# Patient Record
Sex: MALE | Race: WHITE | NOT HISPANIC OR LATINO | Employment: OTHER | URBAN - METROPOLITAN AREA
[De-identification: names, ages, dates, MRNs, and addresses within clinical notes are randomized per-mention and may not be internally consistent; named-entity substitution may affect disease eponyms.]

---

## 2017-09-09 ENCOUNTER — HOSPITAL ENCOUNTER (EMERGENCY)
Facility: HOSPITAL | Age: 57
Discharge: HOME | End: 2017-09-09
Attending: INTERNAL MEDICINE
Payer: COMMERCIAL

## 2017-09-09 VITALS
BODY MASS INDEX: 29.35 KG/M2 | DIASTOLIC BLOOD PRESSURE: 105 MMHG | RESPIRATION RATE: 16 BRPM | SYSTOLIC BLOOD PRESSURE: 164 MMHG | HEART RATE: 64 BPM | OXYGEN SATURATION: 98 % | TEMPERATURE: 98 F | HEIGHT: 70 IN | WEIGHT: 205 LBS

## 2017-09-09 DIAGNOSIS — S99.921A INJURY OF HEEL, RIGHT, INITIAL ENCOUNTER: ICD-10-CM

## 2017-09-09 DIAGNOSIS — T14.8XXA SKIN ABRASION: Primary | ICD-10-CM
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
## 2017-09-09 PROCEDURE — 99284 EMERGENCY DEPT VISIT MOD MDM: CPT
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
## 2018-05-08 ENCOUNTER — OFFICE VISIT (OUTPATIENT)
Dept: OPHTHALMOLOGY | Facility: CLINIC | Age: 58
End: 2018-05-08
Payer: COMMERCIAL

## 2018-05-08 DIAGNOSIS — H52.03 HYPEROPIA WITH PRESBYOPIA OF BOTH EYES: ICD-10-CM

## 2018-05-08 DIAGNOSIS — Z13.5 GLAUCOMA SCREENING: ICD-10-CM

## 2018-05-08 DIAGNOSIS — H02.401 PTOSIS OF EYELID, RIGHT: ICD-10-CM

## 2018-05-08 DIAGNOSIS — H52.4 HYPEROPIA WITH PRESBYOPIA OF BOTH EYES: ICD-10-CM

## 2018-05-08 DIAGNOSIS — H50.05 ESOTROPIA, ALTERNATING: Primary | ICD-10-CM

## 2018-05-08 PROCEDURE — 99999 PR PBB SHADOW E&M-EST. PATIENT-LVL I: CPT | Mod: PBBFAC,,, | Performed by: OPTOMETRIST

## 2018-05-08 PROCEDURE — 92015 DETERMINE REFRACTIVE STATE: CPT | Mod: S$GLB,,, | Performed by: OPTOMETRIST

## 2018-05-08 PROCEDURE — 92004 COMPRE OPH EXAM NEW PT 1/>: CPT | Mod: S$GLB,,, | Performed by: OPTOMETRIST

## 2018-05-08 NOTE — PROGRESS NOTES
HPI     Blurred Vision    Additional comments: At near and distance           Comments   NP to [facility].  Last eye exam was about 18 years ago  Pt c/o blurred vision at near and distance  Wears +2.00 OTC readers  No other complaints  No Drops  1. Eye Muscle SX at age 21.  Used corrective glasses as a child for   esotropia.  2.  Ptosis right eye has been getting progressively worse over the years.    Varies according to fatigue.         Last edited by Ashley Camarillo, OD on 5/8/2018 11:33 AM. (History)            Assessment /Plan     For exam results, see Encounter Report.    Esotropia, alternating    Ptosis of eyelid, right    Glaucoma screening    Hyperopia with presbyopia of both eyes      SP strabismus surgery (cosmetic, age 21) with stable alternating esotropia.  Briefly discussed ptosis surgery.   Patient deferred surgery consult.  Glaucoma screening and fundus exam normal.  Latent hyperopia becoming manifest.  Multifocal prescription.  Return to clinic 2 yrs.

## 2020-02-03 DIAGNOSIS — Z00.00 ROUTINE GENERAL MEDICAL EXAMINATION AT A HEALTH CARE FACILITY: Primary | ICD-10-CM

## 2020-02-05 ENCOUNTER — CLINICAL SUPPORT (OUTPATIENT)
Dept: INTERNAL MEDICINE | Facility: CLINIC | Age: 60
End: 2020-02-05
Payer: COMMERCIAL

## 2020-02-05 ENCOUNTER — CLINICAL SUPPORT (OUTPATIENT)
Dept: CARDIOLOGY | Facility: CLINIC | Age: 60
End: 2020-02-05
Payer: COMMERCIAL

## 2020-02-05 ENCOUNTER — HOSPITAL ENCOUNTER (OUTPATIENT)
Dept: RADIOLOGY | Facility: HOSPITAL | Age: 60
Discharge: HOME | End: 2020-02-05
Attending: FAMILY MEDICINE
Payer: COMMERCIAL

## 2020-02-05 ENCOUNTER — TELEPHONE (OUTPATIENT)
Dept: CARDIOLOGY | Facility: CLINIC | Age: 60
End: 2020-02-05

## 2020-02-05 ENCOUNTER — CLINICAL SUPPORT (OUTPATIENT)
Dept: REHABILITATION | Facility: HOSPITAL | Age: 60
End: 2020-02-05
Attending: FAMILY MEDICINE
Payer: COMMERCIAL

## 2020-02-05 ENCOUNTER — OFFICE VISIT (OUTPATIENT)
Dept: INTERNAL MEDICINE | Facility: CLINIC | Age: 60
End: 2020-02-05
Payer: COMMERCIAL

## 2020-02-05 VITALS
HEART RATE: 63 BPM | OXYGEN SATURATION: 98 % | HEIGHT: 70 IN | DIASTOLIC BLOOD PRESSURE: 74 MMHG | RESPIRATION RATE: 16 BRPM | SYSTOLIC BLOOD PRESSURE: 136 MMHG | WEIGHT: 194 LBS | BODY MASS INDEX: 27.77 KG/M2 | TEMPERATURE: 98 F

## 2020-02-05 VITALS
WEIGHT: 194 LBS | HEIGHT: 70 IN | BODY MASS INDEX: 27.77 KG/M2 | DIASTOLIC BLOOD PRESSURE: 90 MMHG | SYSTOLIC BLOOD PRESSURE: 164 MMHG | RESPIRATION RATE: 16 BRPM

## 2020-02-05 DIAGNOSIS — Z00.00 ROUTINE GENERAL MEDICAL EXAMINATION AT A HEALTH CARE FACILITY: Primary | ICD-10-CM

## 2020-02-05 DIAGNOSIS — Z00.00 PHYSICAL EXAM, ANNUAL: Primary | ICD-10-CM

## 2020-02-05 DIAGNOSIS — Z00.00 ROUTINE GENERAL MEDICAL EXAMINATION AT A HEALTH CARE FACILITY: ICD-10-CM

## 2020-02-05 DIAGNOSIS — R74.8 ELEVATED LIVER ENZYMES: ICD-10-CM

## 2020-02-05 DIAGNOSIS — Z12.11 ENCOUNTER FOR SCREENING COLONOSCOPY: ICD-10-CM

## 2020-02-05 LAB
25(OH)D3+25(OH)D2 SERPL-MCNC: 44 NG/ML (ref 30–96)
ALBUMIN SERPL BCP-MCNC: 4.1 G/DL (ref 3.5–5.2)
ALP SERPL-CCNC: 64 U/L (ref 55–135)
ALT SERPL W/O P-5'-P-CCNC: 41 U/L (ref 10–44)
ANION GAP SERPL CALC-SCNC: 8 MMOL/L (ref 8–16)
AST SERPL-CCNC: 72 U/L (ref 10–40)
BILIRUB SERPL-MCNC: 1 MG/DL (ref 0.1–1)
BUN SERPL-MCNC: 16 MG/DL (ref 6–20)
CALCIUM SERPL-MCNC: 9.5 MG/DL (ref 8.7–10.5)
CHLORIDE SERPL-SCNC: 104 MMOL/L (ref 95–110)
CHOLEST SERPL-MCNC: 193 MG/DL (ref 120–199)
CHOLEST/HDLC SERPL: 3.2 {RATIO} (ref 2–5)
CO2 SERPL-SCNC: 30 MMOL/L (ref 23–29)
COMPLEXED PSA SERPL-MCNC: 2.4 NG/ML (ref 0–4)
CREAT SERPL-MCNC: 1.1 MG/DL (ref 0.5–1.4)
DIASTOLIC DYSFUNCTION: NO
ERYTHROCYTE [DISTWIDTH] IN BLOOD BY AUTOMATED COUNT: 12.3 % (ref 11.5–14.5)
EST. GFR  (AFRICAN AMERICAN): >60 ML/MIN/1.73 M^2
EST. GFR  (NON AFRICAN AMERICAN): >60 ML/MIN/1.73 M^2
ESTIMATED AVG GLUCOSE: 111 MG/DL (ref 68–131)
GLUCOSE SERPL-MCNC: 104 MG/DL (ref 70–110)
HBA1C MFR BLD HPLC: 5.5 % (ref 4–5.6)
HCT VFR BLD AUTO: 45.5 % (ref 40–54)
HDLC SERPL-MCNC: 60 MG/DL (ref 40–75)
HDLC SERPL: 31.1 % (ref 20–50)
HGB BLD-MCNC: 15.5 G/DL (ref 14–18)
LDLC SERPL CALC-MCNC: 120.2 MG/DL (ref 63–159)
MCH RBC QN AUTO: 31.8 PG (ref 27–31)
MCHC RBC AUTO-ENTMCNC: 34.1 G/DL (ref 32–36)
MCV RBC AUTO: 93 FL (ref 82–98)
NONHDLC SERPL-MCNC: 133 MG/DL
PLATELET # BLD AUTO: 171 K/UL (ref 150–350)
PMV BLD AUTO: 11.3 FL (ref 9.2–12.9)
POTASSIUM SERPL-SCNC: 4 MMOL/L (ref 3.5–5.1)
PROT SERPL-MCNC: 6.9 G/DL (ref 6–8.4)
RBC # BLD AUTO: 4.88 M/UL (ref 4.6–6.2)
SODIUM SERPL-SCNC: 142 MMOL/L (ref 136–145)
TRIGL SERPL-MCNC: 64 MG/DL (ref 30–150)
TSH SERPL DL<=0.005 MIU/L-ACNC: 2.58 UIU/ML (ref 0.4–4)
WBC # BLD AUTO: 4.81 K/UL (ref 3.9–12.7)

## 2020-02-05 PROCEDURE — 97802 PR MED NUTR THER, 1ST, INDIV, EA 15 MIN: ICD-10-PCS | Mod: S$GLB,,, | Performed by: INTERNAL MEDICINE

## 2020-02-05 PROCEDURE — 93015 CV STRESS TEST SUPVJ I&R: CPT | Mod: S$GLB,,, | Performed by: INTERNAL MEDICINE

## 2020-02-05 PROCEDURE — 99999 PR PBB SHADOW E&M-EST. PATIENT-LVL III: CPT | Mod: PBBFAC,,, | Performed by: FAMILY MEDICINE

## 2020-02-05 PROCEDURE — 86703 HIV-1/HIV-2 1 RESULT ANTBDY: CPT

## 2020-02-05 PROCEDURE — 80053 COMPREHEN METABOLIC PANEL: CPT

## 2020-02-05 PROCEDURE — 99999 PR PBB SHADOW E&M-EST. PATIENT-LVL III: ICD-10-PCS | Mod: PBBFAC,,, | Performed by: FAMILY MEDICINE

## 2020-02-05 PROCEDURE — 84443 ASSAY THYROID STIM HORMONE: CPT

## 2020-02-05 PROCEDURE — 97802 MEDICAL NUTRITION INDIV IN: CPT | Mod: S$GLB,,, | Performed by: INTERNAL MEDICINE

## 2020-02-05 PROCEDURE — 71046 XR CHEST PA AND LATERAL: ICD-10-PCS | Mod: 26,,, | Performed by: RADIOLOGY

## 2020-02-05 PROCEDURE — 83036 HEMOGLOBIN GLYCOSYLATED A1C: CPT

## 2020-02-05 PROCEDURE — 80061 LIPID PANEL: CPT

## 2020-02-05 PROCEDURE — 86803 HEPATITIS C AB TEST: CPT

## 2020-02-05 PROCEDURE — 71046 X-RAY EXAM CHEST 2 VIEWS: CPT | Mod: 26,,, | Performed by: RADIOLOGY

## 2020-02-05 PROCEDURE — 93010 EKG 12-LEAD: ICD-10-PCS | Mod: S$GLB,,, | Performed by: INTERNAL MEDICINE

## 2020-02-05 PROCEDURE — 84153 ASSAY OF PSA TOTAL: CPT

## 2020-02-05 PROCEDURE — 82306 VITAMIN D 25 HYDROXY: CPT

## 2020-02-05 PROCEDURE — 99386 PR PREVENTIVE VISIT,NEW,40-64: ICD-10-PCS | Mod: S$GLB,,, | Performed by: FAMILY MEDICINE

## 2020-02-05 PROCEDURE — 71046 X-RAY EXAM CHEST 2 VIEWS: CPT | Mod: TC

## 2020-02-05 PROCEDURE — 99386 PREV VISIT NEW AGE 40-64: CPT | Mod: S$GLB,,, | Performed by: FAMILY MEDICINE

## 2020-02-05 PROCEDURE — 97750 PHYSICAL PERFORMANCE TEST: CPT | Performed by: PHYSICAL THERAPIST

## 2020-02-05 PROCEDURE — 93010 ELECTROCARDIOGRAM REPORT: CPT | Mod: S$GLB,,, | Performed by: INTERNAL MEDICINE

## 2020-02-05 PROCEDURE — 93005 ELECTROCARDIOGRAM TRACING: CPT | Mod: S$GLB,,, | Performed by: FAMILY MEDICINE

## 2020-02-05 PROCEDURE — 85027 COMPLETE CBC AUTOMATED: CPT

## 2020-02-05 PROCEDURE — 93005 EKG 12-LEAD: ICD-10-PCS | Mod: S$GLB,,, | Performed by: FAMILY MEDICINE

## 2020-02-05 PROCEDURE — 93015 CARDIAC TREADMILL STRESS TEST: ICD-10-PCS | Mod: S$GLB,,, | Performed by: INTERNAL MEDICINE

## 2020-02-05 NOTE — PROGRESS NOTES
:  60    DX: Z00.0  Orders:  Fitness Evaluation    An [employer] Fitness Component evaluation was completed.  Results are as follows:    Height (in):    70                            Weight (lbs):    194                                    BMI:   27.9    Resting Energy Expenditure:         2100       kcal/24 hrs.              Estimated:    1702     REE measured post treadmill:     Yes    ~ 1.25 hours   REE measured fasting:       Yes         Body Composition:          23.64  % body fat             Rating: Very Good    Waist to Hip Ratio:     0.94                    Risk:  Low   Hip taken over clothing:      Yes          Muscular Strength and Endurance Assessment:               Strength (lbs):     Right: 116             Rating:  Above average      Left:  99           Rating: above average   Push-ups:   10                 Rating:  good   Curl-ups :   30                Rating:  Above average    Reported discomfort B shoulders with push-ups.  Has been working out and actually better.  States would not have been able to do push-ups a year ago.     Flexibility Testing:   Sit and Reach (cm):    31.5                       Rating:  Excellent    H/O lumbar DDD      The patient completed the testing procedures without complications.

## 2020-02-05 NOTE — TELEPHONE ENCOUNTER
Pt presented today for a ETT. HTN noted. Several PVCs, PACs, Atrial runs noted during exercise. Pt asymptomatic. No chest pain. Pt states he have seen Cardio in the past for arrhythmias and ABN EKG. No treatment. Suggests he see Cardiology soon.

## 2020-02-05 NOTE — PROGRESS NOTES
"Nutrition Assessment  Client name:  Cedric Santoro  :  1960  Age:  60 y.o.  Gender:  male    Client states:  Very pleasant gentleman, retired from [employer], who is here for his annual physical with [employer]. Reports being  and 1 one child.  Food and exercise history provided below. Has presented with some high blood pressure readings lately so patient is monitoring at home to present physician with trends. Patient is hoping to improve his blood pressure through diet and exercise and to not have to start taking medication. Wife keeps bowls of chocolate candies and fruits on kitchen counter, which patient finds himself constantly grazing on throughout the day. Currently consume fast food approximately 3 times per week. Has made small changes to nutrition recently: previously dined out more than current amount, switched to lower salt/no salt seasoning options.     Anthropometrics  Height:  5' 10"     Weight:  194 lbs  BMI:  27.9  % Body Fat:  23.64%    Clinical Signs/Symptoms  N/V/D:  none  Appetite (Good, Fair, or Poor):  good      No past medical history on file.    Past Surgical History:   Procedure Laterality Date    EYE SURGERY      GALLBLADDER SURGERY      HERNIA REPAIR      THROAT SURGERY         Medications    has a current medication list which includes the following prescription(s): multivitamin.    Vitamins, Minerals, and/or Supplements:  multivitamin     Food/Medication Interactions:  Reviewed     Food Allergies or Intolerances:  NKFA     Social History    Marital status:    Employment:  Retired from [employer]    Social History     Tobacco Use    Smoking status: Never Smoker    Smokeless tobacco: Never Used   Substance Use Topics    Alcohol use: No        Lab Reports   Total Cholesterol:  193    Triglycerides:  64  HDL:  60  LDL:  120.2   Glucose:  104  HbA1c:  pending  BP:  136/74     Food History  Breakfast:  Toast with peanut butter or butter + coffee  Mid-morning Snack:  " "Throughout the day grazes on chocolate candies or fruit  Lunch:  [place] + chips + 2 fruits  Mid-afternoon Snack:  Same as snack above  Dinner:  [place] + gravy + vegetable + meat  H.S. Snack:  Same as snack above  *Fluid intake:  Water, unsweetened tea    Exercise History:  Walk 4-5 days weekly for 2.5 miles each time (previously walked 3.5 miles daily but had to decrease due to back/hip pain, which is now improving)    Cultural/Spiritual/Personal Preferences:  None noted    Support System:  spouse    State of Change:  contemplation    Barriers to Change:  none    Diagnosis    Excessive energy and carbohydrate intake related to food- and nutrition-related knowledge deficit as evidenced by BMI 27, patient-reported diet history, glucose 104.    Intervention    RMR (Method:  Body Samson):  1702 kcal  Activity Factor:  1.3  ARIELA:  2216 calories    Goals:  1.  Follow My Plate Method for meals.  2.  Have set snack times, using examples from Healthy Snack List.  3.  Choose meals listed in Fast Food Guide 2 out of 3 weekly fast food meals.    Nutrition Education  Labs were available at time of nutrition consultation and were already reviewed with physician. Complimented patient on his current walking routine as he is meeting recommendations for heart health. Reviewed My Plate Method and Fast Food Guide. Made patient aware of how his grazing on candies and fruits throughout the day can add unnecessary calories and sugar intake to his diet. Encouraged patient to not leave food out on counters, so that it is "out of sight, out of mind." Encouraged set snack times. Provided Healthy Snack Lists for options that are satisfying. In regards to patient wanting to decrease sodium intake, made patient aware of sodium sources and alternative. Discussed sodium content typically found in fast food meals. Discussed some of the healthier options available, as far as calories and fat content is concerned.     Patient verbalized understanding of " nutrition education and recommendations received.    Handouts Provided  Meal Planning Guide  Restaurant Guide  Eat Fit Shopping List  Eat Fit Fabiola  Fast Food Guide  Healthy Snack List  My Plate Method  Heart-Healthy Nutrition Therapy    Monitoring/Evaluation    Monitor the following:  Weight  BMI  % Body Fat  Caloric intake  Labs:  CMP, Lipid Panel, HgbA1c    Follow Up Plan:  Communication with referring healthcare provider is unnecessary at this time as patient presented as part of annual wellness exam.  However, will follow up with patient in 1-2 years.

## 2020-02-05 NOTE — PROGRESS NOTES
Subjective:       Patient ID: Cedric Santoro is a 60 y.o. male.    Chief Complaint: [employer]    Annual exam:     Pt is a 60 year who is in generally good health. Elevated liver enzyme was shown on this CMP. Not on any medications    Review of Systems   Constitutional: Negative.    Eyes: Negative.    Respiratory: Negative.    Cardiovascular: Negative.        Objective:      Physical Exam   Constitutional: He appears well-developed and well-nourished.   Cardiovascular: Regular rhythm. Exam reveals no friction rub.   No murmur heard.  Pulmonary/Chest: Effort normal and breath sounds normal. No stridor. He has no wheezes.   Psychiatric: He has a normal mood and affect. His behavior is normal.       Assessment:       1. Physical exam, annual    2. Elevated liver enzymes    3. Encounter for screening colonoscopy        Plan:       Physical exam, annual  Comments:  Pt is over healthy.     Elevated liver enzymes  -     ALT (SGPT); Future; Expected date: 08/05/2020  -     AST (SGOT); Future; Expected date: 08/05/2020    Encounter for screening colonoscopy  -     Case request GI: COLONOSCOPY

## 2020-02-06 ENCOUNTER — TELEPHONE (OUTPATIENT)
Dept: ENDOSCOPY | Facility: HOSPITAL | Age: 60
End: 2020-02-06

## 2020-02-06 LAB
HCV AB SERPL QL IA: NEGATIVE
HIV 1+2 AB+HIV1 P24 AG SERPL QL IA: NEGATIVE

## 2020-02-13 DIAGNOSIS — R94.31 PROLONGED Q-T INTERVAL ON ECG: Primary | ICD-10-CM

## 2020-02-19 ENCOUNTER — PATIENT OUTREACH (OUTPATIENT)
Dept: ADMINISTRATIVE | Facility: HOSPITAL | Age: 60
End: 2020-02-19

## 2020-02-20 ENCOUNTER — DOCUMENTATION ONLY (OUTPATIENT)
Dept: INTERNAL MEDICINE | Facility: CLINIC | Age: 60
End: 2020-02-20

## 2020-02-20 ENCOUNTER — PATIENT MESSAGE (OUTPATIENT)
Dept: ADMINISTRATIVE | Facility: OTHER | Age: 60
End: 2020-02-20

## 2020-02-20 ENCOUNTER — OFFICE VISIT (OUTPATIENT)
Dept: INTERNAL MEDICINE | Facility: CLINIC | Age: 60
End: 2020-02-20
Payer: COMMERCIAL

## 2020-02-20 VITALS
BODY MASS INDEX: 28.69 KG/M2 | HEART RATE: 60 BPM | OXYGEN SATURATION: 100 % | WEIGHT: 200.38 LBS | RESPIRATION RATE: 18 BRPM | SYSTOLIC BLOOD PRESSURE: 160 MMHG | TEMPERATURE: 96 F | HEIGHT: 70 IN | DIASTOLIC BLOOD PRESSURE: 92 MMHG

## 2020-02-20 DIAGNOSIS — R00.1 BRADYCARDIA: ICD-10-CM

## 2020-02-20 DIAGNOSIS — R25.2 MUSCLE CRAMPING: ICD-10-CM

## 2020-02-20 DIAGNOSIS — I10 ESSENTIAL HYPERTENSION: ICD-10-CM

## 2020-02-20 DIAGNOSIS — Z76.89 ESTABLISHING CARE WITH NEW DOCTOR, ENCOUNTER FOR: Primary | ICD-10-CM

## 2020-02-20 DIAGNOSIS — Z12.11 COLON CANCER SCREENING: ICD-10-CM

## 2020-02-20 PROCEDURE — 3008F BODY MASS INDEX DOCD: CPT | Mod: CPTII,S$GLB,, | Performed by: FAMILY MEDICINE

## 2020-02-20 PROCEDURE — 3077F SYST BP >= 140 MM HG: CPT | Mod: CPTII,S$GLB,, | Performed by: FAMILY MEDICINE

## 2020-02-20 PROCEDURE — 99999 PR PBB SHADOW E&M-EST. PATIENT-LVL III: ICD-10-PCS | Mod: PBBFAC,,, | Performed by: FAMILY MEDICINE

## 2020-02-20 PROCEDURE — 3080F DIAST BP >= 90 MM HG: CPT | Mod: CPTII,S$GLB,, | Performed by: FAMILY MEDICINE

## 2020-02-20 PROCEDURE — 3077F PR MOST RECENT SYSTOLIC BLOOD PRESSURE >= 140 MM HG: ICD-10-PCS | Mod: CPTII,S$GLB,, | Performed by: FAMILY MEDICINE

## 2020-02-20 PROCEDURE — 3008F PR BODY MASS INDEX (BMI) DOCUMENTED: ICD-10-PCS | Mod: CPTII,S$GLB,, | Performed by: FAMILY MEDICINE

## 2020-02-20 PROCEDURE — 99214 OFFICE O/P EST MOD 30 MIN: CPT | Mod: 25,S$GLB,, | Performed by: FAMILY MEDICINE

## 2020-02-20 PROCEDURE — 3080F PR MOST RECENT DIASTOLIC BLOOD PRESSURE >= 90 MM HG: ICD-10-PCS | Mod: CPTII,S$GLB,, | Performed by: FAMILY MEDICINE

## 2020-02-20 PROCEDURE — 99214 PR OFFICE/OUTPT VISIT, EST, LEVL IV, 30-39 MIN: ICD-10-PCS | Mod: 25,S$GLB,, | Performed by: FAMILY MEDICINE

## 2020-02-20 PROCEDURE — 99999 PR PBB SHADOW E&M-EST. PATIENT-LVL III: CPT | Mod: PBBFAC,,, | Performed by: FAMILY MEDICINE

## 2020-02-20 RX ORDER — AMLODIPINE BESYLATE 2.5 MG/1
2.5 TABLET ORAL DAILY
Qty: 90 TABLET | Refills: 1 | Status: SHIPPED | OUTPATIENT
Start: 2020-02-20 | End: 2020-03-05 | Stop reason: ALTCHOICE

## 2020-02-20 RX ORDER — TIZANIDINE 2 MG/1
4 TABLET ORAL EVERY 6 HOURS PRN
Qty: 40 TABLET | Refills: 1 | Status: SHIPPED | OUTPATIENT
Start: 2020-02-20 | End: 2020-03-01

## 2020-02-20 NOTE — PROGRESS NOTES
Subjective:       Patient ID: Cedric Santoro is a 60 y.o. male.    Chief Complaint: Establish Care    HPI Mr. Santoro presents today to establish care.  He has a medical history as listed below.     Tetanus done in 2017 at his job     Elevated blood pressure in the past. No diagnosis of HTN    Muscle cramps   Legs and fingers   Looked at magnesium, potassium  When they start they are frequent.     Review of Systems   Constitutional: Negative for activity change, appetite change, fatigue and fever.   HENT: Negative for congestion, ear pain, facial swelling, hearing loss, sore throat and tinnitus.    Eyes: Negative for redness and visual disturbance.   Respiratory: Negative for cough, chest tightness and wheezing.    Gastrointestinal: Negative for abdominal distention, abdominal pain, constipation, diarrhea, nausea and vomiting.   Endocrine: Negative for polydipsia and polyuria.   Genitourinary: Negative for discharge, flank pain and frequency.   Musculoskeletal: Negative for back pain, gait problem and joint swelling.   Skin: Negative for rash.   Neurological: Negative for dizziness, tremors, seizures, weakness and headaches.   Psychiatric/Behavioral: Negative for agitation and confusion.         Past Medical History:   Diagnosis Date    Raynaud's syndrome     Vasovagal syncopes      Past Surgical History:   Procedure Laterality Date    ankle/foot surgery      EYE SURGERY      GALLBLADDER SURGERY      HERNIA REPAIR      THROAT SURGERY       No family history on file.  Social History     Socioeconomic History    Marital status:      Spouse name: Not on file    Number of children: Not on file    Years of education: Not on file    Highest education level: Not on file   Occupational History    Not on file   Social Needs    Financial resource strain: Not on file    Food insecurity:     Worry: Not on file     Inability: Not on file    Transportation needs:     Medical: Not on file     Non-medical: Not on  file   Tobacco Use    Smoking status: Never Smoker    Smokeless tobacco: Never Used   Substance and Sexual Activity    Alcohol use: No    Drug use: No    Sexual activity: Not on file   Lifestyle    Physical activity:     Days per week: Not on file     Minutes per session: Not on file    Stress: Not on file   Relationships    Social connections:     Talks on phone: Not on file     Gets together: Not on file     Attends Jain service: Not on file     Active member of club or organization: Not on file     Attends meetings of clubs or organizations: Not on file     Relationship status: Not on file   Other Topics Concern    Not on file   Social History Narrative    Not on file       Objective:        Physical Exam   Constitutional: He is oriented to person, place, and time. He appears well-nourished. He does not appear ill.   HENT:   Head: Normocephalic and atraumatic.   Right Ear: External ear normal.   Left Ear: External ear normal.   Cerumen impaction in right ear canal.   Irrigated and TM appears normal   Eyes: Pupils are equal, round, and reactive to light. EOM are normal. Right eye exhibits no discharge. Left eye exhibits no discharge. No scleral icterus.   Neck: Normal range of motion. Neck supple. No thyromegaly present.   Cardiovascular: Normal rate, regular rhythm and normal heart sounds.   No murmur heard.  Pulmonary/Chest: Effort normal and breath sounds normal. No respiratory distress. He has no wheezes.   Abdominal: Soft. Bowel sounds are normal. He exhibits no distension. There is no tenderness.   Musculoskeletal: Normal range of motion. He exhibits no edema.   Neurological: He is alert and oriented to person, place, and time. He has normal reflexes. Coordination normal.   Skin: Skin is warm. No rash noted. No erythema.   Psychiatric: He has a normal mood and affect. His behavior is normal.   Nursing note and vitals reviewed.        Results for orders placed or performed in visit on 02/05/20    Cardiac treadmill stress test   Result Value Ref Range    Diastolic Dysfunction No        Assessment/Plan:     Establishing care with new doctor, encounter for  Reviewed medical, social, surgical and family history. Reviewed health maintenance  Reviewed recent physical with job and results from lab, EKG and stress test.   He has an appt with cardiology for possible holter monitor .  Discussed his Bradycardia -he is asymptomatic    Colon cancer screening  -     Fecal Immunochemical Test (iFOBT); Future; Expected date: 02/20/2020    Muscle cramping  -     Magnesium; Future; Expected date: 02/20/2020  -     tiZANidine (ZANAFLEX) 2 MG tablet; Take 2 tablets (4 mg total) by mouth every 6 (six) hours as needed.  Dispense: 40 tablet; Refill: 1    Essential hypertension  -     amLODIPine (NORVASC) 2.5 MG tablet; Take 1 tablet (2.5 mg total) by mouth once daily.  Dispense: 90 tablet; Refill: 1      Follow up as needed     Monserrat Asif MD  [facility]   Family Medicine

## 2020-02-24 PROBLEM — I10 ESSENTIAL HYPERTENSION: Status: ACTIVE | Noted: 2020-02-24

## 2020-02-26 ENCOUNTER — PATIENT OUTREACH (OUTPATIENT)
Dept: OTHER | Facility: OTHER | Age: 60
End: 2020-02-26

## 2020-03-02 ENCOUNTER — TELEPHONE (OUTPATIENT)
Dept: CARDIOLOGY | Facility: CLINIC | Age: 60
End: 2020-03-02

## 2020-03-02 NOTE — TELEPHONE ENCOUNTER
Returned patient's call.  Asked patient if he has seen a Cardiologist since 2015. Pt denies seeing any other Cardiologist since then.    ----- Message from Laz Farrar sent at 3/2/2020  4:39 PM CST -----  Contact: Pt   Type:  Patient Returning Call    Who Called:Cedricedson Santoro  Who Left Message for Patient:  Does the patient know what this is regarding?:Records  Would the patient rather a call back or a response via [facility]? Call Back  Best Call Back Number:[phone] (home)   Additional Information:

## 2020-03-03 ENCOUNTER — PATIENT MESSAGE (OUTPATIENT)
Dept: ADMINISTRATIVE | Facility: OTHER | Age: 60
End: 2020-03-03

## 2020-03-05 ENCOUNTER — INITIAL CONSULT (OUTPATIENT)
Dept: CARDIOLOGY | Facility: CLINIC | Age: 60
End: 2020-03-05
Payer: COMMERCIAL

## 2020-03-05 ENCOUNTER — APPOINTMENT (OUTPATIENT)
Dept: LAB | Facility: HOSPITAL | Age: 60
End: 2020-03-05
Attending: FAMILY MEDICINE
Payer: COMMERCIAL

## 2020-03-05 VITALS
HEIGHT: 70 IN | SYSTOLIC BLOOD PRESSURE: 140 MMHG | WEIGHT: 199.5 LBS | BODY MASS INDEX: 28.56 KG/M2 | HEART RATE: 54 BPM | DIASTOLIC BLOOD PRESSURE: 88 MMHG | OXYGEN SATURATION: 97 %

## 2020-03-05 DIAGNOSIS — I10 ESSENTIAL HYPERTENSION: ICD-10-CM

## 2020-03-05 DIAGNOSIS — R00.2 PALPITATION: ICD-10-CM

## 2020-03-05 DIAGNOSIS — I48.0 PAROXYSMAL ATRIAL FIBRILLATION: Primary | ICD-10-CM

## 2020-03-05 DIAGNOSIS — R94.31 PROLONGED Q-T INTERVAL ON ECG: ICD-10-CM

## 2020-03-05 DIAGNOSIS — R74.8 ELEVATED LIVER ENZYMES: ICD-10-CM

## 2020-03-05 DIAGNOSIS — R94.39 ABNORMAL STRESS TEST: ICD-10-CM

## 2020-03-05 PROCEDURE — 3008F BODY MASS INDEX DOCD: CPT | Mod: CPTII,S$GLB,, | Performed by: INTERNAL MEDICINE

## 2020-03-05 PROCEDURE — 3008F PR BODY MASS INDEX (BMI) DOCUMENTED: ICD-10-PCS | Mod: CPTII,S$GLB,, | Performed by: INTERNAL MEDICINE

## 2020-03-05 PROCEDURE — 99204 OFFICE O/P NEW MOD 45 MIN: CPT | Mod: S$GLB,,, | Performed by: INTERNAL MEDICINE

## 2020-03-05 PROCEDURE — 99999 PR PBB SHADOW E&M-EST. PATIENT-LVL III: CPT | Mod: PBBFAC,,, | Performed by: INTERNAL MEDICINE

## 2020-03-05 PROCEDURE — 3077F SYST BP >= 140 MM HG: CPT | Mod: CPTII,S$GLB,, | Performed by: INTERNAL MEDICINE

## 2020-03-05 PROCEDURE — 3079F DIAST BP 80-89 MM HG: CPT | Mod: CPTII,S$GLB,, | Performed by: INTERNAL MEDICINE

## 2020-03-05 PROCEDURE — 3077F PR MOST RECENT SYSTOLIC BLOOD PRESSURE >= 140 MM HG: ICD-10-PCS | Mod: CPTII,S$GLB,, | Performed by: INTERNAL MEDICINE

## 2020-03-05 PROCEDURE — 3079F PR MOST RECENT DIASTOLIC BLOOD PRESSURE 80-89 MM HG: ICD-10-PCS | Mod: CPTII,S$GLB,, | Performed by: INTERNAL MEDICINE

## 2020-03-05 PROCEDURE — 99999 PR PBB SHADOW E&M-EST. PATIENT-LVL III: ICD-10-PCS | Mod: PBBFAC,,, | Performed by: INTERNAL MEDICINE

## 2020-03-05 PROCEDURE — 99204 PR OFFICE/OUTPT VISIT, NEW, LEVL IV, 45-59 MIN: ICD-10-PCS | Mod: S$GLB,,, | Performed by: INTERNAL MEDICINE

## 2020-03-05 RX ORDER — NEBIVOLOL 5 MG/1
5 TABLET ORAL DAILY
Qty: 30 TABLET | Refills: 11 | Status: SHIPPED | OUTPATIENT
Start: 2020-03-05 | End: 2021-01-21

## 2020-03-05 RX ORDER — LOSARTAN POTASSIUM 25 MG/1
25 TABLET ORAL DAILY
Qty: 30 TABLET | Refills: 6 | Status: SHIPPED | OUTPATIENT
Start: 2020-03-05 | End: 2020-09-27

## 2020-03-05 NOTE — PROGRESS NOTES
Subjective:   Patient ID:  Cedric Santoro is a 60 y.o. male who presents for evaluation of Abnormal ECG (prolonged QT intervals)      HPI  A 60 male with new diagnosis of htn had a stress test as part as well ness program at [place]. He has 9 minutes ett  Bobby protocol htn response to exercise  Had exercise induced paf that self terminated. His resting hr is in the 50s with skipped beats.  His ekg  Showed sr 71/min with pac and has lvh. He is asymptomatic clinically he is very active physically well fit exercise regularily. He was placed on amlodipine 2.5 mg . His bp is still elevated. He is on decaf coffee salt compliance. Has no leg swelling.  No orthopnea pnd no chest pain. Has no blurred vision. Has no weakness no tia I reviewe all his labs no vit d deficiency no thyroid disorder electrolytes abnormalities no stigmata for sleep apnea.     Past Medical History:   Diagnosis Date    Palpitation 3/5/2020    Raynaud's syndrome     Vasovagal syncopes        Past Surgical History:   Procedure Laterality Date    ankle/foot surgery      EYE SURGERY      GALLBLADDER SURGERY      HERNIA REPAIR      THROAT SURGERY         Social History     Tobacco Use    Smoking status: Never Smoker    Smokeless tobacco: Never Used   Substance Use Topics    Alcohol use: No    Drug use: No       Family History   Problem Relation Age of Onset    Pacemaker/defibrilator Father        Current Outpatient Medications   Medication Sig    amLODIPine (NORVASC) 2.5 MG tablet Take 1 tablet (2.5 mg total) by mouth once daily.    multivitamin (THERAGRAN) per tablet Take 1 tablet by mouth once daily.     No current facility-administered medications for this visit.      Current Outpatient Medications on File Prior to Visit   Medication Sig    amLODIPine (NORVASC) 2.5 MG tablet Take 1 tablet (2.5 mg total) by mouth once daily.    multivitamin (THERAGRAN) per tablet Take 1 tablet by mouth once daily.     No current facility-administered  medications on file prior to visit.        Review of patient's allergies indicates:   Allergen Reactions    Iodinated contrast media     Iodine and iodide containing products Hives       Review of Systems   Constitution: Negative for malaise/fatigue.   Eyes: Negative for blurred vision.   Cardiovascular: Negative for chest pain, claudication, cyanosis, dyspnea on exertion, irregular heartbeat, leg swelling, near-syncope, orthopnea, palpitations and paroxysmal nocturnal dyspnea.   Respiratory: Negative for cough, hemoptysis and shortness of breath.    Hematologic/Lymphatic: Negative for bleeding problem. Does not bruise/bleed easily.   Skin: Negative for dry skin and itching.   Musculoskeletal: Positive for muscle cramps. Negative for falls, muscle weakness and myalgias.   Gastrointestinal: Negative for abdominal pain, diarrhea, heartburn, hematemesis, hematochezia and melena.   Genitourinary: Negative for flank pain and hematuria.   Neurological: Negative for dizziness, focal weakness, headaches, light-headedness, numbness, paresthesias, seizures and weakness.   Psychiatric/Behavioral: Negative for altered mental status and memory loss. The patient is not nervous/anxious.    Allergic/Immunologic: Negative for hives.       Objective:   Physical Exam   Constitutional: He is oriented to person, place, and time. He appears well-developed and well-nourished. No distress.   HENT:   Head: Normocephalic and atraumatic.   Eyes: Pupils are equal, round, and reactive to light. EOM are normal. Right eye exhibits no discharge. Left eye exhibits no discharge.   Neck: Neck supple. No JVD present. No thyromegaly present.   Cardiovascular: Normal rate, regular rhythm, normal heart sounds and intact distal pulses. Exam reveals no gallop and no friction rub.   No murmur heard.  Pulmonary/Chest: Effort normal and breath sounds normal. No respiratory distress. He has no wheezes. He has no rales. He exhibits no tenderness.   Abdominal:  "Soft. Bowel sounds are normal. He exhibits no distension. There is no tenderness.   Musculoskeletal: Normal range of motion. He exhibits no edema.   Neurological: He is alert and oriented to person, place, and time. No cranial nerve deficit.   Skin: Skin is warm and dry. No rash noted. He is not diaphoretic. No erythema.   Psychiatric: He has a normal mood and affect. His behavior is normal.   Nursing note and vitals reviewed.    Vitals:    03/05/20 0901 03/05/20 0904   BP: (!) 146/88 (!) 140/88   BP Location: Right arm Left arm   Patient Position: Sitting Sitting   BP Method: Large (Manual) Large (Manual)   Pulse: (!) 54    SpO2: 97%    Weight: 90.5 kg (199 lb 8.3 oz)    Height: 5' 10" (1.778 m)      Lab Results   Component Value Date    CHOL 193 02/05/2020     Lab Results   Component Value Date    HDL 60 02/05/2020     Lab Results   Component Value Date    LDLCALC 120.2 02/05/2020     Lab Results   Component Value Date    TRIG 64 02/05/2020     Lab Results   Component Value Date    CHOLHDL 31.1 02/05/2020       Chemistry        Component Value Date/Time     02/05/2020 0740    K 4.0 02/05/2020 0740     02/05/2020 0740    CO2 30 (H) 02/05/2020 0740    BUN 16 02/05/2020 0740    CREATININE 1.1 02/05/2020 0740     02/05/2020 0740        Component Value Date/Time    CALCIUM 9.5 02/05/2020 0740    ALKPHOS 64 02/05/2020 0740    AST 72 (H) 02/05/2020 0740    ALT 41 02/05/2020 0740    BILITOT 1.0 02/05/2020 0740    ESTGFRAFRICA >60 02/05/2020 0740    EGFRNONAA >60 02/05/2020 0740          Lab Results   Component Value Date    TSH 2.580 02/05/2020     No results found for: INR, PROTIME  Lab Results   Component Value Date    WBC 4.81 02/05/2020    HGB 15.5 02/05/2020    HCT 45.5 02/05/2020    MCV 93 02/05/2020     02/05/2020     BNP  @LABRCNTIP(BNP,BNPTRIAGEBLO)@  CrCl cannot be calculated (Patient's most recent lab result is older than the maximum 7 days allowed.).  Assessment:     1. Paroxysmal " atrial fibrillation    2. Prolonged Q-T interval on ECG    3. Elevated liver enzymes    4. Essential hypertension    5. Palpitation    6. Abnormal stress test      He has exercise induced afib probably secondary to severe lvh due to untreated htn. He will benefit from b blockers therapy and arb than a ca blocker.he will benefit form asa ec 81 mg po daily as well as holter monitor.   Will evaluate initial response and make further recommendations accordingly.  Plan:   bystolic 5 mg  Po daily   Losartan 25 mg po daily   Asa ec 81 mg po daily   Echo   holter   Low salt   Avoid caffeine   F/u in 2--4 weeks.   Stop amlodipine

## 2020-03-05 NOTE — LETTER
March 5, 2020      Cedric Fleming MD  [id number] [facility]  [place] [address]           [facility]  [address] [place] [address] [address], [address] FLO[address]  [place] [address]  Phone: [phone]  Fax: [phone]          Patient: Cedric Santoro   MR Number: [id number]   YOB: 1960   Date of Visit: 3/5/2020       Dear Dr. Cedric Fleming:    Thank you for referring Cedric Santoro to me for evaluation. Attached you will find relevant portions of my assessment and plan of care.    If you have questions, please do not hesitate to call me. I look forward to following Cedric Santoro along with you.    Sincerely,    Jeannette Vergara MD    Enclosure  CC:  No Recipients    If you would like to receive this communication electronically, please contact [email or web address] or [phone] to request more information on [employer] access.    For providers and/or their staff who would like to refer a patient to [facility], please contact us through our one-stop-shop provider referral line, [facility], at [phone].    If you feel you have received this communication in error or would no longer like to receive these types of communications, please e-mail [email or web address]

## 2020-03-13 ENCOUNTER — HOSPITAL ENCOUNTER (OUTPATIENT)
Dept: CARDIOLOGY | Facility: HOSPITAL | Age: 60
Discharge: HOME | End: 2020-03-13
Attending: INTERNAL MEDICINE
Payer: COMMERCIAL

## 2020-03-13 VITALS
SYSTOLIC BLOOD PRESSURE: 140 MMHG | BODY MASS INDEX: 28.49 KG/M2 | WEIGHT: 199 LBS | DIASTOLIC BLOOD PRESSURE: 88 MMHG | HEART RATE: 55 BPM | HEIGHT: 70 IN

## 2020-03-13 DIAGNOSIS — R00.2 PALPITATION: ICD-10-CM

## 2020-03-13 DIAGNOSIS — I10 ESSENTIAL HYPERTENSION: ICD-10-CM

## 2020-03-13 DIAGNOSIS — R94.39 ABNORMAL STRESS TEST: ICD-10-CM

## 2020-03-13 DIAGNOSIS — I48.0 PAROXYSMAL ATRIAL FIBRILLATION: ICD-10-CM

## 2020-03-13 PROCEDURE — 93306 TTE W/DOPPLER COMPLETE: CPT | Mod: 26,,, | Performed by: INTERNAL MEDICINE

## 2020-03-13 PROCEDURE — 93306 TTE W/DOPPLER COMPLETE: CPT

## 2020-03-13 PROCEDURE — 93306 ECHO (CUPID ONLY): ICD-10-PCS | Mod: 26,,, | Performed by: INTERNAL MEDICINE

## 2020-03-14 LAB
AORTIC ROOT ANNULUS: 3.16 CM
ASCENDING AORTA: 2.95 CM
AV INDEX (PROSTH): 0.71
AV MEAN GRADIENT: 6 MMHG
AV PEAK GRADIENT: 11 MMHG
AV VALVE AREA: 2.76 CM2
AV VELOCITY RATIO: 0.72
BSA FOR ECHO PROCEDURE: 2.11 M2
CV ECHO LV RWT: 0.51 CM
DOP CALC AO PEAK VEL: 1.69 M/S
DOP CALC AO VTI: 35.82 CM
DOP CALC LVOT AREA: 3.9 CM2
DOP CALC LVOT DIAMETER: 2.22 CM
DOP CALC LVOT PEAK VEL: 1.21 M/S
DOP CALC LVOT STROKE VOLUME: 98.77 CM3
DOP CALCLVOT PEAK VEL VTI: 25.53 CM
E WAVE DECELERATION TIME: 423.34 MSEC
E/A RATIO: 1.14
E/E' RATIO: 5.88 M/S
ECHO LV POSTERIOR WALL: 1.23 CM (ref 0.6–1.1)
FRACTIONAL SHORTENING: 30 % (ref 28–44)
INTERVENTRICULAR SEPTUM: 1.43 CM (ref 0.6–1.1)
IVRT: 114.19 MSEC
LA MAJOR: 4.95 CM
LA MINOR: 5.06 CM
LA WIDTH: 4.15 CM
LEFT ATRIUM SIZE: 3.96 CM
LEFT ATRIUM VOLUME INDEX: 33.6 ML/M2
LEFT ATRIUM VOLUME: 69.91 CM3
LEFT INTERNAL DIMENSION IN SYSTOLE: 3.4 CM (ref 2.1–4)
LEFT VENTRICLE DIASTOLIC VOLUME INDEX: 52.56 ML/M2
LEFT VENTRICLE DIASTOLIC VOLUME: 109.48 ML
LEFT VENTRICLE MASS INDEX: 124 G/M2
LEFT VENTRICLE SYSTOLIC VOLUME INDEX: 22.8 ML/M2
LEFT VENTRICLE SYSTOLIC VOLUME: 47.51 ML
LEFT VENTRICULAR INTERNAL DIMENSION IN DIASTOLE: 4.84 CM (ref 3.5–6)
LEFT VENTRICULAR MASS: 257.27 G
LV LATERAL E/E' RATIO: 4.55 M/S
LV SEPTAL E/E' RATIO: 8.33 M/S
MV PEAK A VEL: 0.44 M/S
MV PEAK E VEL: 0.5 M/S
PISA TR MAX VEL: 2.25 M/S
PULM VEIN S/D RATIO: 0.68
PV PEAK D VEL: 0.59 M/S
PV PEAK S VEL: 0.4 M/S
PV PEAK VELOCITY: 1.19 CM/S
RA MAJOR: 4.67 CM
RA WIDTH: 4.24 CM
RIGHT VENTRICULAR END-DIASTOLIC DIMENSION: 4.31 CM
SINUS: 2.76 CM
STJ: 2.39 CM
TDI LATERAL: 0.11 M/S
TDI SEPTAL: 0.06 M/S
TDI: 0.09 M/S
TR MAX PG: 20 MMHG

## 2020-03-16 NOTE — PROGRESS NOTES
Digital Medicine: Health  Introduction    Introduced Cedric Santoro to Digital Medicine. Discussed health  role and recommended lifestyle modifications.    The history is provided by the patient. No  was used.     HYPERTENSION  Our goal is to get BP to consistently below 130/80mmHg and make the process convenient so patient can avoid extra trips to the office. Getting your blood pressure below 130/80mmHg (definition of control) will reduce your risk for heart attack, kidney failure, stroke and death (as well as kidney failure, eye disease, & dementia)      Reviewed that the Digital Medicine care team - consisting of a clinician and a health  - will follow the most current evidence-based national guidelines for treating your condition.  The health  will focus on lifestyle modifications and motivation while the clinician will focus on medication therapy.  The care team will review all data on a regular basis and reach out as needed.      Explained that one of the key parts of the program is communication with the care team.  Asked patient to respond to outreach attempts and complete questionnaires.  Stressed importance of medication adherence.    Explained that we expect patient to obtain several blood pressures per week at random times of day.  Instructed patient not to allow anyone else to use phone and monitoring device.  Confirmed appropriate BP monitoring technique.      Explained to patient that the digital medicine team is not available for emergencies.  Patient will call [facility] on-call ([phone] or [phone]) or [phone] if needed.      Patient's BP goal is 130/80.Patient's BP average is 143/86 mmHg, which is above goal, per 2017 ACC/AHA Hypertension Guidelines.        Last 5 Patient Entered Readings                                      Current 30 Day Average: 143/86     Recent Readings 3/16/2020 3/16/2020 3/15/2020 3/14/2020 3/14/2020    SBP (mmHg) 120 135 123 124  101    DBP (mmHg) 89 80 78 71 64    Pulse 60 47 47 56 56        There are no preventive care reminders to display for this patient.    Reviewed the importance of self-monitoring, medication adherence, and that the health  can be used as a resource for lifestyle modifications to help reduce or maintain a healthy lifestyle.    Sent link to [facility] webpages and my contact information via [employer] for future questions. Follow up scheduled.

## 2020-03-18 ENCOUNTER — PATIENT MESSAGE (OUTPATIENT)
Dept: CARDIOLOGY | Facility: CLINIC | Age: 60
End: 2020-03-18

## 2020-03-18 ENCOUNTER — TELEPHONE (OUTPATIENT)
Dept: CARDIOLOGY | Facility: HOSPITAL | Age: 60
End: 2020-03-18

## 2020-03-18 NOTE — TELEPHONE ENCOUNTER
Called patient to reschedule his holter appointment. Pt did state that he wants to wait and reschedule his appointment until after all of the virus craziness is over.

## 2020-03-19 ENCOUNTER — PATIENT MESSAGE (OUTPATIENT)
Dept: ADMINISTRATIVE | Facility: OTHER | Age: 60
End: 2020-03-19

## 2020-03-26 ENCOUNTER — OFFICE VISIT (OUTPATIENT)
Dept: CARDIOLOGY | Facility: CLINIC | Age: 60
End: 2020-03-26
Payer: COMMERCIAL

## 2020-03-26 ENCOUNTER — PATIENT MESSAGE (OUTPATIENT)
Dept: CARDIOLOGY | Facility: CLINIC | Age: 60
End: 2020-03-26

## 2020-03-26 VITALS — DIASTOLIC BLOOD PRESSURE: 64 MMHG | HEART RATE: 52 BPM | SYSTOLIC BLOOD PRESSURE: 105 MMHG

## 2020-03-26 DIAGNOSIS — I10 ESSENTIAL HYPERTENSION: Primary | ICD-10-CM

## 2020-03-26 DIAGNOSIS — R94.39 ABNORMAL STRESS TEST: ICD-10-CM

## 2020-03-26 DIAGNOSIS — R00.2 PALPITATION: ICD-10-CM

## 2020-03-26 DIAGNOSIS — I48.0 PAROXYSMAL ATRIAL FIBRILLATION: ICD-10-CM

## 2020-03-26 PROCEDURE — 99213 PR OFFICE/OUTPT VISIT, EST, LEVL III, 20-29 MIN: ICD-10-PCS | Mod: 95,,, | Performed by: INTERNAL MEDICINE

## 2020-03-26 PROCEDURE — 3078F DIAST BP <80 MM HG: CPT | Mod: CPTII,S$GLB,, | Performed by: INTERNAL MEDICINE

## 2020-03-26 PROCEDURE — 99213 OFFICE O/P EST LOW 20 MIN: CPT | Mod: 95,,, | Performed by: INTERNAL MEDICINE

## 2020-03-26 PROCEDURE — 3078F PR MOST RECENT DIASTOLIC BLOOD PRESSURE < 80 MM HG: ICD-10-PCS | Mod: CPTII,S$GLB,, | Performed by: INTERNAL MEDICINE

## 2020-03-26 PROCEDURE — 3074F PR MOST RECENT SYSTOLIC BLOOD PRESSURE < 130 MM HG: ICD-10-PCS | Mod: CPTII,S$GLB,, | Performed by: INTERNAL MEDICINE

## 2020-03-26 PROCEDURE — 3074F SYST BP LT 130 MM HG: CPT | Mod: CPTII,S$GLB,, | Performed by: INTERNAL MEDICINE

## 2020-03-26 RX ORDER — ASPIRIN 81 MG/1
81 TABLET ORAL DAILY
COMMUNITY
End: 2022-10-04 | Stop reason: ALTCHOICE

## 2020-03-26 NOTE — PROGRESS NOTES
The patient location is:home  The chief complaint leading to consultation is: htn f/u and afib f/u  Visit type: Virtual visit with synchronous audio and video  Total time spent with patient: 15 minutes  Each patient to whom he or she provides medical services by telemedicine is:  (1) informed of the relationship between the physician and patient and the respective role of any other health care provider with respect to management of the patient; and (2) notified that he or she may decline to receive medical services by telemedicine and may withdraw from such care at any time.    Notes:   Subjective:   Patient ID:  Cedric Santoro is a 60 y.o. male who presents for follow up of No chief complaint on file.      HPI  A 59 yo male with htn paf is here today on video visit for bp f/u he has been doing well clinically his bp is well controlled he is walking 2.5 miles daily has no palpitation syncope near syncope chest pain or shortness of breath. Ha sno leg edema. Compliant with diet and meds. No nocturnal symptoms.his echo showed lvh with normal systolic diastolic function with mild MR .  Past Medical History:   Diagnosis Date    Palpitation 3/5/2020    Raynaud's syndrome     Vasovagal syncopes        Past Surgical History:   Procedure Laterality Date    ankle/foot surgery      EYE SURGERY      GALLBLADDER SURGERY      HERNIA REPAIR      THROAT SURGERY         Social History     Tobacco Use    Smoking status: Never Smoker    Smokeless tobacco: Never Used   Substance Use Topics    Alcohol use: No     Frequency: Never    Drug use: No       Family History   Problem Relation Age of Onset    Pacemaker/defibrilator Father        Current Outpatient Medications   Medication Sig    aspirin (ECOTRIN) 81 MG EC tablet Take 81 mg by mouth once daily.    losartan (COZAAR) 25 MG tablet Take 1 tablet (25 mg total) by mouth once daily.    multivitamin (THERAGRAN) per tablet Take 1 tablet by mouth once daily.    nebivolol  (BYSTOLIC) 5 MG Tab Take 1 tablet (5 mg total) by mouth once daily.     No current facility-administered medications for this visit.      Current Outpatient Medications on File Prior to Visit   Medication Sig    aspirin (ECOTRIN) 81 MG EC tablet Take 81 mg by mouth once daily.    losartan (COZAAR) 25 MG tablet Take 1 tablet (25 mg total) by mouth once daily.    multivitamin (THERAGRAN) per tablet Take 1 tablet by mouth once daily.    nebivolol (BYSTOLIC) 5 MG Tab Take 1 tablet (5 mg total) by mouth once daily.     No current facility-administered medications on file prior to visit.      Review of patient's allergies indicates:   Allergen Reactions    Iodinated contrast media     Iodine and iodide containing products Hives     Review of Systems   Constitution: Negative for malaise/fatigue.   Eyes: Negative for blurred vision.   Cardiovascular: Negative for chest pain, claudication, cyanosis, dyspnea on exertion, irregular heartbeat, leg swelling, near-syncope, orthopnea, palpitations and paroxysmal nocturnal dyspnea.   Respiratory: Negative for cough, hemoptysis and shortness of breath.    Hematologic/Lymphatic: Negative for bleeding problem. Does not bruise/bleed easily.   Skin: Negative for dry skin and itching.   Musculoskeletal: Negative for falls, muscle weakness and myalgias.   Gastrointestinal: Negative for abdominal pain, diarrhea, heartburn, hematemesis, hematochezia and melena.   Genitourinary: Negative for flank pain and hematuria.   Neurological: Negative for dizziness, focal weakness, headaches, light-headedness, numbness, paresthesias, seizures and weakness.   Psychiatric/Behavioral: Negative for altered mental status and memory loss. The patient is not nervous/anxious.    Allergic/Immunologic: Negative for hives.       Objective:   Physical Exam  Vitals:    03/26/20 0919   BP: 105/64   Pulse: (!) 52     Lab Results   Component Value Date    CHOL 193 02/05/2020     Lab Results   Component Value  Date    HDL 60 02/05/2020     Lab Results   Component Value Date    LDLCALC 120.2 02/05/2020     Lab Results   Component Value Date    TRIG 64 02/05/2020     Lab Results   Component Value Date    CHOLHDL 31.1 02/05/2020       Chemistry        Component Value Date/Time     02/05/2020 0740    K 4.0 02/05/2020 0740     02/05/2020 0740    CO2 30 (H) 02/05/2020 0740    BUN 16 02/05/2020 0740    CREATININE 1.1 02/05/2020 0740     02/05/2020 0740        Component Value Date/Time    CALCIUM 9.5 02/05/2020 0740    ALKPHOS 64 02/05/2020 0740    AST 72 (H) 02/05/2020 0740    ALT 41 02/05/2020 0740    BILITOT 1.0 02/05/2020 0740    ESTGFRAFRICA >60 02/05/2020 0740    EGFRNONAA >60 02/05/2020 0740          Lab Results   Component Value Date    TSH 2.580 02/05/2020     No results found for: INR, PROTIME  Lab Results   Component Value Date    WBC 4.81 02/05/2020    HGB 15.5 02/05/2020    HCT 45.5 02/05/2020    MCV 93 02/05/2020     02/05/2020     BMP  Sodium   Date Value Ref Range Status   02/05/2020 142 136 - 145 mmol/L Final     Potassium   Date Value Ref Range Status   02/05/2020 4.0 3.5 - 5.1 mmol/L Final     Chloride   Date Value Ref Range Status   02/05/2020 104 95 - 110 mmol/L Final     CO2   Date Value Ref Range Status   02/05/2020 30 (H) 23 - 29 mmol/L Final     BUN, Bld   Date Value Ref Range Status   02/05/2020 16 6 - 20 mg/dL Final     Creatinine   Date Value Ref Range Status   02/05/2020 1.1 0.5 - 1.4 mg/dL Final     Calcium   Date Value Ref Range Status   02/05/2020 9.5 8.7 - 10.5 mg/dL Final     Anion Gap   Date Value Ref Range Status   02/05/2020 8 8 - 16 mmol/L Final     eGFR if    Date Value Ref Range Status   02/05/2020 >60 >60 mL/min/1.73 m^2 Final     eGFR if non    Date Value Ref Range Status   02/05/2020 >60 >60 mL/min/1.73 m^2 Final     Comment:     Calculation used to obtain the estimated glomerular filtration  rate (eGFR) is the CKD-EPI equation.         CrCl cannot be calculated (Patient's most recent lab result is older than the maximum 7 days allowed.).    Assessment:     1. Essential hypertension    2. Palpitation    3. Abnormal stress test    4. Paroxysmal atrial fibrillation    doing well clinically no arrythmias   Subjective:   Cedric Santoro is a 60 y.o. male with hypertension.  Current Outpatient Medications   Medication Sig Dispense Refill    aspirin (ECOTRIN) 81 MG EC tablet Take 81 mg by mouth once daily.      losartan (COZAAR) 25 MG tablet Take 1 tablet (25 mg total) by mouth once daily. 30 tablet 6    multivitamin (THERAGRAN) per tablet Take 1 tablet by mouth once daily.      nebivolol (BYSTOLIC) 5 MG Tab Take 1 tablet (5 mg total) by mouth once daily. 30 tablet 11     No current facility-administered medications for this visit.       Hypertension Controlled good exercise tolerance. Will continue current plan   needs his holter electively to assess afib burden that may be asymptomatic.    Plan:     holter to be rescheduled   Continue current therapy  Cardiac low salt diet.  Risk factor modification and excercise program.  F/u in 6 months with lipid cmp

## 2020-03-30 ENCOUNTER — PATIENT OUTREACH (OUTPATIENT)
Dept: OTHER | Facility: OTHER | Age: 60
End: 2020-03-30

## 2020-03-30 NOTE — PROGRESS NOTES
Digital Medicine: Health  Follow-Up    The history is provided by the patient. No  was used.     Follow Up  Follow-up reason(s): reading review    Cannot contribute anything to higher readings. Mr. Santoro and his wife will access to see if anything in his routine would have caused the spikes. He cannot contribute anything to them. Encouraged Mr. Santoro to take longer resting periods before readings.     INTERVENTION(S)  encouragement/support    PLAN  patient verbalizes understanding    There are no preventive care reminders to display for this patient.    Last 5 Patient Entered Readings                                      Current 30 Day Average: 134/81     Recent Readings 3/30/2020 3/29/2020 3/28/2020 3/27/2020 3/26/2020    SBP (mmHg) 136 151 114 135 105    DBP (mmHg) 82 86 75 79 64    Pulse 46 49 46 45 52            Diet Screening   He has the following dietary restrictions: low sodium diet    Physical Activity Screening   When asked if exercising, patient responded: yes    Patient participates in the following activities: walking    Mr. Santoro walks 2.50 miles 5 to 6 days a week. Praise provided for physical activity.     Medication Adherence Screening   He did not miss a dose this month.    No questions or concerns at this time.

## 2020-03-31 ENCOUNTER — PATIENT OUTREACH (OUTPATIENT)
Dept: OTHER | Facility: OTHER | Age: 60
End: 2020-03-31

## 2020-03-31 DIAGNOSIS — I10 ESSENTIAL HYPERTENSION: Primary | ICD-10-CM

## 2020-03-31 RX ORDER — FERROUS SULFATE 325(65) MG
325 TABLET, DELAYED RELEASE (ENTERIC COATED) ORAL DAILY
COMMUNITY

## 2020-03-31 RX ORDER — MAGNESIUM 30 MG
1 TABLET ORAL DAILY
COMMUNITY

## 2020-03-31 RX ORDER — UBIDECARENONE 30 MG
30 CAPSULE ORAL DAILY
COMMUNITY

## 2020-03-31 RX ORDER — MULTIVIT WITH MINERALS/HERBS
1 TABLET ORAL DAILY
COMMUNITY

## 2020-03-31 RX ORDER — CHOLECALCIFEROL (VITAMIN D3) 25 MCG
1000 TABLET ORAL DAILY
COMMUNITY

## 2020-03-31 NOTE — PROGRESS NOTES
Digital Medicine: Clinician Introduction    Cedric Santoro is a 60 y.o. male who is newly enrolled in the Digital Medicine Clinic.    The following information was reviewed and updated:  Preferred pharmacy   [id number] - [place] - [address] [facility]  [address]  [place] [address]  Phone: [phone] Fax: [phone]      Patient prefers a 90 days supply.     Review of patient's allergies indicates:   Allergen Reactions    Iodinated contrast media     Iodine and iodide containing products Hives         Called patient to welcome into [facility]. Patient endorses adherence to medication regimen. Patient denies hypotensive s/sx (lightheadedness, dizziness, nausea, fatigue); patient denies hypertensive s/sx (SOB, CP, severe headaches, changes in vision). Instructed patient to seek medical care if BP > 180/110 and is accompanied by hypertensive s/sx associated, patient confirms understanding.     Patient discussed lifestyle strategies that were encouraged by HC, including tracking what he eats and his sleeping habits. I commended him for utilizing HC advice.     We reviewed BP monitoring technique, patient has no issues with the BP cuff.     Patient denies having questions or concerns. Patient has my contact information and knows to call with any concerns or clinical changes.        The history is provided by the patient. No  was used.     HYPERTENSION  Our goal is to get BP to consistently below 130/80mmHg and make the process convenient so patient can avoid extra trips to the office. Getting your blood pressure below 130/80mmHg (definition of control) will reduce your risk for heart attack, kidney failure, stroke and death (as well as kidney failure, eye disease, & dementia)      Reviewed non-pharmacologic therapies and impact on BP      Explained that we expect patient to obtain several blood pressures per week at random times of  day.  Instructed patient not to allow anyone else to use phone and monitoring device.  Confirmed appropriate BP monitoring technique.      Explained to patient that the digital medicine team is not available for emergencies.  Patient will call [facility] on-call ([phone] or [phone]) or [phone] if needed.    Patient's BP goal is 130/80. Patients BP average is 133/81 mmHg, which is at or below goal, per 2017 ACC/AHA Hypertension Guidelines.    Assessment:  Reviewed recent readings. Per 2017 ACC/ AHA HTN guidelines (goal of BP < 130/80), current 30-day average is well controlled.       Med Review complete.    Allergies reviewed.      Last 5 Patient Entered Readings                                      Current 30 Day Average: 133/81     Recent Readings 3/31/2020 3/30/2020 3/29/2020 3/28/2020 3/27/2020    SBP (mmHg) 123 136 151 114 135    DBP (mmHg) 76 82 86 75 79    Pulse 49 46 49 46 45            INTERVENTION(S)  reviewed appropriate dose schedule, reviewed monitoring technique, encouragement/support and goal setting    PLAN  patient verbalizes understanding and continue monitoring    Continue current medication regimen. I will continue to monitor regularly and will follow-up in 2 to 3 months, sooner if blood pressure begins to trend upward or downward.         There are no preventive care reminders to display for this patient.    Current Medication Regimen:  Hypertension Medications             losartan (COZAAR) 25 MG tablet Take 1 tablet (25 mg total) by mouth once daily.    nebivolol (BYSTOLIC) 5 MG Tab Take 1 tablet (5 mg total) by mouth once daily.            Reviewed the importance of self-monitoring, medication adherence, and that the health  can be used as a resource for lifestyle modifications to help reduce or maintain a healthy lifestyle.    Sent link to [facility] webpages and my contact information via [employer] for future questions. Follow up scheduled.                      Medication Adherence Screening   He did not miss a dose this month.  Patient knows purpose of medications.

## 2020-04-28 ENCOUNTER — PATIENT OUTREACH (OUTPATIENT)
Dept: OTHER | Facility: OTHER | Age: 60
End: 2020-04-28

## 2020-05-06 NOTE — PROGRESS NOTES
Digital Medicine: Health  Follow-Up    The history is provided by the patient. No  was used.     Follow Up  Follow-up reason(s): reading review      Readings are trending down due to lifestyle change.      INTERVENTION(S)  encouragement/support    There are no preventive care reminders to display for this patient.    Last 5 Patient Entered Readings                                      Current 30 Day Average: 135/76     Recent Readings 5/6/2020 5/5/2020 5/4/2020 5/3/2020 5/2/2020    SBP (mmHg) 118 127 134 120 133    DBP (mmHg) 65 71 73 59 79    Pulse 50 50 46 47 46            Diet Screening   No change to diet.      Physical Activity Screening   When asked if exercising, patient responded: yes    Patient participates in the following activities: walking and strength training     Mr. Santoro walks 2.5 miles a day and has added strength training into his routine. Praise provided for increase in activity.

## 2020-05-11 PROBLEM — Z00.00 PHYSICAL EXAM, ANNUAL: Status: RESOLVED | Noted: 2020-02-05 | Resolved: 2020-05-11

## 2020-06-04 ENCOUNTER — TELEPHONE (OUTPATIENT)
Dept: ENDOSCOPY | Facility: HOSPITAL | Age: 60
End: 2020-06-04

## 2020-06-04 NOTE — TELEPHONE ENCOUNTER
1220 Attempted to schedule pt procedure.  No answer, left message with call back info on home.    1221 Attempted to schedule pt procedure.  No answer, left message with call back info on cell

## 2020-06-10 ENCOUNTER — PATIENT OUTREACH (OUTPATIENT)
Dept: OTHER | Facility: OTHER | Age: 60
End: 2020-06-10

## 2020-06-10 NOTE — PROGRESS NOTES
Digital Medicine: Health  Follow-Up    The history is provided by the patient. No  was used.   Follow Up  Follow-up reason(s): reading review      Readings are trending down due to lifestyle maintenance.    Feeling well. Relates a few higher spikes in BP due to stress. Mr. Santoro reports having a lot going on right now, but is high in spirits and feels well.     INTERVENTION(S)  encouragement/support    There are no preventive care reminders to display for this patient.    Last 5 Patient Entered Readings                                      Current 30 Day Average: 132/75     Recent Readings 6/10/2020 6/9/2020 6/8/2020 6/7/2020 6/6/2020    SBP (mmHg) 136 128 141 130 129    DBP (mmHg) 69 76 78 81 77    Pulse 49 49 50 47 60            Diet Screening   No change to diet.      Physical Activity Screening   No change to exercise routine.

## 2020-07-13 ENCOUNTER — HOSPITAL ENCOUNTER (OUTPATIENT)
Dept: CARDIOLOGY | Facility: HOSPITAL | Age: 60
Discharge: HOME | End: 2020-07-13
Attending: INTERNAL MEDICINE
Payer: COMMERCIAL

## 2020-07-13 DIAGNOSIS — I48.0 PAROXYSMAL ATRIAL FIBRILLATION: ICD-10-CM

## 2020-07-13 DIAGNOSIS — R94.39 ABNORMAL STRESS TEST: ICD-10-CM

## 2020-07-13 PROCEDURE — 93227 HOLTER MONITOR - 48 HOUR (CUPID ONLY): ICD-10-PCS | Mod: ,,, | Performed by: INTERNAL MEDICINE

## 2020-07-13 PROCEDURE — 93227 XTRNL ECG REC<48 HR R&I: CPT | Mod: ,,, | Performed by: INTERNAL MEDICINE

## 2020-07-13 PROCEDURE — 93225 XTRNL ECG REC<48 HRS REC: CPT

## 2020-07-15 LAB
OHS CV EVENT MONITOR DAY: 0
OHS CV HOLTER LENGTH DECIMAL HOURS: 48
OHS CV HOLTER LENGTH HOURS: 48
OHS CV HOLTER LENGTH MINUTES: 0

## 2020-07-17 ENCOUNTER — TELEPHONE (OUTPATIENT)
Dept: CARDIOLOGY | Facility: CLINIC | Age: 60
End: 2020-07-17

## 2020-07-17 NOTE — TELEPHONE ENCOUNTER
Patient was notified of results of holter. All questions were answered. Pt verbalized understanding. Pt will call back with any other questions or concerns.    ----- Message from Jeannette Vergara MD sent at 7/16/2020 10:56 PM CDT -----  Has few skipped beats no significant rhythm issues

## 2020-07-20 ENCOUNTER — PATIENT OUTREACH (OUTPATIENT)
Dept: OTHER | Facility: OTHER | Age: 60
End: 2020-07-20

## 2020-07-22 ENCOUNTER — PATIENT OUTREACH (OUTPATIENT)
Dept: OTHER | Facility: OTHER | Age: 60
End: 2020-07-22

## 2020-07-29 NOTE — PROGRESS NOTES
Digital Medicine: Health  Follow-Up    The history is provided by the patient.             Reason for review: Blood pressure not at goal      Additional Follow-up details: Feeling well. Having some pain in his neck and contributes this to higher readings.         Diet-Not assessed      Additional diet details:    Physical Activity-Change  He removed walking and total gym from His physical activity.    He identified the following barriers to physical activity: neck pain         Additional physical activity details: Mr. Santoro took a break from exercise because of neck pain. He is currently seeing a chiropractor.       Medication Adherence-Medication Adherence not addressed.      Substance, Sleep, Stress-Not assessed    PLAN  Additional monitoring needed: readings trending up due to neck pain     Patient verbalizes understanding. Patient did not express questions or concerns and patient has contact information if needed.        There are no preventive care reminders to display for this patient.    Last 5 Patient Entered Readings                                      Current 30 Day Average: 135/79     Recent Readings 7/28/2020 7/27/2020 7/26/2020 7/25/2020 7/24/2020    SBP (mmHg) 143 150 120 137 140    DBP (mmHg) 76 81 73 78 74    Pulse 48 49 47 47 43

## 2020-08-05 ENCOUNTER — LAB VISIT (OUTPATIENT)
Dept: LAB | Facility: HOSPITAL | Age: 60
End: 2020-08-05
Attending: FAMILY MEDICINE
Payer: COMMERCIAL

## 2020-08-05 DIAGNOSIS — R25.2 MUSCLE CRAMPING: ICD-10-CM

## 2020-08-05 DIAGNOSIS — I48.0 PAROXYSMAL ATRIAL FIBRILLATION: ICD-10-CM

## 2020-08-05 DIAGNOSIS — R94.39 ABNORMAL STRESS TEST: ICD-10-CM

## 2020-08-05 DIAGNOSIS — R74.8 ELEVATED LIVER ENZYMES: ICD-10-CM

## 2020-08-05 LAB
ALBUMIN SERPL BCP-MCNC: 4.1 G/DL (ref 3.5–5.2)
ALP SERPL-CCNC: 68 U/L (ref 55–135)
ALT SERPL W/O P-5'-P-CCNC: 39 U/L (ref 10–44)
ALT SERPL W/O P-5'-P-CCNC: 39 U/L (ref 10–44)
ANION GAP SERPL CALC-SCNC: 8 MMOL/L (ref 8–16)
AST SERPL-CCNC: 66 U/L (ref 10–40)
AST SERPL-CCNC: 66 U/L (ref 10–40)
BILIRUB SERPL-MCNC: 0.7 MG/DL (ref 0.1–1)
BUN SERPL-MCNC: 20 MG/DL (ref 6–20)
CALCIUM SERPL-MCNC: 9.5 MG/DL (ref 8.7–10.5)
CHLORIDE SERPL-SCNC: 104 MMOL/L (ref 95–110)
CHOLEST SERPL-MCNC: 179 MG/DL (ref 120–199)
CHOLEST/HDLC SERPL: 2.7 {RATIO} (ref 2–5)
CO2 SERPL-SCNC: 29 MMOL/L (ref 23–29)
CREAT SERPL-MCNC: 1.1 MG/DL (ref 0.5–1.4)
EST. GFR  (AFRICAN AMERICAN): >60 ML/MIN/1.73 M^2
EST. GFR  (NON AFRICAN AMERICAN): >60 ML/MIN/1.73 M^2
GLUCOSE SERPL-MCNC: 103 MG/DL (ref 70–110)
HDLC SERPL-MCNC: 66 MG/DL (ref 40–75)
HDLC SERPL: 36.9 % (ref 20–50)
LDLC SERPL CALC-MCNC: 96 MG/DL (ref 63–159)
MAGNESIUM SERPL-MCNC: 2 MG/DL (ref 1.6–2.6)
NONHDLC SERPL-MCNC: 113 MG/DL
POTASSIUM SERPL-SCNC: 4.2 MMOL/L (ref 3.5–5.1)
PROT SERPL-MCNC: 7 G/DL (ref 6–8.4)
SODIUM SERPL-SCNC: 141 MMOL/L (ref 136–145)
TRIGL SERPL-MCNC: 85 MG/DL (ref 30–150)

## 2020-08-05 PROCEDURE — 80061 LIPID PANEL: CPT

## 2020-08-05 PROCEDURE — 80053 COMPREHEN METABOLIC PANEL: CPT

## 2020-08-05 PROCEDURE — 83735 ASSAY OF MAGNESIUM: CPT

## 2020-08-05 PROCEDURE — 36415 COLL VENOUS BLD VENIPUNCTURE: CPT

## 2020-08-31 ENCOUNTER — OFFICE VISIT (OUTPATIENT)
Dept: OPHTHALMOLOGY | Facility: CLINIC | Age: 60
End: 2020-08-31
Payer: COMMERCIAL

## 2020-08-31 DIAGNOSIS — H02.401 PTOSIS, RIGHT EYELID: Primary | ICD-10-CM

## 2020-08-31 DIAGNOSIS — H50.05 ALTERNATING ESOTROPIA: ICD-10-CM

## 2020-08-31 DIAGNOSIS — H52.4 BILATERAL PRESBYOPIA: ICD-10-CM

## 2020-08-31 DIAGNOSIS — I10 ESSENTIAL HYPERTENSION: ICD-10-CM

## 2020-08-31 DIAGNOSIS — H50.21 HYPERTROPIA OF RIGHT EYE: ICD-10-CM

## 2020-08-31 DIAGNOSIS — H52.03 HYPEROPIA, BILATERAL: ICD-10-CM

## 2020-08-31 PROCEDURE — 92014 COMPRE OPH EXAM EST PT 1/>: CPT | Mod: S$GLB,,, | Performed by: OPTOMETRIST

## 2020-08-31 PROCEDURE — 92015 PR REFRACTION: ICD-10-PCS | Mod: S$GLB,,, | Performed by: OPTOMETRIST

## 2020-08-31 PROCEDURE — 99999 PR PBB SHADOW E&M-EST. PATIENT-LVL I: ICD-10-PCS | Mod: PBBFAC,,, | Performed by: OPTOMETRIST

## 2020-08-31 PROCEDURE — 92015 DETERMINE REFRACTIVE STATE: CPT | Mod: S$GLB,,, | Performed by: OPTOMETRIST

## 2020-08-31 PROCEDURE — 99999 PR PBB SHADOW E&M-EST. PATIENT-LVL I: CPT | Mod: PBBFAC,,, | Performed by: OPTOMETRIST

## 2020-08-31 PROCEDURE — 92014 PR EYE EXAM, EST PATIENT,COMPREHESV: ICD-10-PCS | Mod: S$GLB,,, | Performed by: OPTOMETRIST

## 2020-08-31 NOTE — PROGRESS NOTES
HPI     Patient having trouble with neck   Head is turning when left eye is looking forward.  Patient having neck problems since April.  Last eye exam 05/08/201 [facility].  Update glasses RX.      Last edited by Taryn Son on 8/31/2020  4:07 PM. (History)            Assessment /Plan     For exam results, see Encounter Report.    Ptosis, right eyelid    Essential hypertension    Alternating esotropia    Hypertropia of right eye    Hyperopia, bilateral    Bilateral presbyopia      Stable ptosis RUL, some improvement with prism correction.    Dispense Final Rx for glasses. With prism to prevent head turn  RTC 1 year  Discussed above and answered questions.

## 2020-09-09 ENCOUNTER — PATIENT OUTREACH (OUTPATIENT)
Dept: OTHER | Facility: OTHER | Age: 60
End: 2020-09-09

## 2020-09-09 NOTE — PROGRESS NOTES
Digital Medicine: Health  Follow-Up    The history is provided by the patient.             Reason for review: Blood pressure not at goal        Topics Covered on Call: physical activity    Additional Follow-up details: Feeling well. Neck pain.         Diet-Not assessed          Physical Activity-Change      He added Physical Therapy to His physical activity routine.        He identified the following barriers to physical activity: Neck pain         Additional physical activity details: Mr. Santoro is 2 weeks in to physical therapy for his neck. He states he is doing neck exercises and dry needling. He is still experiencing some pain, but states it is getting better.       Medication Adherence-Medication Adherence not addressed.      Substance, Sleep, Stress-Not assessed      Continue current diet/physical activity routine. Physical therapy        Addressed any questions or concerns and patient has my contact information if needed prior to next outreach. Patient verbalizes understanding.          There are no preventive care reminders to display for this patient.    Last 5 Patient Entered Readings                                      Current 30 Day Average: 137/80     Recent Readings 9/9/2020 9/8/2020 9/7/2020 9/6/2020 9/5/2020    SBP (mmHg) 148 126 148 131 142    DBP (mmHg) 81 90 85 76 86    Pulse 47 44 49 45 69

## 2020-09-19 ENCOUNTER — PATIENT MESSAGE (OUTPATIENT)
Dept: ADMINISTRATIVE | Facility: OTHER | Age: 60
End: 2020-09-19

## 2020-09-24 ENCOUNTER — OFFICE VISIT (OUTPATIENT)
Dept: CARDIOLOGY | Facility: CLINIC | Age: 60
End: 2020-09-24
Payer: COMMERCIAL

## 2020-09-24 VITALS
SYSTOLIC BLOOD PRESSURE: 98 MMHG | HEART RATE: 64 BPM | BODY MASS INDEX: 27.68 KG/M2 | WEIGHT: 192.88 LBS | OXYGEN SATURATION: 99 % | DIASTOLIC BLOOD PRESSURE: 68 MMHG

## 2020-09-24 DIAGNOSIS — I10 ESSENTIAL HYPERTENSION: ICD-10-CM

## 2020-09-24 DIAGNOSIS — R94.39 ABNORMAL STRESS TEST: ICD-10-CM

## 2020-09-24 DIAGNOSIS — R74.8 ELEVATED LIVER ENZYMES: ICD-10-CM

## 2020-09-24 DIAGNOSIS — R00.2 PALPITATION: ICD-10-CM

## 2020-09-24 DIAGNOSIS — R94.31 PROLONGED Q-T INTERVAL ON ECG: ICD-10-CM

## 2020-09-24 DIAGNOSIS — I48.0 PAROXYSMAL ATRIAL FIBRILLATION: Primary | ICD-10-CM

## 2020-09-24 PROCEDURE — 3078F PR MOST RECENT DIASTOLIC BLOOD PRESSURE < 80 MM HG: ICD-10-PCS | Mod: CPTII,S$GLB,, | Performed by: INTERNAL MEDICINE

## 2020-09-24 PROCEDURE — 3078F DIAST BP <80 MM HG: CPT | Mod: CPTII,S$GLB,, | Performed by: INTERNAL MEDICINE

## 2020-09-24 PROCEDURE — 99999 PR PBB SHADOW E&M-EST. PATIENT-LVL IV: ICD-10-PCS | Mod: PBBFAC,,, | Performed by: INTERNAL MEDICINE

## 2020-09-24 PROCEDURE — 99213 OFFICE O/P EST LOW 20 MIN: CPT | Mod: S$GLB,,, | Performed by: INTERNAL MEDICINE

## 2020-09-24 PROCEDURE — 99999 PR PBB SHADOW E&M-EST. PATIENT-LVL IV: CPT | Mod: PBBFAC,,, | Performed by: INTERNAL MEDICINE

## 2020-09-24 PROCEDURE — 3008F PR BODY MASS INDEX (BMI) DOCUMENTED: ICD-10-PCS | Mod: CPTII,S$GLB,, | Performed by: INTERNAL MEDICINE

## 2020-09-24 PROCEDURE — 99213 PR OFFICE/OUTPT VISIT, EST, LEVL III, 20-29 MIN: ICD-10-PCS | Mod: S$GLB,,, | Performed by: INTERNAL MEDICINE

## 2020-09-24 PROCEDURE — 3074F SYST BP LT 130 MM HG: CPT | Mod: CPTII,S$GLB,, | Performed by: INTERNAL MEDICINE

## 2020-09-24 PROCEDURE — 3074F PR MOST RECENT SYSTOLIC BLOOD PRESSURE < 130 MM HG: ICD-10-PCS | Mod: CPTII,S$GLB,, | Performed by: INTERNAL MEDICINE

## 2020-09-24 PROCEDURE — 3008F BODY MASS INDEX DOCD: CPT | Mod: CPTII,S$GLB,, | Performed by: INTERNAL MEDICINE

## 2020-09-24 NOTE — PROGRESS NOTES
Subjective:   Patient ID:  Cedric Santoro is a 60 y.o. male who presents for follow up of No chief complaint on file.      HPI   3/6/2020  A 59 yo male with htn paf is here today on video visit for bp f/u he has been doing well clinically his bp is well controlled he is walking 2.5 miles daily has no palpitation syncope near syncope chest pain or shortness of breath. Ha sno leg edema. Compliant with diet and meds. No nocturnal symptoms.his echo showed lvh with normal systolic diastolic function with mild MR .  9/24/2020  He is here for f/u today he is physically active does a lot of chores around the house he cuts grass very active physically completely asymptomatic no palpitation chest pain shortness of breath. Compliant with meds and diet.  Past Medical History:   Diagnosis Date    Hypertension     Palpitation 3/5/2020    Raynaud's syndrome     Vasovagal syncopes        Past Surgical History:   Procedure Laterality Date    ankle/foot surgery      EYE SURGERY      GALLBLADDER SURGERY      HERNIA REPAIR      THROAT SURGERY         Social History     Tobacco Use    Smoking status: Never Smoker    Smokeless tobacco: Never Used   Substance Use Topics    Alcohol use: No     Frequency: Never     Binge frequency: Never    Drug use: No       Family History   Problem Relation Age of Onset    Pacemaker/defibrilator Father     Macular degeneration Father     Macular degeneration Brother        Current Outpatient Medications   Medication Sig    aspirin (ECOTRIN) 81 MG EC tablet Take 81 mg by mouth once daily.    b complex vitamins tablet Take 1 tablet by mouth once daily.    co-enzyme Q-10 30 mg capsule Take 30 mg by mouth 3 (three) times daily.    ferrous sulfate 325 (65 FE) MG EC tablet Take 325 mg by mouth 3 (three) times daily with meals.    losartan (COZAAR) 25 MG tablet Take 1 tablet (25 mg total) by mouth once daily.    magnesium 30 mg Tab Take 1 tablet by mouth once.    multivitamin (THERAGRAN)  per tablet Take 1 tablet by mouth once daily.    nebivolol (BYSTOLIC) 5 MG Tab Take 1 tablet (5 mg total) by mouth once daily.    vitamin D (VITAMIN D3) 1000 units Tab Take 1,000 Units by mouth once daily.     No current facility-administered medications for this visit.      Current Outpatient Medications on File Prior to Visit   Medication Sig    aspirin (ECOTRIN) 81 MG EC tablet Take 81 mg by mouth once daily.    b complex vitamins tablet Take 1 tablet by mouth once daily.    co-enzyme Q-10 30 mg capsule Take 30 mg by mouth 3 (three) times daily.    ferrous sulfate 325 (65 FE) MG EC tablet Take 325 mg by mouth 3 (three) times daily with meals.    losartan (COZAAR) 25 MG tablet Take 1 tablet (25 mg total) by mouth once daily.    magnesium 30 mg Tab Take 1 tablet by mouth once.    multivitamin (THERAGRAN) per tablet Take 1 tablet by mouth once daily.    nebivolol (BYSTOLIC) 5 MG Tab Take 1 tablet (5 mg total) by mouth once daily.    vitamin D (VITAMIN D3) 1000 units Tab Take 1,000 Units by mouth once daily.     No current facility-administered medications on file prior to visit.        Review of Systems   Constitution: Negative for malaise/fatigue.   Eyes: Negative for blurred vision.   Cardiovascular: Negative for chest pain, claudication, cyanosis, dyspnea on exertion, irregular heartbeat, leg swelling, near-syncope, orthopnea, palpitations and paroxysmal nocturnal dyspnea.   Respiratory: Negative for cough, hemoptysis and shortness of breath.    Hematologic/Lymphatic: Negative for bleeding problem. Does not bruise/bleed easily.   Skin: Negative for dry skin and itching.   Musculoskeletal: Negative for falls, muscle weakness and myalgias.   Gastrointestinal: Negative for abdominal pain, diarrhea, heartburn, hematemesis, hematochezia and melena.   Genitourinary: Negative for flank pain and hematuria.   Neurological: Negative for dizziness, focal weakness, headaches, light-headedness, numbness,  paresthesias, seizures and weakness.   Psychiatric/Behavioral: Negative for altered mental status and memory loss. The patient is not nervous/anxious.    Allergic/Immunologic: Negative for hives.       Objective:   Physical Exam   Constitutional: He is oriented to person, place, and time. He appears well-developed and well-nourished. No distress.   HENT:   Head: Normocephalic and atraumatic.   Eyes: Pupils are equal, round, and reactive to light. EOM are normal. Right eye exhibits no discharge. Left eye exhibits no discharge.   Neck: Neck supple. No JVD present. No thyromegaly present.   Cardiovascular: Normal rate, regular rhythm, normal heart sounds and intact distal pulses. Exam reveals no gallop and no friction rub.   No murmur heard.  Pulses:       Carotid pulses are 2+ on the right side and 2+ on the left side.       Radial pulses are 2+ on the right side and 2+ on the left side.        Femoral pulses are 2+ on the right side and 2+ on the left side.       Popliteal pulses are 2+ on the right side and 2+ on the left side.        Dorsalis pedis pulses are 2+ on the right side and 2+ on the left side.        Posterior tibial pulses are 2+ on the right side and 2+ on the left side.   Pulmonary/Chest: Effort normal and breath sounds normal. No respiratory distress. He has no wheezes. He has no rales. He exhibits no tenderness.   Abdominal: Soft. Bowel sounds are normal. He exhibits no distension. There is no abdominal tenderness.   Musculoskeletal: Normal range of motion.         General: No edema.   Neurological: He is alert and oriented to person, place, and time. No cranial nerve deficit.   Skin: Skin is warm and dry. No rash noted. He is not diaphoretic. No erythema.   Psychiatric: He has a normal mood and affect. His behavior is normal.   Nursing note and vitals reviewed.    Vitals:    09/24/20 1100 09/24/20 1102   BP: 114/70 98/68   BP Location: Right arm Left arm   Patient Position: Sitting Sitting   Pulse:  64 64   SpO2: 99%    Weight: 87.5 kg (192 lb 14.4 oz)      Lab Results   Component Value Date    CHOL 179 08/05/2020    CHOL 193 02/05/2020     Lab Results   Component Value Date    HDL 66 08/05/2020    HDL 60 02/05/2020     Lab Results   Component Value Date    LDLCALC 96.0 08/05/2020    LDLCALC 120.2 02/05/2020     Lab Results   Component Value Date    TRIG 85 08/05/2020    TRIG 64 02/05/2020     Lab Results   Component Value Date    CHOLHDL 36.9 08/05/2020    CHOLHDL 31.1 02/05/2020       Chemistry        Component Value Date/Time     08/05/2020 1031    K 4.2 08/05/2020 1031     08/05/2020 1031    CO2 29 08/05/2020 1031    BUN 20 08/05/2020 1031    CREATININE 1.1 08/05/2020 1031     08/05/2020 1031        Component Value Date/Time    CALCIUM 9.5 08/05/2020 1031    ALKPHOS 68 08/05/2020 1031    AST 66 (H) 08/05/2020 1031    AST 66 (H) 08/05/2020 1031    ALT 39 08/05/2020 1031    ALT 39 08/05/2020 1031    BILITOT 0.7 08/05/2020 1031    ESTGFRAFRICA >60.0 08/05/2020 1031    EGFRNONAA >60.0 08/05/2020 1031          Lab Results   Component Value Date    TSH 2.580 02/05/2020     No results found for: INR, PROTIME  Lab Results   Component Value Date    WBC 4.81 02/05/2020    HGB 15.5 02/05/2020    HCT 45.5 02/05/2020    MCV 93 02/05/2020     02/05/2020     BMP  Sodium   Date Value Ref Range Status   08/05/2020 141 136 - 145 mmol/L Final     Potassium   Date Value Ref Range Status   08/05/2020 4.2 3.5 - 5.1 mmol/L Final     Chloride   Date Value Ref Range Status   08/05/2020 104 95 - 110 mmol/L Final     CO2   Date Value Ref Range Status   08/05/2020 29 23 - 29 mmol/L Final     BUN, Bld   Date Value Ref Range Status   08/05/2020 20 6 - 20 mg/dL Final     Creatinine   Date Value Ref Range Status   08/05/2020 1.1 0.5 - 1.4 mg/dL Final     Calcium   Date Value Ref Range Status   08/05/2020 9.5 8.7 - 10.5 mg/dL Final     Anion Gap   Date Value Ref Range Status   08/05/2020 8 8 - 16 mmol/L Final      eGFR if    Date Value Ref Range Status   08/05/2020 >60.0 >60 mL/min/1.73 m^2 Final     eGFR if non    Date Value Ref Range Status   08/05/2020 >60.0 >60 mL/min/1.73 m^2 Final     Comment:     Calculation used to obtain the estimated glomerular filtration  rate (eGFR) is the CKD-EPI equation.        CrCl cannot be calculated (Patient's most recent lab result is older than the maximum 7 days allowed.).    Assessment:     1. Paroxysmal atrial fibrillation    2. Elevated liver enzymes    3. Prolonged Q-T interval on ECG    4. Essential hypertension    5. Palpitation    6. Abnormal stress test    doing well asymptomatic lipids on target bp good control   No arrythmias clinically.    Plan:   Continue current therapy  Cardiac low salt diet.  Risk factor modification and excercise program.  F/u in 6 months with lipid cmp

## 2020-09-25 ENCOUNTER — TELEPHONE (OUTPATIENT)
Dept: INTERNAL MEDICINE | Facility: CLINIC | Age: 60
End: 2020-09-25

## 2020-09-25 ENCOUNTER — HOSPITAL ENCOUNTER (OUTPATIENT)
Dept: RADIOLOGY | Facility: HOSPITAL | Age: 60
Discharge: HOME | End: 2020-09-25
Attending: FAMILY MEDICINE
Payer: COMMERCIAL

## 2020-09-25 ENCOUNTER — OFFICE VISIT (OUTPATIENT)
Dept: INTERNAL MEDICINE | Facility: CLINIC | Age: 60
End: 2020-09-25
Payer: COMMERCIAL

## 2020-09-25 VITALS
OXYGEN SATURATION: 97 % | HEART RATE: 53 BPM | DIASTOLIC BLOOD PRESSURE: 80 MMHG | HEIGHT: 70 IN | BODY MASS INDEX: 27.61 KG/M2 | RESPIRATION RATE: 18 BRPM | WEIGHT: 192.88 LBS | TEMPERATURE: 97 F | SYSTOLIC BLOOD PRESSURE: 118 MMHG
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
## 2020-09-25 DIAGNOSIS — M54.2 NECK PAIN: Primary | ICD-10-CM

## 2020-09-25 DIAGNOSIS — R20.0 NUMBNESS AND TINGLING: ICD-10-CM

## 2020-09-25 DIAGNOSIS — M54.2 NECK PAIN: ICD-10-CM

## 2020-09-25 DIAGNOSIS — R20.2 NUMBNESS AND TINGLING: ICD-10-CM

## 2020-09-25 PROCEDURE — 99999 PR PBB SHADOW E&M-EST. PATIENT-LVL V: CPT | Mod: PBBFAC,,, | Performed by: FAMILY MEDICINE

## 2020-09-25 PROCEDURE — 3079F DIAST BP 80-89 MM HG: CPT | Mod: CPTII,S$GLB,, | Performed by: FAMILY MEDICINE

## 2020-09-25 PROCEDURE — 99214 OFFICE O/P EST MOD 30 MIN: CPT | Mod: S$GLB,,, | Performed by: FAMILY MEDICINE

## 2020-09-25 PROCEDURE — 99999 PR PBB SHADOW E&M-EST. PATIENT-LVL V: ICD-10-PCS | Mod: PBBFAC,,, | Performed by: FAMILY MEDICINE

## 2020-09-25 PROCEDURE — 72050 X-RAY EXAM NECK SPINE 4/5VWS: CPT | Mod: TC,PO

## 2020-09-25 PROCEDURE — 99214 PR OFFICE/OUTPT VISIT, EST, LEVL IV, 30-39 MIN: ICD-10-PCS | Mod: S$GLB,,, | Performed by: FAMILY MEDICINE

## 2020-09-25 PROCEDURE — 72050 X-RAY EXAM NECK SPINE 4/5VWS: CPT | Mod: 26,,, | Performed by: RADIOLOGY

## 2020-09-25 PROCEDURE — 3008F BODY MASS INDEX DOCD: CPT | Mod: CPTII,S$GLB,, | Performed by: FAMILY MEDICINE

## 2020-09-25 PROCEDURE — 3008F PR BODY MASS INDEX (BMI) DOCUMENTED: ICD-10-PCS | Mod: CPTII,S$GLB,, | Performed by: FAMILY MEDICINE

## 2020-09-25 PROCEDURE — 3074F SYST BP LT 130 MM HG: CPT | Mod: CPTII,S$GLB,, | Performed by: FAMILY MEDICINE

## 2020-09-25 PROCEDURE — 72050 XR CERVICAL SPINE COMPLETE 5 VIEW: ICD-10-PCS | Mod: 26,,, | Performed by: RADIOLOGY

## 2020-09-25 PROCEDURE — 3074F PR MOST RECENT SYSTOLIC BLOOD PRESSURE < 130 MM HG: ICD-10-PCS | Mod: CPTII,S$GLB,, | Performed by: FAMILY MEDICINE

## 2020-09-25 PROCEDURE — 3079F PR MOST RECENT DIASTOLIC BLOOD PRESSURE 80-89 MM HG: ICD-10-PCS | Mod: CPTII,S$GLB,, | Performed by: FAMILY MEDICINE

## 2020-09-25 NOTE — PROGRESS NOTES
Subjective:       Patient ID: Cedric Santoro is a 60 y.o. male.    Chief Complaint: Neck Pain    HPI Mr. Santoro presents today with neck pain and numbness    Fell in April outside  Fell straight down catching himself with his hands  Didn't hit head on anything but may have jolted   No pain with sitting with a chair with a back  Looking down hurts on the right side.     He has to hold his head up with hand otherwise it will fall down.     Went to PT and they were doing work but his right eye is drooping    Right ankle can not keep a person from pushing on it    There head leans over to the right    When he is exercising his head tends to go down to the right.     Orthopedic he will see in October 13th   [facility]    Review of Systems        Past Medical History:   Diagnosis Date    Hypertension     Palpitation 3/5/2020    Raynaud's syndrome     Vasovagal syncopes      Past Surgical History:   Procedure Laterality Date    ankle/foot surgery      EYE SURGERY      GALLBLADDER SURGERY      HERNIA REPAIR      THROAT SURGERY       Family History   Problem Relation Age of Onset    Pacemaker/defibrilator Father     Macular degeneration Father     Macular degeneration Brother        Objective:        Physical Exam  Vitals signs reviewed.   Constitutional:       Appearance: Normal appearance.   HENT:      Head: Normocephalic and atraumatic.   Neck:      Musculoskeletal: No muscular tenderness.      Comments: Head falls if pt leans forward in chair  He has to use hand to push head up  Pt unable to lift head up when sitting forward  Skin:     General: Skin is warm.   Neurological:      Mental Status: He is alert.           Results for orders placed or performed in visit on 08/05/20   ALT (SGPT)   Result Value Ref Range    ALT 39 10 - 44 U/L   AST (SGOT)   Result Value Ref Range    AST 66 (H) 10 - 40 U/L   Magnesium   Result Value Ref Range    Magnesium 2.0 1.6 - 2.6 mg/dL   Comprehensive metabolic panel    Result Value Ref Range    Sodium 141 136 - 145 mmol/L    Potassium 4.2 3.5 - 5.1 mmol/L    Chloride 104 95 - 110 mmol/L    CO2 29 23 - 29 mmol/L    Glucose 103 70 - 110 mg/dL    BUN, Bld 20 6 - 20 mg/dL    Creatinine 1.1 0.5 - 1.4 mg/dL    Calcium 9.5 8.7 - 10.5 mg/dL    Total Protein 7.0 6.0 - 8.4 g/dL    Albumin 4.1 3.5 - 5.2 g/dL    Total Bilirubin 0.7 0.1 - 1.0 mg/dL    Alkaline Phosphatase 68 55 - 135 U/L    AST 66 (H) 10 - 40 U/L    ALT 39 10 - 44 U/L    Anion Gap 8 8 - 16 mmol/L    eGFR if African American >60.0 >60 mL/min/1.73 m^2    eGFR if non African American >60.0 >60 mL/min/1.73 m^2   Lipid panel   Result Value Ref Range    Cholesterol 179 120 - 199 mg/dL    Triglycerides 85 30 - 150 mg/dL    HDL 66 40 - 75 mg/dL    LDL Cholesterol 96.0 63.0 - 159.0 mg/dL    Hdl/Cholesterol Ratio 36.9 20.0 - 50.0 %    Total Cholesterol/HDL Ratio 2.7 2.0 - 5.0    Non-HDL Cholesterol 113 mg/dL       Assessment/Plan:     Neck pain  -     X-Ray Cervical Spine Complete 5 view; Future; Expected date: 09/25/2020  -     Ambulatory referral/consult to Orthopedics; Future; Expected date: 10/02/2020    Numbness and tingling  -     X-Ray Cervical Spine Complete 5 view; Future; Expected date: 09/25/2020  -     Ambulatory referral/consult to Orthopedics; Future; Expected date: 10/02/2020    Fall, initial encounter  -     X-Ray Cervical Spine Complete 5 view; Future; Expected date: 09/25/2020  -     Ambulatory referral/consult to Orthopedics; Future; Expected date: 10/02/2020      Pt likely in need of an MRI   The Orthopedic surgeon he is going to see specialized with the neck will follow up with patient as he may also need to see a neurosurgeon    Follow up as needed    Monserrat Asif MD  [facility]

## 2020-09-28 ENCOUNTER — PATIENT MESSAGE (OUTPATIENT)
Dept: ADMINISTRATIVE | Facility: OTHER | Age: 60
End: 2020-09-28

## 2020-09-28 NOTE — PROGRESS NOTES
Digital Medicine: Clinician Follow-Up    Patient continues to have neck issues and trouble sleeping. He is unable to exercise as he would like to, and this is causing some extra stress. He has an appt scheduled with an outside orthopedic physician in approx 2 weeks.            Review of patient's allergies indicates:   -- Iodinated contrast media    -- Iodine and iodide containing products -- Hives  Follow-up reason(s): routine follow up.     Hypertension    Patient readings are stable Patient is not experiencing signs/symptoms of hypertension.          Last 5 Patient Entered Readings                                      Current 30 Day Average: 138/83     Recent Readings 9/28/2020 9/26/2020 9/25/2020 9/24/2020 9/23/2020    SBP (mmHg) 135 139 134 127 144    DBP (mmHg) 74 106 80 84 86    Pulse 42 47 46 45 45                 Depression Screening  Did not address depression screening.    Sleep Apnea Screening    Did not address sleep apnea screening.     Medication Affordability Screening  Did not address medication affordability screening.     Medication Adherence-Medication adherence was assessed.        Takes meds around 5am.       ASSESSMENT(S)  Patients BP average is 138/83 mmHg, which is above goal. Patient's BP goal is less than or equal to 130/80 per 2017 ACC/AHA Hypertension Guidelines.     SBP slightly elevated but trending down. Can increase ARB dose.       Hypertension Plan  Continue current therapy.  Continue current diet/physical activity routine.  Instructed to charge device.  Provided patient education. BP monitoring technique - 2nd reading  Will evaluate if improved technique helps to decrease BP, in addition to reviewing evaluation from ortho. Recommend to increase losartan to 50 mg QD if BP remains elevated.      Addressed patient questions and patient has my contact information if needed prior to next outreach. Patient verbalizes understanding.      Explained the importance of self-monitoring and  medication adherence. Encouraged the patient to communicate with their health  for lifestyle modifications to help improve or maintain a healthy lifestyle.              There are no preventive care reminders to display for this patient.  There are no preventive care reminders to display for this patient.    Hypertension Medications             losartan (COZAAR) 25 MG tablet TAKE 1 TABLET BY MOUTH EVERY DAY    nebivolol (BYSTOLIC) 5 MG Tab Take 1 tablet (5 mg total) by mouth once daily.

## 2020-10-07 ENCOUNTER — PATIENT MESSAGE (OUTPATIENT)
Dept: INTERNAL MEDICINE | Facility: CLINIC | Age: 60
End: 2020-10-07

## 2020-10-28 ENCOUNTER — PATIENT OUTREACH (OUTPATIENT)
Dept: OTHER | Facility: OTHER | Age: 60
End: 2020-10-28

## 2020-10-28 NOTE — PROGRESS NOTES
Digital Medicine: Health  Follow-Up    The history is provided by the patient.             Reason for review: Blood pressure at goal      Additional Follow-up details: Feeling well besides neck pain. MRI and X-ray done. Has done 6 weeks of physical therapy and it helped but he states he plateaued. Will see chiropractor for next step of treatment.             Diet-Not assessed          Physical Activity-Not assessed    Medication Adherence-Medication Adherence not addressed.      Substance, Sleep, Stress-Not assessed      Continue current diet/physical activity routine.       Addressed patient questions and patient has my contact information if needed prior to next outreach.        There are no preventive care reminders to display for this patient.    Last 5 Patient Entered Readings                                      Current 30 Day Average: 126/77     Recent Readings 10/28/2020 10/28/2020 10/27/2020 10/27/2020 10/26/2020    SBP (mmHg) 120 126 119 124 117    DBP (mmHg) 77 76 64 71 71    Pulse 49 73 50 47 49

## 2020-11-17 ENCOUNTER — PATIENT OUTREACH (OUTPATIENT)
Dept: OTHER | Facility: OTHER | Age: 60
End: 2020-11-17

## 2020-11-17 NOTE — PROGRESS NOTES
Digital Medicine: Clinician Follow-Up     BP has improved and is now controlled.  He has been walking 5 days per week ~2.5 miles and weight training for 20 minutes.  Pt reports taking BP in both arms at one sitting to double check.  Still experiencing neck pain and stenosis in vertebrae, followed by physical therapy, plan for injections as possible next steps.    The history is provided by the patient.   Follow-up reason(s): routine follow up.     Hypertension    Patient's blood pressure is stable.   Patient is not experiencing signs/symptoms of hypotension.  Patient is not experiencing signs/symptoms of hypertension.            Last 5 Patient Entered Readings                                      Current 30 Day Average: 123/75     Recent Readings 11/17/2020 11/17/2020 11/16/2020 11/16/2020 11/15/2020    SBP (mmHg) 123 140 121 128 119    DBP (mmHg) 76 79 81 74 73    Pulse 45 46 46 49 48                 Depression Screening  Did not address depression screening.    Sleep Apnea Screening    Did not address sleep apnea screening.     Medication Affordability Screening  Did not address medication affordability screening.     Medication Adherence-Medication adherence was assessed.        Pt continues to take losartan 25mg daily and nebivolol 5mg daily.      ASSESSMENT(S)  Patients BP average is 123/75 mmHg, which is at goal. Patient's BP goal is less than or equal to 130/80.    No medication changes recommended at this time. BP has trended down with improvement in lifestyle changes.      Hypertension Plan  Continue current therapy.  Continue current diet/physical activity routine.  Instructed to charge device.  Provided patient education.  Encouraged pt to take repeat readings from same arm instead of switching arms.  Follow up in 6 months.     Addressed patient questions and patient has my contact information if needed prior to next outreach. Patient verbalizes understanding.      Explained the importance of  self-monitoring and medication adherence. Encouraged the patient to communicate with their health  for lifestyle modifications to help improve or maintain a healthy lifestyle.               There are no preventive care reminders to display for this patient.  There are no preventive care reminders to display for this patient.      Hypertension Medications             losartan (COZAAR) 25 MG tablet TAKE 1 TABLET BY MOUTH EVERY DAY    nebivolol (BYSTOLIC) 5 MG Tab Take 1 tablet (5 mg total) by mouth once daily.

## 2020-12-30 ENCOUNTER — PATIENT OUTREACH (OUTPATIENT)
Dept: OTHER | Facility: OTHER | Age: 60
End: 2020-12-30

## 2021-03-16 ENCOUNTER — PATIENT MESSAGE (OUTPATIENT)
Dept: INTERNAL MEDICINE | Facility: CLINIC | Age: 61
End: 2021-03-16

## 2021-03-18 ENCOUNTER — LAB VISIT (OUTPATIENT)
Dept: LAB | Facility: HOSPITAL | Age: 61
End: 2021-03-18
Attending: INTERNAL MEDICINE
Payer: COMMERCIAL

## 2021-03-18 DIAGNOSIS — R74.8 ELEVATED LIVER ENZYMES: ICD-10-CM

## 2021-03-18 DIAGNOSIS — I10 ESSENTIAL HYPERTENSION: ICD-10-CM

## 2021-03-18 DIAGNOSIS — I48.0 PAROXYSMAL ATRIAL FIBRILLATION: ICD-10-CM

## 2021-03-18 LAB
ALBUMIN SERPL BCP-MCNC: 4 G/DL (ref 3.5–5.2)
ALP SERPL-CCNC: 59 U/L (ref 55–135)
ALT SERPL W/O P-5'-P-CCNC: 42 U/L (ref 10–44)
ANION GAP SERPL CALC-SCNC: 4 MMOL/L (ref 8–16)
AST SERPL-CCNC: 66 U/L (ref 10–40)
BILIRUB SERPL-MCNC: 0.7 MG/DL (ref 0.1–1)
BUN SERPL-MCNC: 24 MG/DL (ref 8–23)
CALCIUM SERPL-MCNC: 9.4 MG/DL (ref 8.7–10.5)
CHLORIDE SERPL-SCNC: 103 MMOL/L (ref 95–110)
CHOLEST SERPL-MCNC: 166 MG/DL (ref 120–199)
CHOLEST/HDLC SERPL: 2.8 {RATIO} (ref 2–5)
CO2 SERPL-SCNC: 32 MMOL/L (ref 23–29)
CREAT SERPL-MCNC: 1.3 MG/DL (ref 0.5–1.4)
EST. GFR  (AFRICAN AMERICAN): >60 ML/MIN/1.73 M^2
EST. GFR  (NON AFRICAN AMERICAN): 58.9 ML/MIN/1.73 M^2
GLUCOSE SERPL-MCNC: 107 MG/DL (ref 70–110)
HDLC SERPL-MCNC: 59 MG/DL (ref 40–75)
HDLC SERPL: 35.5 % (ref 20–50)
LDLC SERPL CALC-MCNC: 97 MG/DL (ref 63–159)
NONHDLC SERPL-MCNC: 107 MG/DL
POTASSIUM SERPL-SCNC: 4.3 MMOL/L (ref 3.5–5.1)
PROT SERPL-MCNC: 6.9 G/DL (ref 6–8.4)
SODIUM SERPL-SCNC: 139 MMOL/L (ref 136–145)
TRIGL SERPL-MCNC: 50 MG/DL (ref 30–150)

## 2021-03-18 PROCEDURE — 80053 COMPREHEN METABOLIC PANEL: CPT | Performed by: INTERNAL MEDICINE

## 2021-03-18 PROCEDURE — 36415 COLL VENOUS BLD VENIPUNCTURE: CPT | Performed by: INTERNAL MEDICINE

## 2021-03-18 PROCEDURE — 80061 LIPID PANEL: CPT | Performed by: INTERNAL MEDICINE

## 2021-03-25 ENCOUNTER — HOSPITAL ENCOUNTER (OUTPATIENT)
Dept: CARDIOLOGY | Facility: HOSPITAL | Age: 61
Discharge: HOME | End: 2021-03-25
Attending: INTERNAL MEDICINE
Payer: COMMERCIAL

## 2021-03-25 ENCOUNTER — OFFICE VISIT (OUTPATIENT)
Dept: CARDIOLOGY | Facility: CLINIC | Age: 61
End: 2021-03-25
Payer: COMMERCIAL

## 2021-03-25 VITALS
DIASTOLIC BLOOD PRESSURE: 66 MMHG | OXYGEN SATURATION: 99 % | WEIGHT: 192.88 LBS | HEIGHT: 70 IN | HEART RATE: 55 BPM | SYSTOLIC BLOOD PRESSURE: 124 MMHG | BODY MASS INDEX: 27.61 KG/M2

## 2021-03-25 DIAGNOSIS — I10 ESSENTIAL HYPERTENSION: ICD-10-CM

## 2021-03-25 DIAGNOSIS — I48.0 PAROXYSMAL ATRIAL FIBRILLATION: Primary | ICD-10-CM

## 2021-03-25 DIAGNOSIS — I48.0 PAROXYSMAL ATRIAL FIBRILLATION: ICD-10-CM

## 2021-03-25 DIAGNOSIS — R00.2 PALPITATION: ICD-10-CM

## 2021-03-25 DIAGNOSIS — R94.39 ABNORMAL STRESS TEST: ICD-10-CM

## 2021-03-25 DIAGNOSIS — R74.8 ELEVATED LIVER ENZYMES: ICD-10-CM

## 2021-03-25 DIAGNOSIS — R94.31 PROLONGED Q-T INTERVAL ON ECG: ICD-10-CM

## 2021-03-25 PROCEDURE — 3008F PR BODY MASS INDEX (BMI) DOCUMENTED: ICD-10-PCS | Mod: CPTII,S$GLB,, | Performed by: INTERNAL MEDICINE

## 2021-03-25 PROCEDURE — 3008F BODY MASS INDEX DOCD: CPT | Mod: CPTII,S$GLB,, | Performed by: INTERNAL MEDICINE

## 2021-03-25 PROCEDURE — 93010 EKG 12-LEAD: ICD-10-PCS | Mod: ,,, | Performed by: INTERNAL MEDICINE

## 2021-03-25 PROCEDURE — 93005 ELECTROCARDIOGRAM TRACING: CPT

## 2021-03-25 PROCEDURE — 1126F AMNT PAIN NOTED NONE PRSNT: CPT | Mod: S$GLB,,, | Performed by: INTERNAL MEDICINE

## 2021-03-25 PROCEDURE — 3074F SYST BP LT 130 MM HG: CPT | Mod: CPTII,S$GLB,, | Performed by: INTERNAL MEDICINE

## 2021-03-25 PROCEDURE — 1126F PR PAIN SEVERITY QUANTIFIED, NO PAIN PRESENT: ICD-10-PCS | Mod: S$GLB,,, | Performed by: INTERNAL MEDICINE

## 2021-03-25 PROCEDURE — 99214 PR OFFICE/OUTPT VISIT, EST, LEVL IV, 30-39 MIN: ICD-10-PCS | Mod: S$GLB,,, | Performed by: INTERNAL MEDICINE

## 2021-03-25 PROCEDURE — 3078F DIAST BP <80 MM HG: CPT | Mod: CPTII,S$GLB,, | Performed by: INTERNAL MEDICINE

## 2021-03-25 PROCEDURE — 99999 PR PBB SHADOW E&M-EST. PATIENT-LVL IV: CPT | Mod: PBBFAC,,, | Performed by: INTERNAL MEDICINE

## 2021-03-25 PROCEDURE — 3074F PR MOST RECENT SYSTOLIC BLOOD PRESSURE < 130 MM HG: ICD-10-PCS | Mod: CPTII,S$GLB,, | Performed by: INTERNAL MEDICINE

## 2021-03-25 PROCEDURE — 3078F PR MOST RECENT DIASTOLIC BLOOD PRESSURE < 80 MM HG: ICD-10-PCS | Mod: CPTII,S$GLB,, | Performed by: INTERNAL MEDICINE

## 2021-03-25 PROCEDURE — 99214 OFFICE O/P EST MOD 30 MIN: CPT | Mod: S$GLB,,, | Performed by: INTERNAL MEDICINE

## 2021-03-25 PROCEDURE — 99999 PR PBB SHADOW E&M-EST. PATIENT-LVL IV: ICD-10-PCS | Mod: PBBFAC,,, | Performed by: INTERNAL MEDICINE

## 2021-03-25 PROCEDURE — 93010 ELECTROCARDIOGRAM REPORT: CPT | Mod: ,,, | Performed by: INTERNAL MEDICINE

## 2021-03-28 ENCOUNTER — PATIENT MESSAGE (OUTPATIENT)
Dept: ADMINISTRATIVE | Facility: OTHER | Age: 61
End: 2021-03-28

## 2021-05-10 ENCOUNTER — PATIENT MESSAGE (OUTPATIENT)
Dept: RESEARCH | Facility: HOSPITAL | Age: 61
End: 2021-05-10

## 2021-09-24 ENCOUNTER — PATIENT MESSAGE (OUTPATIENT)
Dept: ADMINISTRATIVE | Facility: OTHER | Age: 61
End: 2021-09-24

## 2022-03-23 ENCOUNTER — PATIENT MESSAGE (OUTPATIENT)
Dept: ADMINISTRATIVE | Facility: OTHER | Age: 62
End: 2022-03-23
Payer: COMMERCIAL

## 2022-04-12 ENCOUNTER — DOCUMENTATION ONLY (OUTPATIENT)
Dept: INTERNAL MEDICINE | Facility: CLINIC | Age: 62
End: 2022-04-12
Payer: COMMERCIAL

## 2022-04-12 ENCOUNTER — LAB VISIT (OUTPATIENT)
Dept: LAB | Facility: HOSPITAL | Age: 62
End: 2022-04-12
Attending: FAMILY MEDICINE
Payer: COMMERCIAL

## 2022-04-12 ENCOUNTER — OFFICE VISIT (OUTPATIENT)
Dept: INTERNAL MEDICINE | Facility: CLINIC | Age: 62
End: 2022-04-12
Payer: COMMERCIAL

## 2022-04-12 VITALS
TEMPERATURE: 99 F | HEIGHT: 70 IN | OXYGEN SATURATION: 96 % | RESPIRATION RATE: 18 BRPM | DIASTOLIC BLOOD PRESSURE: 78 MMHG | BODY MASS INDEX: 28.12 KG/M2 | HEART RATE: 68 BPM | SYSTOLIC BLOOD PRESSURE: 130 MMHG | WEIGHT: 196.44 LBS

## 2022-04-12 DIAGNOSIS — Z00.00 ROUTINE PHYSICAL EXAMINATION: Primary | ICD-10-CM

## 2022-04-12 DIAGNOSIS — Z00.00 ROUTINE PHYSICAL EXAMINATION: ICD-10-CM

## 2022-04-12 DIAGNOSIS — I48.0 PAROXYSMAL ATRIAL FIBRILLATION: ICD-10-CM

## 2022-04-12 DIAGNOSIS — M46.92 UNSPECIFIED INFLAMMATORY SPONDYLOPATHY, CERVICAL REGION: ICD-10-CM

## 2022-04-12 DIAGNOSIS — R25.2 LEG CRAMPS: ICD-10-CM

## 2022-04-12 DIAGNOSIS — Z12.5 SCREENING PSA (PROSTATE SPECIFIC ANTIGEN): ICD-10-CM

## 2022-04-12 DIAGNOSIS — Z12.11 COLON CANCER SCREENING: ICD-10-CM

## 2022-04-12 LAB
ALBUMIN SERPL BCP-MCNC: 4.2 G/DL (ref 3.5–5.2)
ALP SERPL-CCNC: 51 U/L (ref 55–135)
ALT SERPL W/O P-5'-P-CCNC: 48 U/L (ref 10–44)
ANION GAP SERPL CALC-SCNC: 7 MMOL/L (ref 8–16)
AST SERPL-CCNC: 77 U/L (ref 10–40)
BASOPHILS # BLD AUTO: 0.04 K/UL (ref 0–0.2)
BASOPHILS NFR BLD: 1.1 % (ref 0–1.9)
BILIRUB SERPL-MCNC: 0.9 MG/DL (ref 0.1–1)
BUN SERPL-MCNC: 15 MG/DL (ref 8–23)
CALCIUM SERPL-MCNC: 9.8 MG/DL (ref 8.7–10.5)
CHLORIDE SERPL-SCNC: 101 MMOL/L (ref 95–110)
CHOLEST SERPL-MCNC: 197 MG/DL (ref 120–199)
CHOLEST/HDLC SERPL: 2.9 {RATIO} (ref 2–5)
CO2 SERPL-SCNC: 32 MMOL/L (ref 23–29)
COMPLEXED PSA SERPL-MCNC: 3 NG/ML (ref 0–4)
CREAT SERPL-MCNC: 1.2 MG/DL (ref 0.5–1.4)
DIFFERENTIAL METHOD: ABNORMAL
EOSINOPHIL # BLD AUTO: 0.1 K/UL (ref 0–0.5)
EOSINOPHIL NFR BLD: 3.9 % (ref 0–8)
ERYTHROCYTE [DISTWIDTH] IN BLOOD BY AUTOMATED COUNT: 12.5 % (ref 11.5–14.5)
EST. GFR  (AFRICAN AMERICAN): >60 ML/MIN/1.73 M^2
EST. GFR  (NON AFRICAN AMERICAN): >60 ML/MIN/1.73 M^2
GLUCOSE SERPL-MCNC: 109 MG/DL (ref 70–110)
HCT VFR BLD AUTO: 45.5 % (ref 40–54)
HDLC SERPL-MCNC: 68 MG/DL (ref 40–75)
HDLC SERPL: 34.5 % (ref 20–50)
HGB BLD-MCNC: 14.8 G/DL (ref 14–18)
IMM GRANULOCYTES # BLD AUTO: 0 K/UL (ref 0–0.04)
IMM GRANULOCYTES NFR BLD AUTO: 0 % (ref 0–0.5)
LDLC SERPL CALC-MCNC: 118.8 MG/DL (ref 63–159)
LYMPHOCYTES # BLD AUTO: 0.9 K/UL (ref 1–4.8)
LYMPHOCYTES NFR BLD: 24.4 % (ref 18–48)
MAGNESIUM SERPL-MCNC: 2.1 MG/DL (ref 1.6–2.6)
MCH RBC QN AUTO: 30.6 PG (ref 27–31)
MCHC RBC AUTO-ENTMCNC: 32.5 G/DL (ref 32–36)
MCV RBC AUTO: 94 FL (ref 82–98)
MONOCYTES # BLD AUTO: 0.4 K/UL (ref 0.3–1)
MONOCYTES NFR BLD: 11.5 % (ref 4–15)
NEUTROPHILS # BLD AUTO: 2.1 K/UL (ref 1.8–7.7)
NEUTROPHILS NFR BLD: 59.1 % (ref 38–73)
NONHDLC SERPL-MCNC: 129 MG/DL
NRBC BLD-RTO: 0 /100 WBC
PHOSPHATE SERPL-MCNC: 3.1 MG/DL (ref 2.7–4.5)
PLATELET # BLD AUTO: 177 K/UL (ref 150–450)
PMV BLD AUTO: 11 FL (ref 9.2–12.9)
POTASSIUM SERPL-SCNC: 4.3 MMOL/L (ref 3.5–5.1)
PROT SERPL-MCNC: 7 G/DL (ref 6–8.4)
RBC # BLD AUTO: 4.84 M/UL (ref 4.6–6.2)
SODIUM SERPL-SCNC: 140 MMOL/L (ref 136–145)
TRIGL SERPL-MCNC: 51 MG/DL (ref 30–150)
WBC # BLD AUTO: 3.56 K/UL (ref 3.9–12.7)

## 2022-04-12 PROCEDURE — 3008F PR BODY MASS INDEX (BMI) DOCUMENTED: ICD-10-PCS | Mod: CPTII,S$GLB,, | Performed by: FAMILY MEDICINE

## 2022-04-12 PROCEDURE — 3078F DIAST BP <80 MM HG: CPT | Mod: CPTII,S$GLB,, | Performed by: FAMILY MEDICINE

## 2022-04-12 PROCEDURE — 3008F BODY MASS INDEX DOCD: CPT | Mod: CPTII,S$GLB,, | Performed by: FAMILY MEDICINE

## 2022-04-12 PROCEDURE — 4010F ACE/ARB THERAPY RXD/TAKEN: CPT | Mod: CPTII,S$GLB,, | Performed by: FAMILY MEDICINE

## 2022-04-12 PROCEDURE — 84153 ASSAY OF PSA TOTAL: CPT | Performed by: FAMILY MEDICINE

## 2022-04-12 PROCEDURE — 3075F PR MOST RECENT SYSTOLIC BLOOD PRESS GE 130-139MM HG: ICD-10-PCS | Mod: CPTII,S$GLB,, | Performed by: FAMILY MEDICINE

## 2022-04-12 PROCEDURE — 99999 PR PBB SHADOW E&M-EST. PATIENT-LVL IV: CPT | Mod: PBBFAC,,, | Performed by: FAMILY MEDICINE

## 2022-04-12 PROCEDURE — 99999 PR PBB SHADOW E&M-EST. PATIENT-LVL IV: ICD-10-PCS | Mod: PBBFAC,,, | Performed by: FAMILY MEDICINE

## 2022-04-12 PROCEDURE — 99396 PREV VISIT EST AGE 40-64: CPT | Mod: S$GLB,,, | Performed by: FAMILY MEDICINE

## 2022-04-12 PROCEDURE — 4010F PR ACE/ARB THEARPY RXD/TAKEN: ICD-10-PCS | Mod: CPTII,S$GLB,, | Performed by: FAMILY MEDICINE

## 2022-04-12 PROCEDURE — 36415 COLL VENOUS BLD VENIPUNCTURE: CPT | Performed by: FAMILY MEDICINE

## 2022-04-12 PROCEDURE — 3075F SYST BP GE 130 - 139MM HG: CPT | Mod: CPTII,S$GLB,, | Performed by: FAMILY MEDICINE

## 2022-04-12 PROCEDURE — 80053 COMPREHEN METABOLIC PANEL: CPT | Performed by: FAMILY MEDICINE

## 2022-04-12 PROCEDURE — 83735 ASSAY OF MAGNESIUM: CPT | Performed by: FAMILY MEDICINE

## 2022-04-12 PROCEDURE — 99396 PR PREVENTIVE VISIT,EST,40-64: ICD-10-PCS | Mod: S$GLB,,, | Performed by: FAMILY MEDICINE

## 2022-04-12 PROCEDURE — 85025 COMPLETE CBC W/AUTO DIFF WBC: CPT | Performed by: FAMILY MEDICINE

## 2022-04-12 PROCEDURE — 3078F PR MOST RECENT DIASTOLIC BLOOD PRESSURE < 80 MM HG: ICD-10-PCS | Mod: CPTII,S$GLB,, | Performed by: FAMILY MEDICINE

## 2022-04-12 PROCEDURE — 1159F MED LIST DOCD IN RCRD: CPT | Mod: CPTII,S$GLB,, | Performed by: FAMILY MEDICINE

## 2022-04-12 PROCEDURE — 1159F PR MEDICATION LIST DOCUMENTED IN MEDICAL RECORD: ICD-10-PCS | Mod: CPTII,S$GLB,, | Performed by: FAMILY MEDICINE

## 2022-04-12 PROCEDURE — 84100 ASSAY OF PHOSPHORUS: CPT | Performed by: FAMILY MEDICINE

## 2022-04-12 PROCEDURE — 80061 LIPID PANEL: CPT | Performed by: FAMILY MEDICINE

## 2022-04-12 RX ORDER — QUININE SULFATE 324 MG/1
324 CAPSULE ORAL 2 TIMES DAILY
Qty: 60 CAPSULE | Refills: 1 | Status: ON HOLD | OUTPATIENT
Start: 2022-04-12 | End: 2022-10-12 | Stop reason: HOSPADM

## 2022-04-12 RX ORDER — DULOXETIN HYDROCHLORIDE 60 MG/1
60 CAPSULE, DELAYED RELEASE ORAL NIGHTLY
COMMUNITY
Start: 2022-04-05 | End: 2024-02-12 | Stop reason: ALTCHOICE

## 2022-04-12 NOTE — PROGRESS NOTES
Cedric Santoro  04/12/2022  [id number]    Monserrat Asif MD  Patient Care Team:  Monserrat Asif MD as PCP - General (Internal Medicine)  Ryland Matta as Digital Medicine Health   Monserrat Asif MD as Hypertension Digital Medicine Responsible Provider (Internal Medicine)  Ishmael Velarde III, MD as Consulting Physician (Orthopedic Surgery)  [employer] as Hypertension Digital Medicine Contract  Margoth Sosa PharmD as Hypertension Digital Medicine Clinician (Pharmacist)    Has the patient seen any provider outside of the network since the last visit ? (no). If yes, HIPPA forms completed and records requested.      Visit Type:a scheduled routine follow-up visit    Chief Complaint:  Chief Complaint   Patient presents with    Annual Exam       History of Present Illness:  HPI Mr. Santoro presents today for his annual exam.     Leg cramps last night   Kept him up most of the night      ED taking over the counter medication       MRI Cervical spine completed see care everywhere   Treated with injections which has helped and cymbalta is helping   Review of Systems   Constitutional: Negative for chills and fever.   HENT: Negative for congestion and tinnitus.    Eyes: Negative for blurred vision, pain and discharge.   Respiratory: Negative for cough and wheezing.    Cardiovascular: Negative for chest pain, palpitations, orthopnea and leg swelling.   Gastrointestinal: Negative for abdominal pain, blood in stool, constipation, diarrhea, heartburn, nausea and vomiting.   Genitourinary: Negative for dysuria, flank pain, frequency, hematuria and urgency.   Skin: Negative for itching and rash.   Neurological: Negative for dizziness, tingling and headaches.   Psychiatric/Behavioral: Negative for depression.         Screening Questionnaires:    In the last two weeks how often have you felt down, depressed, or hopeless ( no )    In the last two weeks how often have you had little interest or pleasure in doing  (no  )    In the last two weeks how often have you been bothered by the following problems:  1. Feeling nervous, anxious, or on edge ( no )    2. Not being able to stop or control worrying ( no)    3. Worrying too much about different things ( no)    4. Trouble relaxing ( no )    5. Being so restless that it is hard to sit still  (no )    6. Becoming easily annoyed or irritable (no)    7. Feeling afraid as if something awful might happen (no )    How often do you have a drink containing Alcohol? rarely     Do you exercise  (yes ) moderately active    Do you take a baby Aspirin daily ( no)    Do you have an advance directive ( no ) The patient was given information regarding Living Will/Durable Power-of- if requested.     The following were reviewed: Active problem list, medication list, allergies, family history, social history, and Health Maintenance.     History:  Past Medical History:   Diagnosis Date    Hypertension     Palpitation 3/5/2020    Raynaud's syndrome     Vasovagal syncopes      Past Surgical History:   Procedure Laterality Date    ankle/foot surgery      EYE SURGERY      GALLBLADDER SURGERY      HERNIA REPAIR      THROAT SURGERY       Family History   Problem Relation Age of Onset    Pacemaker/defibrilator Father     Macular degeneration Father     Macular degeneration Brother      Social History     Socioeconomic History    Marital status:    Tobacco Use    Smoking status: Never Smoker    Smokeless tobacco: Never Used   Substance and Sexual Activity    Alcohol use: No    Drug use: No     Social Determinants of Health     Financial Resource Strain: Unknown    Difficulty of Paying Living Expenses: Patient refused   Food Insecurity: Unknown    Worried About Running Out of Food in the Last Year: Patient refused    Ran Out of Food in the Last Year: Patient refused   Transportation Needs: Unknown    Lack of Transportation (Medical): Patient refused    Lack of Transportation  (Non-Medical): Patient refused   Physical Activity: Sufficiently Active    Days of Exercise per Week: 5 days    Minutes of Exercise per Session: 40 min   Stress: Unknown    Feeling of Stress : Patient refused   Social Connections: Unknown    Frequency of Communication with Friends and Family: Patient refused    Frequency of Social Gatherings with Friends and Family: Patient refused    Active Member of Clubs or Organizations: Patient refused    Attends Club or Organization Meetings: Patient refused    Marital Status:    Housing Stability: Unknown    Unable to Pay for Housing in the Last Year: Patient refused    Number of Places Lived in the Last Year: 1    Unstable Housing in the Last Year: Patient refused     Patient Active Problem List   Diagnosis    Elevated liver enzymes    Prolonged Q-T interval on ECG    Essential hypertension    Palpitation    Abnormal stress test    Paroxysmal atrial fibrillation     Review of patient's allergies indicates:   Allergen Reactions    Iodinated contrast media     Iodine and iodide containing products Hives       Health Maintenance  Health Maintenance Topics with due status: Not Due       Topic Last Completion Date    Lipid Panel 03/18/2021     Health Maintenance Due   Topic Date Due    COVID-19 Vaccine (1) Never done    TETANUS VACCINE  Never done    Shingles Vaccine (1 of 2) Never done    PROSTATE-SPECIFIC ANTIGEN  02/05/2021    Colorectal Cancer Screening  03/05/2021    Influenza Vaccine (1) Never done       Medications:  Current Outpatient Medications on File Prior to Visit   Medication Sig Dispense Refill    aspirin (ECOTRIN) 81 MG EC tablet Take 81 mg by mouth once daily.      b complex vitamins tablet Take 1 tablet by mouth once daily.      co-enzyme Q-10 30 mg capsule Take 30 mg by mouth 3 (three) times daily.      ferrous sulfate 325 (65 FE) MG EC tablet Take 325 mg by mouth 3 (three) times daily with meals.      losartan (COZAAR) 25  MG tablet TAKE 1 TABLET BY MOUTH EVERY DAY 90 tablet 2    magnesium 30 mg Tab Take 1 tablet by mouth once.      multivitamin (THERAGRAN) per tablet Take 1 tablet by mouth once daily.      nebivoloL (BYSTOLIC) 5 MG Tab TAKE 1 TABLET BY MOUTH EVERY DAY 90 tablet 3    vitamin D (VITAMIN D3) 1000 units Tab Take 1,000 Units by mouth once daily.      DULoxetine (CYMBALTA) 60 MG capsule Take 60 mg by mouth every morning.       No current facility-administered medications on file prior to visit.       Medications have been reviewed and reconciled with patient at visit today.    Barriers to medications present (no )    Adverse reactions to current medications (no)    Over the counter medications reviewed (Yes) and if needed added to active Medication list.    Exam:  Vitals:    04/12/22 0927   BP: 130/78   Pulse: 68   Resp: 18   Temp: 98.8 °F (37.1 °C)     Weight: 89.1 kg (196 lb 6.9 oz)   Body mass index is 28.18 kg/m².      Physical Exam    Laboratory Reviewed: (Yes)  Lab Results   Component Value Date    WBC 4.81 02/05/2020    HGB 15.5 02/05/2020    HCT 45.5 02/05/2020     02/05/2020    CHOL 166 03/18/2021    TRIG 50 03/18/2021    HDL 59 03/18/2021    ALT 42 03/18/2021    AST 66 (H) 03/18/2021     03/18/2021    K 4.3 03/18/2021     03/18/2021    CREATININE 1.3 03/18/2021    BUN 24 (H) 03/18/2021    CO2 32 (H) 03/18/2021    TSH 2.580 02/05/2020    PSA 2.4 02/05/2020    HGBA1C 5.5 02/05/2020       Assessment:  The primary encounter diagnosis was Routine physical examination. Diagnoses of Screening PSA (prostate specific antigen), Colon cancer screening, and Leg cramps were also pertinent to this visit.    Plan:  Routine physical examination  -     Lipid Panel; Future; Expected date: 04/12/2022  -     Comprehensive Metabolic Panel; Future; Expected date: 04/12/2022  -     CBC Auto Differential; Future; Expected date: 04/12/2022    Screening PSA (prostate specific antigen)  -     PSA, Screening; Future;  Expected date: 04/12/2022    Colon cancer screening  -     Fecal Immunochemical Test (iFOBT); Future; Expected date: 04/12/2022    Leg cramps  -     Magnesium; Future; Expected date: 04/12/2022  -     PHOSPHORUS; Future; Expected date: 04/12/2022  -     quiNINE sulfate 324 mg Cap; Take 1 capsule (324 mg total) by mouth 2 (two) times a day.  Dispense: 60 capsule; Refill: 1      -Patient's lab results were reviewed and discussed with patient  -Treatment options and alternatives were discussed with the patient. Patient expressed understanding. Patient was given the opportunity to ask questions and be an active participant in their medical care. Patient had no further questions or concerns at this time.   -Documentation of patient's health and condition was obtained from family member who was present during visit.  -Patient is an overall moderate risk for health complications from their medical conditions.     Follow up: follow up 1 year       Care Plan/Goals: Reviewed Yes   Goals        Patient Stated      Blood Pressure < 130/80 (pt-stated)        Other      Exercise at least 150 minutes per week.       Take at least one BP reading per week at various times of the day               After visit summary printed and given to patient upon discharge.  Patient goals and care plan are included in After visit summary.

## 2022-04-15 PROBLEM — M46.92 UNSPECIFIED INFLAMMATORY SPONDYLOPATHY, CERVICAL REGION: Status: ACTIVE | Noted: 2022-04-15

## 2022-04-26 ENCOUNTER — LAB VISIT (OUTPATIENT)
Dept: LAB | Facility: HOSPITAL | Age: 62
End: 2022-04-26
Attending: FAMILY MEDICINE
Payer: COMMERCIAL

## 2022-04-26 DIAGNOSIS — Z12.11 COLON CANCER SCREENING: ICD-10-CM

## 2022-04-26 PROCEDURE — 82274 ASSAY TEST FOR BLOOD FECAL: CPT | Performed by: FAMILY MEDICINE

## 2022-05-12 LAB — HEMOCCULT STL QL IA: NEGATIVE

## 2022-09-19 ENCOUNTER — PATIENT MESSAGE (OUTPATIENT)
Dept: ADMINISTRATIVE | Facility: OTHER | Age: 62
End: 2022-09-19
Payer: COMMERCIAL

## 2022-10-03 ENCOUNTER — OFFICE VISIT (OUTPATIENT)
Dept: INTERNAL MEDICINE | Facility: CLINIC | Age: 62
End: 2022-10-03
Payer: COMMERCIAL

## 2022-10-03 ENCOUNTER — HOSPITAL ENCOUNTER (OUTPATIENT)
Dept: RADIOLOGY | Facility: HOSPITAL | Age: 62
Discharge: HOME | End: 2022-10-03
Attending: FAMILY MEDICINE
Payer: COMMERCIAL

## 2022-10-03 VITALS
SYSTOLIC BLOOD PRESSURE: 142 MMHG | HEART RATE: 118 BPM | DIASTOLIC BLOOD PRESSURE: 98 MMHG | BODY MASS INDEX: 30.08 KG/M2 | HEIGHT: 70 IN | WEIGHT: 210.13 LBS | OXYGEN SATURATION: 96 %

## 2022-10-03 DIAGNOSIS — R00.2 PALPITATIONS: Primary | ICD-10-CM

## 2022-10-03 DIAGNOSIS — I10 ESSENTIAL HYPERTENSION: ICD-10-CM

## 2022-10-03 DIAGNOSIS — G47.09 OTHER INSOMNIA: ICD-10-CM

## 2022-10-03 DIAGNOSIS — R06.02 SHORTNESS OF BREATH: ICD-10-CM

## 2022-10-03 DIAGNOSIS — R94.39 ABNORMAL STRESS TEST: ICD-10-CM

## 2022-10-03 DIAGNOSIS — I48.0 PAROXYSMAL ATRIAL FIBRILLATION: ICD-10-CM

## 2022-10-03 DIAGNOSIS — R00.2 PALPITATION: ICD-10-CM

## 2022-10-03 PROCEDURE — 3077F PR MOST RECENT SYSTOLIC BLOOD PRESSURE >= 140 MM HG: ICD-10-PCS | Mod: CPTII,S$GLB,, | Performed by: FAMILY MEDICINE

## 2022-10-03 PROCEDURE — 3008F PR BODY MASS INDEX (BMI) DOCUMENTED: ICD-10-PCS | Mod: CPTII,S$GLB,, | Performed by: FAMILY MEDICINE

## 2022-10-03 PROCEDURE — 71046 X-RAY EXAM CHEST 2 VIEWS: CPT | Mod: 26,,, | Performed by: RADIOLOGY

## 2022-10-03 PROCEDURE — 4010F PR ACE/ARB THEARPY RXD/TAKEN: ICD-10-PCS | Mod: CPTII,S$GLB,, | Performed by: FAMILY MEDICINE

## 2022-10-03 PROCEDURE — 4010F ACE/ARB THERAPY RXD/TAKEN: CPT | Mod: CPTII,S$GLB,, | Performed by: FAMILY MEDICINE

## 2022-10-03 PROCEDURE — 93005 EKG 12-LEAD: ICD-10-PCS | Mod: S$GLB,,, | Performed by: FAMILY MEDICINE

## 2022-10-03 PROCEDURE — 99214 PR OFFICE/OUTPT VISIT, EST, LEVL IV, 30-39 MIN: ICD-10-PCS | Mod: S$GLB,,, | Performed by: FAMILY MEDICINE

## 2022-10-03 PROCEDURE — 3080F DIAST BP >= 90 MM HG: CPT | Mod: CPTII,S$GLB,, | Performed by: FAMILY MEDICINE

## 2022-10-03 PROCEDURE — 93010 EKG 12-LEAD: ICD-10-PCS | Mod: 76,S$GLB,, | Performed by: INTERNAL MEDICINE

## 2022-10-03 PROCEDURE — 93010 ELECTROCARDIOGRAM REPORT: CPT | Mod: 76,S$GLB,, | Performed by: INTERNAL MEDICINE

## 2022-10-03 PROCEDURE — 71046 XR CHEST PA AND LATERAL: ICD-10-PCS | Mod: 26,,, | Performed by: RADIOLOGY

## 2022-10-03 PROCEDURE — 99999 PR PBB SHADOW E&M-EST. PATIENT-LVL IV: ICD-10-PCS | Mod: PBBFAC,,, | Performed by: FAMILY MEDICINE

## 2022-10-03 PROCEDURE — 93005 ELECTROCARDIOGRAM TRACING: CPT | Mod: S$GLB,,, | Performed by: FAMILY MEDICINE

## 2022-10-03 PROCEDURE — 3080F PR MOST RECENT DIASTOLIC BLOOD PRESSURE >= 90 MM HG: ICD-10-PCS | Mod: CPTII,S$GLB,, | Performed by: FAMILY MEDICINE

## 2022-10-03 PROCEDURE — 3008F BODY MASS INDEX DOCD: CPT | Mod: CPTII,S$GLB,, | Performed by: FAMILY MEDICINE

## 2022-10-03 PROCEDURE — 99999 PR PBB SHADOW E&M-EST. PATIENT-LVL IV: CPT | Mod: PBBFAC,,, | Performed by: FAMILY MEDICINE

## 2022-10-03 PROCEDURE — 71046 X-RAY EXAM CHEST 2 VIEWS: CPT | Mod: TC

## 2022-10-03 PROCEDURE — 3077F SYST BP >= 140 MM HG: CPT | Mod: CPTII,S$GLB,, | Performed by: FAMILY MEDICINE

## 2022-10-03 PROCEDURE — 1159F MED LIST DOCD IN RCRD: CPT | Mod: CPTII,S$GLB,, | Performed by: FAMILY MEDICINE

## 2022-10-03 PROCEDURE — 99214 OFFICE O/P EST MOD 30 MIN: CPT | Mod: S$GLB,,, | Performed by: FAMILY MEDICINE

## 2022-10-03 PROCEDURE — 1159F PR MEDICATION LIST DOCUMENTED IN MEDICAL RECORD: ICD-10-PCS | Mod: CPTII,S$GLB,, | Performed by: FAMILY MEDICINE

## 2022-10-03 RX ORDER — LOSARTAN POTASSIUM 50 MG/1
50 TABLET ORAL DAILY
Qty: 90 TABLET | Refills: 1 | Status: SHIPPED | OUTPATIENT
Start: 2022-10-03 | End: 2023-03-29

## 2022-10-03 RX ORDER — ESZOPICLONE 1 MG/1
1 TABLET, FILM COATED ORAL NIGHTLY
Qty: 30 TABLET | Refills: 0 | Status: SHIPPED | OUTPATIENT
Start: 2022-10-03 | End: 2022-11-02

## 2022-10-03 NOTE — PROGRESS NOTES
Subjective:       Patient ID: Cedric Santoro is a 62 y.o. male.    Chief Complaint: Palpitations and Hypertension    HPI  Mr. Santoro presents today for hypertension and palpitations.   He has been under a lot of stress     Palpitations   With activity he feels fine    Shortness of breath without activity    Not sleeping   Problems staying asleep     Leg cramps/spasms   Has had normal magnesium, calcium and potassium.    Review of Systems   Constitutional:  Negative for activity change, appetite change, fatigue and fever.   HENT:  Negative for congestion, ear pain, facial swelling, hearing loss, sore throat and tinnitus.    Eyes:  Negative for redness and visual disturbance.   Respiratory:  Negative for cough, chest tightness and wheezing.    Gastrointestinal:  Negative for abdominal distention, abdominal pain, constipation, diarrhea, nausea and vomiting.   Endocrine: Negative for polydipsia and polyuria.   Genitourinary:  Negative for flank pain, frequency and penile discharge.   Musculoskeletal:  Negative for back pain, gait problem and joint swelling.   Skin:  Negative for rash.   Neurological:  Negative for dizziness, tremors, seizures, weakness and headaches.   Psychiatric/Behavioral:  Positive for sleep disturbance. Negative for agitation and confusion.          Past Medical History:   Diagnosis Date    Hypertension     Palpitation 3/5/2020    Raynaud's syndrome     Vasovagal syncopes      Past Surgical History:   Procedure Laterality Date    ankle/foot surgery      EYE SURGERY      GALLBLADDER SURGERY      HERNIA REPAIR      THROAT SURGERY       Family History   Problem Relation Age of Onset    Pacemaker/defibrilator Father     Macular degeneration Father     Macular degeneration Brother      Social History     Socioeconomic History    Marital status:    Tobacco Use    Smoking status: Never    Smokeless tobacco: Never   Substance and Sexual Activity    Alcohol use: No    Drug use: No     Social  Determinants of Health     Financial Resource Strain: Unknown    Difficulty of Paying Living Expenses: Patient refused   Food Insecurity: Unknown    Worried About Running Out of Food in the Last Year: Patient refused    Ran Out of Food in the Last Year: Patient refused   Transportation Needs: Unknown    Lack of Transportation (Medical): Patient refused    Lack of Transportation (Non-Medical): Patient refused   Physical Activity: Sufficiently Active    Days of Exercise per Week: 5 days    Minutes of Exercise per Session: 40 min   Stress: Unknown    Feeling of Stress : Patient refused   Social Connections: Unknown    Frequency of Communication with Friends and Family: Patient refused    Frequency of Social Gatherings with Friends and Family: Patient refused    Active Member of Clubs or Organizations: Patient refused    Attends Club or Organization Meetings: Patient refused    Marital Status:    Housing Stability: Unknown    Unable to Pay for Housing in the Last Year: Patient refused    Number of Places Lived in the Last Year: 1    Unstable Housing in the Last Year: Patient refused       Objective:        Physical Exam  Vitals reviewed.   Constitutional:       Appearance: Normal appearance.   HENT:      Head: Normocephalic and atraumatic.   Eyes:      Extraocular Movements: Extraocular movements intact.   Cardiovascular:      Rate and Rhythm: Rhythm irregular.   Pulmonary:      Effort: Pulmonary effort is normal.   Neurological:      Mental Status: He is alert and oriented to person, place, and time.   Psychiatric:         Mood and Affect: Mood normal.         Behavior: Behavior normal.         Results for orders placed or performed in visit on 04/26/22   Fecal Immunochemical Test (iFOBT)   Result Value Ref Range    Fecal Immunochemical Test (iFOBT) Negative Negative       Assessment/Plan:     Palpitations  -     EKG 12-lead  -     EKG 12-lead    Shortness of breath  -     X-Ray Chest PA And Lateral; Future;  Expected date: 10/03/2022    Essential hypertension  -     losartan (COZAAR) 50 MG tablet; Take 1 tablet (50 mg total) by mouth once daily.  Dispense: 90 tablet; Refill: 1    Palpitation  -     losartan (COZAAR) 50 MG tablet; Take 1 tablet (50 mg total) by mouth once daily.  Dispense: 90 tablet; Refill: 1    Abnormal stress test  -     losartan (COZAAR) 50 MG tablet; Take 1 tablet (50 mg total) by mouth once daily.  Dispense: 90 tablet; Refill: 1    Paroxysmal atrial fibrillation  -     losartan (COZAAR) 50 MG tablet; Take 1 tablet (50 mg total) by mouth once daily.  Dispense: 90 tablet; Refill: 1    Other insomnia  -     eszopiclone (LUNESTA) 1 MG Tab; Take 1 tablet (1 mg total) by mouth nightly.  Dispense: 30 tablet; Refill: 0        Follow up 2 weeks WES Asif MD  [facility]   Family Medicine

## 2022-10-04 ENCOUNTER — OFFICE VISIT (OUTPATIENT)
Dept: CARDIOLOGY | Facility: CLINIC | Age: 62
End: 2022-10-04
Payer: COMMERCIAL

## 2022-10-04 ENCOUNTER — LAB VISIT (OUTPATIENT)
Dept: LAB | Facility: HOSPITAL | Age: 62
End: 2022-10-04
Attending: INTERNAL MEDICINE
Payer: COMMERCIAL

## 2022-10-04 ENCOUNTER — TELEPHONE (OUTPATIENT)
Dept: CARDIOLOGY | Facility: CLINIC | Age: 62
End: 2022-10-04

## 2022-10-04 VITALS
SYSTOLIC BLOOD PRESSURE: 102 MMHG | RESPIRATION RATE: 16 BRPM | OXYGEN SATURATION: 97 % | HEART RATE: 96 BPM | DIASTOLIC BLOOD PRESSURE: 70 MMHG | HEIGHT: 70 IN | BODY MASS INDEX: 30.02 KG/M2 | WEIGHT: 209.69 LBS

## 2022-10-04 DIAGNOSIS — R94.31 ABNORMAL ECG: ICD-10-CM

## 2022-10-04 DIAGNOSIS — I48.92 PAROXYSMAL ATRIAL FLUTTER: Primary | ICD-10-CM

## 2022-10-04 DIAGNOSIS — R00.2 PALPITATION: ICD-10-CM

## 2022-10-04 DIAGNOSIS — I10 ESSENTIAL HYPERTENSION: ICD-10-CM

## 2022-10-04 DIAGNOSIS — R06.09 DOE (DYSPNEA ON EXERTION): ICD-10-CM

## 2022-10-04 DIAGNOSIS — R60.0 EDEMA OF BOTH LEGS: ICD-10-CM

## 2022-10-04 DIAGNOSIS — I48.92 PAROXYSMAL ATRIAL FLUTTER: ICD-10-CM

## 2022-10-04 PROCEDURE — 84439 ASSAY OF FREE THYROXINE: CPT | Performed by: INTERNAL MEDICINE

## 2022-10-04 PROCEDURE — 3074F PR MOST RECENT SYSTOLIC BLOOD PRESSURE < 130 MM HG: ICD-10-PCS | Mod: CPTII,S$GLB,, | Performed by: INTERNAL MEDICINE

## 2022-10-04 PROCEDURE — 3074F SYST BP LT 130 MM HG: CPT | Mod: CPTII,S$GLB,, | Performed by: INTERNAL MEDICINE

## 2022-10-04 PROCEDURE — 83880 ASSAY OF NATRIURETIC PEPTIDE: CPT | Performed by: INTERNAL MEDICINE

## 2022-10-04 PROCEDURE — 1159F MED LIST DOCD IN RCRD: CPT | Mod: CPTII,S$GLB,, | Performed by: INTERNAL MEDICINE

## 2022-10-04 PROCEDURE — 4010F PR ACE/ARB THEARPY RXD/TAKEN: ICD-10-PCS | Mod: CPTII,S$GLB,, | Performed by: INTERNAL MEDICINE

## 2022-10-04 PROCEDURE — 84443 ASSAY THYROID STIM HORMONE: CPT | Performed by: INTERNAL MEDICINE

## 2022-10-04 PROCEDURE — 99215 PR OFFICE/OUTPT VISIT, EST, LEVL V, 40-54 MIN: ICD-10-PCS | Mod: S$GLB,,, | Performed by: INTERNAL MEDICINE

## 2022-10-04 PROCEDURE — 1160F PR REVIEW ALL MEDS BY PRESCRIBER/CLIN PHARMACIST DOCUMENTED: ICD-10-PCS | Mod: CPTII,S$GLB,, | Performed by: INTERNAL MEDICINE

## 2022-10-04 PROCEDURE — 80053 COMPREHEN METABOLIC PANEL: CPT | Performed by: INTERNAL MEDICINE

## 2022-10-04 PROCEDURE — 3078F PR MOST RECENT DIASTOLIC BLOOD PRESSURE < 80 MM HG: ICD-10-PCS | Mod: CPTII,S$GLB,, | Performed by: INTERNAL MEDICINE

## 2022-10-04 PROCEDURE — 1159F PR MEDICATION LIST DOCUMENTED IN MEDICAL RECORD: ICD-10-PCS | Mod: CPTII,S$GLB,, | Performed by: INTERNAL MEDICINE

## 2022-10-04 PROCEDURE — 99999 PR PBB SHADOW E&M-EST. PATIENT-LVL IV: ICD-10-PCS | Mod: PBBFAC,,, | Performed by: INTERNAL MEDICINE

## 2022-10-04 PROCEDURE — 83735 ASSAY OF MAGNESIUM: CPT | Performed by: INTERNAL MEDICINE

## 2022-10-04 PROCEDURE — 85025 COMPLETE CBC W/AUTO DIFF WBC: CPT | Performed by: INTERNAL MEDICINE

## 2022-10-04 PROCEDURE — 36415 COLL VENOUS BLD VENIPUNCTURE: CPT | Performed by: INTERNAL MEDICINE

## 2022-10-04 PROCEDURE — 3078F DIAST BP <80 MM HG: CPT | Mod: CPTII,S$GLB,, | Performed by: INTERNAL MEDICINE

## 2022-10-04 PROCEDURE — 99215 OFFICE O/P EST HI 40 MIN: CPT | Mod: S$GLB,,, | Performed by: INTERNAL MEDICINE

## 2022-10-04 PROCEDURE — 3008F BODY MASS INDEX DOCD: CPT | Mod: CPTII,S$GLB,, | Performed by: INTERNAL MEDICINE

## 2022-10-04 PROCEDURE — 1160F RVW MEDS BY RX/DR IN RCRD: CPT | Mod: CPTII,S$GLB,, | Performed by: INTERNAL MEDICINE

## 2022-10-04 PROCEDURE — 4010F ACE/ARB THERAPY RXD/TAKEN: CPT | Mod: CPTII,S$GLB,, | Performed by: INTERNAL MEDICINE

## 2022-10-04 PROCEDURE — 99999 PR PBB SHADOW E&M-EST. PATIENT-LVL IV: CPT | Mod: PBBFAC,,, | Performed by: INTERNAL MEDICINE

## 2022-10-04 PROCEDURE — 3008F PR BODY MASS INDEX (BMI) DOCUMENTED: ICD-10-PCS | Mod: CPTII,S$GLB,, | Performed by: INTERNAL MEDICINE

## 2022-10-04 NOTE — PROGRESS NOTES
Subjective:    Patient ID:  Cedric Santoro is a 62 y.o. male who presents for evaluation of Atrial Flutter      HPI  pt presents for eval of a flutter.  He sees Donte Mann.  Chart reviewed.  Has h/o HTN, palpitations.  Nonsmoker.  No h/o TIA/CVA.  - stress test 2020.  Reportedly had brief PAF w stress test, resolving on its own.  Echo 2020 normal LV function, mild MR.  Pt had ecg done 10/3/22 twice:  a flutter w rate 99 - 116 bpm.  New dx.  Lost his wife to cancer 7/22.  Has had dyspnea last few months.  Under so much stress so whirlwind w loss of wife.  Some leg edema.  No CP.  No syncope.   Chronic leg cramps.      Past Medical History:   Diagnosis Date    Hypertension     Palpitation 3/5/2020    Raynaud's syndrome     Vasovagal syncopes      Current Outpatient Medications   Medication Instructions    apixaban (ELIQUIS) 5 mg, Oral, 2 times daily    b complex vitamins tablet 1 tablet, Oral, Daily    co-enzyme Q-10 30 mg, Oral, 3 times daily    DULoxetine (CYMBALTA) 60 mg, Oral, Every morning    eszopiclone (LUNESTA) 1 mg, Oral, Nightly    ferrous sulfate 325 mg, Oral, 3 times daily with meals    losartan (COZAAR) 50 mg, Oral, Daily    magnesium 30 mg Tab 1 tablet, Oral, Once    multivitamin (THERAGRAN) per tablet 1 tablet, Oral, Daily    nebivoloL (BYSTOLIC) 5 MG Tab TAKE 1 TABLET BY MOUTH EVERY DAY    quiNINE sulfate 324 mg, Oral, 2 times daily    vitamin D (VITAMIN D3) 1,000 Units, Oral, Daily       Review of Systems   Constitutional: Negative.   HENT: Negative.     Eyes: Negative.    Cardiovascular:  Positive for dyspnea on exertion, leg swelling and palpitations.   Respiratory:  Positive for shortness of breath.    Endocrine: Negative.    Hematologic/Lymphatic: Negative.    Skin: Negative.    Musculoskeletal:  Positive for arthritis and muscle cramps.   Gastrointestinal: Negative.    Genitourinary: Negative.    Neurological: Negative.    Psychiatric/Behavioral: Negative.     Allergic/Immunologic: Negative.  "        /70   Pulse 96   Resp 16   Ht 5' 10" (1.778 m)   Wt 95.1 kg (209 lb 10.5 oz)   SpO2 97%   BMI 30.08 kg/m²     Wt Readings from Last 3 Encounters:   10/04/22 95.1 kg (209 lb 10.5 oz)   10/03/22 95.3 kg (210 lb 1.6 oz)   04/12/22 89.1 kg (196 lb 6.9 oz)     Temp Readings from Last 3 Encounters:   04/12/22 98.8 °F (37.1 °C) (Tympanic)   09/25/20 96.9 °F (36.1 °C) (Tympanic)   02/20/20 96 °F (35.6 °C) (Tympanic)     BP Readings from Last 3 Encounters:   10/04/22 102/70   10/03/22 (!) 142/98   04/12/22 130/78     Pulse Readings from Last 3 Encounters:   10/04/22 96   10/03/22 (!) 118   04/12/22 68       Objective:    Physical Exam  Vitals and nursing note reviewed.   Constitutional:       Appearance: He is well-developed.   HENT:      Head: Normocephalic.   Neck:      Thyroid: No thyromegaly.      Vascular: Normal carotid pulses. No carotid bruit, hepatojugular reflux or JVD.   Cardiovascular:      Rate and Rhythm: Normal rate. Rhythm irregularly irregular.      Pulses:           Radial pulses are 2+ on the right side and 2+ on the left side.      Heart sounds: S1 normal and S2 normal. Heart sounds not distant. No midsystolic click and no opening snap. No murmur heard.    No friction rub. No S3 or S4 sounds.   Pulmonary:      Effort: Pulmonary effort is normal.      Breath sounds: Normal breath sounds. No wheezing or rales.   Abdominal:      General: Bowel sounds are normal. There is no distension or abdominal bruit.      Palpations: Abdomen is soft. There is no mass.      Tenderness: There is no abdominal tenderness.   Musculoskeletal:      Cervical back: Normal range of motion and neck supple.      Right lower leg: Edema present.      Left lower leg: Edema present.   Skin:     General: Skin is warm.   Neurological:      Mental Status: He is alert and oriented to person, place, and time.   Psychiatric:         Behavior: Behavior normal.         I have reviewed all pertinent labs and cardiac " studies.      Chemistry        Component Value Date/Time     04/12/2022 1010    K 4.3 04/12/2022 1010     04/12/2022 1010    CO2 32 (H) 04/12/2022 1010    BUN 15 04/12/2022 1010    CREATININE 1.2 04/12/2022 1010     04/12/2022 1010        Component Value Date/Time    CALCIUM 9.8 04/12/2022 1010    ALKPHOS 51 (L) 04/12/2022 1010    AST 77 (H) 04/12/2022 1010    ALT 48 (H) 04/12/2022 1010    BILITOT 0.9 04/12/2022 1010    ESTGFRAFRICA >60.0 04/12/2022 1010    EGFRNONAA >60.0 04/12/2022 1010        Lab Results   Component Value Date    WBC 3.56 (L) 04/12/2022    HGB 14.8 04/12/2022    HCT 45.5 04/12/2022    MCV 94 04/12/2022     04/12/2022       Lab Results   Component Value Date    HGBA1C 5.5 02/05/2020     Lab Results   Component Value Date    CHOL 197 04/12/2022    CHOL 166 03/18/2021    CHOL 179 08/05/2020     Lab Results   Component Value Date    HDL 68 04/12/2022    HDL 59 03/18/2021    HDL 66 08/05/2020     Lab Results   Component Value Date    LDLCALC 118.8 04/12/2022    LDLCALC 97.0 03/18/2021    LDLCALC 96.0 08/05/2020     Lab Results   Component Value Date    TRIG 51 04/12/2022    TRIG 50 03/18/2021    TRIG 85 08/05/2020     Lab Results   Component Value Date    CHOLHDL 34.5 04/12/2022    CHOLHDL 35.5 03/18/2021    CHOLHDL 36.9 08/05/2020       Assessment:       1. Paroxysmal atrial flutter    2. Abnormal ECG    3. Essential hypertension    4. Palpitation    5. Edema of both legs    6. LEBLANC (dyspnea on exertion)         Plan:             Symptomatic PAFL.  Start Eliquis 5 mg bid for PAFL CVA protection.  Patient advised of indications for above medications, risks/benefits and side effect profile.  Can stop aspirin when he starts Eliquis.  Proceed w ARIELA guided cardioversion next week.  Indications/risks/benefits discussed and pt agreeable to proceeding.  Update labs today: CMP, CBC, BNP, TSH, Mag.  Continue Bystolic.  Avoid strenuous activity until procedure performed.  To ER if any  acute sxs develop.  EP consult at later date.      ARIELA cardioversion 10/12/22 8849    I have reviewed all pertinent labs and cardiac studies independently. Plans and recommendations have been formulated under my direct supervision. All questions answered and patient voiced understanding.

## 2022-10-04 NOTE — TELEPHONE ENCOUNTER
Cynthia,    Please schedule pt for ARIELA guided cardioversion Wed, 10/12/22 5661.    Thanks,    Dr Garzon

## 2022-10-04 NOTE — H&P (VIEW-ONLY)
Subjective:    Patient ID:  Cedric Santoro is a 62 y.o. male who presents for evaluation of Atrial Flutter      HPI  pt presents for eval of a flutter.  He sees Donte Mann.  Chart reviewed.  Has h/o HTN, palpitations.  Nonsmoker.  No h/o TIA/CVA.  - stress test 2020.  Reportedly had brief PAF w stress test, resolving on its own.  Echo 2020 normal LV function, mild MR.  Pt had ecg done 10/3/22 twice:  a flutter w rate 99 - 116 bpm.  New dx.  Lost his wife to cancer 7/22.  Has had dyspnea last few months.  Under so much stress so whirlwind w loss of wife.  Some leg edema.  No CP.  No syncope.   Chronic leg cramps.      Past Medical History:   Diagnosis Date    Hypertension     Palpitation 3/5/2020    Raynaud's syndrome     Vasovagal syncopes      Current Outpatient Medications   Medication Instructions    apixaban (ELIQUIS) 5 mg, Oral, 2 times daily    b complex vitamins tablet 1 tablet, Oral, Daily    co-enzyme Q-10 30 mg, Oral, 3 times daily    DULoxetine (CYMBALTA) 60 mg, Oral, Every morning    eszopiclone (LUNESTA) 1 mg, Oral, Nightly    ferrous sulfate 325 mg, Oral, 3 times daily with meals    losartan (COZAAR) 50 mg, Oral, Daily    magnesium 30 mg Tab 1 tablet, Oral, Once    multivitamin (THERAGRAN) per tablet 1 tablet, Oral, Daily    nebivoloL (BYSTOLIC) 5 MG Tab TAKE 1 TABLET BY MOUTH EVERY DAY    quiNINE sulfate 324 mg, Oral, 2 times daily    vitamin D (VITAMIN D3) 1,000 Units, Oral, Daily       Review of Systems   Constitutional: Negative.   HENT: Negative.     Eyes: Negative.    Cardiovascular:  Positive for dyspnea on exertion, leg swelling and palpitations.   Respiratory:  Positive for shortness of breath.    Endocrine: Negative.    Hematologic/Lymphatic: Negative.    Skin: Negative.    Musculoskeletal:  Positive for arthritis and muscle cramps.   Gastrointestinal: Negative.    Genitourinary: Negative.    Neurological: Negative.    Psychiatric/Behavioral: Negative.     Allergic/Immunologic: Negative.  "        /70   Pulse 96   Resp 16   Ht 5' 10" (1.778 m)   Wt 95.1 kg (209 lb 10.5 oz)   SpO2 97%   BMI 30.08 kg/m²     Wt Readings from Last 3 Encounters:   10/04/22 95.1 kg (209 lb 10.5 oz)   10/03/22 95.3 kg (210 lb 1.6 oz)   04/12/22 89.1 kg (196 lb 6.9 oz)     Temp Readings from Last 3 Encounters:   04/12/22 98.8 °F (37.1 °C) (Tympanic)   09/25/20 96.9 °F (36.1 °C) (Tympanic)   02/20/20 96 °F (35.6 °C) (Tympanic)     BP Readings from Last 3 Encounters:   10/04/22 102/70   10/03/22 (!) 142/98   04/12/22 130/78     Pulse Readings from Last 3 Encounters:   10/04/22 96   10/03/22 (!) 118   04/12/22 68       Objective:    Physical Exam  Vitals and nursing note reviewed.   Constitutional:       Appearance: He is well-developed.   HENT:      Head: Normocephalic.   Neck:      Thyroid: No thyromegaly.      Vascular: Normal carotid pulses. No carotid bruit, hepatojugular reflux or JVD.   Cardiovascular:      Rate and Rhythm: Normal rate. Rhythm irregularly irregular.      Pulses:           Radial pulses are 2+ on the right side and 2+ on the left side.      Heart sounds: S1 normal and S2 normal. Heart sounds not distant. No midsystolic click and no opening snap. No murmur heard.    No friction rub. No S3 or S4 sounds.   Pulmonary:      Effort: Pulmonary effort is normal.      Breath sounds: Normal breath sounds. No wheezing or rales.   Abdominal:      General: Bowel sounds are normal. There is no distension or abdominal bruit.      Palpations: Abdomen is soft. There is no mass.      Tenderness: There is no abdominal tenderness.   Musculoskeletal:      Cervical back: Normal range of motion and neck supple.      Right lower leg: Edema present.      Left lower leg: Edema present.   Skin:     General: Skin is warm.   Neurological:      Mental Status: He is alert and oriented to person, place, and time.   Psychiatric:         Behavior: Behavior normal.         I have reviewed all pertinent labs and cardiac " studies.      Chemistry        Component Value Date/Time     04/12/2022 1010    K 4.3 04/12/2022 1010     04/12/2022 1010    CO2 32 (H) 04/12/2022 1010    BUN 15 04/12/2022 1010    CREATININE 1.2 04/12/2022 1010     04/12/2022 1010        Component Value Date/Time    CALCIUM 9.8 04/12/2022 1010    ALKPHOS 51 (L) 04/12/2022 1010    AST 77 (H) 04/12/2022 1010    ALT 48 (H) 04/12/2022 1010    BILITOT 0.9 04/12/2022 1010    ESTGFRAFRICA >60.0 04/12/2022 1010    EGFRNONAA >60.0 04/12/2022 1010        Lab Results   Component Value Date    WBC 3.56 (L) 04/12/2022    HGB 14.8 04/12/2022    HCT 45.5 04/12/2022    MCV 94 04/12/2022     04/12/2022       Lab Results   Component Value Date    HGBA1C 5.5 02/05/2020     Lab Results   Component Value Date    CHOL 197 04/12/2022    CHOL 166 03/18/2021    CHOL 179 08/05/2020     Lab Results   Component Value Date    HDL 68 04/12/2022    HDL 59 03/18/2021    HDL 66 08/05/2020     Lab Results   Component Value Date    LDLCALC 118.8 04/12/2022    LDLCALC 97.0 03/18/2021    LDLCALC 96.0 08/05/2020     Lab Results   Component Value Date    TRIG 51 04/12/2022    TRIG 50 03/18/2021    TRIG 85 08/05/2020     Lab Results   Component Value Date    CHOLHDL 34.5 04/12/2022    CHOLHDL 35.5 03/18/2021    CHOLHDL 36.9 08/05/2020       Assessment:       1. Paroxysmal atrial flutter    2. Abnormal ECG    3. Essential hypertension    4. Palpitation    5. Edema of both legs    6. LEBLANC (dyspnea on exertion)         Plan:             Symptomatic PAFL.  Start Eliquis 5 mg bid for PAFL CVA protection.  Patient advised of indications for above medications, risks/benefits and side effect profile.  Can stop aspirin when he starts Eliquis.  Proceed w ARIELA guided cardioversion next week.  Indications/risks/benefits discussed and pt agreeable to proceeding.  Update labs today: CMP, CBC, BNP, TSH, Mag.  Continue Bystolic.  Avoid strenuous activity until procedure performed.  To ER if any  acute sxs develop.  EP consult at later date.      ARIELA cardioversion 10/12/22 7483    I have reviewed all pertinent labs and cardiac studies independently. Plans and recommendations have been formulated under my direct supervision. All questions answered and patient voiced understanding.

## 2022-10-05 ENCOUNTER — TELEPHONE (OUTPATIENT)
Dept: CARDIOLOGY | Facility: CLINIC | Age: 62
End: 2022-10-05
Payer: COMMERCIAL

## 2022-10-05 DIAGNOSIS — E87.5 HYPERKALEMIA: Primary | ICD-10-CM

## 2022-10-05 LAB
ALBUMIN SERPL BCP-MCNC: 4.6 G/DL (ref 3.5–5.2)
ALP SERPL-CCNC: 84 U/L (ref 55–135)
ALT SERPL W/O P-5'-P-CCNC: 88 U/L (ref 10–44)
ANION GAP SERPL CALC-SCNC: 10 MMOL/L (ref 8–16)
AST SERPL-CCNC: 71 U/L (ref 10–40)
BASOPHILS # BLD AUTO: 0.06 K/UL (ref 0–0.2)
BASOPHILS NFR BLD: 0.8 % (ref 0–1.9)
BILIRUB SERPL-MCNC: 0.6 MG/DL (ref 0.1–1)
BNP SERPL-MCNC: 95 PG/ML (ref 0–99)
BUN SERPL-MCNC: 21 MG/DL (ref 8–23)
CALCIUM SERPL-MCNC: 10.9 MG/DL (ref 8.7–10.5)
CHLORIDE SERPL-SCNC: 101 MMOL/L (ref 95–110)
CO2 SERPL-SCNC: 30 MMOL/L (ref 23–29)
CREAT SERPL-MCNC: 1.5 MG/DL (ref 0.5–1.4)
DIFFERENTIAL METHOD: NORMAL
EOSINOPHIL # BLD AUTO: 0.2 K/UL (ref 0–0.5)
EOSINOPHIL NFR BLD: 2.5 % (ref 0–8)
ERYTHROCYTE [DISTWIDTH] IN BLOOD BY AUTOMATED COUNT: 12.3 % (ref 11.5–14.5)
EST. GFR  (NO RACE VARIABLE): 52.3 ML/MIN/1.73 M^2
GLUCOSE SERPL-MCNC: 94 MG/DL (ref 70–110)
HCT VFR BLD AUTO: 49 % (ref 40–54)
HGB BLD-MCNC: 15.8 G/DL (ref 14–18)
IMM GRANULOCYTES # BLD AUTO: 0.02 K/UL (ref 0–0.04)
IMM GRANULOCYTES NFR BLD AUTO: 0.3 % (ref 0–0.5)
LYMPHOCYTES # BLD AUTO: 1.6 K/UL (ref 1–4.8)
LYMPHOCYTES NFR BLD: 21.9 % (ref 18–48)
MAGNESIUM SERPL-MCNC: 2.3 MG/DL (ref 1.6–2.6)
MCH RBC QN AUTO: 31 PG (ref 27–31)
MCHC RBC AUTO-ENTMCNC: 32.2 G/DL (ref 32–36)
MCV RBC AUTO: 96 FL (ref 82–98)
MONOCYTES # BLD AUTO: 0.6 K/UL (ref 0.3–1)
MONOCYTES NFR BLD: 8.2 % (ref 4–15)
NEUTROPHILS # BLD AUTO: 4.9 K/UL (ref 1.8–7.7)
NEUTROPHILS NFR BLD: 66.3 % (ref 38–73)
NRBC BLD-RTO: 0 /100 WBC
PLATELET # BLD AUTO: 198 K/UL (ref 150–450)
PMV BLD AUTO: 12 FL (ref 9.2–12.9)
POTASSIUM SERPL-SCNC: 5.5 MMOL/L (ref 3.5–5.1)
PROT SERPL-MCNC: 7 G/DL (ref 6–8.4)
RBC # BLD AUTO: 5.1 M/UL (ref 4.6–6.2)
SODIUM SERPL-SCNC: 141 MMOL/L (ref 136–145)
T4 FREE SERPL-MCNC: 0.83 NG/DL (ref 0.71–1.51)
TSH SERPL DL<=0.005 MIU/L-ACNC: 7.97 UIU/ML (ref 0.4–4)
WBC # BLD AUTO: 7.31 K/UL (ref 3.9–12.7)

## 2022-10-06 NOTE — TELEPHONE ENCOUNTER
Cynthia    Please call pt.  Labs reviewed.  Potassium mildly elevated.  Thyroid test mildly abnormal.    Needs to increase hydration, push po water.  Cut back on any high potassium foods.    Recheck CMP on Monday, 10/10/22.    See PCP on thyroid disorder.    Proceed w ARIELA CV as scheduled.    Dr Garzon    Called patient to advise, per Dr. Garzon     Verbalized understanding repeat labs scheduled

## 2022-10-06 NOTE — TELEPHONE ENCOUNTER
Cynthia    Please call pt.  Labs reviewed.  Potassium mildly elevated.  Thyroid test mildly abnormal.    Needs to increase hydration, push po water.  Cut back on any high potassium foods.    Recheck CMP on Monday, 10/10/22.    See PCP on thyroid disorder.    Proceed w ARIELA CV as scheduled.    Dr Garzon

## 2022-10-10 ENCOUNTER — LAB VISIT (OUTPATIENT)
Dept: LAB | Facility: HOSPITAL | Age: 62
End: 2022-10-10
Attending: INTERNAL MEDICINE
Payer: COMMERCIAL

## 2022-10-10 DIAGNOSIS — E87.5 HYPERKALEMIA: ICD-10-CM

## 2022-10-10 LAB
ALBUMIN SERPL BCP-MCNC: 4.2 G/DL (ref 3.5–5.2)
ALP SERPL-CCNC: 71 U/L (ref 55–135)
ALT SERPL W/O P-5'-P-CCNC: 44 U/L (ref 10–44)
ANION GAP SERPL CALC-SCNC: 6 MMOL/L (ref 8–16)
AST SERPL-CCNC: 48 U/L (ref 10–40)
BILIRUB SERPL-MCNC: 0.6 MG/DL (ref 0.1–1)
BUN SERPL-MCNC: 21 MG/DL (ref 8–23)
CALCIUM SERPL-MCNC: 9.5 MG/DL (ref 8.7–10.5)
CHLORIDE SERPL-SCNC: 100 MMOL/L (ref 95–110)
CO2 SERPL-SCNC: 32 MMOL/L (ref 23–29)
CREAT SERPL-MCNC: 1.2 MG/DL (ref 0.5–1.4)
EST. GFR  (NO RACE VARIABLE): >60 ML/MIN/1.73 M^2
GLUCOSE SERPL-MCNC: 84 MG/DL (ref 70–110)
POTASSIUM SERPL-SCNC: 4.3 MMOL/L (ref 3.5–5.1)
PROT SERPL-MCNC: 6.5 G/DL (ref 6–8.4)
SODIUM SERPL-SCNC: 138 MMOL/L (ref 136–145)

## 2022-10-10 PROCEDURE — 80053 COMPREHEN METABOLIC PANEL: CPT | Performed by: INTERNAL MEDICINE

## 2022-10-10 PROCEDURE — 36415 COLL VENOUS BLD VENIPUNCTURE: CPT | Performed by: INTERNAL MEDICINE

## 2022-10-12 ENCOUNTER — PATIENT MESSAGE (OUTPATIENT)
Dept: INTERNAL MEDICINE | Facility: CLINIC | Age: 62
End: 2022-10-12
Payer: COMMERCIAL

## 2022-10-12 ENCOUNTER — HOSPITAL ENCOUNTER (OUTPATIENT)
Facility: HOSPITAL | Age: 62
Discharge: HOME | End: 2022-10-12
Attending: INTERNAL MEDICINE | Admitting: INTERNAL MEDICINE
Payer: COMMERCIAL

## 2022-10-12 ENCOUNTER — ANESTHESIA (OUTPATIENT)
Dept: CARDIOLOGY | Facility: HOSPITAL | Age: 62
End: 2022-10-12
Payer: COMMERCIAL

## 2022-10-12 ENCOUNTER — ANESTHESIA EVENT (OUTPATIENT)
Dept: CARDIOLOGY | Facility: HOSPITAL | Age: 62
End: 2022-10-12
Payer: COMMERCIAL

## 2022-10-12 DIAGNOSIS — I48.0 PAF (PAROXYSMAL ATRIAL FIBRILLATION): ICD-10-CM

## 2022-10-12 DIAGNOSIS — I48.92 PAROXYSMAL ATRIAL FLUTTER: ICD-10-CM

## 2022-10-12 DIAGNOSIS — I48.92 ATRIAL FLUTTER: ICD-10-CM

## 2022-10-12 LAB
BSA FOR ECHO PROCEDURE: 2.16 M2
CTP QC/QA: YES
EJECTION FRACTION: 55 %
SARS-COV-2 AG RESP QL IA.RAPID: NEGATIVE

## 2022-10-12 PROCEDURE — 93010 EKG 12-LEAD: ICD-10-PCS | Mod: ,,, | Performed by: INTERNAL MEDICINE

## 2022-10-12 PROCEDURE — 93010 ELECTROCARDIOGRAM REPORT: CPT | Mod: ,,, | Performed by: INTERNAL MEDICINE

## 2022-10-12 PROCEDURE — 37000008 HC ANESTHESIA 1ST 15 MINUTES: Performed by: INTERNAL MEDICINE

## 2022-10-12 PROCEDURE — 25000003 PHARM REV CODE 250: Performed by: INTERNAL MEDICINE

## 2022-10-12 PROCEDURE — 63600175 PHARM REV CODE 636 W HCPCS: Performed by: NURSE ANESTHETIST, CERTIFIED REGISTERED

## 2022-10-12 PROCEDURE — 93005 ELECTROCARDIOGRAM TRACING: CPT

## 2022-10-12 PROCEDURE — 25000003 PHARM REV CODE 250: Performed by: NURSE ANESTHETIST, CERTIFIED REGISTERED

## 2022-10-12 RX ORDER — PROPOFOL 10 MG/ML
VIAL (ML) INTRAVENOUS
Status: DISCONTINUED | OUTPATIENT
Start: 2022-10-12 | End: 2022-10-12

## 2022-10-12 RX ORDER — SODIUM CHLORIDE, SODIUM LACTATE, POTASSIUM CHLORIDE, CALCIUM CHLORIDE 600; 310; 30; 20 MG/100ML; MG/100ML; MG/100ML; MG/100ML
INJECTION, SOLUTION INTRAVENOUS CONTINUOUS PRN
Status: DISCONTINUED | OUTPATIENT
Start: 2022-10-12 | End: 2022-10-12

## 2022-10-12 RX ORDER — LIDOCAINE HYDROCHLORIDE 20 MG/ML
SOLUTION OROPHARYNGEAL
Status: DISCONTINUED | OUTPATIENT
Start: 2022-10-12 | End: 2022-10-12 | Stop reason: HOSPADM

## 2022-10-12 RX ORDER — LIDOCAINE HYDROCHLORIDE 10 MG/ML
INJECTION, SOLUTION EPIDURAL; INFILTRATION; INTRACAUDAL; PERINEURAL
Status: DISCONTINUED | OUTPATIENT
Start: 2022-10-12 | End: 2022-10-12

## 2022-10-12 RX ADMIN — LIDOCAINE HYDROCHLORIDE 100 MG: 10 INJECTION, SOLUTION EPIDURAL; INFILTRATION; INTRACAUDAL; PERINEURAL at 08:10

## 2022-10-12 RX ADMIN — PROPOFOL 30 MG: 10 INJECTION, EMULSION INTRAVENOUS at 08:10

## 2022-10-12 RX ADMIN — PROPOFOL 20 MG: 10 INJECTION, EMULSION INTRAVENOUS at 08:10

## 2022-10-12 RX ADMIN — PROPOFOL 80 MG: 10 INJECTION, EMULSION INTRAVENOUS at 08:10

## 2022-10-12 RX ADMIN — SODIUM CHLORIDE, SODIUM LACTATE, POTASSIUM CHLORIDE, AND CALCIUM CHLORIDE: .6; .31; .03; .02 INJECTION, SOLUTION INTRAVENOUS at 08:10

## 2022-10-12 NOTE — ANESTHESIA PREPROCEDURE EVALUATION
10/12/2022  Cedric Santoro is a 62 y.o., male.      Pre-op Assessment    I have reviewed the Patient Summary Reports.     I have reviewed the Nursing Notes. I have reviewed the NPO Status.   I have reviewed the Medications.     Review of Systems  Anesthesia Hx:  No problems with previous Anesthesia  Denies Family Hx of Anesthesia complications.   Denies Personal Hx of Anesthesia complications.   Social:  Non-Smoker    Hematology/Oncology:  Hematology Normal   Oncology Normal     EENT/Dental:EENT/Dental Normal   Cardiovascular:   Hypertension Dysrhythmias atrial fibrillation ECG has been reviewed. Echo 3/2020    ? Normal right ventricular systolic function.  ? Normal left ventricular systolic function. The estimated ejection fraction is 55%.  ? Normal LV diastolic function.  ? Mild mitral regurgitation.   Pulmonary:   Shortness of breath    Renal/:  Renal/ Normal     Hepatic/GI:  Hepatic/GI Normal    Musculoskeletal:  Musculoskeletal Normal    Neurological:  Neurology Normal    Endocrine:  Endocrine Normal    Dermatological:  Skin Normal    Psych:  Psychiatric Normal           Physical Exam  General: Well nourished, Cooperative, Alert and Oriented    Airway:  Mallampati: II   Mouth Opening: Normal  TM Distance: Normal  Tongue: Normal  Neck ROM: Normal ROM    Dental:  Intact  Pt denies loose or removable dentition  Heart:  Rate: Normal  Rhythm: Regular Rhythm        Anesthesia Plan  Type of Anesthesia, risks & benefits discussed:    Anesthesia Type: MAC  Intra-op Monitoring Plan: Standard ASA Monitors  Post Op Pain Control Plan: multimodal analgesia  Induction:  IV  Informed Consent: Informed consent signed with the Patient and all parties understand the risks and agree with anesthesia plan.  All questions answered.   ASA Score: 3  Day of Surgery Review of History & Physical: H&P Update referred to the  surgeon/provider.I have interviewed and examined the patient. I have reviewed the patient's H&P dated: There are no significant changes.     Ready For Surgery From Anesthesia Perspective.     .

## 2022-10-12 NOTE — NURSING
Pt being discharged home in stable condition. IV removed, catheter intact, pt tolerated well. 1 hour post admin of viscous lidocaine bedside swallow assessment performed. No s/s of aspiration noted. Discharge instructions given to pt, pt verbalized understanding.

## 2022-10-12 NOTE — TRANSFER OF CARE
"Anesthesia Transfer of Care Note    Patient: Cedric Santoro    Procedure(s) Performed: Procedure(s) (LRB):  TRANSESOPHAGEAL ECHOCARDIOGRAM WITH POSSIBLE CARDIOVERSION (ARIELA W/ POSS CARDIOVERSION) (N/A)    Patient location: [facility].    Anesthesia Type: MAC    Transport from OR: Transported from OR on room air with adequate spontaneous ventilation    Post pain: adequate analgesia    Post assessment: no apparent anesthetic complications and tolerated procedure well    Post vital signs: stable    Level of consciousness: sedated and responds to stimulation    Nausea/Vomiting: no nausea/vomiting    Complications: none    Transfer of care protocol was followed      Last vitals:   Visit Vitals  /83   Pulse (!) 50   Temp 36.7 °C (98.1 °F) (Temporal)   Resp 18   Ht 5' 10" (1.778 m)   Wt 94.8 kg (209 lb)   SpO2 99%   BMI 29.99 kg/m²     "

## 2022-10-12 NOTE — OP NOTE
[facility] - Cath Lab ([facility])  Cardiac   Procedure and Discharge Note    SUMMARY     Cedric Santoro  [id number]  Monserrat Asif MD    Date of Procedure: 10/12/2022    Procedure: ARIELA    Provider: Kal Garzon MD    Indications: He was referred for ARIELA guided cardioversion for atrial flutter.    Pre-Procedure Diagnosis: Atrial Flutter    Post-Procedure Diagnosis: Same + as below    Anesthesia: Monitor Anesthesia Care    Description of the Findings of the Procedure:     The risks, benefits, complications, treatment options, and expected outcomes were discussed with the patient. The patient and/or family concurred with the proposed plan, giving informed consent.       Findings:  Left atrial appendage thrombus noted.  LVEF 55%  Mild posterior leaflet MV prolapse.  Mild-mod MR.  No cardioversion performed.  See report.      Complications:  none    Estimated Blood Loss (EBL):  none           Implants: none    Specimens: none    Condition: stable    Disposition: PACU - hemodynamically stable.    Attestation: I was present and scrubbed for the entire procedure.    Recommendations:    Usual post ARIELA care.  D/c pt home.  Continue current meds.  Eliquis bid.  Cardiac diet.  EP consultation for PAFL.  F/u [facility] 2 weeks.

## 2022-10-12 NOTE — ANESTHESIA POSTPROCEDURE EVALUATION
Anesthesia Post Evaluation    Patient: Cedric Santoro    Procedure(s) Performed: Procedure(s) (LRB):  TRANSESOPHAGEAL ECHOCARDIOGRAM WITH POSSIBLE CARDIOVERSION (ARIELA W/ POSS CARDIOVERSION) (N/A)    Final Anesthesia Type: MAC      Patient location: [facility].  Patient participation: Yes- Able to Participate  Level of consciousness: awake and alert  Post-procedure vital signs: reviewed and stable  Pain management: adequate  Airway patency: patent    PONV status at discharge: No PONV  Anesthetic complications: no      Cardiovascular status: blood pressure returned to baseline and hemodynamically stable  Respiratory status: unassisted, spontaneous ventilation and room air  Hydration status: euvolemic  Follow-up not needed.          Vitals Value Taken Time   /83 10/12/22 0752   Temp 36.7 °C (98.1 °F) 10/12/22 0751   Pulse 50 10/12/22 0751   Resp 18 10/12/22 0751   SpO2 99 % 10/12/22 0751         No case tracking events are documented in the log.      Pain/Richa Score: Richa Score: 10 (10/12/2022  8:11 AM)

## 2022-10-12 NOTE — DISCHARGE INSTRUCTIONS
ACTIVITY/SAFETY  Because of the aftereffect of the sedation you received, we advise you to refrain from the following activities for 24 hours.  1. Do not drive or operate machinery.  2. Do not operate appliances if alone. (stove, iron, lawnmower)  3. Do not sign legal papers or make important decisions.    Have standby assistance on stairways.  It is advised that you have someone stay with you for at least 8 hours after procedure    Comfort:  If you develop a sore throat gargle with warm salt water (1/2 tsp.of salt in 8oz of warm water) or use throat lozenges.     Some of the sedatives you received today may make you nauseated.  Begin with clear liquids and then progress to solid foods as tolerated.    Avoid eating for 1 hour after procedure due to numbing of throat before procedure.    CALL THE DOCTOR FOR:  SEVERE THROAT PAIN / DIFFICULTY SWALLOWING                                                  SEVERE ABDOMINAL OR CHEST PAIN                                                  VOMITING BLOOD.

## 2022-10-13 ENCOUNTER — PATIENT MESSAGE (OUTPATIENT)
Dept: INTERNAL MEDICINE | Facility: CLINIC | Age: 62
End: 2022-10-13
Payer: COMMERCIAL

## 2022-10-13 RX ORDER — LEVOTHYROXINE SODIUM 25 UG/1
25 TABLET ORAL
Qty: 90 TABLET | Refills: 0 | Status: SHIPPED | OUTPATIENT
Start: 2022-10-13 | End: 2022-12-02

## 2022-10-17 ENCOUNTER — CLINICAL SUPPORT (OUTPATIENT)
Dept: INTERNAL MEDICINE | Facility: CLINIC | Age: 62
End: 2022-10-17
Payer: COMMERCIAL

## 2022-10-17 VITALS
DIASTOLIC BLOOD PRESSURE: 67 MMHG | TEMPERATURE: 98 F | SYSTOLIC BLOOD PRESSURE: 112 MMHG | BODY MASS INDEX: 29.92 KG/M2 | HEART RATE: 75 BPM | RESPIRATION RATE: 13 BRPM | HEIGHT: 70 IN | WEIGHT: 209 LBS | OXYGEN SATURATION: 99 %

## 2022-10-17 VITALS — SYSTOLIC BLOOD PRESSURE: 120 MMHG | HEART RATE: 74 BPM | DIASTOLIC BLOOD PRESSURE: 84 MMHG

## 2022-10-17 DIAGNOSIS — I10 ESSENTIAL HYPERTENSION: Primary | ICD-10-CM

## 2022-10-17 PROCEDURE — 99999 PR PBB SHADOW E&M-EST. PATIENT-LVL II: CPT | Mod: PBBFAC,,,

## 2022-10-17 PROCEDURE — 99999 PR PBB SHADOW E&M-EST. PATIENT-LVL II: ICD-10-PCS | Mod: PBBFAC,,,

## 2022-10-17 NOTE — PROGRESS NOTES
Pt here for BP check, reports no signs/symptoms. Pt has been taking losartan 50mg QD regularly.    HR 74 /84, pt has a BP cuff at home and it has been running good.     Pt advised to continue current medications and f/u as scheduled, he verbalized understanding.

## 2022-10-26 ENCOUNTER — OFFICE VISIT (OUTPATIENT)
Dept: CARDIOLOGY | Facility: CLINIC | Age: 62
End: 2022-10-26
Payer: COMMERCIAL

## 2022-10-26 VITALS
HEIGHT: 70 IN | OXYGEN SATURATION: 97 % | DIASTOLIC BLOOD PRESSURE: 72 MMHG | BODY MASS INDEX: 29.92 KG/M2 | WEIGHT: 209 LBS | HEART RATE: 64 BPM | SYSTOLIC BLOOD PRESSURE: 110 MMHG

## 2022-10-26 DIAGNOSIS — R06.09 DOE (DYSPNEA ON EXERTION): ICD-10-CM

## 2022-10-26 DIAGNOSIS — I48.92 PAROXYSMAL ATRIAL FLUTTER: Primary | ICD-10-CM

## 2022-10-26 DIAGNOSIS — R00.2 PALPITATION: ICD-10-CM

## 2022-10-26 DIAGNOSIS — I34.1 MVP (MITRAL VALVE PROLAPSE): ICD-10-CM

## 2022-10-26 DIAGNOSIS — I48.0 PAROXYSMAL ATRIAL FIBRILLATION: ICD-10-CM

## 2022-10-26 DIAGNOSIS — I34.0 NONRHEUMATIC MITRAL VALVE REGURGITATION: ICD-10-CM

## 2022-10-26 DIAGNOSIS — R94.31 ABNORMAL ECG: ICD-10-CM

## 2022-10-26 DIAGNOSIS — I10 ESSENTIAL HYPERTENSION: ICD-10-CM

## 2022-10-26 DIAGNOSIS — R60.0 EDEMA OF BOTH LEGS: ICD-10-CM

## 2022-10-26 PROCEDURE — 99213 OFFICE O/P EST LOW 20 MIN: CPT | Mod: S$GLB,,, | Performed by: INTERNAL MEDICINE

## 2022-10-26 PROCEDURE — 1160F RVW MEDS BY RX/DR IN RCRD: CPT | Mod: CPTII,S$GLB,, | Performed by: INTERNAL MEDICINE

## 2022-10-26 PROCEDURE — 4010F PR ACE/ARB THEARPY RXD/TAKEN: ICD-10-PCS | Mod: CPTII,S$GLB,, | Performed by: INTERNAL MEDICINE

## 2022-10-26 PROCEDURE — 99999 PR PBB SHADOW E&M-EST. PATIENT-LVL III: ICD-10-PCS | Mod: PBBFAC,,, | Performed by: INTERNAL MEDICINE

## 2022-10-26 PROCEDURE — 1160F PR REVIEW ALL MEDS BY PRESCRIBER/CLIN PHARMACIST DOCUMENTED: ICD-10-PCS | Mod: CPTII,S$GLB,, | Performed by: INTERNAL MEDICINE

## 2022-10-26 PROCEDURE — 99999 PR PBB SHADOW E&M-EST. PATIENT-LVL III: CPT | Mod: PBBFAC,,, | Performed by: INTERNAL MEDICINE

## 2022-10-26 PROCEDURE — 1159F PR MEDICATION LIST DOCUMENTED IN MEDICAL RECORD: ICD-10-PCS | Mod: CPTII,S$GLB,, | Performed by: INTERNAL MEDICINE

## 2022-10-26 PROCEDURE — 3078F DIAST BP <80 MM HG: CPT | Mod: CPTII,S$GLB,, | Performed by: INTERNAL MEDICINE

## 2022-10-26 PROCEDURE — 99213 PR OFFICE/OUTPT VISIT, EST, LEVL III, 20-29 MIN: ICD-10-PCS | Mod: S$GLB,,, | Performed by: INTERNAL MEDICINE

## 2022-10-26 PROCEDURE — 3074F SYST BP LT 130 MM HG: CPT | Mod: CPTII,S$GLB,, | Performed by: INTERNAL MEDICINE

## 2022-10-26 PROCEDURE — 4010F ACE/ARB THERAPY RXD/TAKEN: CPT | Mod: CPTII,S$GLB,, | Performed by: INTERNAL MEDICINE

## 2022-10-26 PROCEDURE — 1159F MED LIST DOCD IN RCRD: CPT | Mod: CPTII,S$GLB,, | Performed by: INTERNAL MEDICINE

## 2022-10-26 PROCEDURE — 3078F PR MOST RECENT DIASTOLIC BLOOD PRESSURE < 80 MM HG: ICD-10-PCS | Mod: CPTII,S$GLB,, | Performed by: INTERNAL MEDICINE

## 2022-10-26 PROCEDURE — 3074F PR MOST RECENT SYSTOLIC BLOOD PRESSURE < 130 MM HG: ICD-10-PCS | Mod: CPTII,S$GLB,, | Performed by: INTERNAL MEDICINE

## 2022-10-26 NOTE — PROGRESS NOTES
Subjective:    Patient ID:  Cedric Santoro is a 62 y.o. male who presents for evaluation of Atrial Flutter      HPI  Pt presents for eval.  His current med conditions include MVP, MR, HTN, palpitations, PAF, PAFL.  Nonsmoker.  Sees Dr. Vergara, Cardiology in past.  Past hx pertinent for following:  - stress test 2020.  Reportedly had brief PAF w stress test, resolving on its own.  Echo 2020 normal LV function, mild MR.  Pt had ecg done 10/3/22 twice:  a flutter w rate 99 - 116 bpm.  New dx.  Lost his wife to cancer 7/22 then had dyspnea few months.  Under much stress so whirlwind w loss of wife.  Now here.  S/p ARIELA Oct 2022 for cardioversion.  Left atrial appendage thrombus noted so no cardioversion was performed.  ARIELA: Left atrial appendage thrombus noted, LVEF 55%, mild posterior leaflet MV prolapse, mild-mod MR.  Taking Eliquis now.  Has EP consult next week.  Stable LEBLANC.  No pnd/orthopnea.  Infrequent palpitations.  Some dizziness bending over.  Stable leg edema.      Past Medical History:   Diagnosis Date    Abnormal ECG 10/4/2022    Essential hypertension 2/24/2020    Hypertension     MVP (mitral valve prolapse) 10/26/2022    Nonrheumatic mitral valve regurgitation 10/26/2022    Palpitation 3/5/2020    Paroxysmal atrial fibrillation 3/5/2020    Paroxysmal atrial flutter 10/4/2022    Raynaud's syndrome     Vasovagal syncopes        Current Outpatient Medications:     apixaban (ELIQUIS) 5 mg Tab, Take 1 tablet (5 mg total) by mouth 2 (two) times daily., Disp: 60 tablet, Rfl: 12    atenoloL (TENORMIN) 50 MG tablet, Take 1 tablet (50 mg total) by mouth once daily., Disp: 90 tablet, Rfl: 3    b complex vitamins tablet, Take 1 tablet by mouth once daily., Disp: , Rfl:     co-enzyme Q-10 30 mg capsule, Take 30 mg by mouth 3 (three) times daily., Disp: , Rfl:     DULoxetine (CYMBALTA) 60 MG capsule, Take 60 mg by mouth every morning., Disp: , Rfl:     eszopiclone (LUNESTA) 1 MG Tab, Take 1 tablet (1 mg total) by mouth  "nightly., Disp: 30 tablet, Rfl: 0    ferrous sulfate 325 (65 FE) MG EC tablet, Take 325 mg by mouth 3 (three) times daily with meals., Disp: , Rfl:     levothyroxine (SYNTHROID) 25 MCG tablet, Take 1 tablet (25 mcg total) by mouth before breakfast., Disp: 90 tablet, Rfl: 0    losartan (COZAAR) 50 MG tablet, Take 1 tablet (50 mg total) by mouth once daily., Disp: 90 tablet, Rfl: 1    magnesium 30 mg Tab, Take 1 tablet by mouth once., Disp: , Rfl:     multivitamin (THERAGRAN) per tablet, Take 1 tablet by mouth once daily., Disp: , Rfl:     vitamin D (VITAMIN D3) 1000 units Tab, Take 1,000 Units by mouth once daily., Disp: , Rfl:       Review of Systems   Constitutional: Negative.   HENT: Negative.     Eyes: Negative.    Cardiovascular:  Positive for dyspnea on exertion, leg swelling and palpitations.   Respiratory:  Positive for shortness of breath.    Endocrine: Negative.    Hematologic/Lymphatic: Negative.    Skin: Negative.    Musculoskeletal: Negative.    Gastrointestinal: Negative.    Genitourinary: Negative.    Neurological: Negative.    Psychiatric/Behavioral: Negative.     Allergic/Immunologic: Negative.        /72 (BP Location: Left arm, Patient Position: Sitting, BP Method: Large (Manual))   Pulse 64   Ht 5' 10" (1.778 m)   Wt 94.8 kg (208 lb 15.9 oz)   SpO2 97%   BMI 29.99 kg/m²     Wt Readings from Last 3 Encounters:   10/26/22 94.8 kg (208 lb 15.9 oz)   10/12/22 94.8 kg (209 lb)   10/04/22 95.1 kg (209 lb 10.5 oz)     Temp Readings from Last 3 Encounters:   10/12/22 98.1 °F (36.7 °C) (Temporal)   04/12/22 98.8 °F (37.1 °C) (Tympanic)   09/25/20 96.9 °F (36.1 °C) (Tympanic)     BP Readings from Last 3 Encounters:   10/26/22 110/72   10/17/22 120/84   10/12/22 112/67     Pulse Readings from Last 3 Encounters:   10/26/22 64   10/17/22 74   10/12/22 75          Objective:    Physical Exam  Vitals and nursing note reviewed.   Constitutional:       Appearance: He is well-developed.   HENT:      Head: " Normocephalic.   Neck:      Thyroid: No thyromegaly.      Vascular: No carotid bruit or JVD.   Cardiovascular:      Rate and Rhythm: Normal rate. Rhythm irregularly irregular.      Pulses:           Radial pulses are 2+ on the right side and 2+ on the left side.      Heart sounds: S1 normal and S2 normal. Heart sounds not distant. No midsystolic click and no opening snap. Murmur heard.   High-pitched blowing holosystolic murmur is present with a grade of 1/6 at the apex.     No friction rub. No S3 or S4 sounds.   Pulmonary:      Effort: Pulmonary effort is normal.      Breath sounds: Normal breath sounds. No wheezing or rales.   Abdominal:      General: Bowel sounds are normal. There is no distension or abdominal bruit.      Palpations: Abdomen is soft. There is no mass.      Tenderness: There is no abdominal tenderness.   Musculoskeletal:      Cervical back: Neck supple.      Right lower leg: Edema present.      Left lower leg: Edema present.   Skin:     General: Skin is warm.   Neurological:      Mental Status: He is alert and oriented to person, place, and time.   Psychiatric:         Behavior: Behavior normal.       I have reviewed all pertinent labs and cardiac studies.      Chemistry        Component Value Date/Time     10/10/2022 1240    K 4.3 10/10/2022 1240     10/10/2022 1240    CO2 32 (H) 10/10/2022 1240    BUN 21 10/10/2022 1240    CREATININE 1.2 10/10/2022 1240    GLU 84 10/10/2022 1240        Component Value Date/Time    CALCIUM 9.5 10/10/2022 1240    ALKPHOS 71 10/10/2022 1240    AST 48 (H) 10/10/2022 1240    ALT 44 10/10/2022 1240    BILITOT 0.6 10/10/2022 1240    ESTGFRAFRICA >60.0 04/12/2022 1010    EGFRNONAA >60.0 04/12/2022 1010        Lab Results   Component Value Date    WBC 7.31 10/04/2022    HGB 15.8 10/04/2022    HCT 49.0 10/04/2022    MCV 96 10/04/2022     10/04/2022       Lab Results   Component Value Date    HGBA1C 5.5 02/05/2020       Lab Results   Component Value Date     TSH 7.971 (H) 10/04/2022     Lab Results   Component Value Date    CHOL 197 04/12/2022    CHOL 166 03/18/2021    CHOL 179 08/05/2020     Lab Results   Component Value Date    HDL 68 04/12/2022    HDL 59 03/18/2021    HDL 66 08/05/2020     Lab Results   Component Value Date    LDLCALC 118.8 04/12/2022    LDLCALC 97.0 03/18/2021    LDLCALC 96.0 08/05/2020     Lab Results   Component Value Date    TRIG 51 04/12/2022    TRIG 50 03/18/2021    TRIG 85 08/05/2020     Lab Results   Component Value Date    CHOLHDL 34.5 04/12/2022    CHOLHDL 35.5 03/18/2021    CHOLHDL 36.9 08/05/2020     Component      Latest Ref Rng & Units 10/4/2022   BNP      0 - 99 pg/mL 95         Assessment:       1. Paroxysmal atrial flutter    2. Paroxysmal atrial fibrillation    3. Palpitation    4. Essential hypertension    5. LEBLANC (dyspnea on exertion)    6. Edema of both legs    7. Abnormal ECG    8. Nonrheumatic mitral valve regurgitation    9. MVP (mitral valve prolapse)         Plan:             EP consult pending next week.  Consider PAFL ablation.  Would need repeat ARIELA, advise 6 - 8 weeks from now.  Discussed MVP/MR w pt -- monitor for now as it is on milder side.  Continue Eliquis/Atenolol.  Activity as tolerated.  Low salt diet.  BP monitoring at home.  F/u w Dr. Vergara, as scheduled in 8 weeks.

## 2022-11-02 ENCOUNTER — OFFICE VISIT (OUTPATIENT)
Dept: CARDIOLOGY | Facility: CLINIC | Age: 62
End: 2022-11-02
Payer: COMMERCIAL

## 2022-11-02 VITALS
BODY MASS INDEX: 30.17 KG/M2 | HEART RATE: 130 BPM | WEIGHT: 210.75 LBS | SYSTOLIC BLOOD PRESSURE: 122 MMHG | DIASTOLIC BLOOD PRESSURE: 98 MMHG | HEIGHT: 70 IN | OXYGEN SATURATION: 95 %

## 2022-11-02 DIAGNOSIS — I48.92 PAROXYSMAL ATRIAL FLUTTER: Primary | ICD-10-CM

## 2022-11-02 PROCEDURE — 1159F PR MEDICATION LIST DOCUMENTED IN MEDICAL RECORD: ICD-10-PCS | Mod: CPTII,S$GLB,, | Performed by: INTERNAL MEDICINE

## 2022-11-02 PROCEDURE — 99204 PR OFFICE/OUTPT VISIT, NEW, LEVL IV, 45-59 MIN: ICD-10-PCS | Mod: S$GLB,,, | Performed by: INTERNAL MEDICINE

## 2022-11-02 PROCEDURE — 4010F ACE/ARB THERAPY RXD/TAKEN: CPT | Mod: CPTII,S$GLB,, | Performed by: INTERNAL MEDICINE

## 2022-11-02 PROCEDURE — 3080F DIAST BP >= 90 MM HG: CPT | Mod: CPTII,S$GLB,, | Performed by: INTERNAL MEDICINE

## 2022-11-02 PROCEDURE — 3008F BODY MASS INDEX DOCD: CPT | Mod: CPTII,S$GLB,, | Performed by: INTERNAL MEDICINE

## 2022-11-02 PROCEDURE — 3074F SYST BP LT 130 MM HG: CPT | Mod: CPTII,S$GLB,, | Performed by: INTERNAL MEDICINE

## 2022-11-02 PROCEDURE — 99204 OFFICE O/P NEW MOD 45 MIN: CPT | Mod: S$GLB,,, | Performed by: INTERNAL MEDICINE

## 2022-11-02 PROCEDURE — 4010F PR ACE/ARB THEARPY RXD/TAKEN: ICD-10-PCS | Mod: CPTII,S$GLB,, | Performed by: INTERNAL MEDICINE

## 2022-11-02 PROCEDURE — 3074F PR MOST RECENT SYSTOLIC BLOOD PRESSURE < 130 MM HG: ICD-10-PCS | Mod: CPTII,S$GLB,, | Performed by: INTERNAL MEDICINE

## 2022-11-02 PROCEDURE — 1159F MED LIST DOCD IN RCRD: CPT | Mod: CPTII,S$GLB,, | Performed by: INTERNAL MEDICINE

## 2022-11-02 PROCEDURE — 99999 PR PBB SHADOW E&M-EST. PATIENT-LVL IV: ICD-10-PCS | Mod: PBBFAC,,, | Performed by: INTERNAL MEDICINE

## 2022-11-02 PROCEDURE — 99999 PR PBB SHADOW E&M-EST. PATIENT-LVL IV: CPT | Mod: PBBFAC,,, | Performed by: INTERNAL MEDICINE

## 2022-11-02 PROCEDURE — 3008F PR BODY MASS INDEX (BMI) DOCUMENTED: ICD-10-PCS | Mod: CPTII,S$GLB,, | Performed by: INTERNAL MEDICINE

## 2022-11-02 PROCEDURE — 3080F PR MOST RECENT DIASTOLIC BLOOD PRESSURE >= 90 MM HG: ICD-10-PCS | Mod: CPTII,S$GLB,, | Performed by: INTERNAL MEDICINE

## 2022-11-02 NOTE — PROGRESS NOTES
Subjective:    Patient ID:  Cedric Santoro is a 62 y.o. male who presents for evaluation of Palpitations      62 yoM referred for AFL management. He developed AFL/RVR leading to ARIELA/CV attempt 10/12/22. He had KP thrombus on ARIELA leading to cancellation of the CV. He was placed on apixaban for CVA prophylaxis 10/4/22 when he as diagnosed. EF normal on ARIELA. He developed LEBLANC and fatigue in the moths after his wife passed away. He remains in AFL today and has mild symptoms. He is here to discuss ablation. Of note, AF was placed on his chart 3/2020. I reviewed the monitor and do not feel he had AF. The event was non-sustained AT lasting less than 30s.     ARIELA 10/12:  · The left ventricle is normal in size with normal systolic function.  · Normal right ventricular size with normal right ventricular systolic function.  · The estimated ejection fraction is 55%.  · Thrombus is present in the appendage.  · Mild left atrial enlargement.  · There is mild mitral prolapse of the posterior mitral leaflet.  · Mild-to-moderate mitral regurgitation.  · Grade 1 plaque present.  · Mild tricuspid regurgitation.  · No cardioversion performed due to left atrial appendage thrombus.      Past Medical History:  10/4/2022: Abnormal ECG  2/24/2020: Essential hypertension  No date: Hypertension  10/26/2022: MVP (mitral valve prolapse)  10/26/2022: Nonrheumatic mitral valve regurgitation  3/5/2020: Palpitation  3/5/2020: Paroxysmal atrial fibrillation  10/4/2022: Paroxysmal atrial flutter  No date: Raynaud's syndrome  No date: Vasovagal syncopes    Past Surgical History:  No date: ankle/foot surgery  No date: EYE SURGERY  No date: GALLBLADDER SURGERY  No date: HERNIA REPAIR  No date: THROAT SURGERY    Social History    Socioeconomic History      Marital status:     Tobacco Use      Smoking status: Never      Smokeless tobacco: Never    Substance and Sexual Activity      Alcohol use: No      Drug use: No    Social Determinants of  Health  Financial Resource Strain: Unknown      Difficulty of Paying Living Expenses: Patient refused  Food Insecurity: Unknown      Worried About Running Out of Food in the Last Year: Patient refused      Ran Out of Food in the Last Year: Patient refused  Transportation Needs: Unknown      Lack of Transportation (Medical): Patient refused      Lack of Transportation (Non-Medical): Patient refused  Physical Activity: Sufficiently Active      Days of Exercise per Week: 5 days      Minutes of Exercise per Session: 40 min  Stress: Unknown      Feeling of Stress : Patient refused  Social Connections: Unknown      Frequency of Communication with Friends and Family: Patient refused      Frequency of Social Gatherings with Friends and Family: Patient refused      Active Member of Clubs or Organizations: Patient refused      Attends Club or Organization Meetings: Patient refused      Marital Status:   Housing Stability: Unknown      Unable to Pay for Housing in the Last Year: Patient refused      Number of Places Lived in the Last Year: 1      Unstable Housing in the Last Year: Patient refused    Review of patient's family history indicates:    Current Outpatient Medications:  apixaban (ELIQUIS) 5 mg Tab, Take 1 tablet (5 mg total) by mouth 2 (two) times daily., Disp: 60 tablet, Rfl: 12  atenoloL (TENORMIN) 50 MG tablet, Take 1 tablet (50 mg total) by mouth once daily., Disp: 90 tablet, Rfl: 3  b complex vitamins tablet, Take 1 tablet by mouth once daily., Disp: , Rfl:    co-enzyme Q-10 30 mg capsule, Take 30 mg by mouth 3 (three) times daily., Disp: , Rfl:   DULoxetine (CYMBALTA) 60 MG capsule, Take 60 mg by mouth every morning., Disp: , Rfl:   eszopiclone (LUNESTA) 1 MG Tab, Take 1 tablet (1 mg total) by mouth nightly., Disp: 30 tablet, Rfl: 0  ferrous sulfate 325 (65 FE) MG EC tablet, Take 325 mg by mouth 3 (three) times daily with meals., Disp: , Rfl:   levothyroxine (SYNTHROID) 25 MCG tablet, Take 1 tablet (25  mcg total) by mouth before breakfast., Disp: 90 tablet, Rfl: 0  losartan (COZAAR) 50 MG tablet, Take 1 tablet (50 mg total) by mouth once daily., Disp: 90 tablet, Rfl: 1  magnesium 30 mg Tab, Take 1 tablet by mouth once., Disp: , Rfl:   multivitamin (THERAGRAN) per tablet, Take 1 tablet by mouth once daily., Disp: , Rfl:   vitamin D (VITAMIN D3) 1000 units Tab, Take 1,000 Units by mouth once daily., Disp: , Rfl:     No current facility-administered medications for this visit.            Review of Systems   Constitutional: Negative.   HENT: Negative.     Eyes: Negative.    Cardiovascular:  Positive for irregular heartbeat and palpitations. Negative for chest pain, dyspnea on exertion, leg swelling, near-syncope and syncope.   Respiratory:  Positive for shortness of breath.    Endocrine: Negative.    Hematologic/Lymphatic: Negative.    Skin: Negative.    Musculoskeletal: Negative.    Gastrointestinal: Negative.    Genitourinary: Negative.    Neurological: Negative.  Negative for dizziness and light-headedness.   Psychiatric/Behavioral: Negative.     Allergic/Immunologic: Negative.       Objective:    Physical Exam  Vitals reviewed.   Constitutional:       General: He is not in acute distress.     Appearance: He is well-developed.   HENT:      Head: Normocephalic and atraumatic.   Eyes:      Pupils: Pupils are equal, round, and reactive to light.   Neck:      Thyroid: No thyromegaly.      Vascular: No JVD.   Cardiovascular:      Rate and Rhythm: Normal rate. Rhythm irregular.      Chest Wall: PMI is not displaced.      Heart sounds: Normal heart sounds, S1 normal and S2 normal. No murmur heard.    No friction rub. No gallop.   Pulmonary:      Effort: Pulmonary effort is normal. No respiratory distress.      Breath sounds: Normal breath sounds. No wheezing or rales.   Abdominal:      General: Bowel sounds are normal. There is no distension.      Palpations: Abdomen is soft.      Tenderness: There is no abdominal  tenderness. There is no guarding or rebound.   Musculoskeletal:         General: No tenderness. Normal range of motion.      Cervical back: Normal range of motion.   Skin:     General: Skin is warm and dry.      Findings: No erythema or rash.   Neurological:      Mental Status: He is alert and oriented to person, place, and time.      Cranial Nerves: No cranial nerve deficit.   Psychiatric:         Behavior: Behavior normal.         Thought Content: Thought content normal.         Judgment: Judgment normal.         Assessment:       1. Paroxysmal atrial flutter         Plan:       62 yoM AFL/RVR here for arrhythmia management. He has symptomatic AFL/RVR. He had KP thrombus on ARIELA 10/12/22. He had only been on eliquis for 1w. I offered ARIELA/RFA for definitive therapy. Would wait 6w from his last attempt. I do not think the thrombus was a failure of eliquis. Extensive discussion regarding risks, benefits, and alternative approaches to the procedure was had with the patient.  The patient voices understanding and all questions have been answered.  The patient would like to proceed as planned.    AFL RFA  ARIELA prior  NICOLASA

## 2022-11-08 ENCOUNTER — TELEPHONE (OUTPATIENT)
Dept: ELECTROPHYSIOLOGY | Facility: CLINIC | Age: 62
End: 2022-11-08
Payer: COMMERCIAL

## 2022-11-08 NOTE — TELEPHONE ENCOUNTER
Spoke with Mr. Santoro and scheduled his procedure for 12/15/22. Procedure details reviewed and instructions will be sent via patient portal as requested.

## 2022-11-15 DIAGNOSIS — I48.3 TYPICAL ATRIAL FLUTTER: Primary | ICD-10-CM

## 2022-11-17 ENCOUNTER — PATIENT MESSAGE (OUTPATIENT)
Dept: ELECTROPHYSIOLOGY | Facility: CLINIC | Age: 62
End: 2022-11-17
Payer: COMMERCIAL

## 2022-11-23 ENCOUNTER — PATIENT MESSAGE (OUTPATIENT)
Dept: INTERNAL MEDICINE | Facility: CLINIC | Age: 62
End: 2022-11-23
Payer: COMMERCIAL

## 2022-11-23 DIAGNOSIS — R00.2 PALPITATIONS: Primary | ICD-10-CM

## 2022-11-29 ENCOUNTER — PATIENT MESSAGE (OUTPATIENT)
Dept: INTERNAL MEDICINE | Facility: CLINIC | Age: 62
End: 2022-11-29
Payer: COMMERCIAL

## 2022-12-08 ENCOUNTER — LAB VISIT (OUTPATIENT)
Dept: LAB | Facility: HOSPITAL | Age: 62
End: 2022-12-08
Attending: INTERNAL MEDICINE
Payer: COMMERCIAL

## 2022-12-08 DIAGNOSIS — R00.2 PALPITATIONS: ICD-10-CM

## 2022-12-08 DIAGNOSIS — I48.3 TYPICAL ATRIAL FLUTTER: ICD-10-CM

## 2022-12-08 LAB
ANION GAP SERPL CALC-SCNC: 10 MMOL/L (ref 8–16)
APTT BLDCRRT: 33.8 SEC (ref 21–32)
BUN SERPL-MCNC: 18 MG/DL (ref 8–23)
CALCIUM SERPL-MCNC: 9.8 MG/DL (ref 8.7–10.5)
CHLORIDE SERPL-SCNC: 101 MMOL/L (ref 95–110)
CO2 SERPL-SCNC: 27 MMOL/L (ref 23–29)
CREAT SERPL-MCNC: 1.3 MG/DL (ref 0.5–1.4)
ERYTHROCYTE [DISTWIDTH] IN BLOOD BY AUTOMATED COUNT: 12.5 % (ref 11.5–14.5)
EST. GFR  (NO RACE VARIABLE): >60 ML/MIN/1.73 M^2
GLUCOSE SERPL-MCNC: 107 MG/DL (ref 70–110)
HCT VFR BLD AUTO: 45.6 % (ref 40–54)
HGB BLD-MCNC: 15.2 G/DL (ref 14–18)
INR PPP: 1.1 (ref 0.8–1.2)
MCH RBC QN AUTO: 31.1 PG (ref 27–31)
MCHC RBC AUTO-ENTMCNC: 33.3 G/DL (ref 32–36)
MCV RBC AUTO: 93 FL (ref 82–98)
PLATELET # BLD AUTO: 176 K/UL (ref 150–450)
PMV BLD AUTO: 11.2 FL (ref 9.2–12.9)
POTASSIUM SERPL-SCNC: 5.3 MMOL/L (ref 3.5–5.1)
PROTHROMBIN TIME: 11.6 SEC (ref 9–12.5)
RBC # BLD AUTO: 4.88 M/UL (ref 4.6–6.2)
SODIUM SERPL-SCNC: 138 MMOL/L (ref 136–145)
T4 FREE SERPL-MCNC: 0.89 NG/DL (ref 0.71–1.51)
TSH SERPL DL<=0.005 MIU/L-ACNC: 3.01 UIU/ML (ref 0.4–4)
WBC # BLD AUTO: 4.82 K/UL (ref 3.9–12.7)

## 2022-12-08 PROCEDURE — 80048 BASIC METABOLIC PNL TOTAL CA: CPT | Performed by: INTERNAL MEDICINE

## 2022-12-08 PROCEDURE — 85027 COMPLETE CBC AUTOMATED: CPT | Performed by: INTERNAL MEDICINE

## 2022-12-08 PROCEDURE — 84439 ASSAY OF FREE THYROXINE: CPT | Performed by: FAMILY MEDICINE

## 2022-12-08 PROCEDURE — 36415 COLL VENOUS BLD VENIPUNCTURE: CPT | Mod: PO | Performed by: INTERNAL MEDICINE

## 2022-12-08 PROCEDURE — 85730 THROMBOPLASTIN TIME PARTIAL: CPT | Performed by: INTERNAL MEDICINE

## 2022-12-08 PROCEDURE — 84443 ASSAY THYROID STIM HORMONE: CPT | Performed by: FAMILY MEDICINE

## 2022-12-08 PROCEDURE — 85610 PROTHROMBIN TIME: CPT | Performed by: INTERNAL MEDICINE

## 2022-12-13 ENCOUNTER — TELEPHONE (OUTPATIENT)
Dept: ELECTROPHYSIOLOGY | Facility: CLINIC | Age: 62
End: 2022-12-13
Payer: COMMERCIAL

## 2022-12-13 NOTE — TELEPHONE ENCOUNTER
Spoke to Mr. Santoro    CONFIRMED procedure arrival time of 1100 12/15    Reiterated instructions including:  -Directions to check in desk  -NPO after midnight night prior to procedure  -High importance of HOLDING Eliquis am of procedure. Atenolol after 12/11  -Pre-procedure LABS done, no alerts  -Confirmed absence or presence of implanted device/stimulator, none present  -Confirmed no fever, cough, or shortness of breath in the past 30 days  -Confirmed no redness, rash, irritation, or yeast infection to groin area.       Patient verbalized understanding of above and appreciated the call.

## 2022-12-14 ENCOUNTER — ANESTHESIA EVENT (OUTPATIENT)
Dept: MEDSURG UNIT | Facility: HOSPITAL | Age: 62
End: 2022-12-14
Payer: COMMERCIAL

## 2022-12-14 NOTE — ANESTHESIA PREPROCEDURE EVALUATION
12/14/2022  Cedric Santoro is a 62 y.o., male.  Patient Active Problem List   Diagnosis    Elevated liver enzymes    Prolonged Q-T interval on ECG    Essential hypertension    Palpitation    Paroxysmal atrial fibrillation    Unspecified inflammatory spondylopathy, cervical region    Abnormal ECG    Paroxysmal atrial flutter    Edema of both legs    LEBLANC (dyspnea on exertion)    Nonrheumatic mitral valve regurgitation    MVP (mitral valve prolapse)     br    Pre-op Assessment          Review of Systems      Physical Exam    Airway:  No airway management difficulties anticipated  Dental:No active dental issues noted  Chest/Lungs:  Clear to auscultation    Heart:  Rate: Normal  Rhythm: Regular Rhythm  Sounds: Normal        Anesthesia Plan  Type of Anesthesia, risks & benefits discussed:    Anesthesia Type: Gen ETT  Informed Consent: Informed consent signed with the Patient and all parties understand the risks and agree with anesthesia plan.  All questions answered.   ASA Score: 3  Anesthesia Plan Notes: Chart reviewed. Patient seen and examined. Anesthesia plan discussed and questions answered. E-consent signed. Zachary Escalona MD    Ready For Surgery From Anesthesia Perspective.     .

## 2022-12-15 ENCOUNTER — ANESTHESIA (OUTPATIENT)
Dept: MEDSURG UNIT | Facility: HOSPITAL | Age: 62
End: 2022-12-15
Payer: COMMERCIAL

## 2022-12-15 ENCOUNTER — HOSPITAL ENCOUNTER (OUTPATIENT)
Facility: HOSPITAL | Age: 62
Discharge: HOME | End: 2022-12-16
Attending: INTERNAL MEDICINE | Admitting: INTERNAL MEDICINE
Payer: COMMERCIAL

## 2022-12-15 ENCOUNTER — HOSPITAL ENCOUNTER (OUTPATIENT)
Dept: CARDIOLOGY | Facility: HOSPITAL | Age: 62
Discharge: HOME | End: 2022-12-15
Attending: INTERNAL MEDICINE | Admitting: INTERNAL MEDICINE
Payer: COMMERCIAL

## 2022-12-15 VITALS
HEIGHT: 70 IN | WEIGHT: 208 LBS | DIASTOLIC BLOOD PRESSURE: 78 MMHG | SYSTOLIC BLOOD PRESSURE: 136 MMHG | BODY MASS INDEX: 29.78 KG/M2

## 2022-12-15 DIAGNOSIS — I48.3 TYPICAL ATRIAL FLUTTER: ICD-10-CM

## 2022-12-15 DIAGNOSIS — I50.20 HFREF (HEART FAILURE WITH REDUCED EJECTION FRACTION): Primary | ICD-10-CM

## 2022-12-15 DIAGNOSIS — I49.9 ARRHYTHMIA: ICD-10-CM

## 2022-12-15 DIAGNOSIS — I48.92 ATRIAL FLUTTER: ICD-10-CM

## 2022-12-15 LAB
ASCENDING AORTA: 3 CM
BSA FOR ECHO PROCEDURE: 2.16 M2
EJECTION FRACTION: 35 %
SINUS: 3 CM
STJ: 2.9 CM

## 2022-12-15 PROCEDURE — 36620 ARTERIAL: ICD-10-PCS | Mod: 59,,, | Performed by: ANESTHESIOLOGY

## 2022-12-15 PROCEDURE — 37000009 HC ANESTHESIA EA ADD 15 MINS: Performed by: INTERNAL MEDICINE

## 2022-12-15 PROCEDURE — 93320 DOPPLER ECHO COMPLETE: CPT | Mod: 26,,, | Performed by: INTERNAL MEDICINE

## 2022-12-15 PROCEDURE — 93010 EKG 12-LEAD: ICD-10-PCS | Mod: 77,,, | Performed by: INTERNAL MEDICINE

## 2022-12-15 PROCEDURE — 25000003 PHARM REV CODE 250: Performed by: INTERNAL MEDICINE

## 2022-12-15 PROCEDURE — 92960 CARDIOVERSION ELECTRIC EXT: CPT | Mod: 59,,, | Performed by: INTERNAL MEDICINE

## 2022-12-15 PROCEDURE — 93005 ELECTROCARDIOGRAM TRACING: CPT

## 2022-12-15 PROCEDURE — 93320 TRANSESOPHAGEAL ECHO (TEE) (CUPID ONLY): ICD-10-PCS | Mod: 26,,, | Performed by: INTERNAL MEDICINE

## 2022-12-15 PROCEDURE — 25500020 PHARM REV CODE 255: Performed by: INTERNAL MEDICINE

## 2022-12-15 PROCEDURE — 92960 CARDIOVERSION ELECTRIC EXT: CPT | Mod: 59 | Performed by: INTERNAL MEDICINE

## 2022-12-15 PROCEDURE — 27201423 OPTIME MED/SURG SUP & DEVICES STERILE SUPPLY: Performed by: INTERNAL MEDICINE

## 2022-12-15 PROCEDURE — 25000003 PHARM REV CODE 250: Performed by: STUDENT IN AN ORGANIZED HEALTH CARE EDUCATION/TRAINING PROGRAM

## 2022-12-15 PROCEDURE — 93653 COMPRE EP EVAL TX SVT: CPT | Performed by: INTERNAL MEDICINE

## 2022-12-15 PROCEDURE — 93325 DOPPLER ECHO COLOR FLOW MAPG: CPT | Mod: 26,,, | Performed by: INTERNAL MEDICINE

## 2022-12-15 PROCEDURE — 93312 ECHO TRANSESOPHAGEAL: CPT | Mod: 26,,, | Performed by: INTERNAL MEDICINE

## 2022-12-15 PROCEDURE — 36620 INSERTION CATHETER ARTERY: CPT | Mod: 59,,, | Performed by: ANESTHESIOLOGY

## 2022-12-15 PROCEDURE — 93010 ELECTROCARDIOGRAM REPORT: CPT | Mod: 77,,, | Performed by: INTERNAL MEDICINE

## 2022-12-15 PROCEDURE — 92960 PR CARDIOVERSION, ELECTIVE;EXTERN: ICD-10-PCS | Mod: 59,,, | Performed by: INTERNAL MEDICINE

## 2022-12-15 PROCEDURE — 93010 ELECTROCARDIOGRAM REPORT: CPT | Mod: ,,, | Performed by: INTERNAL MEDICINE

## 2022-12-15 PROCEDURE — 93010 EKG 12-LEAD: ICD-10-PCS | Mod: ,,, | Performed by: INTERNAL MEDICINE

## 2022-12-15 PROCEDURE — 93325 DOPPLER ECHO COLOR FLOW MAPG: CPT

## 2022-12-15 PROCEDURE — C1894 INTRO/SHEATH, NON-LASER: HCPCS | Performed by: INTERNAL MEDICINE

## 2022-12-15 PROCEDURE — 37000008 HC ANESTHESIA 1ST 15 MINUTES: Performed by: INTERNAL MEDICINE

## 2022-12-15 PROCEDURE — D9220A PRA ANESTHESIA: ICD-10-PCS | Mod: ANES,,, | Performed by: ANESTHESIOLOGY

## 2022-12-15 PROCEDURE — 25000003 PHARM REV CODE 250: Performed by: NURSE ANESTHETIST, CERTIFIED REGISTERED

## 2022-12-15 PROCEDURE — C1733 CATH, EP, OTHR THAN COOL-TIP: HCPCS | Performed by: INTERNAL MEDICINE

## 2022-12-15 PROCEDURE — 93653 PR ELECTROPHYS EVAL, COMPREHEN, W/SUPRAVENT TACHYCARD TRMT: ICD-10-PCS | Mod: ,,, | Performed by: INTERNAL MEDICINE

## 2022-12-15 PROCEDURE — 93653 COMPRE EP EVAL TX SVT: CPT | Mod: ,,, | Performed by: INTERNAL MEDICINE

## 2022-12-15 PROCEDURE — 93325 TRANSESOPHAGEAL ECHO (TEE) (CUPID ONLY): ICD-10-PCS | Mod: 26,,, | Performed by: INTERNAL MEDICINE

## 2022-12-15 PROCEDURE — 93312 TRANSESOPHAGEAL ECHO (TEE) (CUPID ONLY): ICD-10-PCS | Mod: 26,,, | Performed by: INTERNAL MEDICINE

## 2022-12-15 PROCEDURE — 63600175 PHARM REV CODE 636 W HCPCS: Performed by: NURSE ANESTHETIST, CERTIFIED REGISTERED

## 2022-12-15 PROCEDURE — D9220A PRA ANESTHESIA: Mod: CRNA,,, | Performed by: NURSE ANESTHETIST, CERTIFIED REGISTERED

## 2022-12-15 PROCEDURE — D9220A PRA ANESTHESIA: ICD-10-PCS | Mod: CRNA,,, | Performed by: NURSE ANESTHETIST, CERTIFIED REGISTERED

## 2022-12-15 PROCEDURE — D9220A PRA ANESTHESIA: Mod: ANES,,, | Performed by: ANESTHESIOLOGY

## 2022-12-15 PROCEDURE — C1730 CATH, EP, 19 OR FEW ELECT: HCPCS | Performed by: INTERNAL MEDICINE

## 2022-12-15 RX ORDER — FENTANYL CITRATE 50 UG/ML
25 INJECTION, SOLUTION INTRAMUSCULAR; INTRAVENOUS EVERY 5 MIN PRN
Status: DISCONTINUED | OUTPATIENT
Start: 2022-12-15 | End: 2022-12-16 | Stop reason: HOSPADM

## 2022-12-15 RX ORDER — CALCIUM CHLORIDE INJECTION 100 MG/ML
INJECTION, SOLUTION INTRAVENOUS
Status: DISCONTINUED | OUTPATIENT
Start: 2022-12-15 | End: 2022-12-15

## 2022-12-15 RX ORDER — SILVER SULFADIAZINE 10 G/1000G
CREAM TOPICAL
Status: DISCONTINUED | OUTPATIENT
Start: 2022-12-15 | End: 2022-12-16 | Stop reason: HOSPADM

## 2022-12-15 RX ORDER — FENTANYL CITRATE 50 UG/ML
INJECTION, SOLUTION INTRAMUSCULAR; INTRAVENOUS
Status: DISCONTINUED | OUTPATIENT
Start: 2022-12-15 | End: 2022-12-15

## 2022-12-15 RX ORDER — LIDOCAINE HYDROCHLORIDE 10 MG/ML
INJECTION INFILTRATION; PERINEURAL
Status: DISCONTINUED | OUTPATIENT
Start: 2022-12-15 | End: 2022-12-16 | Stop reason: HOSPADM

## 2022-12-15 RX ORDER — ROCURONIUM BROMIDE 10 MG/ML
INJECTION, SOLUTION INTRAVENOUS
Status: DISCONTINUED | OUTPATIENT
Start: 2022-12-15 | End: 2022-12-15

## 2022-12-15 RX ORDER — DIPHENHYDRAMINE HYDROCHLORIDE 50 MG/ML
25 INJECTION INTRAMUSCULAR; INTRAVENOUS EVERY 6 HOURS PRN
Status: DISCONTINUED | OUTPATIENT
Start: 2022-12-15 | End: 2022-12-16 | Stop reason: HOSPADM

## 2022-12-15 RX ORDER — ACETAMINOPHEN 325 MG/1
650 TABLET ORAL EVERY 4 HOURS PRN
Status: DISCONTINUED | OUTPATIENT
Start: 2022-12-15 | End: 2022-12-16 | Stop reason: HOSPADM

## 2022-12-15 RX ORDER — HYDROMORPHONE HYDROCHLORIDE 1 MG/ML
0.2 INJECTION, SOLUTION INTRAMUSCULAR; INTRAVENOUS; SUBCUTANEOUS EVERY 5 MIN PRN
Status: DISCONTINUED | OUTPATIENT
Start: 2022-12-15 | End: 2022-12-16 | Stop reason: HOSPADM

## 2022-12-15 RX ORDER — VASOPRESSIN 20 [USP'U]/ML
INJECTION, SOLUTION INTRAMUSCULAR; SUBCUTANEOUS
Status: DISCONTINUED | OUTPATIENT
Start: 2022-12-15 | End: 2022-12-15

## 2022-12-15 RX ORDER — ONDANSETRON 2 MG/ML
INJECTION INTRAMUSCULAR; INTRAVENOUS
Status: DISCONTINUED | OUTPATIENT
Start: 2022-12-15 | End: 2022-12-15

## 2022-12-15 RX ORDER — MIDAZOLAM HYDROCHLORIDE 1 MG/ML
INJECTION, SOLUTION INTRAMUSCULAR; INTRAVENOUS
Status: DISCONTINUED | OUTPATIENT
Start: 2022-12-15 | End: 2022-12-15

## 2022-12-15 RX ORDER — DEXAMETHASONE SODIUM PHOSPHATE 4 MG/ML
INJECTION, SOLUTION INTRA-ARTICULAR; INTRALESIONAL; INTRAMUSCULAR; INTRAVENOUS; SOFT TISSUE
Status: DISCONTINUED | OUTPATIENT
Start: 2022-12-15 | End: 2022-12-15

## 2022-12-15 RX ORDER — LEVOTHYROXINE SODIUM 25 UG/1
25 TABLET ORAL
Status: DISCONTINUED | OUTPATIENT
Start: 2022-12-16 | End: 2022-12-16 | Stop reason: HOSPADM

## 2022-12-15 RX ORDER — FUROSEMIDE 10 MG/ML
INJECTION INTRAMUSCULAR; INTRAVENOUS
Status: DISCONTINUED | OUTPATIENT
Start: 2022-12-15 | End: 2022-12-15

## 2022-12-15 RX ORDER — HEPARIN SOD,PORCINE/0.9 % NACL 1000/500ML
INTRAVENOUS SOLUTION INTRAVENOUS
Status: DISCONTINUED | OUTPATIENT
Start: 2022-12-15 | End: 2022-12-16 | Stop reason: HOSPADM

## 2022-12-15 RX ORDER — EPHEDRINE SULFATE 50 MG/ML
INJECTION, SOLUTION INTRAVENOUS
Status: DISCONTINUED | OUTPATIENT
Start: 2022-12-15 | End: 2022-12-15

## 2022-12-15 RX ORDER — DOPAMINE HYDROCHLORIDE 160 MG/100ML
INJECTION, SOLUTION INTRAVENOUS CONTINUOUS PRN
Status: DISCONTINUED | OUTPATIENT
Start: 2022-12-15 | End: 2022-12-15

## 2022-12-15 RX ORDER — ONDANSETRON 2 MG/ML
4 INJECTION INTRAMUSCULAR; INTRAVENOUS ONCE AS NEEDED
Status: DISCONTINUED | OUTPATIENT
Start: 2022-12-15 | End: 2022-12-16 | Stop reason: HOSPADM

## 2022-12-15 RX ORDER — PROPOFOL 10 MG/ML
VIAL (ML) INTRAVENOUS
Status: DISCONTINUED | OUTPATIENT
Start: 2022-12-15 | End: 2022-12-15

## 2022-12-15 RX ORDER — PHENYLEPHRINE HYDROCHLORIDE 10 MG/ML
INJECTION INTRAVENOUS
Status: DISCONTINUED | OUTPATIENT
Start: 2022-12-15 | End: 2022-12-15

## 2022-12-15 RX ORDER — LIDOCAINE HYDROCHLORIDE 20 MG/ML
INJECTION, SOLUTION EPIDURAL; INFILTRATION; INTRACAUDAL; PERINEURAL
Status: DISCONTINUED | OUTPATIENT
Start: 2022-12-15 | End: 2022-12-15

## 2022-12-15 RX ADMIN — PHENYLEPHRINE HYDROCHLORIDE 150 MCG: 10 INJECTION INTRAVENOUS at 12:12

## 2022-12-15 RX ADMIN — VASOPRESSIN 2 UNITS: 20 INJECTION INTRAVENOUS at 12:12

## 2022-12-15 RX ADMIN — DEXAMETHASONE SODIUM PHOSPHATE 4 MG: 4 INJECTION, SOLUTION INTRAMUSCULAR; INTRAVENOUS at 12:12

## 2022-12-15 RX ADMIN — EPHEDRINE SULFATE 10 MG: 50 INJECTION INTRAVENOUS at 02:12

## 2022-12-15 RX ADMIN — SUGAMMADEX 200 MG: 100 INJECTION, SOLUTION INTRAVENOUS at 02:12

## 2022-12-15 RX ADMIN — ONDANSETRON 4 MG: 2 INJECTION INTRAMUSCULAR; INTRAVENOUS at 12:12

## 2022-12-15 RX ADMIN — VASOPRESSIN 1 UNITS: 20 INJECTION INTRAVENOUS at 12:12

## 2022-12-15 RX ADMIN — FENTANYL CITRATE 25 MCG: 50 INJECTION, SOLUTION INTRAMUSCULAR; INTRAVENOUS at 12:12

## 2022-12-15 RX ADMIN — EPHEDRINE SULFATE 25 MG: 50 INJECTION INTRAVENOUS at 01:12

## 2022-12-15 RX ADMIN — MIDAZOLAM HYDROCHLORIDE 2 MG: 1 INJECTION, SOLUTION INTRAMUSCULAR; INTRAVENOUS at 12:12

## 2022-12-15 RX ADMIN — FUROSEMIDE 20 MG: 10 INJECTION, SOLUTION INTRAMUSCULAR; INTRAVENOUS at 01:12

## 2022-12-15 RX ADMIN — EPHEDRINE SULFATE 15 MG: 50 INJECTION INTRAVENOUS at 02:12

## 2022-12-15 RX ADMIN — PROPOFOL 150 MG: 10 INJECTION, EMULSION INTRAVENOUS at 12:12

## 2022-12-15 RX ADMIN — VASOPRESSIN 3 UNITS: 20 INJECTION INTRAVENOUS at 01:12

## 2022-12-15 RX ADMIN — MIDAZOLAM HYDROCHLORIDE 2 MG: 1 INJECTION, SOLUTION INTRAMUSCULAR; INTRAVENOUS at 01:12

## 2022-12-15 RX ADMIN — HUMAN ALBUMIN MICROSPHERES AND PERFLUTREN 0.66 MG: 10; .22 INJECTION, SOLUTION INTRAVENOUS at 01:12

## 2022-12-15 RX ADMIN — CALCIUM CHLORIDE 1 G: 100 INJECTION, SOLUTION INTRAVENOUS at 12:12

## 2022-12-15 RX ADMIN — LIDOCAINE HYDROCHLORIDE 40 MG: 20 INJECTION, SOLUTION EPIDURAL; INFILTRATION; INTRACAUDAL; PERINEURAL at 12:12

## 2022-12-15 RX ADMIN — PHENYLEPHRINE HYDROCHLORIDE 200 MCG: 10 INJECTION INTRAVENOUS at 12:12

## 2022-12-15 RX ADMIN — ACETAMINOPHEN 650 MG: 325 TABLET ORAL at 06:12

## 2022-12-15 RX ADMIN — DOPAMINE HYDROCHLORIDE 5 MCG/KG/MIN: 160 INJECTION, SOLUTION INTRAVENOUS at 01:12

## 2022-12-15 RX ADMIN — ROCURONIUM BROMIDE 50 MG: 10 INJECTION INTRAVENOUS at 12:12

## 2022-12-15 RX ADMIN — FENTANYL CITRATE 75 MCG: 50 INJECTION, SOLUTION INTRAMUSCULAR; INTRAVENOUS at 12:12

## 2022-12-15 RX ADMIN — SODIUM CHLORIDE: 0.9 INJECTION, SOLUTION INTRAVENOUS at 12:12

## 2022-12-15 RX ADMIN — APIXABAN 5 MG: 5 TABLET, FILM COATED ORAL at 08:12

## 2022-12-15 NOTE — TRANSFER OF CARE
"Anesthesia Transfer of Care Note    Patient: Cedric Santoro    Procedure(s) Performed: Procedure(s) (LRB):  Ablation, Atrial Flutter, Typical (N/A)  Transesophageal echo (ARIELA) intra-procedure log documentation (N/A)  Cardioversion or Defibrillation    Patient location: [facility]    Anesthesia Type: general    Transport from OR: Transported from OR on 6-10 L/min O2 by face mask with adequate spontaneous ventilation    Post pain: adequate analgesia    Post assessment: no apparent anesthetic complications and tolerated procedure well    Post vital signs: stable    Level of consciousness: awake    Nausea/Vomiting: no nausea/vomiting    Complications: none    Transfer of care protocol was followed      Last vitals:   Visit Vitals  /78 (BP Location: Left arm, Patient Position: Lying)   Pulse 72   Temp 36.6 °C (97.8 °F)   Resp 17   Ht 5' 10" (1.778 m)   Wt 94.3 kg (208 lb)   SpO2 97%   BMI 29.84 kg/m²     "

## 2022-12-15 NOTE — PLAN OF CARE
Pt ambulated on unit this morning with daughter at side.  Denies pain or SOB.  Verbalized an understanding of procedure.  Oriented to unit and call bell provided.  Daughter received text via text messaging system.  Will continue to monitor.

## 2022-12-15 NOTE — HPI
62 yoM referred for AFL management. He developed AFL/RVR leading to ARIELA/CV attempt 10/12/22. He had KP thrombus on ARIELA leading to cancellation of the CV. He was placed on apixaban for CVA prophylaxis 10/4/22 when he as diagnosed. EF normal on ARIELA. He developed LEBLANC and fatigue in the moths after his wife passed away. He remains in AFL today and has mild symptoms. He is here to discuss ablation. Of note, AF was placed on his chart 3/2020. I reviewed the monitor and do not feel he had AF. The event was non-sustained AT lasting less than 30s.      ARIELA 10/12:  ·           The left ventricle is normal in size with normal systolic function.  ·           Normal right ventricular size with normal right ventricular systolic function.  ·           The estimated ejection fraction is 55%.  ·           Thrombus is present in the appendage.  ·           Mild left atrial enlargement.  ·           There is mild mitral prolapse of the posterior mitral leaflet.  ·           Mild-to-moderate mitral regurgitation.  ·           Grade 1 plaque present.  ·           Mild tricuspid regurgitation.  ·           No cardioversion performed due to left atrial appendage thrombus.

## 2022-12-15 NOTE — BRIEF OP NOTE
: Matty Hendricks MD  Date of Procedure: 12/15/22    Post-operative Diagnosis: AFL    Procedure Performed:  CTI ablations     Description of Procedure: The patient was brought to the EP lab in the fasting state. Prepped and draped in sterile fashion. Safety timeout was performed. Sedation administered by anesthesia staff. Ultrasound guided venous access of the bilateral femoral veins was performed. CS catheters placed via right femoral vein access. Halo catheter placed via right femoral vein. RFA to the CTI with confirmation of bidirectional block. Long sheaths via right femoral venous access.     Patient hypotensive during procedure started on dopamine gtt. Received 20mg iv lasxi given hypervolemic. EF appears to be newly reduced, likely tachycardia induced cardiomyopathy    EBL: <10 mL    Specimens: none  Complications: no immediate    Plan:  Bedrest x 6 hrs  Ambulation at 6 hours post-procedure if there is no evidence of access site complications  Close monitoring of hemodynamics, access site, and neurologic status  ECG upon arrival to PACU  Patient to resume OAC this evening. If bleeding or hematoma formation, please notify EP service before holding OAC  Medication changes: none  Recommend ibuprofen 800 mg TID x 3 days for pericarditis post-procedure  Admit to observation overnight. Will be evaluated by EP in the AM.     Elizabeth Be MD, PGY6  Electrophysiology

## 2022-12-15 NOTE — HPI
62 yoM referred for AFL management. He developed AFL/RVR leading to ARIELA/CV attempt 10/12/22. He had KP thrombus on ARIELA leading to cancellation of the CV. He was placed on apixaban for CVA prophylaxis 10/4/22 when he as diagnosed. EF normal on ARIELA. He developed LEBLANC and fatigue in the moths after his wife passed away. He remains in AFL today and has mild symptoms. He is here to discuss ablation. Of note, AF was placed on his chart 3/2020. Dr. Hendricks reviewed the monitor and did not feel he had AF. The event was non-sustained AT lasting less than 30s.      ARIELA 10/12:  ·           The left ventricle is normal in size with normal systolic function.  ·           Normal right ventricular size with normal right ventricular systolic function.  ·           The estimated ejection fraction is 55%.  ·           Thrombus is present in the appendage.  ·           Mild left atrial enlargement.  ·           There is mild mitral prolapse of the posterior mitral leaflet.  ·           Mild-to-moderate mitral regurgitation.  ·           Grade 1 plaque present.  ·           Mild tricuspid regurgitation.  ·           No cardioversion performed due to left atrial appendage thrombus.

## 2022-12-15 NOTE — ANESTHESIA PROCEDURE NOTES
Arterial    Diagnosis: a flutter    Patient location during procedure: done in OR    Staffing  Authorizing Provider: Zachary Escalona MD  Performing Provider: Zachary Escalona MD    Anesthesiologist was present at the time of the procedure.  Arterial  Skin Prep: chlorhexidine gluconate  Local Infiltration: none  Orientation: right  Location: radial    Catheter Size: 20 G  Catheter placement by Ultrasound guidance. Heme positive aspiration all ports.   Vessel Caliber: patent  Needle advanced into vessel with real time Ultrasound guidance.Insertion Attempts: 1

## 2022-12-15 NOTE — ANESTHESIA PROCEDURE NOTES
Intubation    Date/Time: 12/15/2022 12:30 PM  Performed by: Reji Bueno Jr., CRNA  Authorized by: Zachary Escalona MD     Intubation:     Induction:  Intravenous    Intubated:  Postinduction    Mask Ventilation:  Moderately difficult with oral airway    Attempts:  1    Attempted By:  CRNA    Method of Intubation:  Direct    Blade:  Combs 2    Laryngeal View Grade: Grade I - full view of cords      Difficult Airway Encountered?: No      Complications:  None    Airway Device:  Oral endotracheal tube    Airway Device Size:  7.5    Style/Cuff Inflation:  Cuffed    Inflation Amount (mL):  7    Tube secured:  24    Secured at:  The lips    Placement Verified By:  Capnometry    Complicating Factors:  None    Findings Post-Intubation:  BS equal bilateral and atraumatic/condition of teeth unchanged

## 2022-12-15 NOTE — PLAN OF CARE
Vss. S/p ablation. Right groin manual pressure in ep lab. Hemostasis at 1430. Bedrest x4hrs. Done at 1830. No hematoma or drainage noted. Palpable pulses noted. Edema noted to steph le ankles +2-3. Pt voided via urinal in ep pacu 3. 700ml clear yellow urine noted. Pt aaox4 follows commands. Pt tolerating diet cranberry juice and water and ice chips in ep pacu 3. Pt's daughter and parents at bedside, visiting pt in ep pacu 3. Pt denies pain. Md to update pt in room. Daughter was updated by md and ep pacu rn. Verbalizes understanding. 12 lead ekg done and in chart. See flowsheet for full assessment. Awaiting csu bed. S/p cardioversion x1. Silvadene to chest and back. Pt educated on purpose of silvadene.  Silvadene on stretcher with pt.

## 2022-12-15 NOTE — ANESTHESIA POSTPROCEDURE EVALUATION
Anesthesia Post Evaluation    Patient: Cedric Santoro    Procedure(s) Performed: Procedure(s) (LRB):  Ablation, Atrial Flutter, Typical (N/A)  Transesophageal echo (ARIELA) intra-procedure log documentation (N/A)  Cardioversion or Defibrillation    Final Anesthesia Type: general      Level of consciousness: awake and alert  Post-procedure vital signs: reviewed and stable  Pain control: Pain has been treated.  Airway patency: patent    PONV status: Absent or treated.  Anesthetic complications: no      Cardiovascular status: hemodynamically stable  Respiratory status: unassisted  Hydration status: euvolemic            Vitals Value Taken Time   /77 12/15/22 1602   Temp 36.6 °C (97.9 °F) 12/15/22 1432   Pulse 95 12/15/22 1610   Resp 23 12/15/22 1610   SpO2 98 % 12/15/22 1610   Vitals shown include unvalidated device data.      No case tracking events are documented in the log.      Pain/Richa Score: Pain Rating Prior to Med Admin: 0 (12/15/2022  3:45 PM)  Richa Score: 9 (12/15/2022  3:45 PM)

## 2022-12-15 NOTE — Clinical Note
Dr. Hendricks called and notified that Dr. Escalona would like to cardiovert patient now. Dr. Hendricks coming to lab to see patient.

## 2022-12-15 NOTE — H&P
[facility] - Short Stay Cardiac Unit  Cardiac Electrophysiology  History and Physical     Admission Date: 12/15/2022  Code Status: No Order   Attending Provider: Matty Hendricks MD   Principal Problem:Paroxysmal atrial flutter    Subjective:     Chief Complaint:  AFL     HPI:  62 yoM referred for AFL management. He developed AFL/RVR leading to ARIELA/CV attempt 10/12/22. He had KP thrombus on ARIELA leading to cancellation of the CV. He was placed on apixaban for CVA prophylaxis 10/4/22 when he as diagnosed. EF normal on ARIELA. He developed LEBLANC and fatigue in the moths after his wife passed away. He remains in AFL today and has mild symptoms. He is here to discuss ablation. Of note, AF was placed on his chart 3/2020. Dr. Hendricks reviewed the monitor and did not feel he had AF. The event was non-sustained AT lasting less than 30s.      ARIELA 10/12:  ·           The left ventricle is normal in size with normal systolic function.  ·           Normal right ventricular size with normal right ventricular systolic function.  ·           The estimated ejection fraction is 55%.  ·           Thrombus is present in the appendage.  ·           Mild left atrial enlargement.  ·           There is mild mitral prolapse of the posterior mitral leaflet.  ·           Mild-to-moderate mitral regurgitation.  ·           Grade 1 plaque present.  ·           Mild tricuspid regurgitation.  ·           No cardioversion performed due to left atrial appendage thrombus.      Past Medical History:   Diagnosis Date    Abnormal ECG 10/4/2022    Essential hypertension 2/24/2020    Hypertension     MVP (mitral valve prolapse) 10/26/2022    Nonrheumatic mitral valve regurgitation 10/26/2022    Palpitation 3/5/2020    Paroxysmal atrial fibrillation 3/5/2020    Paroxysmal atrial flutter 10/4/2022    Raynaud's syndrome     Vasovagal syncopes        Past Surgical History:   Procedure Laterality Date    ankle/foot surgery      EYE SURGERY       GALLBLADDER SURGERY      HERNIA REPAIR      THROAT SURGERY         Review of patient's allergies indicates:   Allergen Reactions    Iodinated contrast media     Iodine and iodide containing products Hives       No current facility-administered medications on file prior to encounter.     Current Outpatient Medications on File Prior to Encounter   Medication Sig    apixaban (ELIQUIS) 5 mg Tab Take 1 tablet (5 mg total) by mouth 2 (two) times daily.    atenoloL (TENORMIN) 50 MG tablet Take 1 tablet (50 mg total) by mouth once daily.    b complex vitamins tablet Take 1 tablet by mouth once daily.    co-enzyme Q-10 30 mg capsule Take 30 mg by mouth 3 (three) times daily.    DULoxetine (CYMBALTA) 60 MG capsule Take 60 mg by mouth every morning.    ferrous sulfate 325 (65 FE) MG EC tablet Take 325 mg by mouth 3 (three) times daily with meals.    losartan (COZAAR) 50 MG tablet Take 1 tablet (50 mg total) by mouth once daily.    magnesium 30 mg Tab Take 1 tablet by mouth once.    multivitamin (THERAGRAN) per tablet Take 1 tablet by mouth once daily.    vitamin D (VITAMIN D3) 1000 units Tab Take 1,000 Units by mouth once daily.     Family History       Problem Relation (Age of Onset)    Macular degeneration Father, Brother    Pacemaker/defibrilator Father          Tobacco Use    Smoking status: Never    Smokeless tobacco: Never   Substance and Sexual Activity    Alcohol use: No    Drug use: No    Sexual activity: Not on file     Review of Systems   All other systems reviewed and are negative.  Objective:     Vital Signs (Most Recent):    Vital Signs (24h Range):             There is no height or weight on file to calculate BMI.            Physical Exam  Vitals reviewed.   Constitutional:       Appearance: Normal appearance.   HENT:      Head: Normocephalic and atraumatic.   Cardiovascular:      Rate and Rhythm: Normal rate and regular rhythm.      Pulses: Normal pulses.      Heart sounds: Normal heart  sounds. No murmur heard.  Pulmonary:      Effort: Pulmonary effort is normal. No respiratory distress.      Breath sounds: Normal breath sounds. No wheezing or rales.   Musculoskeletal:         General: Normal range of motion.      Right lower leg: No edema.      Left lower leg: Edema present.   Skin:     General: Skin is warm.   Neurological:      General: No focal deficit present.      Mental Status: He is alert.       Significant Labs: All pertinent lab results from the last 24 hours have been reviewed.        Assessment and Plan:     * Paroxysmal atrial flutter  62 yoM AFL/RVR here for arrhythmia management.   Patient here for CTI RFA with Dr. Hendricks for AFL. ARIELA prior. NICOLASA    He has symptomatic AFL/RVR. He had KP thrombus on ARIELA 10/12/22. He had only been on eliquis for 1w. He has been offered ARILEA/RFA for definitive therapy. Unlikely that thrombus was a failure of eliquis.     Anticoagulation: apixiban  EF (most recent): 55%  AAD/AVN agents: Atenolol 50 DAILY    The risks, benefits and alternatives of the procedure were explained to the patient, patient's family and/or surrogate decision maker. Risks include (but not limited to) bleeding, hematoma, infection, pain, vascular damage, damage to structures surrounding the vasculature, myocardial damage [perforation, valvular damage], cardiac tamponade, phrenic nerve damage, CVA, MI, and death. All questions were answered. Patient is understanding of these risks, and would like to proceed. Consents signed.        Elizabeth Be MD  Cardiac Electrophysiology  [facility] - Short Stay Cardiac Unit

## 2022-12-15 NOTE — SUBJECTIVE & OBJECTIVE
Past Medical History:   Diagnosis Date    Abnormal ECG 10/4/2022    Essential hypertension 2/24/2020    Hypertension     MVP (mitral valve prolapse) 10/26/2022    Nonrheumatic mitral valve regurgitation 10/26/2022    Palpitation 3/5/2020    Paroxysmal atrial fibrillation 3/5/2020    Paroxysmal atrial flutter 10/4/2022    Raynaud's syndrome     Vasovagal syncopes        Past Surgical History:   Procedure Laterality Date    ankle/foot surgery      EYE SURGERY      GALLBLADDER SURGERY      HERNIA REPAIR      THROAT SURGERY         Review of patient's allergies indicates:   Allergen Reactions    Iodinated contrast media     Iodine and iodide containing products Hives       No current facility-administered medications on file prior to encounter.     Current Outpatient Medications on File Prior to Encounter   Medication Sig    apixaban (ELIQUIS) 5 mg Tab Take 1 tablet (5 mg total) by mouth 2 (two) times daily.    atenoloL (TENORMIN) 50 MG tablet Take 1 tablet (50 mg total) by mouth once daily.    b complex vitamins tablet Take 1 tablet by mouth once daily.    co-enzyme Q-10 30 mg capsule Take 30 mg by mouth 3 (three) times daily.    DULoxetine (CYMBALTA) 60 MG capsule Take 60 mg by mouth every morning.    ferrous sulfate 325 (65 FE) MG EC tablet Take 325 mg by mouth 3 (three) times daily with meals.    losartan (COZAAR) 50 MG tablet Take 1 tablet (50 mg total) by mouth once daily.    magnesium 30 mg Tab Take 1 tablet by mouth once.    multivitamin (THERAGRAN) per tablet Take 1 tablet by mouth once daily.    vitamin D (VITAMIN D3) 1000 units Tab Take 1,000 Units by mouth once daily.     Family History       Problem Relation (Age of Onset)    Macular degeneration Father, Brother    Pacemaker/defibrilator Father          Tobacco Use    Smoking status: Never    Smokeless tobacco: Never   Substance and Sexual Activity    Alcohol use: No    Drug use: No    Sexual activity: Not on file     Review of Systems   All other systems  reviewed and are negative.  Objective:     Vital Signs (Most Recent):    Vital Signs (24h Range):             There is no height or weight on file to calculate BMI.            Physical Exam  Vitals reviewed.   Constitutional:       Appearance: Normal appearance.   HENT:      Head: Normocephalic and atraumatic.   Cardiovascular:      Rate and Rhythm: Normal rate and regular rhythm.      Pulses: Normal pulses.      Heart sounds: Normal heart sounds. No murmur heard.  Pulmonary:      Effort: Pulmonary effort is normal. No respiratory distress.      Breath sounds: Normal breath sounds. No wheezing or rales.   Musculoskeletal:         General: Normal range of motion.      Right lower leg: No edema.      Left lower leg: Edema present.   Skin:     General: Skin is warm.   Neurological:      General: No focal deficit present.      Mental Status: He is alert.       Significant Labs: All pertinent lab results from the last 24 hours have been reviewed.

## 2022-12-15 NOTE — CONSULTS
[facility] - Short Stay Cardiac Unit  Cardiology  Consult Note    Patient Name: Cedric Santoro  MRN: [id number]  Admission Date: 12/15/2022  Hospital Length of Stay: 0 days  Code Status: No Order   Attending Provider: Matty Hendricks MD   Consulting Provider: Ector Dent MD  Primary Care Physician: Monserrat Asif MD  Principal Problem:Paroxysmal atrial flutter    Patient information was obtained from patient and ER records.     Consults  Subjective:     Chief Complaint:  Rule out KP cloth     HPI:   62 yoM referred for AFL management. He developed AFL/RVR leading to ARIELA/CV attempt 10/12/22. He had KP thrombus on ARIELA leading to cancellation of the CV. He was placed on apixaban for CVA prophylaxis 10/4/22 when he as diagnosed. EF normal on ARIELA. He developed LEBLANC and fatigue in the moths after his wife passed away. He remains in AFL today and has mild symptoms. He is here to discuss ablation. Of note, AF was placed on his chart 3/2020. I reviewed the monitor and do not feel he had AF. The event was non-sustained AT lasting less than 30s.      ARIELA 10/12:  ·           The left ventricle is normal in size with normal systolic function.  ·           Normal right ventricular size with normal right ventricular systolic function.  ·           The estimated ejection fraction is 55%.  ·           Thrombus is present in the appendage.  ·           Mild left atrial enlargement.  ·           There is mild mitral prolapse of the posterior mitral leaflet.  ·           Mild-to-moderate mitral regurgitation.  ·           Grade 1 plaque present.  ·           Mild tricuspid regurgitation.  ·           No cardioversion performed due to left atrial appendage thrombus.  Anticoagulant/antiplatelets:  ECG:    Dysphagia or odynophagia:  No  Liver Disease, esophageal disease, or known varices:  No  Upper GI Bleeding: No  Snoring:  No  Sleep Apnea:  No  Prior neck surgery or radiation:  No  History of anesthetic difficulties:   No  Family history of anesthetic difficulties:  No  Last oral intake: yesterday before midnight  Able to move neck in all directions:  No  Platelet count:   INR:       Past Medical History:   Diagnosis Date    Abnormal ECG 10/4/2022    Essential hypertension 2/24/2020    Hypertension     MVP (mitral valve prolapse) 10/26/2022    Nonrheumatic mitral valve regurgitation 10/26/2022    Palpitation 3/5/2020    Paroxysmal atrial fibrillation 3/5/2020    Paroxysmal atrial flutter 10/4/2022    Raynaud's syndrome     Vasovagal syncopes        Past Surgical History:   Procedure Laterality Date    ankle/foot surgery      EYE SURGERY      GALLBLADDER SURGERY      HERNIA REPAIR      THROAT SURGERY         Review of patient's allergies indicates:   Allergen Reactions    Iodinated contrast media     Iodine and iodide containing products Hives       No current facility-administered medications on file prior to encounter.     Current Outpatient Medications on File Prior to Encounter   Medication Sig    apixaban (ELIQUIS) 5 mg Tab Take 1 tablet (5 mg total) by mouth 2 (two) times daily.    b complex vitamins tablet Take 1 tablet by mouth once daily.    co-enzyme Q-10 30 mg capsule Take 30 mg by mouth 3 (three) times daily.    DULoxetine (CYMBALTA) 60 MG capsule Take 60 mg by mouth every morning.    ferrous sulfate 325 (65 FE) MG EC tablet Take 325 mg by mouth 3 (three) times daily with meals.    losartan (COZAAR) 50 MG tablet Take 1 tablet (50 mg total) by mouth once daily.    magnesium 30 mg Tab Take 1 tablet by mouth once.    multivitamin (THERAGRAN) per tablet Take 1 tablet by mouth once daily.    vitamin D (VITAMIN D3) 1000 units Tab Take 1,000 Units by mouth once daily.    atenoloL (TENORMIN) 50 MG tablet Take 1 tablet (50 mg total) by mouth once daily.     Family History       Problem Relation (Age of Onset)    Macular degeneration Father, Brother    Pacemaker/defibrilator Father          Tobacco Use     Smoking status: Never    Smokeless tobacco: Never   Substance and Sexual Activity    Alcohol use: No    Drug use: No    Sexual activity: Not on file     Review of Systems   Constitutional: Negative.   HENT: Negative.     Eyes: Negative.    Cardiovascular: Negative.    Respiratory: Negative.     Endocrine: Negative.    Hematologic/Lymphatic: Negative.    Skin: Negative.    Musculoskeletal: Negative.    Gastrointestinal: Negative.    Neurological: Negative.    Objective:     Vital Signs (Most Recent):  Temp: 97.8 °F (36.6 °C) (12/15/22 1102)  Pulse: 72 (12/15/22 1102)  Resp: 17 (12/15/22 1102)  BP: 136/78 (12/15/22 1119)  SpO2: 97 % (12/15/22 1102) Vital Signs (24h Range):  Temp:  [97.8 °F (36.6 °C)] 97.8 °F (36.6 °C)  Pulse:  [72] 72  Resp:  [17] 17  SpO2:  [97 %] 97 %  BP: (129-136)/(78-91) 136/78     Weight: 94.3 kg (208 lb)  Body mass index is 29.84 kg/m².    SpO2: 97 %       No intake or output data in the 24 hours ending 12/15/22 1157    Lines/Drains/Airways       Peripheral Intravenous Line  Duration                  Peripheral IV - Single Lumen 12/15/22 1132 18 G Left Antecubital <1 day                    Physical Exam  Constitutional:       Appearance: Normal appearance.   HENT:      Head: Normocephalic and atraumatic.      Nose: Nose normal.   Cardiovascular:      Rate and Rhythm: Normal rate and regular rhythm.      Pulses: Normal pulses.      Heart sounds: Normal heart sounds.   Pulmonary:      Effort: Pulmonary effort is normal.      Breath sounds: Normal breath sounds.   Abdominal:      General: Bowel sounds are normal.      Palpations: Abdomen is soft.   Musculoskeletal:         General: Normal range of motion.      Cervical back: Normal range of motion.   Skin:     General: Skin is warm.      Capillary Refill: Capillary refill takes less than 2 seconds.   Neurological:      General: No focal deficit present.      Mental Status: He is alert and oriented to person, place, and time.        Significant Labs: All pertinent lab results from the last 24 hours have been reviewed.    Significant Imaging: EKG: Atrial flutter     Assessment and Plan:     * Paroxysmal atrial flutter  Here today for ARIELA to rule out KP cloth   -No absolute contraindications of esophageal stricture, tumor, perforation, laceration,or diverticulum and/or active GI bleed  -The risks, benefits & alternatives of the procedure were explained to the patient.   -The risks of transesophageal echo include but are not limited to:  Dental trauma, esophageal trauma/perforation, bleeding, laryngospasm/brochospasm, aspiration, sore throat/hoarseness, & dislodgement of the endotracheal tube/nasogastric tube (where applicable).    -The risks of ANES monitored sedation include hypotension, respiratory depression, arrhythmias, bronchospasm, & death.    -Informed consent was obtained. The patient is agreeable to proceed with the procedure and all questions and concerns addressed.    Case discussed with an attending in echocardiography lab.    Further recommendations per attending addendum          VTE Risk Mitigation (From admission, onward)    None          Thank you for your consult. I will sign off. Please contact us if you have any additional questions.    Ector Dent MD  Cardiology   [facility] - Short Stay Cardiac Unit

## 2022-12-15 NOTE — ASSESSMENT & PLAN NOTE
62 yoM AFL/RVR here for arrhythmia management.   Patient here for CTI RFA with Dr. Hendricks for AFL. ARIELA prior. NICOLASA    He has symptomatic AFL/RVR. He had KP thrombus on ARIELA 10/12/22. He had only been on eliquis for 1w. He has been offered ARIELA/RFA for definitive therapy. Unlikely that thrombus was a failure of eliquis.     Anticoagulation: apixiban  EF (most recent): 55%  AAD/AVN agents: Atenolol 50 DAILY    The risks, benefits and alternatives of the procedure were explained to the patient, patient's family and/or surrogate decision maker. Risks include (but not limited to) bleeding, hematoma, infection, pain, vascular damage, damage to structures surrounding the vasculature, myocardial damage [perforation, valvular damage], cardiac tamponade, phrenic nerve damage, CVA, MI, and death. All questions were answered. Patient is understanding of these risks, and would like to proceed. Consents signed.

## 2022-12-15 NOTE — NURSING TRANSFER
Nursing Transfer Note      12/15/2022     Reason patient is being transferred: ep pacu 3 to 345    Transfer To: ep pacu 3 to 345    Transfer via stretcher    Transfer with cardiac monitoring, tele box on confirmed by tele tech    Transported by [facility] pacu pct    Medicines sent: silvadene    Any special needs or follow-up needed: bedrest x4hrs. Hemostasis 1430. Bedrest done at 1830    Chart send with patient: Yes    Notified: daughter    Patient reassessed at: 12/15/22 1715. Next due 1745    Upon arrival to floor: cardiac monitor applied, patient oriented to room, call bell in reach, and bed in lowest position

## 2022-12-15 NOTE — ASSESSMENT & PLAN NOTE
Here today for ARIELA to rule out KP cloth   -No absolute contraindications of esophageal stricture, tumor, perforation, laceration,or diverticulum and/or active GI bleed  -The risks, benefits & alternatives of the procedure were explained to the patient.   -The risks of transesophageal echo include but are not limited to:  Dental trauma, esophageal trauma/perforation, bleeding, laryngospasm/brochospasm, aspiration, sore throat/hoarseness, & dislodgement of the endotracheal tube/nasogastric tube (where applicable).    -The risks of ANES monitored sedation include hypotension, respiratory depression, arrhythmias, bronchospasm, & death.    -Informed consent was obtained. The patient is agreeable to proceed with the procedure and all questions and concerns addressed.    Case discussed with an attending in echocardiography lab.    Further recommendations per attending addendum

## 2022-12-15 NOTE — SUBJECTIVE & OBJECTIVE
Past Medical History:   Diagnosis Date    Abnormal ECG 10/4/2022    Essential hypertension 2/24/2020    Hypertension     MVP (mitral valve prolapse) 10/26/2022    Nonrheumatic mitral valve regurgitation 10/26/2022    Palpitation 3/5/2020    Paroxysmal atrial fibrillation 3/5/2020    Paroxysmal atrial flutter 10/4/2022    Raynaud's syndrome     Vasovagal syncopes        Past Surgical History:   Procedure Laterality Date    ankle/foot surgery      EYE SURGERY      GALLBLADDER SURGERY      HERNIA REPAIR      THROAT SURGERY         Review of patient's allergies indicates:   Allergen Reactions    Iodinated contrast media     Iodine and iodide containing products Hives       No current facility-administered medications on file prior to encounter.     Current Outpatient Medications on File Prior to Encounter   Medication Sig    apixaban (ELIQUIS) 5 mg Tab Take 1 tablet (5 mg total) by mouth 2 (two) times daily.    b complex vitamins tablet Take 1 tablet by mouth once daily.    co-enzyme Q-10 30 mg capsule Take 30 mg by mouth 3 (three) times daily.    DULoxetine (CYMBALTA) 60 MG capsule Take 60 mg by mouth every morning.    ferrous sulfate 325 (65 FE) MG EC tablet Take 325 mg by mouth 3 (three) times daily with meals.    losartan (COZAAR) 50 MG tablet Take 1 tablet (50 mg total) by mouth once daily.    magnesium 30 mg Tab Take 1 tablet by mouth once.    multivitamin (THERAGRAN) per tablet Take 1 tablet by mouth once daily.    vitamin D (VITAMIN D3) 1000 units Tab Take 1,000 Units by mouth once daily.    atenoloL (TENORMIN) 50 MG tablet Take 1 tablet (50 mg total) by mouth once daily.     Family History       Problem Relation (Age of Onset)    Macular degeneration Father, Brother    Pacemaker/defibrilator Father          Tobacco Use    Smoking status: Never    Smokeless tobacco: Never   Substance and Sexual Activity    Alcohol use: No    Drug use: No    Sexual activity: Not on file     Review of Systems   Constitutional:  Negative.   HENT: Negative.     Eyes: Negative.    Cardiovascular: Negative.    Respiratory: Negative.     Endocrine: Negative.    Hematologic/Lymphatic: Negative.    Skin: Negative.    Musculoskeletal: Negative.    Gastrointestinal: Negative.    Neurological: Negative.    Objective:     Vital Signs (Most Recent):  Temp: 97.8 °F (36.6 °C) (12/15/22 1102)  Pulse: 72 (12/15/22 1102)  Resp: 17 (12/15/22 1102)  BP: 136/78 (12/15/22 1119)  SpO2: 97 % (12/15/22 1102) Vital Signs (24h Range):  Temp:  [97.8 °F (36.6 °C)] 97.8 °F (36.6 °C)  Pulse:  [72] 72  Resp:  [17] 17  SpO2:  [97 %] 97 %  BP: (129-136)/(78-91) 136/78     Weight: 94.3 kg (208 lb)  Body mass index is 29.84 kg/m².    SpO2: 97 %       No intake or output data in the 24 hours ending 12/15/22 1157    Lines/Drains/Airways       Peripheral Intravenous Line  Duration                  Peripheral IV - Single Lumen 12/15/22 1132 18 G Left Antecubital <1 day                    Physical Exam  Constitutional:       Appearance: Normal appearance.   HENT:      Head: Normocephalic and atraumatic.      Nose: Nose normal.   Cardiovascular:      Rate and Rhythm: Normal rate and regular rhythm.      Pulses: Normal pulses.      Heart sounds: Normal heart sounds.   Pulmonary:      Effort: Pulmonary effort is normal.      Breath sounds: Normal breath sounds.   Abdominal:      General: Bowel sounds are normal.      Palpations: Abdomen is soft.   Musculoskeletal:         General: Normal range of motion.      Cervical back: Normal range of motion.   Skin:     General: Skin is warm.      Capillary Refill: Capillary refill takes less than 2 seconds.   Neurological:      General: No focal deficit present.      Mental Status: He is alert and oriented to person, place, and time.       Significant Labs: All pertinent lab results from the last 24 hours have been reviewed.    Significant Imaging: EKG: Atrial flutter

## 2022-12-16 ENCOUNTER — PATIENT MESSAGE (OUTPATIENT)
Dept: RESEARCH | Facility: HOSPITAL | Age: 62
End: 2022-12-16
Payer: COMMERCIAL

## 2022-12-16 VITALS
WEIGHT: 208 LBS | OXYGEN SATURATION: 96 % | HEIGHT: 70 IN | SYSTOLIC BLOOD PRESSURE: 123 MMHG | DIASTOLIC BLOOD PRESSURE: 79 MMHG | RESPIRATION RATE: 18 BRPM | BODY MASS INDEX: 29.78 KG/M2 | HEART RATE: 79 BPM | TEMPERATURE: 98 F

## 2022-12-16 PROBLEM — I50.20 HFREF (HEART FAILURE WITH REDUCED EJECTION FRACTION): Status: ACTIVE | Noted: 2022-12-16

## 2022-12-16 PROCEDURE — 25000003 PHARM REV CODE 250: Performed by: STUDENT IN AN ORGANIZED HEALTH CARE EDUCATION/TRAINING PROGRAM

## 2022-12-16 RX ORDER — FUROSEMIDE 20 MG/1
20 TABLET ORAL DAILY
Qty: 5 TABLET | Refills: 0 | Status: SHIPPED | OUTPATIENT
Start: 2022-12-16 | End: 2023-01-04 | Stop reason: SDUPTHER

## 2022-12-16 RX ADMIN — APIXABAN 5 MG: 5 TABLET, FILM COATED ORAL at 08:12

## 2022-12-16 RX ADMIN — ACETAMINOPHEN 650 MG: 325 TABLET ORAL at 08:12

## 2022-12-16 RX ADMIN — LEVOTHYROXINE SODIUM 25 MCG: 25 TABLET ORAL at 05:12

## 2022-12-16 NOTE — NURSING
Patient provided with discharge instructions. Patient and family verbalize understanding. Patient aware of next appointments and home medication. Patient received home lasix 20 mg po ordered. Patient had peripheral iv's removed times 3. Patient left with all belongings. Patient escorted to the front of the hospital for pick-up.

## 2022-12-16 NOTE — PLAN OF CARE
Pt remained free of injuries, falls, and trauma. VSS. HR maintained in SR throughout the night. No complaints of pain. Pt s/p aflutter ablation, accessed through R groin. Dressing CDI. No hematoma noted. Pt ambulated to bathroom without any issues. Fall precautions maintained. Pt pending d/c. Plan of care reviewed w/ pt. Pt verbalized understanding.     Problem: Adult Inpatient Plan of Care  Goal: Plan of Care Review  Outcome: Ongoing, Progressing  Flowsheets (Taken 12/16/2022 0128)  Plan of Care Reviewed With:   patient   family  Goal: Optimal Comfort and Wellbeing  Intervention: Monitor Pain and Promote Comfort  Flowsheets (Taken 12/16/2022 0128)  Pain Management Interventions:   care clustered   position adjusted   pillow support provided   quiet environment facilitated  Intervention: Provide Person-Centered Care  Flowsheets (Taken 12/16/2022 0128)  Trust Relationship/Rapport:   care explained   choices provided   questions answered   questions encouraged

## 2022-12-16 NOTE — DISCHARGE INSTRUCTIONS
"Make sure to continue taking your blood thinner apixiban (trade name: Eliquis) after your procedure as you would normally take it starting the evening of your procedure  You may experience chest discomfort (also known as "pericarditis") with deep breathes or coughing which is normal following your procedure. If this occurs, you can take ibuprofen (Motrin) 800 mg every 8 hours for 2-3 days. If the chest pain is persistent or severe please visit the nearest emergency department.  You can take furosemide (trade name: Lasix) daily or twice daily as needed for fluid retention following your ablation  Remove the bandages over your groin area the morning after your procedure. You can show after you remove these bandages. Keep the groin sites clean and dry. You do not need to apply ointments or bandages to the area.   Do not take baths or submerge your groin area or at least 1 week or when the puncture sites in your groin have completely healed  Do not lift anything over 10 lbs for the first week after your procedure, and avoid strenuous activity during this time to allow for the groin sites to heal. After 1 week, there are no activity restrictions.  Please contact the electrophysiology clinic or go to the ER if you experience: severe chest pain, shortness of breath, bleeding or swelling of the groin sites, or any other concerns.  Keep salt intake to under 2000 mg sodium, fluids to under 2 L (64 oz)  Check your weights every morning after getting out of bed and urinating. If your weight goes up 3lbs overnight or 5lbs in one week take 20 mg lasix   Please schedule follow up appointment with your general cardiologist to continue to optimize your medical therapy. Will need repeat echo.    "

## 2022-12-16 NOTE — DISCHARGE SUMMARY
[facility] - Cardiology Stepdown  Cardiac Electrophysiology  Discharge Summary      Patient Name: Cedric Santoro  MRN: [id number]  Admission Date: 12/15/2022  Hospital Length of Stay: 0 days  Discharge Date and Time:  12/16/2022 7:42 AM  Attending Physician: Matty Hendricks MD    Discharging Provider: Elizabeth Be MD  Primary Care Physician: Monserrat Asif MD    HPI:   62 yoM referred for AFL management. He developed AFL/RVR leading to ARIELA/CV attempt 10/12/22. He had KP thrombus on ARIELA leading to cancellation of the CV. He was placed on apixaban for CVA prophylaxis 10/4/22 when he as diagnosed. EF normal on ARIELA. He developed LEBLANC and fatigue in the moths after his wife passed away. He remains in AFL today and has mild symptoms. He is here to discuss ablation. Of note, AF was placed on his chart 3/2020. Dr. Hendricks reviewed the monitor and did not feel he had AF. The event was non-sustained AT lasting less than 30s.      ARIELA 10/12:  ·           The left ventricle is normal in size with normal systolic function.  ·           Normal right ventricular size with normal right ventricular systolic function.  ·           The estimated ejection fraction is 55%.  ·           Thrombus is present in the appendage.  ·           Mild left atrial enlargement.  ·           There is mild mitral prolapse of the posterior mitral leaflet.  ·           Mild-to-moderate mitral regurgitation.  ·           Grade 1 plaque present.  ·           Mild tricuspid regurgitation.  ·           No cardioversion performed due to left atrial appendage thrombus.      Procedure(s) (LRB):  Ablation, Atrial Flutter, Typical (N/A)  Transesophageal echo (ARIELA) intra-procedure log documentation (N/A)  Cardioversion or Defibrillation     Indwelling Lines/Drains at time of discharge:  Lines/Drains/Airways       None                   Hospital Course:  Patient underwent successful RFA of the CTI for treatment of CTI-dependent atrial flutter. No  evidence of intra- or post-procedure complications. Post-ablation ECG shows NSR, and no acute abnormalities. Remains in NSR. During procedure he became hypotensive requiring pressor support with Dopamine. 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 Overnight he was weaned off Dopamine and BP normalized. He will be going home with home medications and follow up with his general cardiologist for optimizing of GDMT and follow up of ef. Will send home with 3-5 days worth of Lasix and encouraged patient to monitor salt intake and weight.    EP medications at discharge:  Antiarrhythmics and/or AVN agents: unchanged.   Patient was instructed to continue their oral anticoagulation as previously prescribed  Patient was instructed to take ibuprofen 800 mg TID x 3 days for pericarditis.     Groin access sites without hematoma or bleeding. Activity restrictions given to patient. Instructed to seek medical attention for shortness of breath, chest discomfort not alleviated with NSAIDs, bleeding/hematoma formation at the access sites, acute onset of neurologic symptoms, N/V, or hematemesis. At discharge the patient denied CP, SOB, access site bleeding/hematoma, or any other complaints or evidence of complications.      Goals of Care Treatment Preferences:         Consults:     Significant Diagnostic Studies: Labs: All labs within the past 24 hours have been reviewed    Pending Diagnostic Studies:       Procedure Component Value Units Date/Time    Transesophageal echo (ARIELA) [[id number]]     Order Status: Sent Lab Status: No result             Final Active Diagnoses:    Diagnosis Date Noted POA    PRINCIPAL PROBLEM:  Paroxysmal atrial flutter [I48.92] 10/04/2022 Yes    HFrEF (heart failure with reduced ejection fraction) [I50.20] 12/16/2022 Unknown      Problems Resolved During this Admission:     No new Assessment & Plan notes have been filed under this hospital service  since the last note was generated.  Service: Arrhythmia      Discharged Condition: stable    Disposition:     Follow Up:   Follow-up Information       Matty Hendricks MD Follow up in 3 month(s).    Specialties: Electrophysiology, Cardiovascular Disease  Contact information:  [address]  [facility] [id number] [phone]                           Patient Instructions:      BASIC METABOLIC PANEL   Standing Status: Future Standing Exp. Date: 02/14/24     Medications:  Reconciled Home Medications:      Medication List        START taking these medications      furosemide 20 MG tablet  Commonly known as: LASIX  Take 1 tablet (20 mg total) by mouth once daily.            CONTINUE taking these medications      apixaban 5 mg Tab  Commonly known as: ELIQUIS  Take 1 tablet (5 mg total) by mouth 2 (two) times daily.     atenoloL 50 MG tablet  Commonly known as: TENORMIN  Take 1 tablet (50 mg total) by mouth once daily.     b complex vitamins tablet  Take 1 tablet by mouth once daily.     co-enzyme Q-10 30 mg capsule  Take 30 mg by mouth 3 (three) times daily.     DULoxetine 60 MG capsule  Commonly known as: CYMBALTA  Take 60 mg by mouth every morning.     ferrous sulfate 325 (65 FE) MG EC tablet  Take 325 mg by mouth 3 (three) times daily with meals.     levothyroxine 25 MCG tablet  Commonly known as: SYNTHROID  TAKE 1 TABLET BY MOUTH BEFORE BREAKFAST.     losartan 50 MG tablet  Commonly known as: COZAAR  Take 1 tablet (50 mg total) by mouth once daily.     magnesium 30 mg Tab  Take 1 tablet by mouth once.     multivitamin per tablet  Commonly known as: THERAGRAN  Take 1 tablet by mouth once daily.     vitamin D 1000 units Tab  Commonly known as: VITAMIN D3  Take 1,000 Units by mouth once daily.              Time spent on the discharge of patient: 45 minutes    Elizabeth Be MD  Cardiac Electrophysiology  [facility] - Cardiology Stepdown

## 2022-12-16 NOTE — HOSPITAL COURSE
Patient underwent successful RFA of the CTI for treatment of CTI-dependent atrial flutter. No evidence of intra- or post-procedure complications. Post-ablation ECG shows NSR, and no acute abnormalities. Remains in NSR. During procedure he became hypotensive requiring pressor support with Dopamine. 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 Overnight he was weaned off Dopamine and BP normalized. He will be going home with home medications and follow up with his general cardiologist for optimizing of GDMT and follow up of ef. Will send home with 3-5 days worth of Lasix and encouraged patient to monitor salt intake and weight.    EP medications at discharge:  Antiarrhythmics and/or AVN agents: unchanged.   Patient was instructed to continue their oral anticoagulation as previously prescribed  Patient was instructed to take ibuprofen 800 mg TID x 3 days for pericarditis.     Groin access sites without hematoma or bleeding. Activity restrictions given to patient. Instructed to seek medical attention for shortness of breath, chest discomfort not alleviated with NSAIDs, bleeding/hematoma formation at the access sites, acute onset of neurologic symptoms, N/V, or hematemesis. At discharge the patient denied CP, SOB, access site bleeding/hematoma, or any other complaints or evidence of complications.

## 2022-12-22 ENCOUNTER — TELEPHONE (OUTPATIENT)
Dept: ELECTROPHYSIOLOGY | Facility: CLINIC | Age: 62
End: 2022-12-22
Payer: COMMERCIAL

## 2022-12-22 NOTE — TELEPHONE ENCOUNTER
Spoke to patient regarding post op care. Patient states he is doing well denies any complaints of chest pain or SOB.   His apple watch states he is in NSR.   Wound site dry and intact without redness, swelling, hematoma or drainage. Patient denies any fever or pain.   Patient  has resumed medications and has started new prescriptions  lasix as ordered at discharge.Patient verbalizes understanding of all post op instructions.

## 2023-01-04 ENCOUNTER — OFFICE VISIT (OUTPATIENT)
Dept: INTERNAL MEDICINE | Facility: CLINIC | Age: 63
End: 2023-01-04
Payer: COMMERCIAL

## 2023-01-04 VITALS
SYSTOLIC BLOOD PRESSURE: 102 MMHG | HEART RATE: 60 BPM | TEMPERATURE: 97 F | WEIGHT: 206.38 LBS | RESPIRATION RATE: 18 BRPM | DIASTOLIC BLOOD PRESSURE: 76 MMHG | HEIGHT: 70 IN | OXYGEN SATURATION: 95 % | BODY MASS INDEX: 29.54 KG/M2

## 2023-01-04 DIAGNOSIS — Z23 NEED FOR SHINGLES VACCINE: Primary | ICD-10-CM

## 2023-01-04 DIAGNOSIS — I10 ESSENTIAL HYPERTENSION: ICD-10-CM

## 2023-01-04 DIAGNOSIS — I50.20 HFREF (HEART FAILURE WITH REDUCED EJECTION FRACTION): ICD-10-CM

## 2023-01-04 PROCEDURE — 3008F BODY MASS INDEX DOCD: CPT | Mod: CPTII,S$GLB,, | Performed by: FAMILY MEDICINE

## 2023-01-04 PROCEDURE — 3074F SYST BP LT 130 MM HG: CPT | Mod: CPTII,S$GLB,, | Performed by: FAMILY MEDICINE

## 2023-01-04 PROCEDURE — 99999 PR PBB SHADOW E&M-EST. PATIENT-LVL IV: CPT | Mod: PBBFAC,,, | Performed by: FAMILY MEDICINE

## 2023-01-04 PROCEDURE — 99999 PR PBB SHADOW E&M-EST. PATIENT-LVL IV: ICD-10-PCS | Mod: PBBFAC,,, | Performed by: FAMILY MEDICINE

## 2023-01-04 PROCEDURE — 90750 HZV VACC RECOMBINANT IM: CPT | Mod: S$GLB,,, | Performed by: FAMILY MEDICINE

## 2023-01-04 PROCEDURE — 3078F PR MOST RECENT DIASTOLIC BLOOD PRESSURE < 80 MM HG: ICD-10-PCS | Mod: CPTII,S$GLB,, | Performed by: FAMILY MEDICINE

## 2023-01-04 PROCEDURE — 3078F DIAST BP <80 MM HG: CPT | Mod: CPTII,S$GLB,, | Performed by: FAMILY MEDICINE

## 2023-01-04 PROCEDURE — 99214 PR OFFICE/OUTPT VISIT, EST, LEVL IV, 30-39 MIN: ICD-10-PCS | Mod: 25,S$GLB,, | Performed by: FAMILY MEDICINE

## 2023-01-04 PROCEDURE — 3008F PR BODY MASS INDEX (BMI) DOCUMENTED: ICD-10-PCS | Mod: CPTII,S$GLB,, | Performed by: FAMILY MEDICINE

## 2023-01-04 PROCEDURE — 90471 IMMUNIZATION ADMIN: CPT | Mod: S$GLB,,, | Performed by: FAMILY MEDICINE

## 2023-01-04 PROCEDURE — 90750 ZOSTER RECOMBINANT VACCINE: ICD-10-PCS | Mod: S$GLB,,, | Performed by: FAMILY MEDICINE

## 2023-01-04 PROCEDURE — 90471 ZOSTER RECOMBINANT VACCINE: ICD-10-PCS | Mod: S$GLB,,, | Performed by: FAMILY MEDICINE

## 2023-01-04 PROCEDURE — 99214 OFFICE O/P EST MOD 30 MIN: CPT | Mod: 25,S$GLB,, | Performed by: FAMILY MEDICINE

## 2023-01-04 PROCEDURE — 1159F PR MEDICATION LIST DOCUMENTED IN MEDICAL RECORD: ICD-10-PCS | Mod: CPTII,S$GLB,, | Performed by: FAMILY MEDICINE

## 2023-01-04 PROCEDURE — 1159F MED LIST DOCD IN RCRD: CPT | Mod: CPTII,S$GLB,, | Performed by: FAMILY MEDICINE

## 2023-01-04 PROCEDURE — 3074F PR MOST RECENT SYSTOLIC BLOOD PRESSURE < 130 MM HG: ICD-10-PCS | Mod: CPTII,S$GLB,, | Performed by: FAMILY MEDICINE

## 2023-01-04 RX ORDER — FUROSEMIDE 20 MG/1
20 TABLET ORAL DAILY PRN
Qty: 60 TABLET | Refills: 1 | Status: SHIPPED | OUTPATIENT
Start: 2023-01-04 | End: 2023-04-03 | Stop reason: SDUPTHER

## 2023-01-04 NOTE — PROGRESS NOTES
Subjective:       Patient ID: Cedric Santoro is a 62 y.o. male.    Chief Complaint: Hospital Follow Up    HPI Mr. Santoro presents today for hospital follow up.       Cardioablation 12/15/2022  Patient had complications during procedure  While patient was being intubated had significant drop in blood pressure chest compressions     patient continues to be physically active although he has had 2 slow down  Physical activity   Heart failure diagnose with a significant change in ejection fraction over 2 months.  Time from October 2022 to December 2022  Ejection fraction went from 55% to 35%  Patient was on Lasix for short period after procedure due to swelling  Inquires about being on a diuretic as needed since he has also experienced increased intake of water when he is physically active and sweating      Review of Systems   Constitutional: Negative.    HENT: Negative.     Respiratory:  Positive for shortness of breath.    Cardiovascular:  Positive for leg swelling.   Gastrointestinal: Negative.    Musculoskeletal: Negative.          Past Medical History:   Diagnosis Date    Abnormal ECG 10/4/2022    Essential hypertension 2/24/2020    Hypertension     MVP (mitral valve prolapse) 10/26/2022    Nonrheumatic mitral valve regurgitation 10/26/2022    Palpitation 3/5/2020    Paroxysmal atrial fibrillation 3/5/2020    Paroxysmal atrial flutter 10/4/2022    Raynaud's syndrome     Vasovagal syncopes      Past Surgical History:   Procedure Laterality Date    ABLATION, ATRIAL FLUTTER, TYPICAL N/A 12/15/2022    Procedure: Ablation, Atrial Flutter, Typical;  Surgeon: Matty Hendricks MD;  Location: [facility] EP LAB;  Service: Cardiology;  Laterality: N/A;  AFL, RFA, NICOLASA, ARIELA, anes, MB, 3 Prep    ankle/foot surgery      ECHOCARDIOGRAM,TRANSESOPHAGEAL N/A 12/15/2022    Procedure: Transesophageal echo (ARIELA) intra-procedure log documentation;  Surgeon: Nisreen Fenton MD;  Location: [facility] EP LAB;  Service: Cardiology;  Laterality:  N/A;    EYE SURGERY      GALLBLADDER SURGERY      HERNIA REPAIR      THROAT SURGERY      TREATMENT OF CARDIAC ARRHYTHMIA  12/15/2022    Procedure: Cardioversion or Defibrillation;  Surgeon: Matty Hendricks MD;  Location: [facility];  Service: Cardiology;;     Family History   Problem Relation Age of Onset    Pacemaker/defibrilator Father     Macular degeneration Father     Macular degeneration Brother      Social History     Socioeconomic History    Marital status: Other   Tobacco Use    Smoking status: Never    Smokeless tobacco: Never   Substance and Sexual Activity    Alcohol use: No    Drug use: No     Social Determinants of Health     Financial Resource Strain: Unknown    Difficulty of Paying Living Expenses: Patient refused   Food Insecurity: Unknown    Worried About Running Out of Food in the Last Year: Patient refused    Ran Out of Food in the Last Year: Patient refused   Transportation Needs: Unknown    Lack of Transportation (Medical): Patient refused    Lack of Transportation (Non-Medical): Patient refused   Physical Activity: Sufficiently Active    Days of Exercise per Week: 5 days    Minutes of Exercise per Session: 40 min   Stress: Unknown    Feeling of Stress : Patient refused   Social Connections: Unknown    Frequency of Communication with Friends and Family: Patient refused    Frequency of Social Gatherings with Friends and Family: Patient refused    Active Member of Clubs or Organizations: Patient refused    Attends Club or Organization Meetings: Patient refused    Marital Status:    Housing Stability: Unknown    Unable to Pay for Housing in the Last Year: Patient refused    Number of Places Lived in the Last Year: 1    Unstable Housing in the Last Year: Patient refused       Objective:        Physical Exam  Vitals reviewed.   Constitutional:       Appearance: Normal appearance.   HENT:      Head: Normocephalic and atraumatic.   Cardiovascular:      Heart sounds: Murmur heard.    Pulmonary:      Effort: Pulmonary effort is normal.      Breath sounds: Normal breath sounds.   Musculoskeletal:         General: Swelling (trace edema bilateral) present.   Neurological:      Mental Status: He is alert.         Results for orders placed or performed during the hospital encounter of 12/15/22   Transesophageal echo (ARIELA)   Result Value Ref Range    BSA 2.16 m2    EF 35 %    Sinus 3.00 cm    STJ 2.90 cm    Ascending aorta 3.00 cm       Assessment/Plan:           HFrEF (heart failure with reduced ejection fraction)  Patient to follow-up with cardiology at the end of the month  This is about 3 weeks from now  Essential HTN  blood pressure controlled  Blood pressure slightly low although still normal  If patient becomes symptomatic we will consider lowering losartan back to 25 mg from 50 mg    Swelling  -     furosemide (LASIX) 20 MG tablet; Take 1 tablet (20 mg total) by mouth daily as needed (swelling).  Dispense: 60 tablet; Refill: 1    Need for shingles vaccine  -     (In Office Administered) Zoster Recombinant Vaccine      Follow-up: follow-up in March as scheduled    Monserrat Asif MD  [facility]   Family Medicine

## 2023-01-17 ENCOUNTER — LAB VISIT (OUTPATIENT)
Dept: LAB | Facility: HOSPITAL | Age: 63
End: 2023-01-17
Attending: STUDENT IN AN ORGANIZED HEALTH CARE EDUCATION/TRAINING PROGRAM
Payer: COMMERCIAL

## 2023-01-17 DIAGNOSIS — I50.20 HFREF (HEART FAILURE WITH REDUCED EJECTION FRACTION): ICD-10-CM

## 2023-01-17 PROCEDURE — 80048 BASIC METABOLIC PNL TOTAL CA: CPT | Performed by: STUDENT IN AN ORGANIZED HEALTH CARE EDUCATION/TRAINING PROGRAM

## 2023-01-17 PROCEDURE — 36415 COLL VENOUS BLD VENIPUNCTURE: CPT | Mod: PO | Performed by: STUDENT IN AN ORGANIZED HEALTH CARE EDUCATION/TRAINING PROGRAM

## 2023-01-18 LAB
ANION GAP SERPL CALC-SCNC: 8 MMOL/L (ref 8–16)
BUN SERPL-MCNC: 17 MG/DL (ref 8–23)
CALCIUM SERPL-MCNC: 10.2 MG/DL (ref 8.7–10.5)
CHLORIDE SERPL-SCNC: 99 MMOL/L (ref 95–110)
CO2 SERPL-SCNC: 31 MMOL/L (ref 23–29)
CREAT SERPL-MCNC: 1.2 MG/DL (ref 0.5–1.4)
EST. GFR  (NO RACE VARIABLE): >60 ML/MIN/1.73 M^2
GLUCOSE SERPL-MCNC: 107 MG/DL (ref 70–110)
POTASSIUM SERPL-SCNC: 5.1 MMOL/L (ref 3.5–5.1)
SODIUM SERPL-SCNC: 138 MMOL/L (ref 136–145)

## 2023-02-03 ENCOUNTER — HOSPITAL ENCOUNTER (OUTPATIENT)
Dept: CARDIOLOGY | Facility: HOSPITAL | Age: 63
Discharge: HOME | End: 2023-02-03
Attending: INTERNAL MEDICINE
Payer: COMMERCIAL

## 2023-02-03 ENCOUNTER — OFFICE VISIT (OUTPATIENT)
Dept: CARDIOLOGY | Facility: CLINIC | Age: 63
End: 2023-02-03
Payer: COMMERCIAL

## 2023-02-03 VITALS
DIASTOLIC BLOOD PRESSURE: 72 MMHG | SYSTOLIC BLOOD PRESSURE: 118 MMHG | WEIGHT: 205.69 LBS | HEIGHT: 70 IN | BODY MASS INDEX: 29.45 KG/M2

## 2023-02-03 DIAGNOSIS — I10 ESSENTIAL HYPERTENSION: ICD-10-CM

## 2023-02-03 DIAGNOSIS — I48.92 PAROXYSMAL ATRIAL FLUTTER: ICD-10-CM

## 2023-02-03 DIAGNOSIS — I48.0 PAROXYSMAL ATRIAL FIBRILLATION: Primary | ICD-10-CM

## 2023-02-03 DIAGNOSIS — I50.20 HFREF (HEART FAILURE WITH REDUCED EJECTION FRACTION): Primary | ICD-10-CM

## 2023-02-03 DIAGNOSIS — I48.0 PAROXYSMAL ATRIAL FIBRILLATION: ICD-10-CM

## 2023-02-03 DIAGNOSIS — I34.1 MVP (MITRAL VALVE PROLAPSE): ICD-10-CM

## 2023-02-03 DIAGNOSIS — R00.2 PALPITATION: ICD-10-CM

## 2023-02-03 DIAGNOSIS — I34.0 NONRHEUMATIC MITRAL VALVE REGURGITATION: ICD-10-CM

## 2023-02-03 DIAGNOSIS — R94.31 ABNORMAL ECG: ICD-10-CM

## 2023-02-03 PROCEDURE — 99999 PR PBB SHADOW E&M-EST. PATIENT-LVL III: CPT | Mod: PBBFAC,,, | Performed by: INTERNAL MEDICINE

## 2023-02-03 PROCEDURE — 3008F BODY MASS INDEX DOCD: CPT | Mod: CPTII,S$GLB,, | Performed by: INTERNAL MEDICINE

## 2023-02-03 PROCEDURE — 1159F PR MEDICATION LIST DOCUMENTED IN MEDICAL RECORD: ICD-10-PCS | Mod: CPTII,S$GLB,, | Performed by: INTERNAL MEDICINE

## 2023-02-03 PROCEDURE — 3074F PR MOST RECENT SYSTOLIC BLOOD PRESSURE < 130 MM HG: ICD-10-PCS | Mod: CPTII,S$GLB,, | Performed by: INTERNAL MEDICINE

## 2023-02-03 PROCEDURE — 1160F PR REVIEW ALL MEDS BY PRESCRIBER/CLIN PHARMACIST DOCUMENTED: ICD-10-PCS | Mod: CPTII,S$GLB,, | Performed by: INTERNAL MEDICINE

## 2023-02-03 PROCEDURE — 3078F PR MOST RECENT DIASTOLIC BLOOD PRESSURE < 80 MM HG: ICD-10-PCS | Mod: CPTII,S$GLB,, | Performed by: INTERNAL MEDICINE

## 2023-02-03 PROCEDURE — 93005 ELECTROCARDIOGRAM TRACING: CPT

## 2023-02-03 PROCEDURE — 99214 OFFICE O/P EST MOD 30 MIN: CPT | Mod: S$GLB,,, | Performed by: INTERNAL MEDICINE

## 2023-02-03 PROCEDURE — 3008F PR BODY MASS INDEX (BMI) DOCUMENTED: ICD-10-PCS | Mod: CPTII,S$GLB,, | Performed by: INTERNAL MEDICINE

## 2023-02-03 PROCEDURE — 99999 PR PBB SHADOW E&M-EST. PATIENT-LVL III: ICD-10-PCS | Mod: PBBFAC,,, | Performed by: INTERNAL MEDICINE

## 2023-02-03 PROCEDURE — 1160F RVW MEDS BY RX/DR IN RCRD: CPT | Mod: CPTII,S$GLB,, | Performed by: INTERNAL MEDICINE

## 2023-02-03 PROCEDURE — 93010 EKG 12-LEAD: ICD-10-PCS | Mod: ,,, | Performed by: INTERNAL MEDICINE

## 2023-02-03 PROCEDURE — 93010 ELECTROCARDIOGRAM REPORT: CPT | Mod: ,,, | Performed by: INTERNAL MEDICINE

## 2023-02-03 PROCEDURE — 3074F SYST BP LT 130 MM HG: CPT | Mod: CPTII,S$GLB,, | Performed by: INTERNAL MEDICINE

## 2023-02-03 PROCEDURE — 3078F DIAST BP <80 MM HG: CPT | Mod: CPTII,S$GLB,, | Performed by: INTERNAL MEDICINE

## 2023-02-03 PROCEDURE — 99214 PR OFFICE/OUTPT VISIT, EST, LEVL IV, 30-39 MIN: ICD-10-PCS | Mod: S$GLB,,, | Performed by: INTERNAL MEDICINE

## 2023-02-03 PROCEDURE — 1159F MED LIST DOCD IN RCRD: CPT | Mod: CPTII,S$GLB,, | Performed by: INTERNAL MEDICINE

## 2023-02-03 NOTE — PROGRESS NOTES
Subjective:    Patient ID:  Cedric Santoro is a 63 y.o. male who presents for evaluation of Congestive Heart Failure (Atrial flutter/) and Atrial Flutter        HPIPt presents for eval.  His current med conditions include MVP, MR, HTN, palpitations, PAF, PAFL.  Nonsmoker.  Sees Dr. Vergara, Cardiology in past.  Past hx pertinent for following:  - stress test 2020.  Reportedly had brief PAF w stress test, resolving on its own.  Echo 2020 normal LV function, mild MR.  Pt had ecg done 10/3/22 twice:  a flutter w rate 99 - 116 bpm.  New dx.  Lost his wife to cancer 7/22 then had dyspnea few months.  Under much stress so whirlwind w loss of wife.  S/p ARIELA Oct 2022 for cardioversion.  Left atrial appendage thrombus noted so no cardioversion was performed.  ARIELA: Left atrial appendage thrombus noted, LVEF 55%, mild posterior leaflet MV prolapse, mild-mod MR.  Now here.  S/p a flutter ablation w Dr. Hendricks, EP, Dec 2022.  Had low BP w procedure, and required inotropes.  ARIELA showed EF 35%, decline thought to be due to a flutter.  Feels better.  No angina.  CHF is stable.  No concerning LEBLANC.  No pnd/orthopnea.  Stable leg edema.  Compliant w Eliquis.  Ecg today 2/3/23 NSR, 1 av block, incomplete RBBB.  No ischemia  noted.  Walking for exercise.   No syncope.     Past Medical History:   Diagnosis Date    Abnormal ECG 10/4/2022    Essential hypertension 2/24/2020    Hypertension     MVP (mitral valve prolapse) 10/26/2022    Nonrheumatic mitral valve regurgitation 10/26/2022    Palpitation 3/5/2020    Paroxysmal atrial fibrillation 3/5/2020    Paroxysmal atrial flutter 10/4/2022    Raynaud's syndrome     Vasovagal syncopes        Current Outpatient Medications:     apixaban (ELIQUIS) 5 mg Tab, Take 1 tablet (5 mg total) by mouth 2 (two) times daily., Disp: 60 tablet, Rfl: 12    atenoloL (TENORMIN) 50 MG tablet, Take 1 tablet (50 mg total) by mouth once daily., Disp: 90 tablet, Rfl: 3    b complex vitamins tablet, Take 1 tablet by  "mouth once daily., Disp: , Rfl:     co-enzyme Q-10 30 mg capsule, Take 30 mg by mouth 3 (three) times daily., Disp: , Rfl:     DULoxetine (CYMBALTA) 60 MG capsule, Take 60 mg by mouth every morning., Disp: , Rfl:     ferrous sulfate 325 (65 FE) MG EC tablet, Take 325 mg by mouth 3 (three) times daily with meals., Disp: , Rfl:     furosemide (LASIX) 20 MG tablet, Take 1 tablet (20 mg total) by mouth daily as needed (swelling)., Disp: 60 tablet, Rfl: 1    levothyroxine (SYNTHROID) 25 MCG tablet, Take 1 tablet (25 mcg total) by mouth before breakfast., Disp: 90 tablet, Rfl: 3    losartan (COZAAR) 50 MG tablet, Take 1 tablet (50 mg total) by mouth once daily., Disp: 90 tablet, Rfl: 1    magnesium 30 mg Tab, Take 1 tablet by mouth once., Disp: , Rfl:     multivitamin (THERAGRAN) per tablet, Take 1 tablet by mouth once daily., Disp: , Rfl:     vitamin D (VITAMIN D3) 1000 units Tab, Take 1,000 Units by mouth once daily., Disp: , Rfl:       Review of Systems   Constitutional: Negative.   HENT: Negative.     Eyes: Negative.    Cardiovascular:  Positive for leg swelling.   Respiratory: Negative.     Endocrine: Negative.    Hematologic/Lymphatic: Negative.    Skin: Negative.    Musculoskeletal: Negative.    Gastrointestinal: Negative.    Genitourinary: Negative.    Neurological: Negative.    Psychiatric/Behavioral: Negative.     Allergic/Immunologic: Negative.         /72 (BP Location: Left arm, Patient Position: Sitting, BP Method: Large (Manual))   Ht 5' 10" (1.778 m)   Wt 93.3 kg (205 lb 11 oz)   BMI 29.51 kg/m²     Wt Readings from Last 3 Encounters:   02/03/23 93.3 kg (205 lb 11 oz)   01/04/23 93.6 kg (206 lb 5.6 oz)   12/15/22 94.3 kg (208 lb)     Temp Readings from Last 3 Encounters:   01/04/23 96.9 °F (36.1 °C) (Tympanic)   12/16/22 98.1 °F (36.7 °C) (Oral)   10/12/22 98.1 °F (36.7 °C) (Temporal)     BP Readings from Last 3 Encounters:   02/03/23 118/72   01/04/23 102/76   12/16/22 123/79     Pulse Readings " from Last 3 Encounters:   01/04/23 60   12/16/22 79   11/02/22 (!) 130       Objective:    Physical Exam  Vitals and nursing note reviewed.   Constitutional:       Appearance: He is well-developed.   HENT:      Head: Normocephalic.   Neck:      Thyroid: No thyromegaly.      Vascular: Normal carotid pulses. No carotid bruit, hepatojugular reflux or JVD.   Cardiovascular:      Rate and Rhythm: Normal rate and regular rhythm.      Chest Wall: PMI is not displaced.      Pulses:           Radial pulses are 2+ on the right side and 2+ on the left side.      Heart sounds: S1 normal and S2 normal. Heart sounds not distant. No midsystolic click and no opening snap. Murmur heard.   High-pitched blowing holosystolic murmur is present with a grade of 2/6 at the apex.     No friction rub. No S3 or S4 sounds.   Pulmonary:      Effort: Pulmonary effort is normal.      Breath sounds: Normal breath sounds. No wheezing or rales.   Abdominal:      General: Bowel sounds are normal. There is no distension or abdominal bruit.      Palpations: Abdomen is soft. There is no mass.      Tenderness: There is no abdominal tenderness.   Musculoskeletal:      Cervical back: Normal range of motion and neck supple.      Right lower leg: Edema present.      Left lower leg: Edema present.   Skin:     General: Skin is warm.   Neurological:      Mental Status: He is alert and oriented to person, place, and time.   Psychiatric:         Behavior: Behavior normal.     I have reviewed all pertinent labs and cardiac studies.      Chemistry        Component Value Date/Time     01/17/2023 1121    K 5.1 01/17/2023 1121    CL 99 01/17/2023 1121    CO2 31 (H) 01/17/2023 1121    BUN 17 01/17/2023 1121    CREATININE 1.2 01/17/2023 1121     01/17/2023 1121        Component Value Date/Time    CALCIUM 10.2 01/17/2023 1121    ALKPHOS 71 10/10/2022 1240    AST 48 (H) 10/10/2022 1240    ALT 44 10/10/2022 1240    BILITOT 0.6 10/10/2022 1240    ESTGFRAFRICA  >60.0 04/12/2022 1010    EGFRNONAA >60.0 04/12/2022 1010        Lab Results   Component Value Date    WBC 4.82 12/08/2022    HGB 15.2 12/08/2022    HCT 45.6 12/08/2022    MCV 93 12/08/2022     12/08/2022       Lab Results   Component Value Date    HGBA1C 5.5 02/05/2020     Lab Results   Component Value Date    CHOL 197 04/12/2022    CHOL 166 03/18/2021    CHOL 179 08/05/2020     Lab Results   Component Value Date    HDL 68 04/12/2022    HDL 59 03/18/2021    HDL 66 08/05/2020     Lab Results   Component Value Date    LDLCALC 118.8 04/12/2022    LDLCALC 97.0 03/18/2021    LDLCALC 96.0 08/05/2020     Lab Results   Component Value Date    TRIG 51 04/12/2022    TRIG 50 03/18/2021    TRIG 85 08/05/2020     Lab Results   Component Value Date    CHOLHDL 34.5 04/12/2022    CHOLHDL 35.5 03/18/2021    CHOLHDL 36.9 08/05/2020       Summary    The left ventricle is normal in size with moderately decreased systolic function.The estimated ejection fraction is 35%.  Normal right ventricular size with moderately reduced right ventricular systolic function.  No interatrial septal defect present.  Normal appearing left atrial appendage. No thrombus is present in the appendage.  Mild mitral regurgitation.  Plaque present in descending aorta         Assessment:       1. HFrEF (heart failure with reduced ejection fraction)    2. Abnormal ECG    3. Essential hypertension    4. MVP (mitral valve prolapse)    5. Nonrheumatic mitral valve regurgitation    6. Palpitation    7. Paroxysmal atrial fibrillation    8. Paroxysmal atrial flutter         Plan:             S/p a flutter ablation.  Decline in EF, thought to be tachycardia mediated cardiomyopathy.  Recheck echo to assess LVEF.  CMP + BNP.  Will review; if EF still abnormal will need to go to Entresto, Coreg.  Will also need either cath or stress test for ischemia eval if EF fails to improve.  Discussed w pt/family.  Continue current meds for now.  Cardiac diet.  Exercise as  tolerated.  F/u w Dr. Hendricks, EP, as scheduled next month.  Continue Eliquis.  Phone review.    F/u 2 months.

## 2023-02-09 ENCOUNTER — HOSPITAL ENCOUNTER (OUTPATIENT)
Dept: CARDIOLOGY | Facility: HOSPITAL | Age: 63
Discharge: HOME | End: 2023-02-09
Attending: INTERNAL MEDICINE
Payer: COMMERCIAL

## 2023-02-09 VITALS
BODY MASS INDEX: 29.35 KG/M2 | SYSTOLIC BLOOD PRESSURE: 118 MMHG | HEART RATE: 66 BPM | WEIGHT: 205 LBS | HEIGHT: 70 IN | DIASTOLIC BLOOD PRESSURE: 72 MMHG

## 2023-02-09 DIAGNOSIS — I50.20 HFREF (HEART FAILURE WITH REDUCED EJECTION FRACTION): ICD-10-CM

## 2023-02-09 DIAGNOSIS — I48.92 PAROXYSMAL ATRIAL FLUTTER: ICD-10-CM

## 2023-02-09 DIAGNOSIS — I34.0 NONRHEUMATIC MITRAL VALVE REGURGITATION: ICD-10-CM

## 2023-02-09 DIAGNOSIS — I34.1 MVP (MITRAL VALVE PROLAPSE): ICD-10-CM

## 2023-02-09 DIAGNOSIS — I10 ESSENTIAL HYPERTENSION: ICD-10-CM

## 2023-02-09 DIAGNOSIS — I48.0 PAROXYSMAL ATRIAL FIBRILLATION: ICD-10-CM

## 2023-02-09 DIAGNOSIS — R94.31 ABNORMAL ECG: ICD-10-CM

## 2023-02-09 LAB
AORTIC ROOT ANNULUS: 2.74 CM
ASCENDING AORTA: 3.07 CM
AV INDEX (PROSTH): 0.82
AV MEAN GRADIENT: 6 MMHG
AV PEAK GRADIENT: 10 MMHG
AV VALVE AREA: 2.8 CM2
AV VELOCITY RATIO: 0.82
BSA FOR ECHO PROCEDURE: 2.14 M2
CV ECHO LV RWT: 0.49 CM
DOP CALC AO PEAK VEL: 1.59 M/S
DOP CALC AO VTI: 36.3 CM
DOP CALC LVOT AREA: 3.4 CM2
DOP CALC LVOT DIAMETER: 2.08 CM
DOP CALC LVOT PEAK VEL: 1.3 M/S
DOP CALC LVOT STROKE VOLUME: 101.55 CM3
DOP CALC RVOT PEAK VEL: 0.6 M/S
DOP CALC RVOT VTI: 14.2 CM
DOP CALCLVOT PEAK VEL VTI: 29.9 CM
E WAVE DECELERATION TIME: 278.19 MSEC
E/A RATIO: 1.16
E/E' RATIO: 7.1 M/S
ECHO LV POSTERIOR WALL: 1.12 CM (ref 0.6–1.1)
EJECTION FRACTION: 60 %
FRACTIONAL SHORTENING: 28 % (ref 28–44)
INTERVENTRICULAR SEPTUM: 1.48 CM (ref 0.6–1.1)
IVRT: 117.03 MSEC
LA MAJOR: 5.29 CM
LA MINOR: 4.93 CM
LA WIDTH: 4.3 CM
LEFT ATRIUM SIZE: 4.7 CM
LEFT ATRIUM VOLUME INDEX MOD: 26.6 ML/M2
LEFT ATRIUM VOLUME INDEX: 41.6 ML/M2
LEFT ATRIUM VOLUME MOD: 56.22 CM3
LEFT ATRIUM VOLUME: 87.67 CM3
LEFT INTERNAL DIMENSION IN SYSTOLE: 3.26 CM (ref 2.1–4)
LEFT VENTRICLE DIASTOLIC VOLUME INDEX: 44.99 ML/M2
LEFT VENTRICLE DIASTOLIC VOLUME: 94.93 ML
LEFT VENTRICLE MASS INDEX: 107 G/M2
LEFT VENTRICLE SYSTOLIC VOLUME INDEX: 20.2 ML/M2
LEFT VENTRICLE SYSTOLIC VOLUME: 42.69 ML
LEFT VENTRICULAR INTERNAL DIMENSION IN DIASTOLE: 4.55 CM (ref 3.5–6)
LEFT VENTRICULAR MASS: 226.35 G
LV LATERAL E/E' RATIO: 5.92 M/S
LV SEPTAL E/E' RATIO: 8.88 M/S
LVOT MG: 3.89 MMHG
LVOT MV: 0.92 CM/S
MV PEAK A VEL: 0.61 M/S
MV PEAK E VEL: 0.71 M/S
MV STENOSIS PRESSURE HALF TIME: 80.67 MS
MV VALVE AREA P 1/2 METHOD: 2.73 CM2
PISA TR MAX VEL: 2.48 M/S
PV MEAN GRADIENT: 0.89 MMHG
PV PEAK S VEL: 0.51 M/S
PV PEAK VELOCITY: 1.31 CM/S
RA MAJOR: 4.91 CM
RA PRESSURE: 3 MMHG
RA WIDTH: 3.48 CM
RIGHT VENTRICULAR END-DIASTOLIC DIMENSION: 4.25 CM
SINUS: 2.74 CM
STJ: 2.3 CM
TDI LATERAL: 0.12 M/S
TDI SEPTAL: 0.08 M/S
TDI: 0.1 M/S
TR MAX PG: 25 MMHG
TV REST PULMONARY ARTERY PRESSURE: 28 MMHG

## 2023-02-09 PROCEDURE — 93306 TTE W/DOPPLER COMPLETE: CPT

## 2023-02-09 PROCEDURE — 93306 TTE W/DOPPLER COMPLETE: CPT | Mod: 26,,, | Performed by: INTERNAL MEDICINE

## 2023-02-09 PROCEDURE — 93306 ECHO (CUPID ONLY): ICD-10-PCS | Mod: 26,,, | Performed by: INTERNAL MEDICINE

## 2023-02-13 ENCOUNTER — TELEPHONE (OUTPATIENT)
Dept: CARDIOLOGY | Facility: CLINIC | Age: 63
End: 2023-02-13
Payer: COMMERCIAL

## 2023-02-13 ENCOUNTER — PATIENT MESSAGE (OUTPATIENT)
Dept: CARDIOLOGY | Facility: CLINIC | Age: 63
End: 2023-02-13
Payer: COMMERCIAL

## 2023-02-13 NOTE — TELEPHONE ENCOUNTER
Telephoned patient to review results and recommendations from Dr. Garzon  Needs ARIELA scheduled with Dr. Del Valle confirmed 2/27 for 12Noon  Advised 1030AM arrival time, reviewed NPO status except for medications, avoid diuretics and diabetic medications  Also arrange for transportation home

## 2023-02-13 NOTE — TELEPHONE ENCOUNTER
Cynthia,    Please call pt.  Most recent echo shows his EF to be improved but MR is worse on echo.    Mitral valve may require surgery to prevent further problems in future but would like reassessment with ARIELA again.    Please schedule ARIELA with Dr. Del Valle.    Thanks    Dr Garzon

## 2023-02-27 ENCOUNTER — HOSPITAL ENCOUNTER (OUTPATIENT)
Facility: HOSPITAL | Age: 63
Discharge: HOME | End: 2023-02-27
Attending: INTERNAL MEDICINE | Admitting: INTERNAL MEDICINE
Payer: COMMERCIAL

## 2023-02-27 ENCOUNTER — ANESTHESIA (OUTPATIENT)
Dept: CARDIOLOGY | Facility: HOSPITAL | Age: 63
End: 2023-02-27
Payer: COMMERCIAL

## 2023-02-27 ENCOUNTER — ANESTHESIA EVENT (OUTPATIENT)
Dept: CARDIOLOGY | Facility: HOSPITAL | Age: 63
End: 2023-02-27
Payer: COMMERCIAL

## 2023-02-27 DIAGNOSIS — I34.0 MITRAL VALVE REGURGITATION: ICD-10-CM

## 2023-02-27 DIAGNOSIS — I34.0 NONRHEUMATIC MITRAL VALVE REGURGITATION: Primary | ICD-10-CM

## 2023-02-27 DIAGNOSIS — Z01.89 ENCOUNTER FOR CARDIOVERSION PROCEDURE: ICD-10-CM

## 2023-02-27 LAB
BASOPHILS # BLD AUTO: 0.04 K/UL (ref 0–0.2)
BASOPHILS NFR BLD: 0.9 % (ref 0–1.9)
BSA FOR ECHO PROCEDURE: 2.13 M2
CV ECHO LV RWT: 0.69 CM
DIFFERENTIAL METHOD: ABNORMAL
ECHO LV POSTERIOR WALL: 1.2 CM (ref 0.6–1.1)
EJECTION FRACTION: 60 %
EOSINOPHIL # BLD AUTO: 0.2 K/UL (ref 0–0.5)
EOSINOPHIL NFR BLD: 3.8 % (ref 0–8)
ERYTHROCYTE [DISTWIDTH] IN BLOOD BY AUTOMATED COUNT: 12.3 % (ref 11.5–14.5)
HCT VFR BLD AUTO: 41.8 % (ref 40–54)
HGB BLD-MCNC: 14.3 G/DL (ref 14–18)
IMM GRANULOCYTES # BLD AUTO: 0.01 K/UL (ref 0–0.04)
IMM GRANULOCYTES NFR BLD AUTO: 0.2 % (ref 0–0.5)
INR PPP: 1 (ref 0.8–1.2)
INTERVENTRICULAR SEPTUM: 1.2 CM (ref 0.6–1.1)
LEFT VENTRICLE MASS INDEX: 65 G/M2
LEFT VENTRICULAR INTERNAL DIMENSION IN DIASTOLE: 3.5 CM (ref 3.5–6)
LEFT VENTRICULAR MASS: 135.8 G
LYMPHOCYTES # BLD AUTO: 0.9 K/UL (ref 1–4.8)
LYMPHOCYTES NFR BLD: 20.4 % (ref 18–48)
MCH RBC QN AUTO: 31.4 PG (ref 27–31)
MCHC RBC AUTO-ENTMCNC: 34.2 G/DL (ref 32–36)
MCV RBC AUTO: 92 FL (ref 82–98)
MONOCYTES # BLD AUTO: 0.4 K/UL (ref 0.3–1)
MONOCYTES NFR BLD: 8.9 % (ref 4–15)
NEUTROPHILS # BLD AUTO: 2.8 K/UL (ref 1.8–7.7)
NEUTROPHILS NFR BLD: 65.8 % (ref 38–73)
NRBC BLD-RTO: 0 /100 WBC
PLATELET # BLD AUTO: 183 K/UL (ref 150–450)
PMV BLD AUTO: 10.7 FL (ref 9.2–12.9)
PROTHROMBIN TIME: 11.2 SEC (ref 9–12.5)
PROX AORTA: 3.1 CM
RBC # BLD AUTO: 4.56 M/UL (ref 4.6–6.2)
SINUS: 3.2 CM
STJ: 3 CM
WBC # BLD AUTO: 4.26 K/UL (ref 3.9–12.7)

## 2023-02-27 PROCEDURE — 85610 PROTHROMBIN TIME: CPT | Performed by: INTERNAL MEDICINE

## 2023-02-27 PROCEDURE — 93005 ELECTROCARDIOGRAM TRACING: CPT

## 2023-02-27 PROCEDURE — 93010 EKG 12-LEAD: ICD-10-PCS | Mod: ,,, | Performed by: INTERNAL MEDICINE

## 2023-02-27 PROCEDURE — 37000008 HC ANESTHESIA 1ST 15 MINUTES: Performed by: INTERNAL MEDICINE

## 2023-02-27 PROCEDURE — 93010 ELECTROCARDIOGRAM REPORT: CPT | Mod: ,,, | Performed by: INTERNAL MEDICINE

## 2023-02-27 PROCEDURE — 63600175 PHARM REV CODE 636 W HCPCS: Performed by: NURSE ANESTHETIST, CERTIFIED REGISTERED

## 2023-02-27 PROCEDURE — 85025 COMPLETE CBC W/AUTO DIFF WBC: CPT | Performed by: INTERNAL MEDICINE

## 2023-02-27 PROCEDURE — 25000003 PHARM REV CODE 250: Performed by: INTERNAL MEDICINE

## 2023-02-27 PROCEDURE — 37000009 HC ANESTHESIA EA ADD 15 MINS: Performed by: INTERNAL MEDICINE

## 2023-02-27 RX ORDER — PROPOFOL 10 MG/ML
VIAL (ML) INTRAVENOUS
Status: DISCONTINUED | OUTPATIENT
Start: 2023-02-27 | End: 2023-02-27

## 2023-02-27 RX ORDER — LIDOCAINE HYDROCHLORIDE 20 MG/ML
SOLUTION OROPHARYNGEAL
Status: DISCONTINUED | OUTPATIENT
Start: 2023-02-27 | End: 2023-02-27 | Stop reason: HOSPADM

## 2023-02-27 RX ORDER — SODIUM CHLORIDE 0.9 % (FLUSH) 0.9 %
10 SYRINGE (ML) INJECTION
Status: DISCONTINUED | OUTPATIENT
Start: 2023-02-27 | End: 2023-02-27 | Stop reason: HOSPADM

## 2023-02-27 RX ADMIN — PROPOFOL 50 MG: 10 INJECTION, EMULSION INTRAVENOUS at 12:02

## 2023-02-27 RX ADMIN — PROPOFOL 30 MG: 10 INJECTION, EMULSION INTRAVENOUS at 12:02

## 2023-02-27 NOTE — PLAN OF CARE
Pt being discharged Home in stable condition. IV removed, catheter intact, pt tolerated well. Discharge instructions given to pt, pt verbalized understanding.

## 2023-02-27 NOTE — OP NOTE
[facility] - Cath Lab ([facility])  Brief Operative Note    Surgery Date: 2/27/2023     Surgeon(s) and Role:     * Samuel Del Valle MD - Primary    Assisting Surgeon: None    Pre-op Diagnosis:  Nonrheumatic mitral valve regurgitation [I34.0]    Post-op Diagnosis:  Post-Op Diagnosis Codes:     * Nonrheumatic mitral valve regurgitation [I34.0]    Procedure(s) (LRB):  ECHOCARDIOGRAM, TRANSESOPHAGEAL (N/A)    Anesthesia: Monitor Anesthesia Care    Operative Findings:   Procedure Description: The risks, benefits, and alternatives of the procedure expalined in detail with patient and family. The patient voices understanding and all questions have been addressed. The patient agrees to proceed as planned.   The patient was sedated by anesthesiology service. The probe was advanced via throat into esophagus smoothly. The patient tolerated the procedure well and there was no complications. The probed was withdrawal at the end of study. The patient was hemodynamically stable.   Estimated Blood Loss: none.   Findings / Operative Note:   Normal EF and LV size. Mitral valve P1 prolapsed with mild to mod MR. Multiple jets with central and eccentric to posterior.      Ok to discharge to home once hemodynamically stable.  F/U with Dr. Garzon in 4 weeks at cardiology clinic.  DIET  ACTIVITY      Estimated Blood Loss: * No values recorded between 2/27/2023 12:19 PM and 2/27/2023 12:36 PM *         Specimens:   Specimen (24h ago, onward)      None              Discharge Note    OUTCOME: Patient tolerated treatment/procedure well without complication and is now ready for discharge.    DISPOSITION: Home or Self Care    FINAL DIAGNOSIS:  <principal problem not specified>    FOLLOWUP: In clinic    DISCHARGE INSTRUCTIONS:    Discharge Procedure Orders   Notify your health care provider if you experience any of the following:  persistent nausea and vomiting or diarrhea     Notify your health care provider if you experience any of the following:  severe  uncontrolled pain     Notify your health care provider if you experience any of the following:  difficulty breathing or increased cough     Notify your health care provider if you experience any of the following:  increased confusion or weakness     Notify your health care provider if you experience any of the following:  persistent dizziness, light-headedness, or visual disturbances

## 2023-02-27 NOTE — ANESTHESIA POSTPROCEDURE EVALUATION
Anesthesia Post Evaluation    Patient: Cedric Santoro    Procedure(s) Performed: Procedure(s) (LRB):  ECHOCARDIOGRAM, TRANSESOPHAGEAL (N/A)    Final Anesthesia Type: MAC      Patient location: [facility].  Patient participation: Yes- Able to Participate  Level of consciousness: awake and alert  Post-procedure vital signs: reviewed and stable  Pain management: adequate  Airway patency: patent  JIA mitigation strategies: Multimodal analgesia  PONV status at discharge: No PONV  Anesthetic complications: no      Cardiovascular status: blood pressure returned to baseline  Respiratory status: unassisted and spontaneous ventilation  Hydration status: euvolemic  Follow-up not needed.          Vitals Value Taken Time   /76 02/27/23 1053   Temp 37.2 °C (99 °F) 02/27/23 1053   Pulse 52 02/27/23 1053   Resp 18 02/27/23 1053   SpO2 96 % 02/27/23 1053         No case tracking events are documented in the log.      Pain/Richa Score: Richa Score: 10 (2/27/2023 11:48 AM)

## 2023-02-27 NOTE — DISCHARGE SUMMARY
[facility] Lab (Hospital)  Discharge Note  Short Stay    Procedure(s) (LRB):  ECHOCARDIOGRAM, TRANSESOPHAGEAL (N/A)      OUTCOME: Patient tolerated treatment/procedure well without complication and is now ready for discharge.    DISPOSITION: Home or Self Care    FINAL DIAGNOSIS:  <principal problem not specified>    FOLLOWUP: In clinic    DISCHARGE INSTRUCTIONS:    Discharge Procedure Orders   Notify your health care provider if you experience any of the following:  persistent nausea and vomiting or diarrhea     Notify your health care provider if you experience any of the following:  severe uncontrolled pain     Notify your health care provider if you experience any of the following:  difficulty breathing or increased cough     Notify your health care provider if you experience any of the following:  increased confusion or weakness     Notify your health care provider if you experience any of the following:  persistent dizziness, light-headedness, or visual disturbances        TIME SPENT ON DISCHARGE: 10 minutes

## 2023-02-27 NOTE — INTERVAL H&P NOTE
The patient has been examined and the H&P has been reviewed:    I concur with the findings and no changes have occurred since H&P was written.    Surgery risks, benefits and alternative options discussed and understood by patient/family.      ARIELA as scheduled for Mitral regurgitation      There are no hospital problems to display for this patient.

## 2023-02-27 NOTE — ANESTHESIA PREPROCEDURE EVALUATION
02/27/2023  Cedric Santoro is a 63 y.o., male.      Pre-op Assessment    I have reviewed the Patient Summary Reports.     I have reviewed the Nursing Notes. I have reviewed the NPO Status.   I have reviewed the Medications.     Review of Systems  Anesthesia Hx:  No problems with previous Anesthesia  Denies Family Hx of Anesthesia complications.   Denies Personal Hx of Anesthesia complications.   Social:  Non-Smoker    Hematology/Oncology:  Hematology Normal   Oncology Normal     EENT/Dental:EENT/Dental Normal   Cardiovascular:   Hypertension Valvular problems/Murmurs, MR Dysrhythmias atrial fibrillation LEBLANC ECG has been reviewed. Echo 3/2020    ? Normal right ventricular systolic function.  ? Normal left ventricular systolic function. The estimated ejection fraction is 55%.  ? Normal LV diastolic function.  ? Mild mitral regurgitation.   Pulmonary:   Shortness of breath    Renal/:  Renal/ Normal     Hepatic/GI:  Hepatic/GI Normal    Musculoskeletal:  Musculoskeletal Normal    Neurological:  Neurology Normal    Endocrine:  Endocrine Normal    Dermatological:  Skin Normal    Psych:  Psychiatric Normal           Physical Exam  General: Well nourished, Cooperative, Alert and Oriented    Airway:  Mallampati: II   Mouth Opening: Normal  TM Distance: Normal  Tongue: Normal  Neck ROM: Normal ROM    Dental:  Intact  Pt denies loose or removable dentition  Heart:  Rate: Normal  Rhythm: Regular Rhythm        Anesthesia Plan  Type of Anesthesia, risks & benefits discussed:    Anesthesia Type: MAC  Intra-op Monitoring Plan: Standard ASA Monitors  Post Op Pain Control Plan: multimodal analgesia  Induction:  IV  Informed Consent: Informed consent signed with the Patient and all parties understand the risks and agree with anesthesia plan.  All questions answered.   ASA Score: 3  Day of Surgery Review of History &  Physical: H&P Update referred to the surgeon/provider.I have interviewed and examined the patient. I have reviewed the patient's H&P dated: There are no significant changes.     Ready For Surgery From Anesthesia Perspective.     .

## 2023-03-02 VITALS
BODY MASS INDEX: 28.92 KG/M2 | HEIGHT: 70 IN | RESPIRATION RATE: 14 BRPM | TEMPERATURE: 99 F | HEART RATE: 60 BPM | DIASTOLIC BLOOD PRESSURE: 73 MMHG | SYSTOLIC BLOOD PRESSURE: 110 MMHG | OXYGEN SATURATION: 99 % | WEIGHT: 202 LBS

## 2023-03-17 ENCOUNTER — PATIENT MESSAGE (OUTPATIENT)
Dept: ADMINISTRATIVE | Facility: OTHER | Age: 63
End: 2023-03-17
Payer: COMMERCIAL

## 2023-03-27 DIAGNOSIS — Z00.00 ROUTINE GENERAL MEDICAL EXAMINATION AT A HEALTH CARE FACILITY: Primary | ICD-10-CM

## 2023-03-29 ENCOUNTER — OFFICE VISIT (OUTPATIENT)
Dept: CARDIOLOGY | Facility: CLINIC | Age: 63
End: 2023-03-29
Payer: COMMERCIAL

## 2023-03-29 VITALS
HEART RATE: 53 BPM | HEIGHT: 70 IN | WEIGHT: 206.38 LBS | BODY MASS INDEX: 29.54 KG/M2 | OXYGEN SATURATION: 96 % | SYSTOLIC BLOOD PRESSURE: 120 MMHG | DIASTOLIC BLOOD PRESSURE: 88 MMHG

## 2023-03-29 DIAGNOSIS — I48.92 PAROXYSMAL ATRIAL FLUTTER: ICD-10-CM

## 2023-03-29 DIAGNOSIS — I48.0 PAROXYSMAL ATRIAL FIBRILLATION: Primary | ICD-10-CM

## 2023-03-29 PROCEDURE — 3008F BODY MASS INDEX DOCD: CPT | Mod: CPTII,S$GLB,, | Performed by: INTERNAL MEDICINE

## 2023-03-29 PROCEDURE — 4010F ACE/ARB THERAPY RXD/TAKEN: CPT | Mod: CPTII,S$GLB,, | Performed by: INTERNAL MEDICINE

## 2023-03-29 PROCEDURE — 99214 PR OFFICE/OUTPT VISIT, EST, LEVL IV, 30-39 MIN: ICD-10-PCS | Mod: S$GLB,,, | Performed by: INTERNAL MEDICINE

## 2023-03-29 PROCEDURE — 1159F MED LIST DOCD IN RCRD: CPT | Mod: CPTII,S$GLB,, | Performed by: INTERNAL MEDICINE

## 2023-03-29 PROCEDURE — 4010F PR ACE/ARB THEARPY RXD/TAKEN: ICD-10-PCS | Mod: CPTII,S$GLB,, | Performed by: INTERNAL MEDICINE

## 2023-03-29 PROCEDURE — 3008F PR BODY MASS INDEX (BMI) DOCUMENTED: ICD-10-PCS | Mod: CPTII,S$GLB,, | Performed by: INTERNAL MEDICINE

## 2023-03-29 PROCEDURE — 99999 PR PBB SHADOW E&M-EST. PATIENT-LVL IV: ICD-10-PCS | Mod: PBBFAC,,, | Performed by: INTERNAL MEDICINE

## 2023-03-29 PROCEDURE — 3074F PR MOST RECENT SYSTOLIC BLOOD PRESSURE < 130 MM HG: ICD-10-PCS | Mod: CPTII,S$GLB,, | Performed by: INTERNAL MEDICINE

## 2023-03-29 PROCEDURE — 3079F PR MOST RECENT DIASTOLIC BLOOD PRESSURE 80-89 MM HG: ICD-10-PCS | Mod: CPTII,S$GLB,, | Performed by: INTERNAL MEDICINE

## 2023-03-29 PROCEDURE — 3074F SYST BP LT 130 MM HG: CPT | Mod: CPTII,S$GLB,, | Performed by: INTERNAL MEDICINE

## 2023-03-29 PROCEDURE — 99214 OFFICE O/P EST MOD 30 MIN: CPT | Mod: S$GLB,,, | Performed by: INTERNAL MEDICINE

## 2023-03-29 PROCEDURE — 99999 PR PBB SHADOW E&M-EST. PATIENT-LVL IV: CPT | Mod: PBBFAC,,, | Performed by: INTERNAL MEDICINE

## 2023-03-29 PROCEDURE — 1159F PR MEDICATION LIST DOCUMENTED IN MEDICAL RECORD: ICD-10-PCS | Mod: CPTII,S$GLB,, | Performed by: INTERNAL MEDICINE

## 2023-03-29 PROCEDURE — 3079F DIAST BP 80-89 MM HG: CPT | Mod: CPTII,S$GLB,, | Performed by: INTERNAL MEDICINE

## 2023-03-29 NOTE — PROGRESS NOTES
Subjective:   Patient ID:  Cedric Santoro is a 63 y.o. male who presents for follow-up of Atrial Flutter      62 yoM referred for AFL management.     11/22: He developed AFL/RVR leading to ARIELA/CV attempt 10/12/22. He had KP thrombus on ARIELA leading to cancellation of the CV. He was placed on apixaban for CVA prophylaxis 10/4/22 when he as diagnosed. EF normal on ARIELA. He developed LEBLANC and fatigue in the moths after his wife passed away. He remains in AFL today and has mild symptoms. He is here to discuss ablation. Of note, AF was placed on his chart 3/2020. I reviewed the monitor and do not feel he had AF. The event was non-sustained AT lasting less than 30s.     Interval history: CTI ablation 12/15/22, no recurrence. Symptoms markedly improved.     Ablation 12/22:  ·  Emergent cardioversion for AFL/RVR with hypotensopn was successfully performed with restoration of normal sinus rhythm prior to vascular access  ·  Empiric CTI ablation.  ·  3D mapping performed with Ensite.    Echo 1/23:  · The left ventricle is normal in size with concentric remodeling and normal systolic function.  · Moderate left atrial enlargement.  · The estimated ejection fraction is 60%.  · Normal left ventricular diastolic function.  · Normal right ventricular size with normal right ventricular systolic function.  · Moderate right atrial enlargement.  · Moderate-to-severe mitral regurgitation.  · Mild tricuspid regurgitation.  · There is bileaflet mitral prolapse.  · Normal central venous pressure (3 mmHg).  · The estimated PA systolic pressure is 28 mmHg.      ARIELA 10/12:  · The left ventricle is normal in size with normal systolic function.  · Normal right ventricular size with normal right ventricular systolic function.  · The estimated ejection fraction is 55%.  · Thrombus is present in the appendage.  · Mild left atrial enlargement.  · There is mild mitral prolapse of the posterior mitral leaflet.  · Mild-to-moderate mitral  regurgitation.  · Grade 1 plaque present.  · Mild tricuspid regurgitation.  · No cardioversion performed due to left atrial appendage thrombus.      Review of Systems   Constitutional: Negative.   HENT: Negative.     Eyes: Negative.    Cardiovascular:  Positive for palpitations. Negative for chest pain, dyspnea on exertion, irregular heartbeat, leg swelling, near-syncope and syncope.   Respiratory:  Negative for shortness of breath.    Endocrine: Negative.    Hematologic/Lymphatic: Negative.    Skin: Negative.    Musculoskeletal: Negative.    Gastrointestinal: Negative.    Genitourinary: Negative.    Neurological: Negative.  Negative for dizziness and light-headedness.   Psychiatric/Behavioral: Negative.     Allergic/Immunologic: Negative.      Objective:   Physical Exam  Vitals reviewed.   Constitutional:       General: He is not in acute distress.     Appearance: He is well-developed.   HENT:      Head: Normocephalic and atraumatic.   Eyes:      Pupils: Pupils are equal, round, and reactive to light.   Neck:      Thyroid: No thyromegaly.      Vascular: No JVD.   Cardiovascular:      Rate and Rhythm: Normal rate and regular rhythm.      Chest Wall: PMI is not displaced.      Heart sounds: Normal heart sounds, S1 normal and S2 normal. No murmur heard.    No friction rub. No gallop.   Pulmonary:      Effort: Pulmonary effort is normal. No respiratory distress.      Breath sounds: Normal breath sounds. No wheezing or rales.   Abdominal:      General: Bowel sounds are normal. There is no distension.      Palpations: Abdomen is soft.      Tenderness: There is no abdominal tenderness. There is no guarding or rebound.   Musculoskeletal:         General: No tenderness. Normal range of motion.      Cervical back: Normal range of motion.   Skin:     General: Skin is warm and dry.      Findings: No erythema or rash.   Neurological:      Mental Status: He is alert and oriented to person, place, and time.      Cranial Nerves:  No cranial nerve deficit.   Psychiatric:         Behavior: Behavior normal.         Thought Content: Thought content normal.         Judgment: Judgment normal.     ECG: SBr first degree AVB, QRS    Assessment:      1. Paroxysmal atrial fibrillation    2. Paroxysmal atrial flutter        Plan:   63 yoM here for follow up after AFL ablation. No recurrence, symptoms improved. Follow up echo with mod-sev MR. Discussed increased risk to develop AF. RTC 6m

## 2023-04-10 ENCOUNTER — PATIENT MESSAGE (OUTPATIENT)
Dept: ADMINISTRATIVE | Facility: HOSPITAL | Age: 63
End: 2023-04-10
Payer: COMMERCIAL

## 2023-04-10 DIAGNOSIS — Z12.11 SCREENING FOR COLON CANCER: ICD-10-CM

## 2023-04-14 ENCOUNTER — OFFICE VISIT (OUTPATIENT)
Dept: CARDIOLOGY | Facility: CLINIC | Age: 63
End: 2023-04-14
Payer: COMMERCIAL

## 2023-04-14 VITALS
BODY MASS INDEX: 29.35 KG/M2 | HEIGHT: 70 IN | WEIGHT: 205 LBS | OXYGEN SATURATION: 99 % | DIASTOLIC BLOOD PRESSURE: 73 MMHG | RESPIRATION RATE: 16 BRPM | SYSTOLIC BLOOD PRESSURE: 113 MMHG | HEART RATE: 58 BPM

## 2023-04-14 DIAGNOSIS — I48.92 PAROXYSMAL ATRIAL FLUTTER: ICD-10-CM

## 2023-04-14 DIAGNOSIS — R06.09 DOE (DYSPNEA ON EXERTION): ICD-10-CM

## 2023-04-14 DIAGNOSIS — I34.1 MVP (MITRAL VALVE PROLAPSE): Primary | ICD-10-CM

## 2023-04-14 DIAGNOSIS — I48.0 PAROXYSMAL ATRIAL FIBRILLATION: ICD-10-CM

## 2023-04-14 DIAGNOSIS — I34.0 NONRHEUMATIC MITRAL VALVE REGURGITATION: ICD-10-CM

## 2023-04-14 DIAGNOSIS — I10 ESSENTIAL HYPERTENSION: ICD-10-CM

## 2023-04-14 DIAGNOSIS — R60.0 EDEMA OF BOTH LEGS: ICD-10-CM

## 2023-04-14 DIAGNOSIS — R00.2 PALPITATION: ICD-10-CM

## 2023-04-14 DIAGNOSIS — R94.31 ABNORMAL ECG: ICD-10-CM

## 2023-04-14 PROCEDURE — 99214 PR OFFICE/OUTPT VISIT, EST, LEVL IV, 30-39 MIN: ICD-10-PCS | Mod: S$GLB,,, | Performed by: INTERNAL MEDICINE

## 2023-04-14 PROCEDURE — 1159F PR MEDICATION LIST DOCUMENTED IN MEDICAL RECORD: ICD-10-PCS | Mod: CPTII,S$GLB,, | Performed by: INTERNAL MEDICINE

## 2023-04-14 PROCEDURE — 3074F SYST BP LT 130 MM HG: CPT | Mod: CPTII,S$GLB,, | Performed by: INTERNAL MEDICINE

## 2023-04-14 PROCEDURE — 99999 PR PBB SHADOW E&M-EST. PATIENT-LVL IV: ICD-10-PCS | Mod: PBBFAC,,, | Performed by: INTERNAL MEDICINE

## 2023-04-14 PROCEDURE — 3074F PR MOST RECENT SYSTOLIC BLOOD PRESSURE < 130 MM HG: ICD-10-PCS | Mod: CPTII,S$GLB,, | Performed by: INTERNAL MEDICINE

## 2023-04-14 PROCEDURE — 3008F PR BODY MASS INDEX (BMI) DOCUMENTED: ICD-10-PCS | Mod: CPTII,S$GLB,, | Performed by: INTERNAL MEDICINE

## 2023-04-14 PROCEDURE — 4010F ACE/ARB THERAPY RXD/TAKEN: CPT | Mod: CPTII,S$GLB,, | Performed by: INTERNAL MEDICINE

## 2023-04-14 PROCEDURE — 1159F MED LIST DOCD IN RCRD: CPT | Mod: CPTII,S$GLB,, | Performed by: INTERNAL MEDICINE

## 2023-04-14 PROCEDURE — 1160F RVW MEDS BY RX/DR IN RCRD: CPT | Mod: CPTII,S$GLB,, | Performed by: INTERNAL MEDICINE

## 2023-04-14 PROCEDURE — 1160F PR REVIEW ALL MEDS BY PRESCRIBER/CLIN PHARMACIST DOCUMENTED: ICD-10-PCS | Mod: CPTII,S$GLB,, | Performed by: INTERNAL MEDICINE

## 2023-04-14 PROCEDURE — 3078F PR MOST RECENT DIASTOLIC BLOOD PRESSURE < 80 MM HG: ICD-10-PCS | Mod: CPTII,S$GLB,, | Performed by: INTERNAL MEDICINE

## 2023-04-14 PROCEDURE — 99999 PR PBB SHADOW E&M-EST. PATIENT-LVL IV: CPT | Mod: PBBFAC,,, | Performed by: INTERNAL MEDICINE

## 2023-04-14 PROCEDURE — 4010F PR ACE/ARB THEARPY RXD/TAKEN: ICD-10-PCS | Mod: CPTII,S$GLB,, | Performed by: INTERNAL MEDICINE

## 2023-04-14 PROCEDURE — 99214 OFFICE O/P EST MOD 30 MIN: CPT | Mod: S$GLB,,, | Performed by: INTERNAL MEDICINE

## 2023-04-14 PROCEDURE — 3078F DIAST BP <80 MM HG: CPT | Mod: CPTII,S$GLB,, | Performed by: INTERNAL MEDICINE

## 2023-04-14 PROCEDURE — 3008F BODY MASS INDEX DOCD: CPT | Mod: CPTII,S$GLB,, | Performed by: INTERNAL MEDICINE

## 2023-04-14 RX ORDER — FUROSEMIDE 20 MG/1
40 TABLET ORAL DAILY
Qty: 90 TABLET | Refills: 1
Start: 2023-04-14 | End: 2023-08-03

## 2023-04-14 NOTE — PROGRESS NOTES
Subjective:    Patient ID:  Cedric Santoro is a 63 y.o. male who presents for evaluation of Follow-up, Valvular Heart Disease, Atrial Fibrillation, and Atrial Flutter        HPIPt presents for eval.  His current med conditions include MVP, MR, HTN, palpitations, PAF, PAFL.  Nonsmoker.  Sees Dr. Vergara, Cardiology in past.  Past hx pertinent for following:  - stress test 2020.  Reportedly had brief PAF w stress test, resolving on its own.  Echo 2020 normal LV function, mild MR.  Pt had ecg done 10/3/22 twice:  a flutter w rate 99 - 116 bpm.  New dx.  Lost his wife to cancer 7/22 then had dyspnea few months.  Under much stress so whirlwind w loss of wife.  S/p ARIELA Oct 2022 for cardioversion.  Left atrial appendage thrombus noted so no cardioversion was performed.  ARIELA: Left atrial appendage thrombus noted, LVEF 55%, mild posterior leaflet MV prolapse, mild-mod MR.  S/p a flutter ablation w Dr. Hendricks, EP, Dec 2022.  Had low BP w procedure, and required inotropes.  ARIELA showed EF 35%, decline thought to be due to a flutter.  Ecg 2/3/23 NSR, 1 av block, incomplete RBBB.  Now here.  Echo Feb 2023: normal LVEF, LAE, mod-severe MR with MVP, JOSE, mild TR, PAP wnl.  ARIELA was advised again.  S/p ARIELA Feb 2023 w Dr. Del Valle: normal EF, mild MVP, mild-mod MR, mild TR.  He saw Dr. Hendricks, EP, march 2023 with 6 month f/u appt advised.  BNP last check was normal.  No angina.  No acute CHF sxs.  Leg edema stable.  No dizziness.  No syncope.  No TIA/CVA sxs.  Compliant w meds.  No palpitations.  Leg edema issues, w ulcer on left lower shin.  On Lasix qd.  May need neck surgery w Dr. Eng, [facility].  Has coronary calcium score test next week.        Past Medical History:   Diagnosis Date    Abnormal ECG 10/4/2022    Essential hypertension 2/24/2020    Hypertension     MVP (mitral valve prolapse) 10/26/2022    Nonrheumatic mitral valve regurgitation 10/26/2022    Palpitation 3/5/2020    Paroxysmal atrial fibrillation 3/5/2020     "Paroxysmal atrial flutter 10/4/2022    Raynaud's syndrome     Vasovagal syncopes        Current Outpatient Medications:     apixaban (ELIQUIS) 5 mg Tab, Take 1 tablet (5 mg total) by mouth 2 (two) times daily., Disp: 60 tablet, Rfl: 12    atenoloL (TENORMIN) 50 MG tablet, Take 1 tablet (50 mg total) by mouth once daily., Disp: 90 tablet, Rfl: 3    b complex vitamins tablet, Take 1 tablet by mouth once daily., Disp: , Rfl:     co-enzyme Q-10 30 mg capsule, Take 30 mg by mouth 3 (three) times daily., Disp: , Rfl:     DULoxetine (CYMBALTA) 60 MG capsule, Take 60 mg by mouth every morning., Disp: , Rfl:     ferrous sulfate 325 (65 FE) MG EC tablet, Take 325 mg by mouth 3 (three) times daily with meals., Disp: , Rfl:     levothyroxine (SYNTHROID) 25 MCG tablet, Take 1 tablet (25 mcg total) by mouth before breakfast., Disp: 90 tablet, Rfl: 3    losartan (COZAAR) 50 MG tablet, TAKE 1 TABLET BY MOUTH EVERY DAY, Disp: 90 tablet, Rfl: 3    magnesium 30 mg Tab, Take 1 tablet by mouth once., Disp: , Rfl:     multivitamin (THERAGRAN) per tablet, Take 1 tablet by mouth once daily., Disp: , Rfl:     vitamin D (VITAMIN D3) 1000 units Tab, Take 1,000 Units by mouth once daily., Disp: , Rfl:     furosemide (LASIX) 20 MG tablet, Take 2 tablets (40 mg total) by mouth once daily., Disp: 90 tablet, Rfl: 1      Review of Systems   Constitutional: Negative.   HENT: Negative.     Eyes: Negative.    Cardiovascular:  Positive for leg swelling.   Respiratory: Negative.     Endocrine: Negative.    Hematologic/Lymphatic: Negative.    Skin:  Positive for color change.   Musculoskeletal:  Positive for arthritis, back pain and neck pain.   Gastrointestinal: Negative.    Genitourinary: Negative.    Neurological: Negative.    Psychiatric/Behavioral: Negative.     Allergic/Immunologic: Negative.         /73   Pulse (!) 58   Resp 16   Ht 5' 10" (1.778 m)   Wt 93 kg (205 lb 0.4 oz)   SpO2 99%   BMI 29.42 kg/m²     Wt Readings from Last 3 " Encounters:   04/14/23 93 kg (205 lb 0.4 oz)   03/29/23 93.6 kg (206 lb 5.6 oz)   02/27/23 91.6 kg (202 lb)     Temp Readings from Last 3 Encounters:   02/27/23 99 °F (37.2 °C) (Temporal)   01/04/23 96.9 °F (36.1 °C) (Tympanic)   12/16/22 98.1 °F (36.7 °C) (Oral)     BP Readings from Last 3 Encounters:   04/14/23 113/73   03/29/23 120/88   02/27/23 110/73     Pulse Readings from Last 3 Encounters:   04/14/23 (!) 58   03/29/23 (!) 53   02/27/23 60       Objective:    Physical Exam  Vitals and nursing note reviewed.   Constitutional:       Appearance: He is well-developed.   HENT:      Head: Normocephalic.   Neck:      Thyroid: No thyromegaly.      Vascular: Normal carotid pulses. No carotid bruit, hepatojugular reflux or JVD.   Cardiovascular:      Rate and Rhythm: Normal rate and regular rhythm.      Chest Wall: PMI is not displaced.      Pulses:           Radial pulses are 2+ on the right side and 2+ on the left side.      Heart sounds: S1 normal and S2 normal. Heart sounds not distant. No midsystolic click and no opening snap. Murmur heard.   High-pitched blowing holosystolic murmur is present with a grade of 2/6 at the apex.     No friction rub. No S3 or S4 sounds.   Pulmonary:      Effort: Pulmonary effort is normal.      Breath sounds: Normal breath sounds. No wheezing or rales.   Abdominal:      General: Bowel sounds are normal. There is no distension or abdominal bruit.      Palpations: Abdomen is soft. There is no mass.      Tenderness: There is no abdominal tenderness.   Musculoskeletal:      Cervical back: Normal range of motion and neck supple.      Right lower leg: Edema present.      Left lower leg: Edema present.   Skin:     General: Skin is warm.   Neurological:      Mental Status: He is alert and oriented to person, place, and time.   Psychiatric:         Behavior: Behavior normal.     I have reviewed all pertinent labs and cardiac studies.      Chemistry        Component Value Date/Time      02/09/2023 0958    K 4.3 02/09/2023 0958     02/09/2023 0958    CO2 28 02/09/2023 0958    BUN 26 (H) 02/09/2023 0958    CREATININE 1.1 02/09/2023 0958    GLU 99 02/09/2023 0958        Component Value Date/Time    CALCIUM 9.7 02/09/2023 0958    ALKPHOS 66 02/09/2023 0958    AST 52 (H) 02/09/2023 0958    ALT 35 02/09/2023 0958    BILITOT 0.6 02/09/2023 0958    ESTGFRAFRICA >60.0 04/12/2022 1010    EGFRNONAA >60.0 04/12/2022 1010        Lab Results   Component Value Date    WBC 4.26 02/27/2023    HGB 14.3 02/27/2023    HCT 41.8 02/27/2023    MCV 92 02/27/2023     02/27/2023       Lab Results   Component Value Date    HGBA1C 5.5 02/05/2020     Lab Results   Component Value Date    CHOL 197 04/12/2022    CHOL 166 03/18/2021    CHOL 179 08/05/2020     Lab Results   Component Value Date    HDL 68 04/12/2022    HDL 59 03/18/2021    HDL 66 08/05/2020     Lab Results   Component Value Date    LDLCALC 118.8 04/12/2022    LDLCALC 97.0 03/18/2021    LDLCALC 96.0 08/05/2020     Lab Results   Component Value Date    TRIG 51 04/12/2022    TRIG 50 03/18/2021    TRIG 85 08/05/2020     Lab Results   Component Value Date    CHOLHDL 34.5 04/12/2022    CHOLHDL 35.5 03/18/2021    CHOLHDL 36.9 08/05/2020       Results for orders placed during the hospital encounter of 02/09/23    Echo    Interpretation Summary  · The left ventricle is normal in size with concentric remodeling and normal systolic function.  · Moderate left atrial enlargement.  · The estimated ejection fraction is 60%.  · Normal left ventricular diastolic function.  · Normal right ventricular size with normal right ventricular systolic function.  · Moderate right atrial enlargement.  · Moderate-to-severe mitral regurgitation.  · Mild tricuspid regurgitation.  · There is bileaflet mitral prolapse.  · Normal central venous pressure (3 mmHg).  · The estimated PA systolic pressure is 28 mmHg.                   Assessment:       1. MVP (mitral valve prolapse)    2.  Abnormal ECG    3. LEBLANC (dyspnea on exertion)    4. Edema of both legs    5. Essential hypertension    6. Nonrheumatic mitral valve regurgitation    7. Palpitation    8. Paroxysmal atrial flutter    9. Paroxysmal atrial fibrillation           Plan:             Increase Lasix to 40 mg qd.  CMP next week.  May go back to 20 mg day of Lasix as needed.  B LE venous u/s.  B LE arterial u/s.  MVP/MR: stable on ARIELA 2/23.  Will continue to monitor.  PAF/PAF: S/p ablation.  Continue Eliquis.  F/u w EP as directed.  Reviewed all tests and above medical conditions with patient in detail and formulated treatment plan.  Continue optimal medical treatment for cardiovascular conditions.  Cardiac low salt diet advised.  Daily exercise encouraged, with the goal 30 +  minutes aerobic exercise as tolerated.  Maintaining healthy weight and weight loss goals (if needed) were discussed in clinic.  BP control.  Lipid control.  Abnl ecg: stable. F/u ecg.  He will email me about results of coronary calcium score next week.  Would like him to have a stress test before undergoing any orthopedic surgery.    PHONE REVIEW.    F/u in 3 months.      I have reviewed all pertinent labs and cardiac studies independently. Plans and recommendations have been formulated under my direct supervision. All questions answered and patient voiced understanding.

## 2023-04-19 ENCOUNTER — CLINICAL SUPPORT (OUTPATIENT)
Dept: INTERNAL MEDICINE | Facility: CLINIC | Age: 63
End: 2023-04-19
Payer: COMMERCIAL

## 2023-04-19 ENCOUNTER — CLINICAL SUPPORT (OUTPATIENT)
Dept: REHABILITATION | Facility: HOSPITAL | Age: 63
End: 2023-04-19
Payer: COMMERCIAL

## 2023-04-19 ENCOUNTER — HOSPITAL ENCOUNTER (OUTPATIENT)
Dept: RADIOLOGY | Facility: HOSPITAL | Age: 63
Discharge: HOME | End: 2023-04-19
Attending: FAMILY MEDICINE
Payer: COMMERCIAL

## 2023-04-19 ENCOUNTER — CLINICAL SUPPORT (OUTPATIENT)
Dept: AUDIOLOGY | Facility: CLINIC | Age: 63
End: 2023-04-19
Payer: COMMERCIAL

## 2023-04-19 ENCOUNTER — OFFICE VISIT (OUTPATIENT)
Dept: INTERNAL MEDICINE | Facility: CLINIC | Age: 63
End: 2023-04-19
Payer: COMMERCIAL

## 2023-04-19 VITALS
TEMPERATURE: 97 F | WEIGHT: 202 LBS | SYSTOLIC BLOOD PRESSURE: 114 MMHG | BODY MASS INDEX: 28.92 KG/M2 | DIASTOLIC BLOOD PRESSURE: 69 MMHG | HEART RATE: 51 BPM | HEIGHT: 70 IN

## 2023-04-19 DIAGNOSIS — Z00.00 ROUTINE GENERAL MEDICAL EXAMINATION AT A HEALTH CARE FACILITY: Primary | ICD-10-CM

## 2023-04-19 DIAGNOSIS — M46.92 UNSPECIFIED INFLAMMATORY SPONDYLOPATHY, CERVICAL REGION: ICD-10-CM

## 2023-04-19 DIAGNOSIS — Z01.12 HEARING CONSERVATION AND TREATMENT EXAM: Primary | ICD-10-CM

## 2023-04-19 DIAGNOSIS — I48.0 PAROXYSMAL ATRIAL FIBRILLATION: ICD-10-CM

## 2023-04-19 DIAGNOSIS — I10 ESSENTIAL HYPERTENSION: ICD-10-CM

## 2023-04-19 DIAGNOSIS — R53.81 MALAISE AND FATIGUE: ICD-10-CM

## 2023-04-19 DIAGNOSIS — Z12.11 SCREEN FOR COLON CANCER: ICD-10-CM

## 2023-04-19 DIAGNOSIS — Z00.00 ROUTINE GENERAL MEDICAL EXAMINATION AT A HEALTH CARE FACILITY: ICD-10-CM

## 2023-04-19 DIAGNOSIS — R53.83 MALAISE AND FATIGUE: ICD-10-CM

## 2023-04-19 DIAGNOSIS — Z00.00 ENCOUNTER FOR PREVENTIVE HEALTH EXAMINATION: Primary | ICD-10-CM

## 2023-04-19 DIAGNOSIS — Z79.01 CHRONIC ANTICOAGULATION: ICD-10-CM

## 2023-04-19 LAB
ALBUMIN SERPL BCP-MCNC: 4.1 G/DL (ref 3.5–5.2)
ALP SERPL-CCNC: 73 U/L (ref 55–135)
ALT SERPL W/O P-5'-P-CCNC: 44 U/L (ref 10–44)
ANION GAP SERPL CALC-SCNC: 7 MMOL/L (ref 8–16)
AST SERPL-CCNC: 62 U/L (ref 10–40)
BILIRUB SERPL-MCNC: 0.4 MG/DL (ref 0.1–1)
BUN SERPL-MCNC: 26 MG/DL (ref 8–23)
CALCIUM SERPL-MCNC: 9.7 MG/DL (ref 8.7–10.5)
CHLORIDE SERPL-SCNC: 103 MMOL/L (ref 95–110)
CHOLEST SERPL-MCNC: 203 MG/DL (ref 120–199)
CHOLEST/HDLC SERPL: 3.4 {RATIO} (ref 2–5)
CO2 SERPL-SCNC: 31 MMOL/L (ref 23–29)
COMPLEXED PSA SERPL-MCNC: 3.1 NG/ML (ref 0–4)
CREAT SERPL-MCNC: 1.1 MG/DL (ref 0.5–1.4)
ERYTHROCYTE [DISTWIDTH] IN BLOOD BY AUTOMATED COUNT: 12.2 % (ref 11.5–14.5)
EST. GFR  (NO RACE VARIABLE): >60 ML/MIN/1.73 M^2
ESTIMATED AVG GLUCOSE: 114 MG/DL (ref 68–131)
GLUCOSE SERPL-MCNC: 102 MG/DL (ref 70–110)
HBA1C MFR BLD: 5.6 % (ref 4–5.6)
HCT VFR BLD AUTO: 42.7 % (ref 40–54)
HDLC SERPL-MCNC: 59 MG/DL (ref 40–75)
HDLC SERPL: 29.1 % (ref 20–50)
HGB BLD-MCNC: 14.5 G/DL (ref 14–18)
LDLC SERPL CALC-MCNC: 130.2 MG/DL (ref 63–159)
MCH RBC QN AUTO: 31.9 PG (ref 27–31)
MCHC RBC AUTO-ENTMCNC: 34 G/DL (ref 32–36)
MCV RBC AUTO: 94 FL (ref 82–98)
NONHDLC SERPL-MCNC: 144 MG/DL
PLATELET # BLD AUTO: 164 K/UL (ref 150–450)
PMV BLD AUTO: 11 FL (ref 9.2–12.9)
POTASSIUM SERPL-SCNC: 4.3 MMOL/L (ref 3.5–5.1)
PROT SERPL-MCNC: 6.9 G/DL (ref 6–8.4)
RBC # BLD AUTO: 4.55 M/UL (ref 4.6–6.2)
SODIUM SERPL-SCNC: 141 MMOL/L (ref 136–145)
T4 FREE SERPL-MCNC: 0.88 NG/DL (ref 0.71–1.51)
TRIGL SERPL-MCNC: 69 MG/DL (ref 30–150)
TSH SERPL DL<=0.005 MIU/L-ACNC: 5.38 UIU/ML (ref 0.4–4)
WBC # BLD AUTO: 4.23 K/UL (ref 3.9–12.7)

## 2023-04-19 PROCEDURE — 4010F ACE/ARB THERAPY RXD/TAKEN: CPT | Mod: CPTII,S$GLB,, | Performed by: INTERNAL MEDICINE

## 2023-04-19 PROCEDURE — 92552 PURE TONE AUDIOMETRY AIR: CPT | Mod: S$GLB,,, | Performed by: AUDIOLOGIST

## 2023-04-19 PROCEDURE — 75571 CT HRT W/O DYE W/CA TEST: CPT | Mod: 26,,, | Performed by: RADIOLOGY

## 2023-04-19 PROCEDURE — 85027 COMPLETE CBC AUTOMATED: CPT | Performed by: FAMILY MEDICINE

## 2023-04-19 PROCEDURE — 84439 ASSAY OF FREE THYROXINE: CPT | Performed by: FAMILY MEDICINE

## 2023-04-19 PROCEDURE — 99999 PR PBB SHADOW E&M-EST. PATIENT-LVL III: ICD-10-PCS | Mod: PBBFAC,,, | Performed by: INTERNAL MEDICINE

## 2023-04-19 PROCEDURE — 3008F BODY MASS INDEX DOCD: CPT | Mod: CPTII,S$GLB,, | Performed by: INTERNAL MEDICINE

## 2023-04-19 PROCEDURE — 3008F PR BODY MASS INDEX (BMI) DOCUMENTED: ICD-10-PCS | Mod: CPTII,S$GLB,, | Performed by: INTERNAL MEDICINE

## 2023-04-19 PROCEDURE — 99999 PR PBB SHADOW E&M-EST. PATIENT-LVL I: ICD-10-PCS | Mod: PBBFAC,,, | Performed by: AUDIOLOGIST

## 2023-04-19 PROCEDURE — 80061 LIPID PANEL: CPT | Performed by: FAMILY MEDICINE

## 2023-04-19 PROCEDURE — 75571 CT HRT W/O DYE W/CA TEST: CPT | Mod: TC

## 2023-04-19 PROCEDURE — 84403 ASSAY OF TOTAL TESTOSTERONE: CPT | Performed by: INTERNAL MEDICINE

## 2023-04-19 PROCEDURE — 99396 PREV VISIT EST AGE 40-64: CPT | Mod: S$GLB,,, | Performed by: INTERNAL MEDICINE

## 2023-04-19 PROCEDURE — 3074F SYST BP LT 130 MM HG: CPT | Mod: CPTII,S$GLB,, | Performed by: INTERNAL MEDICINE

## 2023-04-19 PROCEDURE — 3078F PR MOST RECENT DIASTOLIC BLOOD PRESSURE < 80 MM HG: ICD-10-PCS | Mod: CPTII,S$GLB,, | Performed by: INTERNAL MEDICINE

## 2023-04-19 PROCEDURE — 3078F DIAST BP <80 MM HG: CPT | Mod: CPTII,S$GLB,, | Performed by: INTERNAL MEDICINE

## 2023-04-19 PROCEDURE — 84153 ASSAY OF PSA TOTAL: CPT | Performed by: FAMILY MEDICINE

## 2023-04-19 PROCEDURE — 97802 MEDICAL NUTRITION INDIV IN: CPT | Mod: S$GLB,,, | Performed by: DIETITIAN, REGISTERED

## 2023-04-19 PROCEDURE — 75571 CT CALCIUM SCORING CARDIAC: ICD-10-PCS | Mod: 26,,, | Performed by: RADIOLOGY

## 2023-04-19 PROCEDURE — 92552 PR PURE TONE AUDIOMETRY, AIR: ICD-10-PCS | Mod: S$GLB,,, | Performed by: AUDIOLOGIST

## 2023-04-19 PROCEDURE — 80053 COMPREHEN METABOLIC PANEL: CPT | Performed by: FAMILY MEDICINE

## 2023-04-19 PROCEDURE — 99396 PR PREVENTIVE VISIT,EST,40-64: ICD-10-PCS | Mod: S$GLB,,, | Performed by: INTERNAL MEDICINE

## 2023-04-19 PROCEDURE — 4010F PR ACE/ARB THEARPY RXD/TAKEN: ICD-10-PCS | Mod: CPTII,S$GLB,, | Performed by: INTERNAL MEDICINE

## 2023-04-19 PROCEDURE — 3074F PR MOST RECENT SYSTOLIC BLOOD PRESSURE < 130 MM HG: ICD-10-PCS | Mod: CPTII,S$GLB,, | Performed by: INTERNAL MEDICINE

## 2023-04-19 PROCEDURE — 97802 PR MED NUTR THER, 1ST, INDIV, EA 15 MIN: ICD-10-PCS | Mod: S$GLB,,, | Performed by: DIETITIAN, REGISTERED

## 2023-04-19 PROCEDURE — 84443 ASSAY THYROID STIM HORMONE: CPT | Performed by: FAMILY MEDICINE

## 2023-04-19 PROCEDURE — 83036 HEMOGLOBIN GLYCOSYLATED A1C: CPT | Performed by: FAMILY MEDICINE

## 2023-04-19 PROCEDURE — 99999 PR PBB SHADOW E&M-EST. PATIENT-LVL III: CPT | Mod: PBBFAC,,, | Performed by: INTERNAL MEDICINE

## 2023-04-19 PROCEDURE — 97750 PHYSICAL PERFORMANCE TEST: CPT | Performed by: PHYSICAL THERAPIST

## 2023-04-19 PROCEDURE — 99999 PR PBB SHADOW E&M-EST. PATIENT-LVL I: CPT | Mod: PBBFAC,,, | Performed by: AUDIOLOGIST

## 2023-04-19 NOTE — PROGRESS NOTES
Subjective:      Patient ID: Cedric Santoro is a 63 y.o. male.    Chief Complaint: Executive Health      HPI  Patient is here for Executive physical with [employer].  Exercises daily, walking 30-35min.  Energy low, no morning erection.  No f/c/sw/cough.  No cp/sob/palp.  BMs and urine normal.      Past Medical History:   Diagnosis Date    Abnormal ECG 10/4/2022    Essential hypertension 2/24/2020    Hypertension     MVP (mitral valve prolapse) 10/26/2022    Nonrheumatic mitral valve regurgitation 10/26/2022    Palpitation 3/5/2020    Paroxysmal atrial fibrillation 3/5/2020    Paroxysmal atrial flutter 10/4/2022    Raynaud's syndrome     Vasovagal syncopes      Past Surgical History:   Procedure Laterality Date    ABLATION, ATRIAL FLUTTER, TYPICAL N/A 12/15/2022    Procedure: Ablation, Atrial Flutter, Typical;  Surgeon: Matty Hendricks MD;  Location: [facility] EP LAB;  Service: Cardiology;  Laterality: N/A;  AFL, RFA, NICOLASA, ARIELA, anes, MB, 3 Prep    ankle/foot surgery      ECHOCARDIOGRAM,TRANSESOPHAGEAL N/A 12/15/2022    Procedure: Transesophageal echo (ARIELA) intra-procedure log documentation;  Surgeon: Nisreen Fenton MD;  Location: [facility] EP LAB;  Service: Cardiology;  Laterality: N/A;    EYE SURGERY      GALLBLADDER SURGERY      HERNIA REPAIR      THROAT SURGERY      TRANSESOPHAGEAL ECHOCARDIOGRAPHY N/A 2/27/2023    Procedure: ECHOCARDIOGRAM, TRANSESOPHAGEAL;  Surgeon: Samuel Del Valle MD;  Location: [facility] CATH LAB;  Service: Cardiology;  Laterality: N/A;    TREATMENT OF CARDIAC ARRHYTHMIA  12/15/2022    Procedure: Cardioversion or Defibrillation;  Surgeon: Matty Hendricks MD;  Location: [facility] EP LAB;  Service: Cardiology;;           The 10-year ASCVD risk score (Micheline MARTIN, et al., 2019) is: 9.5%    Values used to calculate the score:      Age: 63 years      Sex: Male      Is Non- : No      Diabetic: No      Tobacco smoker: No      Systolic Blood Pressure: 114 mmHg      Is BP treated: Yes      HDL  "Cholesterol: 59 mg/dL      Total Cholesterol: 203 mg/dL      SH:  no tob, no EtOH, , retired.    FH:  Mom multiple myeloma.    HM:  2017 Tetanus, 1/23 Shingrix #1, 4/23 PSA, 4/23 today CT Ca score, 4/23 today Cologuard.      Review of Systems   Constitutional:  Negative for appetite change, chills, diaphoresis, fatigue and fever.   HENT:  Negative for congestion, ear pain, rhinorrhea and sinus pressure.    Respiratory:  Negative for cough and shortness of breath.    Cardiovascular:  Negative for chest pain and palpitations.   Gastrointestinal:  Negative for abdominal distention, abdominal pain, blood in stool, constipation, diarrhea, nausea and vomiting.   Genitourinary:  Negative for difficulty urinating, dysuria, frequency, hematuria and urgency.   Musculoskeletal:  Negative for arthralgias.   Skin:  Negative for rash.   Neurological:  Negative for dizziness and headaches.   Psychiatric/Behavioral:  The patient is not nervous/anxious.        Objective:   /69 (BP Location: Right arm, Patient Position: Sitting)   Pulse (!) 51   Temp 96.7 °F (35.9 °C) (Oral)   Ht 5' 10" (1.778 m)   Wt 91.6 kg (202 lb)   BMI 28.98 kg/m²     Physical Exam  Constitutional:       Appearance: He is well-developed.   HENT:      Right Ear: External ear normal. Tympanic membrane is not injected.      Left Ear: External ear normal. Tympanic membrane is not injected.   Eyes:      Conjunctiva/sclera: Conjunctivae normal.   Neck:      Thyroid: No thyromegaly.   Cardiovascular:      Rate and Rhythm: Normal rate and regular rhythm.      Heart sounds: Murmur (systolic) heard.     No friction rub. No gallop.   Pulmonary:      Effort: Pulmonary effort is normal.      Breath sounds: Normal breath sounds. No wheezing or rales.   Abdominal:      General: Bowel sounds are normal.      Palpations: Abdomen is soft. There is no mass.      Tenderness: There is no abdominal tenderness.   Musculoskeletal:      Cervical back: Normal range of " motion and neck supple.   Lymphadenopathy:      Cervical: No cervical adenopathy.   Neurological:      Mental Status: He is alert and oriented to person, place, and time.         Assessment:       1. Encounter for preventive health examination    2. Paroxysmal atrial fibrillation    3. Essential hypertension    4. Unspecified inflammatory spondylopathy, cervical region    5. Malaise and fatigue    6. Screen for colon cancer    7. Chronic anticoagulation          Plan:     Encounter for preventive health examination- Report results when available.    Paroxysmal atrial fibrillation, Chronic anticoagulation    Essential hypertension- stable on rx.    Unspecified inflammatory spondylopathy, cervical region    Malaise and fatigue  -     Testosterone; Future; Expected date: 04/19/2023    Screen for colon cancer  -     Cologuard Screening (Multitarget Stool DNA); Future; Expected date: 04/19/2023

## 2023-04-19 NOTE — PROGRESS NOTES
Executive Health Screening    Cedric Santoro was seen 04/19/2023 for an executive health hearing screen.      Results reveal a mild hearing loss 6000 Hz for the right ear, and  mild-to-moderately severe hearing loss 1331-8694 Hz for the left ear.     Otoscopy was unremarkable.    Patient was counseled on the above findings.     Recommendations include:    1. Wear hearing protective devices around loud noise and equipment.

## 2023-04-19 NOTE — LETTER
"April 19, 2023    Cedric Santoro  [address] [place] [address]             [address]  [address] [facility] [address]  Phone: [phone]  Fax: [phone] Dear Mr. Santoro,    It was a pleasure to see you and perform your Executive Health physical on Wednesday, April 19, 2023.  At the time of your visit you are 63 years old.  You exercise every day, but report decreased energy level in the past year.  You have had no fevers, chills, sweats, or cough.  You deny any exertional chest pain, shortness of breath, or palpitations.  You report normal bowel and urinary function.      Your past medical history is significant for hypertension, mitral valve prolapse, paroxysmal atrial fibrillation, Raynaud's phenomenon, vasovagal syncope, ablation, ankle/foot surgery, eye surgery, gallbladder surgery, hernia repair, and  throat surgery.  Your medications include Eliquis, atenolol, duloxetine, iron, levothyroxine, Lasix, losartan, coenzyme Q10, vitamin-D.  You have a medication allergy to iodine dye.     On physical exam height is 5'10" , your weight is 202 pounds,  body mass index is 28.98.Your blood pressure 114/69,  pulse 51,  Your general appearance is well-developed,  with no distress. Your head and neck exam is normal.  Your heart has a regular rate and rhythm with no abnormal sounds.  Your lungs are clear throughout, with normal respiratory effort.  Your abdomen has normal bowel sounds with no masses or enlarged organs noted. Your extremities have strong distal pulses, with no swelling.     Your blood count is normal, platelets 164 normal.  Your kidney and liver function are normal.  Your electrolytes are normal.  TSH 5.384 which is slightly on the low function side.  A PSA 3.1, normal.  Your total cholesterol is 203, HDL 59, , triglycerides 69.  This is not an optimal cholesterol panel, and you should be on a statin medication for your vascular " protection.    Your CT calcium score is elevated at 597.  You definitely need to be on a statin medication for your vascular protection.    In summary you appear to be in overall fairly good medical health.  You are up-to-date on all vaccinations and preventive care needed at this time.  Please made up with your regular doctor regarding your thyroid function and most likely needing to start a statin medication for your vascular protection.      It was a pleasure to see you and perform year executive physical.  If I can be of any further assistance, or if you have any questions or concerns please do not hesitate to let me know.    Yours very truly,      Hortencia Elliott MD

## 2023-04-20 ENCOUNTER — PATIENT MESSAGE (OUTPATIENT)
Dept: INTERNAL MEDICINE | Facility: CLINIC | Age: 63
End: 2023-04-20
Payer: COMMERCIAL

## 2023-04-20 ENCOUNTER — PATIENT MESSAGE (OUTPATIENT)
Dept: CARDIOLOGY | Facility: CLINIC | Age: 63
End: 2023-04-20
Payer: COMMERCIAL

## 2023-04-20 ENCOUNTER — PATIENT MESSAGE (OUTPATIENT)
Dept: PRIMARY CARE CLINIC | Facility: CLINIC | Age: 63
End: 2023-04-20
Payer: COMMERCIAL

## 2023-04-20 DIAGNOSIS — E03.9 HYPOTHYROIDISM, UNSPECIFIED TYPE: Primary | ICD-10-CM

## 2023-04-20 LAB — TESTOST SERPL-MCNC: 789 NG/DL (ref 304–1227)

## 2023-04-21 NOTE — PROGRESS NOTES
:  1960    DX: Z00.0  Orders:  Fitness Evaluation    An [facility] Fitness Component evaluation was completed.  Results are as follows:    Height (in):    70                            Weight (lbs):    202                                    BMI:   29.0    Resting Energy Expenditure:         1724       kcal/24 hrs.                             Body Composition:          25.43  % body fat             Rating: Good     Waist to Hip Ratio:     0.96                    Risk:  Moderate   Hip taken over clothing:      Yes          Muscular Strength and Endurance Assessment:               Strength (lbs):     Right: 88             Rating:  Average      Left:  94           Rating: Average   Push-ups:   1                 Rating:  Needs Improvement    Curl-ups :   24                Rating:  Average     Flexibility Testing:   Sit and Reach (cm):    18.5                       Rating:  Good    Assessment: Patient reports cervical issues since last testing which has caused weakness in the Right Upper Extremity thus leading to a decrease in  strength from prior testing.      Date 2023   Height (in): 70 70   Weight: 202 194   BMI: 29.04 27.89   Body Fat %: 25.43 23.64   Waist (cm): 100 97   Hip (cm): 104 102.7   WHR: 0.96 0.94   RBP: 114/69 164/90   RHR: 51 64    Rt (lbs): 88 116    Lt (lbs): 94 99   Push-up  1 10   Curl-up  24 30   Flexibility  19 32   REE  (Kcals) 1724 2100       The patient completed the testing procedures without complications.

## 2023-04-26 ENCOUNTER — HOSPITAL ENCOUNTER (OUTPATIENT)
Dept: CARDIOLOGY | Facility: HOSPITAL | Age: 63
Discharge: HOME | End: 2023-04-26
Attending: INTERNAL MEDICINE
Payer: COMMERCIAL

## 2023-04-26 VITALS
HEIGHT: 70 IN | SYSTOLIC BLOOD PRESSURE: 114 MMHG | SYSTOLIC BLOOD PRESSURE: 114 MMHG | HEIGHT: 70 IN | BODY MASS INDEX: 28.92 KG/M2 | DIASTOLIC BLOOD PRESSURE: 70 MMHG | BODY MASS INDEX: 28.92 KG/M2 | DIASTOLIC BLOOD PRESSURE: 70 MMHG | WEIGHT: 202 LBS | WEIGHT: 202 LBS

## 2023-04-26 DIAGNOSIS — R94.31 ABNORMAL ECG: ICD-10-CM

## 2023-04-26 DIAGNOSIS — I10 ESSENTIAL HYPERTENSION: ICD-10-CM

## 2023-04-26 DIAGNOSIS — I48.92 PAROXYSMAL ATRIAL FLUTTER: ICD-10-CM

## 2023-04-26 DIAGNOSIS — R06.09 DOE (DYSPNEA ON EXERTION): ICD-10-CM

## 2023-04-26 DIAGNOSIS — R60.0 EDEMA OF BOTH LEGS: ICD-10-CM

## 2023-04-26 DIAGNOSIS — I48.0 PAROXYSMAL ATRIAL FIBRILLATION: ICD-10-CM

## 2023-04-26 LAB
LEFT ANT TIBIAL SYS PSV: 75 CM/S
LEFT CFA PSV: 106 CM/S
LEFT PERONEAL SYS PSV: 53 CM/S
LEFT POPLITEAL PSV: 53 CM/S
LEFT POST TIBIAL SYS PSV: 70 CM/S
LEFT PROFUNDA SYS PSV: 61 CM/S
LEFT SUPER FEMORAL DIST SYS PSV: 60 CM/S
LEFT SUPER FEMORAL MID SYS PSV: 90 CM/S
LEFT SUPER FEMORAL OSTIAL SYS PSV: 78 CM/S
LEFT SUPER FEMORAL PROX SYS PSV: 86 CM/S
LEFT TIB/PER TRUNK SYS PSV: 49 CM/S
OHS CV LEFT LOWER EXTREMITY ABI (NO CALC): 1
OHS CV RIGHT ABI LOWER EXTREMITY (NO CALC): 1.09
RIGHT ANT TIBIAL SYS PSV: 55 CM/S
RIGHT CFA PSV: 119 CM/S
RIGHT PERONEAL SYS PSV: 53 CM/S
RIGHT POPLITEAL PSV: 52 CM/S
RIGHT POST TIBIAL SYS PSV: 64 CM/S
RIGHT PROFUNDA SYS PSV: 76 CM/S
RIGHT SUPER FEMORAL DIST SYS PSV: 68 CM/S
RIGHT SUPER FEMORAL MID SYS PSV: 84 CM/S
RIGHT SUPER FEMORAL OSTIAL SYS PSV: 89 CM/S
RIGHT SUPER FEMORAL PROX SYS PSV: 87 CM/S
RIGHT TIB/PER TRUNK SYS PSV: 40 CM/S

## 2023-04-26 PROCEDURE — 93970 EXTREMITY STUDY: CPT | Mod: 26,,, | Performed by: INTERNAL MEDICINE

## 2023-04-26 PROCEDURE — 93925 LOWER EXTREMITY STUDY: CPT

## 2023-04-26 PROCEDURE — 93925 LOWER EXTREMITY STUDY: CPT | Mod: 26,,, | Performed by: INTERNAL MEDICINE

## 2023-04-26 PROCEDURE — 93925 CV US DOPPLER ARTERIAL LEGS BILATERAL (CUPID ONLY): ICD-10-PCS | Mod: 26,,, | Performed by: INTERNAL MEDICINE

## 2023-04-26 PROCEDURE — 93970 CV US DOPPLER VENOUS LEGS BILATERAL (CUPID ONLY): ICD-10-PCS | Mod: 26,,, | Performed by: INTERNAL MEDICINE

## 2023-04-26 PROCEDURE — 93970 EXTREMITY STUDY: CPT

## 2023-04-27 ENCOUNTER — OFFICE VISIT (OUTPATIENT)
Dept: INTERNAL MEDICINE | Facility: CLINIC | Age: 63
End: 2023-04-27
Payer: COMMERCIAL

## 2023-04-27 ENCOUNTER — TELEPHONE (OUTPATIENT)
Dept: CARDIOLOGY | Facility: HOSPITAL | Age: 63
End: 2023-04-27
Payer: COMMERCIAL

## 2023-04-27 VITALS
HEIGHT: 70 IN | TEMPERATURE: 96 F | WEIGHT: 204.81 LBS | DIASTOLIC BLOOD PRESSURE: 60 MMHG | HEART RATE: 50 BPM | RESPIRATION RATE: 20 BRPM | BODY MASS INDEX: 29.32 KG/M2 | OXYGEN SATURATION: 96 % | SYSTOLIC BLOOD PRESSURE: 110 MMHG

## 2023-04-27 DIAGNOSIS — R06.09 DOE (DYSPNEA ON EXERTION): ICD-10-CM

## 2023-04-27 DIAGNOSIS — I34.0 NONRHEUMATIC MITRAL VALVE REGURGITATION: ICD-10-CM

## 2023-04-27 DIAGNOSIS — R25.2 LEG CRAMPS: ICD-10-CM

## 2023-04-27 DIAGNOSIS — Z23 NEED FOR SHINGLES VACCINE: Primary | ICD-10-CM

## 2023-04-27 DIAGNOSIS — R94.31 ABNORMAL ECG: ICD-10-CM

## 2023-04-27 DIAGNOSIS — I48.92 PAROXYSMAL ATRIAL FLUTTER: ICD-10-CM

## 2023-04-27 DIAGNOSIS — I48.0 PAROXYSMAL ATRIAL FIBRILLATION: ICD-10-CM

## 2023-04-27 DIAGNOSIS — E03.9 HYPOTHYROIDISM, UNSPECIFIED TYPE: ICD-10-CM

## 2023-04-27 DIAGNOSIS — R93.1 ELEVATED CORONARY ARTERY CALCIUM SCORE: Primary | ICD-10-CM

## 2023-04-27 PROCEDURE — 1159F PR MEDICATION LIST DOCUMENTED IN MEDICAL RECORD: ICD-10-PCS | Mod: CPTII,S$GLB,, | Performed by: FAMILY MEDICINE

## 2023-04-27 PROCEDURE — 90750 HZV VACC RECOMBINANT IM: CPT | Mod: S$GLB,,, | Performed by: FAMILY MEDICINE

## 2023-04-27 PROCEDURE — 1159F MED LIST DOCD IN RCRD: CPT | Mod: CPTII,S$GLB,, | Performed by: FAMILY MEDICINE

## 2023-04-27 PROCEDURE — 4010F ACE/ARB THERAPY RXD/TAKEN: CPT | Mod: CPTII,S$GLB,, | Performed by: FAMILY MEDICINE

## 2023-04-27 PROCEDURE — 3078F DIAST BP <80 MM HG: CPT | Mod: CPTII,S$GLB,, | Performed by: FAMILY MEDICINE

## 2023-04-27 PROCEDURE — 3074F SYST BP LT 130 MM HG: CPT | Mod: CPTII,S$GLB,, | Performed by: FAMILY MEDICINE

## 2023-04-27 PROCEDURE — 3078F PR MOST RECENT DIASTOLIC BLOOD PRESSURE < 80 MM HG: ICD-10-PCS | Mod: CPTII,S$GLB,, | Performed by: FAMILY MEDICINE

## 2023-04-27 PROCEDURE — 3008F PR BODY MASS INDEX (BMI) DOCUMENTED: ICD-10-PCS | Mod: CPTII,S$GLB,, | Performed by: FAMILY MEDICINE

## 2023-04-27 PROCEDURE — 99213 OFFICE O/P EST LOW 20 MIN: CPT | Mod: 25,S$GLB,, | Performed by: FAMILY MEDICINE

## 2023-04-27 PROCEDURE — 99999 PR PBB SHADOW E&M-EST. PATIENT-LVL IV: ICD-10-PCS | Mod: PBBFAC,,, | Performed by: FAMILY MEDICINE

## 2023-04-27 PROCEDURE — 99999 PR PBB SHADOW E&M-EST. PATIENT-LVL IV: CPT | Mod: PBBFAC,,, | Performed by: FAMILY MEDICINE

## 2023-04-27 PROCEDURE — 3008F BODY MASS INDEX DOCD: CPT | Mod: CPTII,S$GLB,, | Performed by: FAMILY MEDICINE

## 2023-04-27 PROCEDURE — 3074F PR MOST RECENT SYSTOLIC BLOOD PRESSURE < 130 MM HG: ICD-10-PCS | Mod: CPTII,S$GLB,, | Performed by: FAMILY MEDICINE

## 2023-04-27 PROCEDURE — 90471 IMMUNIZATION ADMIN: CPT | Mod: S$GLB,,, | Performed by: FAMILY MEDICINE

## 2023-04-27 PROCEDURE — 4010F PR ACE/ARB THEARPY RXD/TAKEN: ICD-10-PCS | Mod: CPTII,S$GLB,, | Performed by: FAMILY MEDICINE

## 2023-04-27 PROCEDURE — 90750 ZOSTER RECOMBINANT VACCINE: ICD-10-PCS | Mod: S$GLB,,, | Performed by: FAMILY MEDICINE

## 2023-04-27 PROCEDURE — 3044F PR MOST RECENT HEMOGLOBIN A1C LEVEL <7.0%: ICD-10-PCS | Mod: CPTII,S$GLB,, | Performed by: FAMILY MEDICINE

## 2023-04-27 PROCEDURE — 90471 ZOSTER RECOMBINANT VACCINE: ICD-10-PCS | Mod: S$GLB,,, | Performed by: FAMILY MEDICINE

## 2023-04-27 PROCEDURE — 99213 PR OFFICE/OUTPT VISIT, EST, LEVL III, 20-29 MIN: ICD-10-PCS | Mod: 25,S$GLB,, | Performed by: FAMILY MEDICINE

## 2023-04-27 PROCEDURE — 3044F HG A1C LEVEL LT 7.0%: CPT | Mod: CPTII,S$GLB,, | Performed by: FAMILY MEDICINE

## 2023-04-27 RX ORDER — LEVOTHYROXINE SODIUM 50 UG/1
50 TABLET ORAL
Qty: 64 TABLET | Refills: 1 | Status: SHIPPED | OUTPATIENT
Start: 2023-04-27 | End: 2023-06-09 | Stop reason: SDUPTHER

## 2023-04-27 NOTE — PROGRESS NOTES
Subjective:       Patient ID: Cedric Santoro is a 63 y.o. male.    Chief Complaint: Annual Exam and Neck Pain    HPI Mr Santoro presents today for follow up.   Recently had exam with [facility]  Would like to review lab results     Still getting nocturnal leg cramps     Review of Systems   Constitutional:  Negative for activity change, appetite change, fatigue and fever.   HENT:  Negative for congestion, ear pain, facial swelling, hearing loss, sore throat and tinnitus.    Eyes:  Negative for redness and visual disturbance.   Respiratory:  Negative for cough, chest tightness and wheezing.    Gastrointestinal:  Negative for abdominal distention, abdominal pain, constipation, diarrhea, nausea and vomiting.   Endocrine: Negative for polydipsia and polyuria.   Genitourinary:  Negative for flank pain, frequency and penile discharge.   Musculoskeletal:  Negative for back pain, gait problem and joint swelling.   Skin:  Negative for rash.   Neurological:  Negative for dizziness, tremors, seizures, weakness and headaches.   Psychiatric/Behavioral:  Negative for agitation and confusion.          Past Medical History:   Diagnosis Date    Abnormal ECG 10/4/2022    Essential hypertension 2/24/2020    Hypertension     MVP (mitral valve prolapse) 10/26/2022    Nonrheumatic mitral valve regurgitation 10/26/2022    Palpitation 3/5/2020    Paroxysmal atrial fibrillation 3/5/2020    Paroxysmal atrial flutter 10/4/2022    Raynaud's syndrome     Vasovagal syncopes      Past Surgical History:   Procedure Laterality Date    ABLATION, ATRIAL FLUTTER, TYPICAL N/A 12/15/2022    Procedure: Ablation, Atrial Flutter, Typical;  Surgeon: Matty Hendricks MD;  Location: [facility] EP LAB;  Service: Cardiology;  Laterality: N/A;  AFL, RFA, NICOLASA, ARIELA, RADHA mazariegos, 3 Prep    ankle/foot surgery      ECHOCARDIOGRAM,TRANSESOPHAGEAL N/A 12/15/2022    Procedure: Transesophageal echo (ARIELA) intra-procedure log documentation;  Surgeon: Nisreen Fenton MD;   Location: [facility] EP LAB;  Service: Cardiology;  Laterality: N/A;    EYE SURGERY      GALLBLADDER SURGERY      HERNIA REPAIR      THROAT SURGERY      TRANSESOPHAGEAL ECHOCARDIOGRAPHY N/A 2/27/2023    Procedure: ECHOCARDIOGRAM, TRANSESOPHAGEAL;  Surgeon: Samuel Del Valle MD;  Location: [facility] CATH LAB;  Service: Cardiology;  Laterality: N/A;    TREATMENT OF CARDIAC ARRHYTHMIA  12/15/2022    Procedure: Cardioversion or Defibrillation;  Surgeon: Matty Hendricks MD;  Location: [facility] EP LAB;  Service: Cardiology;;     Family History   Problem Relation Age of Onset    Pacemaker/defibrilator Father     Macular degeneration Father     Macular degeneration Brother      Social History     Socioeconomic History    Marital status: Other   Tobacco Use    Smoking status: Never    Smokeless tobacco: Never   Substance and Sexual Activity    Alcohol use: No    Drug use: No       Objective:        Physical Exam  Vitals reviewed.   Constitutional:       Appearance: Normal appearance.   HENT:      Head: Normocephalic and atraumatic.   Cardiovascular:      Rate and Rhythm: Normal rate. Rhythm irregular.   Pulmonary:      Effort: Pulmonary effort is normal.   Neurological:      Mental Status: He is alert.         \    Assessment/Plan:     Need for shingles vaccine  -     (In Office Administered) Zoster Recombinant Vaccine    Hypothyroidism, unspecified type  -     levothyroxine (SYNTHROID) 50 MCG tablet; Take 1 tablet (50 mcg total) by mouth every Mon, Tues, Wed, Thurs, Fri.  Dispense: 64 tablet; Refill: 1  -     TSH; Future; Expected date: 04/28/2023  -     T4, FREE; Future; Expected date: 04/28/2023    Leg cramps  -     E-Consult to General Neurology          Follow up in about 6 weeks (around 6/8/2023).    Monserrat Asif MD  [facility]   Family Medicine

## 2023-04-28 ENCOUNTER — PATIENT MESSAGE (OUTPATIENT)
Dept: CARDIOLOGY | Facility: CLINIC | Age: 63
End: 2023-04-28
Payer: COMMERCIAL

## 2023-04-28 NOTE — TELEPHONE ENCOUNTER
Telephoned patient to discuss/confirm heart cath on 5/24 for 8am with 630am arrival time  Reviewed NPO, no Eliquis 48 hours before, no Lasix morning of cath, confirmed Lab work at [place] location  Also sent portal message and prescription for Pre med since Iodine allergy noted

## 2023-04-28 NOTE — TELEPHONE ENCOUNTER
Cynthia,    Please call pt and schedule LHC on Wed, 5/24/23 0800.    Hold Eliquis x 2 days for cath.  Update preop labs 1 week before cath.    Thanks    Dr Garzon

## 2023-05-01 ENCOUNTER — TELEPHONE (OUTPATIENT)
Dept: NEUROLOGY | Facility: CLINIC | Age: 63
End: 2023-05-01
Payer: COMMERCIAL

## 2023-05-01 RX ORDER — PREDNISONE 50 MG/1
50 TABLET ORAL
COMMUNITY
End: 2023-06-01

## 2023-05-01 RX ORDER — FAMOTIDINE 40 MG/1
40 TABLET, FILM COATED ORAL
COMMUNITY
End: 2023-06-09

## 2023-05-01 NOTE — TELEPHONE ENCOUNTER
Spoke with patient and he has been scheduled with . Patient has also been added to the waiting list in case of anything sooner. Patient did verbalized understanding.

## 2023-05-10 ENCOUNTER — TELEPHONE (OUTPATIENT)
Dept: CARDIOLOGY | Facility: CLINIC | Age: 63
End: 2023-05-10
Payer: COMMERCIAL

## 2023-05-10 NOTE — TELEPHONE ENCOUNTER
Peer to peer review done for [facility] approval.    Approval given.    #[id number]    Exp 6/24/23      Dr Garzon

## 2023-05-16 LAB — NONINV COLON CA DNA+OCC BLD SCRN STL QL: NEGATIVE

## 2023-05-17 ENCOUNTER — PATIENT MESSAGE (OUTPATIENT)
Dept: PRIMARY CARE CLINIC | Facility: CLINIC | Age: 63
End: 2023-05-17
Payer: COMMERCIAL

## 2023-05-18 ENCOUNTER — LAB VISIT (OUTPATIENT)
Dept: LAB | Facility: HOSPITAL | Age: 63
End: 2023-05-18
Attending: FAMILY MEDICINE
Payer: COMMERCIAL

## 2023-05-18 DIAGNOSIS — I48.0 PAROXYSMAL ATRIAL FIBRILLATION: ICD-10-CM

## 2023-05-18 DIAGNOSIS — I48.92 PAROXYSMAL ATRIAL FLUTTER: ICD-10-CM

## 2023-05-18 DIAGNOSIS — R60.0 EDEMA OF BOTH LEGS: ICD-10-CM

## 2023-05-18 DIAGNOSIS — R94.31 ABNORMAL ECG: ICD-10-CM

## 2023-05-18 DIAGNOSIS — R93.1 ELEVATED CORONARY ARTERY CALCIUM SCORE: ICD-10-CM

## 2023-05-18 DIAGNOSIS — I34.0 NONRHEUMATIC MITRAL VALVE REGURGITATION: ICD-10-CM

## 2023-05-18 DIAGNOSIS — R06.09 DOE (DYSPNEA ON EXERTION): ICD-10-CM

## 2023-05-18 DIAGNOSIS — I10 ESSENTIAL HYPERTENSION: ICD-10-CM

## 2023-05-18 LAB
ALBUMIN SERPL BCP-MCNC: 4.1 G/DL (ref 3.5–5.2)
ALP SERPL-CCNC: 81 U/L (ref 55–135)
ALT SERPL W/O P-5'-P-CCNC: 54 U/L (ref 10–44)
ANION GAP SERPL CALC-SCNC: 9 MMOL/L (ref 8–16)
ANION GAP SERPL CALC-SCNC: 9 MMOL/L (ref 8–16)
APTT PPP: 29.6 SEC (ref 21–32)
AST SERPL-CCNC: 74 U/L (ref 10–40)
BASOPHILS # BLD AUTO: 0.04 K/UL (ref 0–0.2)
BASOPHILS NFR BLD: 0.7 % (ref 0–1.9)
BILIRUB SERPL-MCNC: 0.4 MG/DL (ref 0.1–1)
BUN SERPL-MCNC: 24 MG/DL (ref 8–23)
BUN SERPL-MCNC: 24 MG/DL (ref 8–23)
CALCIUM SERPL-MCNC: 10.4 MG/DL (ref 8.7–10.5)
CALCIUM SERPL-MCNC: 10.4 MG/DL (ref 8.7–10.5)
CHLORIDE SERPL-SCNC: 102 MMOL/L (ref 95–110)
CHLORIDE SERPL-SCNC: 102 MMOL/L (ref 95–110)
CO2 SERPL-SCNC: 32 MMOL/L (ref 23–29)
CO2 SERPL-SCNC: 32 MMOL/L (ref 23–29)
CREAT SERPL-MCNC: 1.4 MG/DL (ref 0.5–1.4)
CREAT SERPL-MCNC: 1.4 MG/DL (ref 0.5–1.4)
DIFFERENTIAL METHOD: ABNORMAL
EOSINOPHIL # BLD AUTO: 0.1 K/UL (ref 0–0.5)
EOSINOPHIL NFR BLD: 1.5 % (ref 0–8)
ERYTHROCYTE [DISTWIDTH] IN BLOOD BY AUTOMATED COUNT: 12 % (ref 11.5–14.5)
EST. GFR  (NO RACE VARIABLE): 56.5 ML/MIN/1.73 M^2
EST. GFR  (NO RACE VARIABLE): 56.5 ML/MIN/1.73 M^2
GLUCOSE SERPL-MCNC: 77 MG/DL (ref 70–110)
GLUCOSE SERPL-MCNC: 77 MG/DL (ref 70–110)
HCT VFR BLD AUTO: 44.8 % (ref 40–54)
HGB BLD-MCNC: 14.3 G/DL (ref 14–18)
IMM GRANULOCYTES # BLD AUTO: 0.01 K/UL (ref 0–0.04)
IMM GRANULOCYTES NFR BLD AUTO: 0.2 % (ref 0–0.5)
INR PPP: 1 (ref 0.8–1.2)
LYMPHOCYTES # BLD AUTO: 0.8 K/UL (ref 1–4.8)
LYMPHOCYTES NFR BLD: 13.7 % (ref 18–48)
MCH RBC QN AUTO: 31 PG (ref 27–31)
MCHC RBC AUTO-ENTMCNC: 31.9 G/DL (ref 32–36)
MCV RBC AUTO: 97 FL (ref 82–98)
MONOCYTES # BLD AUTO: 0.5 K/UL (ref 0.3–1)
MONOCYTES NFR BLD: 8.5 % (ref 4–15)
NEUTROPHILS # BLD AUTO: 4.5 K/UL (ref 1.8–7.7)
NEUTROPHILS NFR BLD: 75.4 % (ref 38–73)
NRBC BLD-RTO: 0 /100 WBC
PLATELET # BLD AUTO: 187 K/UL (ref 150–450)
PMV BLD AUTO: 11.7 FL (ref 9.2–12.9)
POTASSIUM SERPL-SCNC: 5.1 MMOL/L (ref 3.5–5.1)
POTASSIUM SERPL-SCNC: 5.1 MMOL/L (ref 3.5–5.1)
PROT SERPL-MCNC: 6.6 G/DL (ref 6–8.4)
PROTHROMBIN TIME: 11.1 SEC (ref 9–12.5)
RBC # BLD AUTO: 4.62 M/UL (ref 4.6–6.2)
SODIUM SERPL-SCNC: 143 MMOL/L (ref 136–145)
SODIUM SERPL-SCNC: 143 MMOL/L (ref 136–145)
WBC # BLD AUTO: 6 K/UL (ref 3.9–12.7)

## 2023-05-18 PROCEDURE — 85025 COMPLETE CBC W/AUTO DIFF WBC: CPT | Performed by: INTERNAL MEDICINE

## 2023-05-18 PROCEDURE — 85730 THROMBOPLASTIN TIME PARTIAL: CPT | Performed by: INTERNAL MEDICINE

## 2023-05-18 PROCEDURE — 80053 COMPREHEN METABOLIC PANEL: CPT | Performed by: INTERNAL MEDICINE

## 2023-05-18 PROCEDURE — 85610 PROTHROMBIN TIME: CPT | Performed by: INTERNAL MEDICINE

## 2023-05-18 PROCEDURE — 36415 COLL VENOUS BLD VENIPUNCTURE: CPT | Mod: PO | Performed by: INTERNAL MEDICINE

## 2023-05-24 ENCOUNTER — HOSPITAL ENCOUNTER (OUTPATIENT)
Facility: HOSPITAL | Age: 63
Discharge: HOME | End: 2023-05-24
Attending: INTERNAL MEDICINE | Admitting: INTERNAL MEDICINE
Payer: COMMERCIAL

## 2023-05-24 DIAGNOSIS — I48.91 ATRIAL FIBRILLATION AND FLUTTER: ICD-10-CM

## 2023-05-24 DIAGNOSIS — I48.92 ATRIAL FIBRILLATION AND FLUTTER: ICD-10-CM

## 2023-05-24 DIAGNOSIS — R06.09 DOE (DYSPNEA ON EXERTION): ICD-10-CM

## 2023-05-24 DIAGNOSIS — R93.1 ELEVATED CORONARY ARTERY CALCIUM SCORE: Primary | ICD-10-CM

## 2023-05-24 DIAGNOSIS — R93.1 HIGH CORONARY ARTERY CALCIUM SCORE: ICD-10-CM

## 2023-05-24 DIAGNOSIS — R94.31 ABNORMAL EKG: ICD-10-CM

## 2023-05-24 DIAGNOSIS — I50.20 HFREF (HEART FAILURE WITH REDUCED EJECTION FRACTION): ICD-10-CM

## 2023-05-24 DIAGNOSIS — I05.9 MITRAL VALVE DISEASE: ICD-10-CM

## 2023-05-24 LAB — CATH EF QUANTITATIVE: 60 %

## 2023-05-24 PROCEDURE — 99152 MOD SED SAME PHYS/QHP 5/>YRS: CPT | Performed by: INTERNAL MEDICINE

## 2023-05-24 PROCEDURE — 25500020 PHARM REV CODE 255: Performed by: INTERNAL MEDICINE

## 2023-05-24 PROCEDURE — 25000003 PHARM REV CODE 250: Performed by: INTERNAL MEDICINE

## 2023-05-24 PROCEDURE — 93458 PR CATH PLACE/CORON ANGIO, IMG SUPER/INTERP,W LEFT HEART VENTRICULOGRAPHY: ICD-10-PCS | Mod: 26,,, | Performed by: INTERNAL MEDICINE

## 2023-05-24 PROCEDURE — 93458 L HRT ARTERY/VENTRICLE ANGIO: CPT | Mod: 26,,, | Performed by: INTERNAL MEDICINE

## 2023-05-24 PROCEDURE — 93458 L HRT ARTERY/VENTRICLE ANGIO: CPT | Performed by: INTERNAL MEDICINE

## 2023-05-24 PROCEDURE — 99152 MOD SED SAME PHYS/QHP 5/>YRS: CPT | Mod: ,,, | Performed by: INTERNAL MEDICINE

## 2023-05-24 PROCEDURE — C1769 GUIDE WIRE: HCPCS | Performed by: INTERNAL MEDICINE

## 2023-05-24 PROCEDURE — 63600175 PHARM REV CODE 636 W HCPCS: Performed by: INTERNAL MEDICINE

## 2023-05-24 PROCEDURE — 99152 PR MOD CONSCIOUS SEDATION, SAME PHYS, 5+ YRS, FIRST 15 MIN: ICD-10-PCS | Mod: ,,, | Performed by: INTERNAL MEDICINE

## 2023-05-24 PROCEDURE — C1894 INTRO/SHEATH, NON-LASER: HCPCS | Performed by: INTERNAL MEDICINE

## 2023-05-24 RX ORDER — SODIUM CHLORIDE 0.9 % (FLUSH) 0.9 %
10 SYRINGE (ML) INJECTION
Status: DISCONTINUED | OUTPATIENT
Start: 2023-05-24 | End: 2023-05-24 | Stop reason: HOSPADM

## 2023-05-24 RX ORDER — DIPHENHYDRAMINE HCL 50 MG
50 CAPSULE ORAL ONCE
Status: DISCONTINUED | OUTPATIENT
Start: 2023-05-24 | End: 2023-05-24 | Stop reason: HOSPADM

## 2023-05-24 RX ORDER — MIDAZOLAM HYDROCHLORIDE 1 MG/ML
INJECTION, SOLUTION INTRAMUSCULAR; INTRAVENOUS
Status: DISCONTINUED | OUTPATIENT
Start: 2023-05-24 | End: 2023-05-24 | Stop reason: HOSPADM

## 2023-05-24 RX ORDER — VERAPAMIL HYDROCHLORIDE 2.5 MG/ML
INJECTION, SOLUTION INTRAVENOUS
Status: DISCONTINUED | OUTPATIENT
Start: 2023-05-24 | End: 2023-05-24 | Stop reason: HOSPADM

## 2023-05-24 RX ORDER — HEPARIN SODIUM 1000 [USP'U]/ML
INJECTION, SOLUTION INTRAVENOUS; SUBCUTANEOUS
Status: DISCONTINUED | OUTPATIENT
Start: 2023-05-24 | End: 2023-05-24 | Stop reason: HOSPADM

## 2023-05-24 RX ORDER — ACETAMINOPHEN 325 MG/1
650 TABLET ORAL EVERY 4 HOURS PRN
Status: DISCONTINUED | OUTPATIENT
Start: 2023-05-24 | End: 2023-05-24 | Stop reason: HOSPADM

## 2023-05-24 RX ORDER — SODIUM CHLORIDE 9 MG/ML
INJECTION, SOLUTION INTRAVENOUS CONTINUOUS
Status: DISCONTINUED | OUTPATIENT
Start: 2023-05-24 | End: 2023-05-24 | Stop reason: HOSPADM

## 2023-05-24 RX ORDER — LIDOCAINE HYDROCHLORIDE 20 MG/ML
INJECTION, SOLUTION EPIDURAL; INFILTRATION; INTRACAUDAL; PERINEURAL
Status: DISCONTINUED | OUTPATIENT
Start: 2023-05-24 | End: 2023-05-24 | Stop reason: HOSPADM

## 2023-05-24 RX ORDER — ASPIRIN 325 MG
325 TABLET ORAL ONCE
Status: COMPLETED | OUTPATIENT
Start: 2023-05-24 | End: 2023-05-24

## 2023-05-24 RX ORDER — FENTANYL CITRATE 50 UG/ML
INJECTION, SOLUTION INTRAMUSCULAR; INTRAVENOUS
Status: DISCONTINUED | OUTPATIENT
Start: 2023-05-24 | End: 2023-05-24 | Stop reason: HOSPADM

## 2023-05-24 RX ORDER — ONDANSETRON 8 MG/1
8 TABLET, ORALLY DISINTEGRATING ORAL EVERY 8 HOURS PRN
Status: DISCONTINUED | OUTPATIENT
Start: 2023-05-24 | End: 2023-05-24 | Stop reason: HOSPADM

## 2023-05-24 RX ORDER — DIAZEPAM 5 MG/1
5 TABLET ORAL
Status: COMPLETED | OUTPATIENT
Start: 2023-05-24 | End: 2023-05-24

## 2023-05-24 RX ADMIN — SODIUM CHLORIDE: 9 INJECTION, SOLUTION INTRAVENOUS at 07:05

## 2023-05-24 RX ADMIN — DIAZEPAM 5 MG: 5 TABLET ORAL at 07:05

## 2023-05-24 RX ADMIN — ASPIRIN 325 MG ORAL TABLET 325 MG: 325 PILL ORAL at 07:05

## 2023-05-24 NOTE — Clinical Note
The left radial was prepped. The site was prepped with ChloraPrep. The site was clipped. The patient was draped. The patient was positioned supine. The patient was secured to an armboard.

## 2023-05-24 NOTE — Clinical Note
The catheter was inserted over the wire into the ostium   right coronary artery. Hemodynamics were performed.  An angiography was performed of the right coronary arteries. Inserted over wholey wire

## 2023-05-24 NOTE — OP NOTE
[facility] - Cath Lab ([facility])  Cardiac Catheterization  Procedure and Discharge Note    SUMMARY     Cedric Santoro  [id number]  Monserrat Asif MD    Date of Procedure: 5/24/2023    Procedure: 1. LHC 2. LEFT VENTRICULOGRAM 3. CORONARY ANGIOGRAM    Provider: Kal Garzon MD    Indications: He was referred for cardiac catheterization to further evaluate CAD, high coronary calcium score, CHF.    Pre-Procedure Diagnosis: CAD, high coronary calcium score, CHF.    Post-Procedure Diagnosis:  Nonobstructive CAD.    Anesthesia: RN IV Sedation    Description of the Findings of the Procedure:     The risks, benefits, complications, treatment options, and expected outcomes were discussed with the patient. The patient and/or family concurred with the proposed plan, giving informed consent. Patient was brought to the cath lab after IV hydration was begun and oral premedication was given.    Findings:  LAD has moderate calcification in proximal segment with mild disease.  Essentially normal coronary arteries otherwise.  LCX dominant, large vessel.  LVEF 60%.  LVEDP 12  Left radial TR band.  See report.    Complications: None; patient tolerated the procedure well.    Estimated Blood Loss (EBL): Minimal           Implants: none    Specimens: none    Condition: stable    Disposition: PACU - hemodynamically stable.    Attestation: I was present and scrubbed for the entire procedure.    Recommendations:    Usual post cath care.  Medical tx for CAD.  D/c pt home.  Continue current meds.  Cardiac diet.  F/u Cards clinic 1 - 3 weeks.

## 2023-05-24 NOTE — Clinical Note
The catheter was inserted over the wire into the left ventricle. Hemodynamics were performed.  and Pullback was recorded.  The angiography was performed via power injection. The injected amount was 30 mL contrast at 12 mL/s.  Exchanged over J wire

## 2023-05-24 NOTE — Clinical Note
100 ml of contrast were injected throughout the case. 0 mL of contrast was the total wasted during the case. 100 mL was the total amount used during the case.

## 2023-05-24 NOTE — H&P
[facility] ([facility])  Cardiology  History and Physical     Patient Name: Cedric Santoro  MRN: [id number]  Admission Date: 5/24/2023  Code Status: Prior   Attending Provider: Kal Garzon MD   Primary Care Physician: Monserrat Asif MD  Principal Problem:Elevated coronary artery calcium score    Patient information was obtained from patient, past medical records, and ER records.     Subjective:         HPI:  Pt presents for elective LHC.  Recent coronary calcium score is high, suggestive of severe CAD.  LHC advised.    Per last clinic visit April 2023:    HPIPt presents for eval.  His current med conditions include MVP, MR, HTN, palpitations, PAF, PAFL.  Nonsmoker.  Sees Dr. Vergara, Cardiology in past.  Past hx pertinent for following:  - stress test 2020.  Reportedly had brief PAF w stress test, resolving on its own.  Echo 2020 normal LV function, mild MR.  Pt had ecg done 10/3/22 twice:  a flutter w rate 99 - 116 bpm.  New dx.  Lost his wife to cancer 7/22 then had dyspnea few months.  Under much stress so whirlwind w loss of wife.  S/p ARIELA Oct 2022 for cardioversion.  Left atrial appendage thrombus noted so no cardioversion was performed.  ARIELA: Left atrial appendage thrombus noted, LVEF 55%, mild posterior leaflet MV prolapse, mild-mod MR.  S/p a flutter ablation w Dr. Hendricks, [facility]TONI, Dec 2022.  Had low BP w procedure, and required inotropes.  ARIELA showed EF 35%, decline thought to be due to a flutter.  Ecg 2/3/23 NSR, 1 av block, incomplete RBBB.  Now here.  Echo Feb 2023: normal LVEF, LAE, mod-severe MR with MVP, JOSE, mild TR, PAP wnl.  ARIELA was advised again.  S/p ARIELA Feb 2023 w Dr. Del Valle: normal EF, mild MVP, mild-mod MR, mild TR.  He saw Dr. Hendricks, EP, march 2023 with 6 month f/u appt advised.  Past Medical History:   Diagnosis Date    Abnormal ECG 10/4/2022    Essential hypertension 2/24/2020    Hypertension     MVP (mitral valve prolapse) 10/26/2022    Nonrheumatic mitral valve regurgitation  10/26/2022    Palpitation 3/5/2020    Paroxysmal atrial fibrillation 3/5/2020    Paroxysmal atrial flutter 10/4/2022    Raynaud's syndrome     Vasovagal syncopes        Past Surgical History:   Procedure Laterality Date    ABLATION, ATRIAL FLUTTER, TYPICAL N/A 12/15/2022    Procedure: Ablation, Atrial Flutter, Typical;  Surgeon: Matty Hendricks MD;  Location: [facility] EP LAB;  Service: Cardiology;  Laterality: N/A;  AFL, RFA, NICOLASA, ARIELA, anes, MB, 3 Prep    ankle/foot surgery      ECHOCARDIOGRAM,TRANSESOPHAGEAL N/A 12/15/2022    Procedure: Transesophageal echo (ARIELA) intra-procedure log documentation;  Surgeon: Nisreen Fenton MD;  Location: [facility] EP LAB;  Service: Cardiology;  Laterality: N/A;    EYE SURGERY      GALLBLADDER SURGERY      HERNIA REPAIR      THROAT SURGERY      TRANSESOPHAGEAL ECHOCARDIOGRAPHY N/A 2/27/2023    Procedure: ECHOCARDIOGRAM, TRANSESOPHAGEAL;  Surgeon: Samuel Del Valle MD;  Location: [facility] CATH LAB;  Service: Cardiology;  Laterality: N/A;    TREATMENT OF CARDIAC ARRHYTHMIA  12/15/2022    Procedure: Cardioversion or Defibrillation;  Surgeon: Matty Hendricks MD;  Location: [facility] EP LAB;  Service: Cardiology;;       Review of patient's allergies indicates:   Allergen Reactions    Iodinated contrast media     Iodine and iodide containing products Hives       No current facility-administered medications on file prior to encounter.     Current Outpatient Medications on File Prior to Encounter   Medication Sig    atenoloL (TENORMIN) 50 MG tablet Take 1 tablet (50 mg total) by mouth once daily.    b complex vitamins tablet Take 1 tablet by mouth once daily.    co-enzyme Q-10 30 mg capsule Take 30 mg by mouth 3 (three) times daily.    diphenhydrAMINE (BENADRYL) 50 MG tablet Take 50 mg by mouth. Take on evening and morning before heart cath [id number] along with Pepcid 40 mg+Prednisone 50 mg    DULoxetine (CYMBALTA) 60 MG capsule Take 60 mg by mouth every morning.    famotidine (PEPCID) 40 MG tablet  Take 40 mg by mouth. Take one evening and morning before heart cath [id number] along with Benadryl 50 mg+Prednisone 50 mg    ferrous sulfate 325 (65 FE) MG EC tablet Take 325 mg by mouth 3 (three) times daily with meals.    furosemide (LASIX) 20 MG tablet Take 2 tablets (40 mg total) by mouth once daily.    levothyroxine (SYNTHROID) 50 MCG tablet Take 1 tablet (50 mcg total) by mouth every Mon, Tues, Wed, Thurs, Fri.    losartan (COZAAR) 50 MG tablet TAKE 1 TABLET BY MOUTH EVERY DAY    magnesium 30 mg Tab Take 1 tablet by mouth once.    multivitamin (THERAGRAN) per tablet Take 1 tablet by mouth once daily.    predniSONE (DELTASONE) 50 MG Tab Take 50 mg by mouth. Take one on evening and morning before heart cath [id number] along with Benadryl 50 mg +Pepcid 40 mg    vitamin D (VITAMIN D3) 1000 units Tab Take 1,000 Units by mouth once daily.    apixaban (ELIQUIS) 5 mg Tab Take 1 tablet (5 mg total) by mouth 2 (two) times daily.     Family History       Problem Relation (Age of Onset)    Macular degeneration Father, Brother    Pacemaker/defibrilator Father          Tobacco Use    Smoking status: Never    Smokeless tobacco: Never   Substance and Sexual Activity    Alcohol use: No    Drug use: No    Sexual activity: Not on file     Review of Systems   Constitutional: Negative.   HENT: Negative.     Eyes: Negative.    Cardiovascular:  Positive for leg swelling.   Respiratory: Negative.     Endocrine: Negative.    Hematologic/Lymphatic: Negative.    Skin: Negative.    Musculoskeletal:  Positive for arthritis, back pain and neck pain.   Gastrointestinal: Negative.    Genitourinary: Negative.    Neurological: Negative.    Psychiatric/Behavioral: Negative.     Allergic/Immunologic: Negative.    Objective:     Vital Signs (Most Recent):  Temp: 97.9 °F (36.6 °C) (05/24/23 0718)  Pulse: 64 (05/24/23 0718)  Resp: 16 (05/24/23 0718)  BP: 134/72 (05/24/23 0720)  SpO2: 97 % (05/24/23 0718) Vital Signs (24h Range):  Temp:  [97.9  °F (36.6 °C)] 97.9 °F (36.6 °C)  Pulse:  [64] 64  Resp:  [16] 16  SpO2:  [97 %] 97 %  BP: (134)/(72) 134/72     Weight: 92.5 kg (204 lb)  Body mass index is 29.27 kg/m².    SpO2: 97 %       No intake or output data in the 24 hours ending 05/24/23 0736    Lines/Drains/Airways       Peripheral Intravenous Line  Duration                  Peripheral IV - Single Lumen 05/24/23 0706 20 G Right Forearm <1 day                    Physical Exam  Vitals and nursing note reviewed.   Constitutional:       Appearance: He is well-developed.   HENT:      Head: Normocephalic.   Neck:      Thyroid: No thyromegaly.      Vascular: No carotid bruit or JVD.   Cardiovascular:      Rate and Rhythm: Normal rate and regular rhythm.      Pulses:           Radial pulses are 2+ on the right side and 2+ on the left side.      Heart sounds: S1 normal and S2 normal. Heart sounds not distant. No midsystolic click and no opening snap. Murmur heard.     No friction rub. No S3 or S4 sounds.   Pulmonary:      Effort: Pulmonary effort is normal.      Breath sounds: Normal breath sounds. No wheezing or rales.   Abdominal:      General: Bowel sounds are normal. There is no distension or abdominal bruit.      Palpations: Abdomen is soft. There is no mass.      Tenderness: There is no abdominal tenderness.   Musculoskeletal:      Cervical back: Normal range of motion and neck supple.      Right lower leg: Edema present.      Left lower leg: Edema present.   Skin:     General: Skin is warm.   Neurological:      Mental Status: He is alert and oriented to person, place, and time.   Psychiatric:         Behavior: Behavior normal.     I have reviewed all pertinent labs and cardiac studies.       Assessment and Plan:     Active Diagnoses:    Diagnosis Date Noted POA    PRINCIPAL PROBLEM:  Elevated coronary artery calcium score [R93.1] 05/24/2023 Yes    HFrEF (heart failure with reduced ejection fraction) [I50.20] 12/16/2022 Yes    LEBLANC (dyspnea on exertion)  [R06.09] 10/04/2022 Yes    Paroxysmal atrial flutter [I48.92] 10/04/2022 Yes      Problems Resolved During this Admission:       VTE Risk Mitigation (From admission, onward)      None          Pt advised to undergo left heart catheterization with possible PCI if warranted.  Pt advised of all risks and benefits of procedure.  Pt advised of alternative approaches and treatment strategies to include medical therapy, PCI as well as surgical revascularization with possible CABG.  All questions answered.    Kal Garzon MD  Cardiology   [facility] ([facility])

## 2023-05-24 NOTE — PLAN OF CARE
Able to eat and drink without nausea.  Ambulated to bathroom w/o difficulty or dizziness.  Discharge inst. Reviewed with patient and daughter and copy given. IV removed. Discharged home.  To exit via W/C

## 2023-05-24 NOTE — Clinical Note
The catheter was inserted over the wire into the ostial  left coronary artery. Hemodynamics were performed.  An angiography was performed of the left coronary arteries. Multiple views were taken. Exchanged over J wire

## 2023-05-26 VITALS
OXYGEN SATURATION: 95 % | HEART RATE: 63 BPM | DIASTOLIC BLOOD PRESSURE: 67 MMHG | BODY MASS INDEX: 29.2 KG/M2 | HEIGHT: 70 IN | TEMPERATURE: 98 F | SYSTOLIC BLOOD PRESSURE: 112 MMHG | RESPIRATION RATE: 24 BRPM | WEIGHT: 204 LBS

## 2023-06-01 ENCOUNTER — OFFICE VISIT (OUTPATIENT)
Dept: CARDIOLOGY | Facility: CLINIC | Age: 63
End: 2023-06-01
Payer: COMMERCIAL

## 2023-06-01 VITALS
HEART RATE: 54 BPM | SYSTOLIC BLOOD PRESSURE: 96 MMHG | HEIGHT: 70 IN | DIASTOLIC BLOOD PRESSURE: 74 MMHG | BODY MASS INDEX: 29.89 KG/M2 | OXYGEN SATURATION: 97 % | WEIGHT: 208.75 LBS

## 2023-06-01 DIAGNOSIS — Z79.01 CHRONIC ANTICOAGULATION: ICD-10-CM

## 2023-06-01 DIAGNOSIS — I34.0 NONRHEUMATIC MITRAL VALVE REGURGITATION: ICD-10-CM

## 2023-06-01 DIAGNOSIS — I34.1 MVP (MITRAL VALVE PROLAPSE): ICD-10-CM

## 2023-06-01 DIAGNOSIS — R94.31 PROLONGED Q-T INTERVAL ON ECG: ICD-10-CM

## 2023-06-01 DIAGNOSIS — I10 ESSENTIAL HYPERTENSION: ICD-10-CM

## 2023-06-01 DIAGNOSIS — R93.1 ELEVATED CORONARY ARTERY CALCIUM SCORE: ICD-10-CM

## 2023-06-01 DIAGNOSIS — I48.92 PAROXYSMAL ATRIAL FLUTTER: ICD-10-CM

## 2023-06-01 DIAGNOSIS — I50.20 HFREF (HEART FAILURE WITH REDUCED EJECTION FRACTION): ICD-10-CM

## 2023-06-01 DIAGNOSIS — R94.31 ABNORMAL ECG: ICD-10-CM

## 2023-06-01 DIAGNOSIS — I48.0 PAROXYSMAL ATRIAL FIBRILLATION: ICD-10-CM

## 2023-06-01 DIAGNOSIS — R79.89 ELEVATED LFTS: Primary | ICD-10-CM

## 2023-06-01 DIAGNOSIS — R06.09 DOE (DYSPNEA ON EXERTION): ICD-10-CM

## 2023-06-01 PROCEDURE — 1159F MED LIST DOCD IN RCRD: CPT | Mod: CPTII,S$GLB,, | Performed by: PHYSICIAN ASSISTANT

## 2023-06-01 PROCEDURE — 99999 PR PBB SHADOW E&M-EST. PATIENT-LVL V: CPT | Mod: PBBFAC,,, | Performed by: PHYSICIAN ASSISTANT

## 2023-06-01 PROCEDURE — 99214 OFFICE O/P EST MOD 30 MIN: CPT | Mod: S$GLB,,, | Performed by: PHYSICIAN ASSISTANT

## 2023-06-01 PROCEDURE — 3008F PR BODY MASS INDEX (BMI) DOCUMENTED: ICD-10-PCS | Mod: CPTII,S$GLB,, | Performed by: PHYSICIAN ASSISTANT

## 2023-06-01 PROCEDURE — 3008F BODY MASS INDEX DOCD: CPT | Mod: CPTII,S$GLB,, | Performed by: PHYSICIAN ASSISTANT

## 2023-06-01 PROCEDURE — 4010F PR ACE/ARB THEARPY RXD/TAKEN: ICD-10-PCS | Mod: CPTII,S$GLB,, | Performed by: PHYSICIAN ASSISTANT

## 2023-06-01 PROCEDURE — 3078F PR MOST RECENT DIASTOLIC BLOOD PRESSURE < 80 MM HG: ICD-10-PCS | Mod: CPTII,S$GLB,, | Performed by: PHYSICIAN ASSISTANT

## 2023-06-01 PROCEDURE — 1159F PR MEDICATION LIST DOCUMENTED IN MEDICAL RECORD: ICD-10-PCS | Mod: CPTII,S$GLB,, | Performed by: PHYSICIAN ASSISTANT

## 2023-06-01 PROCEDURE — 3044F PR MOST RECENT HEMOGLOBIN A1C LEVEL <7.0%: ICD-10-PCS | Mod: CPTII,S$GLB,, | Performed by: PHYSICIAN ASSISTANT

## 2023-06-01 PROCEDURE — 4010F ACE/ARB THERAPY RXD/TAKEN: CPT | Mod: CPTII,S$GLB,, | Performed by: PHYSICIAN ASSISTANT

## 2023-06-01 PROCEDURE — 3044F HG A1C LEVEL LT 7.0%: CPT | Mod: CPTII,S$GLB,, | Performed by: PHYSICIAN ASSISTANT

## 2023-06-01 PROCEDURE — 99214 PR OFFICE/OUTPT VISIT, EST, LEVL IV, 30-39 MIN: ICD-10-PCS | Mod: S$GLB,,, | Performed by: PHYSICIAN ASSISTANT

## 2023-06-01 PROCEDURE — 3074F PR MOST RECENT SYSTOLIC BLOOD PRESSURE < 130 MM HG: ICD-10-PCS | Mod: CPTII,S$GLB,, | Performed by: PHYSICIAN ASSISTANT

## 2023-06-01 PROCEDURE — 3078F DIAST BP <80 MM HG: CPT | Mod: CPTII,S$GLB,, | Performed by: PHYSICIAN ASSISTANT

## 2023-06-01 PROCEDURE — 99999 PR PBB SHADOW E&M-EST. PATIENT-LVL V: ICD-10-PCS | Mod: PBBFAC,,, | Performed by: PHYSICIAN ASSISTANT

## 2023-06-01 PROCEDURE — 3074F SYST BP LT 130 MM HG: CPT | Mod: CPTII,S$GLB,, | Performed by: PHYSICIAN ASSISTANT

## 2023-06-01 NOTE — PROGRESS NOTES
Subjective:   Patient ID:  Cedric Santoro is a 63 y.o. male who presents for follow-up of hospital follow-up    HPI  Mr. Santoro is a 63 year old male patient whose current medical conditions include MVP, MR, HTN, palpitations, PAF/PAFL s/p aflutter ablation 12/22, cardiomyopathy (afib induced), and elevated coronary calcium score who presents today for hospital follow-up. Patient recently underwent LHC by Dr. Garzon on 5/24/23 which showed non-obstructive CAD. OMT advised. He returns today and states he is doing clinically well. No real CV complaints. Does admit to chronic fatigue. Denies any chest pain, heaviness, or tightness. States SOB/LEBLANC improved. No lightheadedness, dizziness, palpitations, near syncope or syncope. Does admit to chronic BLE edema. No PND, orthopnea, or excessive weight gain. Does have some abdominal bloating. BP on lower side today, no symptoms. Patient is compliant with his medications. Needs to start statin. LFTs mildly abnormal, will refer to GI and recheck CMP next week. No radial access site complaints.  .  S/p ARIELA Feb 2023 w Dr. Del Valle: normal EF, mild MVP, mild-mod MR, mild TR.        Review of Systems   Constitutional: Positive for malaise/fatigue. Negative for chills, decreased appetite and fever.   HENT:  Negative for congestion, hoarse voice and sore throat.    Eyes:  Negative for blurred vision and discharge.   Cardiovascular:  Positive for leg swelling. Negative for chest pain, claudication, cyanosis, dyspnea on exertion, irregular heartbeat, near-syncope, orthopnea, palpitations and paroxysmal nocturnal dyspnea.   Respiratory:  Negative for cough, hemoptysis, shortness of breath, snoring, sputum production and wheezing.    Endocrine: Negative for cold intolerance and heat intolerance.   Hematologic/Lymphatic: Negative for bleeding problem. Does not bruise/bleed easily.   Skin:  Negative for rash.   Musculoskeletal:  Positive for arthritis. Negative for back pain, joint pain, joint  swelling, muscle cramps, muscle weakness and myalgias.   Gastrointestinal:  Negative for abdominal pain, constipation, diarrhea, heartburn, melena and nausea.   Genitourinary:  Negative for hematuria.   Neurological:  Negative for dizziness, focal weakness, headaches, light-headedness, loss of balance, numbness, paresthesias, seizures and weakness.   Psychiatric/Behavioral:  Negative for memory loss. The patient does not have insomnia.    Allergic/Immunologic: Negative for hives.     Past Medical History:   Diagnosis Date    Abnormal ECG 10/4/2022    Essential hypertension 2/24/2020    Hypertension     MVP (mitral valve prolapse) 10/26/2022    Nonrheumatic mitral valve regurgitation 10/26/2022    Palpitation 3/5/2020    Paroxysmal atrial fibrillation 3/5/2020    Paroxysmal atrial flutter 10/4/2022    Raynaud's syndrome     Vasovagal syncopes      Family History   Problem Relation Age of Onset    Pacemaker/defibrilator Father     Macular degeneration Father     Macular degeneration Brother      Social History     Socioeconomic History    Marital status: Other   Tobacco Use    Smoking status: Never    Smokeless tobacco: Never   Substance and Sexual Activity    Alcohol use: No    Drug use: No    Sexual activity: Not Currently     Past Surgical History:   Procedure Laterality Date    ABLATION, ATRIAL FLUTTER, TYPICAL N/A 12/15/2022    Procedure: Ablation, Atrial Flutter, Typical;  Surgeon: Matty Hendricks MD;  Location: [facility] EP LAB;  Service: Cardiology;  Laterality: N/A;  AFL, RFA, NICOLASA, ARIELA, anes, MB, 3 Prep    ankle/foot surgery      ECHOCARDIOGRAM,TRANSESOPHAGEAL N/A 12/15/2022    Procedure: Transesophageal echo (ARIELA) intra-procedure log documentation;  Surgeon: Nisreen Fenton MD;  Location: [facility] EP LAB;  Service: Cardiology;  Laterality: N/A;    EYE SURGERY      GALLBLADDER SURGERY      HERNIA REPAIR      LEFT HEART CATHETERIZATION Left 5/24/2023    Procedure: Left heart cath;  Surgeon: Kal Garzon MD;   Location: [facility] CATH LAB;  Service: Cardiology;  Laterality: Left;  may need to sign consent//    THROAT SURGERY      TRANSESOPHAGEAL ECHOCARDIOGRAPHY N/A 2/27/2023    Procedure: ECHOCARDIOGRAM, TRANSESOPHAGEAL;  Surgeon: Samuel Del Valle MD;  Location: [facility] CATH LAB;  Service: Cardiology;  Laterality: N/A;    TREATMENT OF CARDIAC ARRHYTHMIA  12/15/2022    Procedure: Cardioversion or Defibrillation;  Surgeon: Matty Hendricks MD;  Location: [facility] EP LAB;  Service: Cardiology;;     Review of patient's allergies indicates:   Allergen Reactions    Iodinated contrast media     Iodine and iodide containing products Hives     Medication List with Changes/Refills   Current Medications    APIXABAN (ELIQUIS) 5 MG TAB    Take 1 tablet (5 mg total) by mouth 2 (two) times daily.    ATENOLOL (TENORMIN) 50 MG TABLET    Take 1 tablet (50 mg total) by mouth once daily.    B COMPLEX VITAMINS TABLET    Take 1 tablet by mouth once daily.    CO-ENZYME Q-10 30 MG CAPSULE    Take 30 mg by mouth 3 (three) times daily.    DULOXETINE (CYMBALTA) 60 MG CAPSULE    Take 60 mg by mouth every morning.    FAMOTIDINE (PEPCID) 40 MG TABLET    Take 40 mg by mouth. Take one evening and morning before heart cath [id number] along with Benadryl 50 mg+Prednisone 50 mg    FERROUS SULFATE 325 (65 FE) MG EC TABLET    Take 325 mg by mouth 3 (three) times daily with meals.    FUROSEMIDE (LASIX) 20 MG TABLET    Take 2 tablets (40 mg total) by mouth once daily.    LEVOTHYROXINE (SYNTHROID) 50 MCG TABLET    Take 1 tablet (50 mcg total) by mouth every Mon, Tues, Wed, Thurs, Fri.    LOSARTAN (COZAAR) 50 MG TABLET    TAKE 1 TABLET BY MOUTH EVERY DAY    MAGNESIUM 30 MG TAB    Take 1 tablet by mouth once.    MULTIVITAMIN (THERAGRAN) PER TABLET    Take 1 tablet by mouth once daily.    VITAMIN D (VITAMIN D3) 1000 UNITS TAB    Take 1,000 Units by mouth once daily.   Discontinued Medications    DIPHENHYDRAMINE (BENADRYL) 50 MG TABLET    Take 50 mg by mouth. Take on evening  "and morning before heart cath [id number] along with Pepcid 40 mg+Prednisone 50 mg    PREDNISONE (DELTASONE) 50 MG TAB    Take 50 mg by mouth. Take one on evening and morning before heart cath [id number] along with Benadryl 50 mg +Pepcid 40 mg       BP 96/74 (BP Location: Left arm, Patient Position: Sitting, BP Method: Large (Manual))   Pulse (!) 54   Ht 5' 10" (1.778 m)   Wt 94.7 kg (208 lb 12.4 oz)   SpO2 97%   BMI 29.96 kg/m²   118/64  Objective:   Physical Exam  Vitals and nursing note reviewed.   Constitutional:       General: He is not in acute distress.     Appearance: He is well-developed. He is not diaphoretic.   HENT:      Head: Normocephalic and atraumatic.   Eyes:      General:         Right eye: No discharge.         Left eye: No discharge.      Pupils: Pupils are equal, round, and reactive to light.   Neck:      Thyroid: No thyromegaly.      Vascular: No JVD.      Trachea: No tracheal deviation.   Cardiovascular:      Rate and Rhythm: Regular rhythm. Bradycardia present.      Heart sounds: S1 normal and S2 normal. Murmur heard.   High-pitched blowing holosystolic murmur is present at the apex.   Pulmonary:      Effort: Pulmonary effort is normal. No respiratory distress.      Breath sounds: Normal breath sounds. No wheezing or rales.   Abdominal:      General: There is no distension.      Palpations: Abdomen is soft.      Tenderness: There is no rebound.   Musculoskeletal:      Cervical back: Neck supple.      Right lower leg: Edema present.      Left lower leg: Edema present.   Skin:     General: Skin is warm and dry.      Findings: No erythema.      Comments: CVI changes BLE    Left radial access site with mild bruising, no hematoma, no drainage, intact pulse   Neurological:      General: No focal deficit present.      Mental Status: He is alert and oriented to person, place, and time.   Psychiatric:         Mood and Affect: Mood normal.         Behavior: Behavior normal.         Thought " Content: Thought content normal.     C Results  Summary         Nonobstructive CAD.    The ejection fraction was calculated to be 60%.    ARIELA Results  Summary    The left ventricle is normal in size with mild concentric hypertrophy and normal systolic function.  The estimated ejection fraction is 60%.  Normal left ventricular diastolic function.  Normal right ventricular size with normal right ventricular systolic function.  Normal appearing left atrial appendage. No thrombus is present in the appendage.  There is mild mitral prolapse of the posterior mitral leaflet.  Mild-to-moderate mitral regurgitation.  Mild tricuspid regurgitation.  No thrombus present in the inferior vena cava.     Assessment:      1. Elevated coronary artery calcium score    2. Abnormal ECG    3. LEBLANC (dyspnea on exertion)    4. Essential hypertension    5. HFrEF (heart failure with reduced ejection fraction)    6. MVP (mitral valve prolapse)    7. Nonrheumatic mitral valve regurgitation    8. Paroxysmal atrial fibrillation    9. Paroxysmal atrial flutter    10. Prolonged Q-T interval on ECG    11. Chronic anticoagulation      Patient presents for f/u. Doing well. Stable CV wise. NO angina. SOB improved. [facility] findings reviewed/discussed. Needs statin on board if LFT's stable on repeat labs. Referred to GI for workup. Continue other CV meds/mgmt.  Plan:   -Repeat CMP next week, start atorvastatin 20 mg nightly if stable  -GI referral as LFTs have been mildly elevated  -Continue other CV meds/mgmt  -Cardiac low salt diet  -Can try compression socks to help with BLE edema  -Follow-up with Dr. Garzon in 3 months

## 2023-06-08 ENCOUNTER — LAB VISIT (OUTPATIENT)
Dept: LAB | Facility: HOSPITAL | Age: 63
End: 2023-06-08
Attending: FAMILY MEDICINE
Payer: COMMERCIAL

## 2023-06-08 DIAGNOSIS — E03.9 HYPOTHYROIDISM, UNSPECIFIED TYPE: ICD-10-CM

## 2023-06-08 DIAGNOSIS — R79.89 ELEVATED LFTS: ICD-10-CM

## 2023-06-08 LAB
ALBUMIN SERPL BCP-MCNC: 4 G/DL (ref 3.5–5.2)
ALP SERPL-CCNC: 69 U/L (ref 55–135)
ALT SERPL W/O P-5'-P-CCNC: 42 U/L (ref 10–44)
ANION GAP SERPL CALC-SCNC: 11 MMOL/L (ref 8–16)
AST SERPL-CCNC: 54 U/L (ref 10–40)
BILIRUB SERPL-MCNC: 0.7 MG/DL (ref 0.1–1)
BUN SERPL-MCNC: 25 MG/DL (ref 8–23)
CALCIUM SERPL-MCNC: 10.3 MG/DL (ref 8.7–10.5)
CHLORIDE SERPL-SCNC: 97 MMOL/L (ref 95–110)
CO2 SERPL-SCNC: 30 MMOL/L (ref 23–29)
CREAT SERPL-MCNC: 1.4 MG/DL (ref 0.5–1.4)
EST. GFR  (NO RACE VARIABLE): 56.5 ML/MIN/1.73 M^2
GLUCOSE SERPL-MCNC: 112 MG/DL (ref 70–110)
POTASSIUM SERPL-SCNC: 4.9 MMOL/L (ref 3.5–5.1)
PROT SERPL-MCNC: 6.9 G/DL (ref 6–8.4)
SODIUM SERPL-SCNC: 138 MMOL/L (ref 136–145)
T4 FREE SERPL-MCNC: 0.89 NG/DL (ref 0.71–1.51)
TSH SERPL DL<=0.005 MIU/L-ACNC: 2.77 UIU/ML (ref 0.4–4)

## 2023-06-08 PROCEDURE — 84439 ASSAY OF FREE THYROXINE: CPT | Performed by: FAMILY MEDICINE

## 2023-06-08 PROCEDURE — 84443 ASSAY THYROID STIM HORMONE: CPT | Performed by: FAMILY MEDICINE

## 2023-06-08 PROCEDURE — 80053 COMPREHEN METABOLIC PANEL: CPT | Performed by: PHYSICIAN ASSISTANT

## 2023-06-08 PROCEDURE — 36415 COLL VENOUS BLD VENIPUNCTURE: CPT | Performed by: FAMILY MEDICINE

## 2023-06-09 ENCOUNTER — PATIENT MESSAGE (OUTPATIENT)
Dept: INTERNAL MEDICINE | Facility: CLINIC | Age: 63
End: 2023-06-09

## 2023-06-09 ENCOUNTER — OFFICE VISIT (OUTPATIENT)
Dept: INTERNAL MEDICINE | Facility: CLINIC | Age: 63
End: 2023-06-09
Payer: COMMERCIAL

## 2023-06-09 VITALS
RESPIRATION RATE: 18 BRPM | HEIGHT: 70 IN | WEIGHT: 208.31 LBS | DIASTOLIC BLOOD PRESSURE: 68 MMHG | OXYGEN SATURATION: 95 % | SYSTOLIC BLOOD PRESSURE: 108 MMHG | BODY MASS INDEX: 29.82 KG/M2 | HEART RATE: 58 BPM | TEMPERATURE: 96 F

## 2023-06-09 DIAGNOSIS — E03.9 HYPOTHYROIDISM, UNSPECIFIED TYPE: ICD-10-CM

## 2023-06-09 PROCEDURE — 3078F DIAST BP <80 MM HG: CPT | Mod: CPTII,S$GLB,, | Performed by: FAMILY MEDICINE

## 2023-06-09 PROCEDURE — 1159F PR MEDICATION LIST DOCUMENTED IN MEDICAL RECORD: ICD-10-PCS | Mod: CPTII,S$GLB,, | Performed by: FAMILY MEDICINE

## 2023-06-09 PROCEDURE — 3044F PR MOST RECENT HEMOGLOBIN A1C LEVEL <7.0%: ICD-10-PCS | Mod: CPTII,S$GLB,, | Performed by: FAMILY MEDICINE

## 2023-06-09 PROCEDURE — 3074F PR MOST RECENT SYSTOLIC BLOOD PRESSURE < 130 MM HG: ICD-10-PCS | Mod: CPTII,S$GLB,, | Performed by: FAMILY MEDICINE

## 2023-06-09 PROCEDURE — 3008F BODY MASS INDEX DOCD: CPT | Mod: CPTII,S$GLB,, | Performed by: FAMILY MEDICINE

## 2023-06-09 PROCEDURE — 4010F ACE/ARB THERAPY RXD/TAKEN: CPT | Mod: CPTII,S$GLB,, | Performed by: FAMILY MEDICINE

## 2023-06-09 PROCEDURE — 3008F PR BODY MASS INDEX (BMI) DOCUMENTED: ICD-10-PCS | Mod: CPTII,S$GLB,, | Performed by: FAMILY MEDICINE

## 2023-06-09 PROCEDURE — 3078F PR MOST RECENT DIASTOLIC BLOOD PRESSURE < 80 MM HG: ICD-10-PCS | Mod: CPTII,S$GLB,, | Performed by: FAMILY MEDICINE

## 2023-06-09 PROCEDURE — 99213 PR OFFICE/OUTPT VISIT, EST, LEVL III, 20-29 MIN: ICD-10-PCS | Mod: S$GLB,,, | Performed by: FAMILY MEDICINE

## 2023-06-09 PROCEDURE — 1159F MED LIST DOCD IN RCRD: CPT | Mod: CPTII,S$GLB,, | Performed by: FAMILY MEDICINE

## 2023-06-09 PROCEDURE — 99999 PR PBB SHADOW E&M-EST. PATIENT-LVL IV: ICD-10-PCS | Mod: PBBFAC,,, | Performed by: FAMILY MEDICINE

## 2023-06-09 PROCEDURE — 4010F PR ACE/ARB THEARPY RXD/TAKEN: ICD-10-PCS | Mod: CPTII,S$GLB,, | Performed by: FAMILY MEDICINE

## 2023-06-09 PROCEDURE — 3074F SYST BP LT 130 MM HG: CPT | Mod: CPTII,S$GLB,, | Performed by: FAMILY MEDICINE

## 2023-06-09 PROCEDURE — 3044F HG A1C LEVEL LT 7.0%: CPT | Mod: CPTII,S$GLB,, | Performed by: FAMILY MEDICINE

## 2023-06-09 PROCEDURE — 99213 OFFICE O/P EST LOW 20 MIN: CPT | Mod: S$GLB,,, | Performed by: FAMILY MEDICINE

## 2023-06-09 PROCEDURE — 99999 PR PBB SHADOW E&M-EST. PATIENT-LVL IV: CPT | Mod: PBBFAC,,, | Performed by: FAMILY MEDICINE

## 2023-06-09 RX ORDER — BACLOFEN 10 MG/1
10 TABLET ORAL 3 TIMES DAILY
Qty: 90 TABLET | Refills: 2 | Status: SHIPPED | OUTPATIENT
Start: 2023-06-09 | End: 2023-07-06

## 2023-06-09 RX ORDER — LEVOTHYROXINE SODIUM 50 UG/1
50 TABLET ORAL
Qty: 64 TABLET | Refills: 1 | Status: SHIPPED | OUTPATIENT
Start: 2023-06-09 | End: 2024-03-06 | Stop reason: SDUPTHER

## 2023-06-11 NOTE — PROGRESS NOTES
Subjective:       Patient ID: Cedric Santoro is a 63 y.o. male.    Chief Complaint: lab results    HPI Mr Santoro presents today to review test results.  We made some adjustments to his thyroid medication on his last visit and repeated his labs to see how this adjustments affected his blood work.  Everything is going well.  He does not have any concerns or complaints today.  Upon review of his labs his TSH is in a normal range prior to this he was elevated.  T4 is in normal range and did not change as much as TSH    Review of Systems   Constitutional:  Positive for fatigue. Negative for activity change, appetite change and fever.   HENT:  Negative for congestion, ear pain, facial swelling, hearing loss, sore throat and tinnitus.    Eyes:  Negative for redness and visual disturbance.   Respiratory:  Negative for cough, chest tightness and wheezing.    Gastrointestinal:  Negative for abdominal distention, abdominal pain, constipation, diarrhea, nausea and vomiting.   Endocrine: Negative for polydipsia and polyuria.   Genitourinary:  Negative for flank pain, frequency and penile discharge.   Musculoskeletal:  Negative for back pain, gait problem and joint swelling.   Skin:  Negative for rash.   Neurological:  Negative for dizziness, tremors, seizures, weakness and headaches.   Psychiatric/Behavioral:  Negative for agitation and confusion.          Past Medical History:   Diagnosis Date    Abnormal ECG 10/4/2022    Essential hypertension 2/24/2020    Hypertension     MVP (mitral valve prolapse) 10/26/2022    Nonrheumatic mitral valve regurgitation 10/26/2022    Palpitation 3/5/2020    Paroxysmal atrial fibrillation 3/5/2020    Paroxysmal atrial flutter 10/4/2022    Raynaud's syndrome     Vasovagal syncopes      Past Surgical History:   Procedure Laterality Date    ABLATION, ATRIAL FLUTTER, TYPICAL N/A 12/15/2022    Procedure: Ablation, Atrial Flutter, Typical;  Surgeon: Matty Hendricks MD;  Location: [facility];   Service: Cardiology;  Laterality: N/A;  AFL, RFA, NICOLASA, ARIELA, anes, MB, 3 Prep    ankle/foot surgery      ECHOCARDIOGRAM,TRANSESOPHAGEAL N/A 12/15/2022    Procedure: Transesophageal echo (ARIELA) intra-procedure log documentation;  Surgeon: Nisreen Fenton MD;  Location: [facility] EP LAB;  Service: Cardiology;  Laterality: N/A;    EYE SURGERY      GALLBLADDER SURGERY      HERNIA REPAIR      LEFT HEART CATHETERIZATION Left 5/24/2023    Procedure: Left heart cath;  Surgeon: Kal Garzon MD;  Location: [facility] CATH LAB;  Service: Cardiology;  Laterality: Left;  may need to sign consent//    THROAT SURGERY      TRANSESOPHAGEAL ECHOCARDIOGRAPHY N/A 2/27/2023    Procedure: ECHOCARDIOGRAM, TRANSESOPHAGEAL;  Surgeon: Samuel Del Valle MD;  Location: [facility] CATH LAB;  Service: Cardiology;  Laterality: N/A;    TREATMENT OF CARDIAC ARRHYTHMIA  12/15/2022    Procedure: Cardioversion or Defibrillation;  Surgeon: Matty Hendricks MD;  Location: [facility] EP LAB;  Service: Cardiology;;     Family History   Problem Relation Age of Onset    Pacemaker/defibrilator Father     Macular degeneration Father     Macular degeneration Brother            Objective:        Physical Exam  Vitals reviewed.   Constitutional:       Appearance: Normal appearance.   HENT:      Head: Normocephalic and atraumatic.   Pulmonary:      Effort: Pulmonary effort is normal.   Neurological:      Mental Status: He is alert and oriented to person, place, and time.   Psychiatric:         Mood and Affect: Mood normal.         Behavior: Behavior normal.         Results for orders placed or performed in visit on 06/08/23   TSH   Result Value Ref Range    TSH 2.765 0.400 - 4.000 uIU/mL   T4, FREE   Result Value Ref Range    Free T4 0.89 0.71 - 1.51 ng/dL   Comprehensive Metabolic Panel   Result Value Ref Range    Sodium 138 136 - 145 mmol/L    Potassium 4.9 3.5 - 5.1 mmol/L    Chloride 97 95 - 110 mmol/L    CO2 30 (H) 23 - 29 mmol/L    Glucose 112 (H) 70 - 110 mg/dL    BUN 25 (H) 8  - 23 mg/dL    Creatinine 1.4 0.5 - 1.4 mg/dL    Calcium 10.3 8.7 - 10.5 mg/dL    Total Protein 6.9 6.0 - 8.4 g/dL    Albumin 4.0 3.5 - 5.2 g/dL    Total Bilirubin 0.7 0.1 - 1.0 mg/dL    Alkaline Phosphatase 69 55 - 135 U/L    AST 54 (H) 10 - 40 U/L    ALT 42 10 - 44 U/L    Anion Gap 11 8 - 16 mmol/L    eGFR 56.5 (A) >60 mL/min/1.73 m^2       Assessment/Plan:     Hypothyroidism, unspecified type  -     levothyroxine (SYNTHROID) 50 MCG tablet; Take 1 tablet (50 mcg total) by mouth every Mon, Tues, Wed, Thurs, Fri.  Dispense: 64 tablet; Refill: 1    Other orders  -     baclofen (LIORESAL) 10 MG tablet; Take 1 tablet (10 mg total) by mouth 3 (three) times daily.  Dispense: 90 tablet; Refill: 2    Discussed neck pain and how baclofen may help with this in addition to his muscle cramps       follow up as needed   Monserrat Asif MD  [facility]   Family Medicine

## 2023-06-19 ENCOUNTER — LAB VISIT (OUTPATIENT)
Dept: LAB | Facility: HOSPITAL | Age: 63
End: 2023-06-19
Attending: PHYSICIAN ASSISTANT
Payer: COMMERCIAL

## 2023-06-19 ENCOUNTER — OFFICE VISIT (OUTPATIENT)
Dept: GASTROENTEROLOGY | Facility: CLINIC | Age: 63
End: 2023-06-19
Payer: COMMERCIAL

## 2023-06-19 VITALS
WEIGHT: 208.31 LBS | HEIGHT: 70 IN | OXYGEN SATURATION: 96 % | BODY MASS INDEX: 29.82 KG/M2 | DIASTOLIC BLOOD PRESSURE: 56 MMHG | HEART RATE: 56 BPM | SYSTOLIC BLOOD PRESSURE: 110 MMHG

## 2023-06-19 DIAGNOSIS — R79.89 ELEVATED LFTS: ICD-10-CM

## 2023-06-19 LAB
HCV AB SERPL QL IA: NORMAL
INR PPP: 1 (ref 0.8–1.2)
IRON SERPL-MCNC: 85 UG/DL (ref 45–160)
PROTHROMBIN TIME: 10.9 SEC (ref 9–12.5)
SATURATED IRON: 28 % (ref 20–50)
TOTAL IRON BINDING CAPACITY: 300 UG/DL (ref 250–450)
TRANSFERRIN SERPL-MCNC: 203 MG/DL (ref 200–375)

## 2023-06-19 PROCEDURE — 86015 ACTIN ANTIBODY EACH: CPT | Performed by: PHYSICIAN ASSISTANT

## 2023-06-19 PROCEDURE — 3044F PR MOST RECENT HEMOGLOBIN A1C LEVEL <7.0%: ICD-10-PCS | Mod: CPTII,S$GLB,, | Performed by: PHYSICIAN ASSISTANT

## 2023-06-19 PROCEDURE — 3074F SYST BP LT 130 MM HG: CPT | Mod: CPTII,S$GLB,, | Performed by: PHYSICIAN ASSISTANT

## 2023-06-19 PROCEDURE — 3008F BODY MASS INDEX DOCD: CPT | Mod: CPTII,S$GLB,, | Performed by: PHYSICIAN ASSISTANT

## 2023-06-19 PROCEDURE — 99213 PR OFFICE/OUTPT VISIT, EST, LEVL III, 20-29 MIN: ICD-10-PCS | Mod: S$GLB,,, | Performed by: PHYSICIAN ASSISTANT

## 2023-06-19 PROCEDURE — 99213 OFFICE O/P EST LOW 20 MIN: CPT | Mod: S$GLB,,, | Performed by: PHYSICIAN ASSISTANT

## 2023-06-19 PROCEDURE — 3044F HG A1C LEVEL LT 7.0%: CPT | Mod: CPTII,S$GLB,, | Performed by: PHYSICIAN ASSISTANT

## 2023-06-19 PROCEDURE — 86704 HEP B CORE ANTIBODY TOTAL: CPT | Performed by: PHYSICIAN ASSISTANT

## 2023-06-19 PROCEDURE — 36415 COLL VENOUS BLD VENIPUNCTURE: CPT | Performed by: PHYSICIAN ASSISTANT

## 2023-06-19 PROCEDURE — 86803 HEPATITIS C AB TEST: CPT | Performed by: PHYSICIAN ASSISTANT

## 2023-06-19 PROCEDURE — 86790 VIRUS ANTIBODY NOS: CPT | Performed by: PHYSICIAN ASSISTANT

## 2023-06-19 PROCEDURE — 86381 MITOCHONDRIAL ANTIBODY EACH: CPT | Performed by: PHYSICIAN ASSISTANT

## 2023-06-19 PROCEDURE — 87340 HEPATITIS B SURFACE AG IA: CPT | Performed by: PHYSICIAN ASSISTANT

## 2023-06-19 PROCEDURE — 86038 ANTINUCLEAR ANTIBODIES: CPT | Performed by: PHYSICIAN ASSISTANT

## 2023-06-19 PROCEDURE — 3074F PR MOST RECENT SYSTOLIC BLOOD PRESSURE < 130 MM HG: ICD-10-PCS | Mod: CPTII,S$GLB,, | Performed by: PHYSICIAN ASSISTANT

## 2023-06-19 PROCEDURE — 4010F ACE/ARB THERAPY RXD/TAKEN: CPT | Mod: CPTII,S$GLB,, | Performed by: PHYSICIAN ASSISTANT

## 2023-06-19 PROCEDURE — 3008F PR BODY MASS INDEX (BMI) DOCUMENTED: ICD-10-PCS | Mod: CPTII,S$GLB,, | Performed by: PHYSICIAN ASSISTANT

## 2023-06-19 PROCEDURE — 84466 ASSAY OF TRANSFERRIN: CPT | Performed by: PHYSICIAN ASSISTANT

## 2023-06-19 PROCEDURE — 86706 HEP B SURFACE ANTIBODY: CPT | Mod: 91 | Performed by: PHYSICIAN ASSISTANT

## 2023-06-19 PROCEDURE — 4010F PR ACE/ARB THEARPY RXD/TAKEN: ICD-10-PCS | Mod: CPTII,S$GLB,, | Performed by: PHYSICIAN ASSISTANT

## 2023-06-19 PROCEDURE — 3078F DIAST BP <80 MM HG: CPT | Mod: CPTII,S$GLB,, | Performed by: PHYSICIAN ASSISTANT

## 2023-06-19 PROCEDURE — 3078F PR MOST RECENT DIASTOLIC BLOOD PRESSURE < 80 MM HG: ICD-10-PCS | Mod: CPTII,S$GLB,, | Performed by: PHYSICIAN ASSISTANT

## 2023-06-19 PROCEDURE — 99999 PR PBB SHADOW E&M-EST. PATIENT-LVL IV: ICD-10-PCS | Mod: PBBFAC,,, | Performed by: PHYSICIAN ASSISTANT

## 2023-06-19 PROCEDURE — 99999 PR PBB SHADOW E&M-EST. PATIENT-LVL IV: CPT | Mod: PBBFAC,,, | Performed by: PHYSICIAN ASSISTANT

## 2023-06-19 PROCEDURE — 85610 PROTHROMBIN TIME: CPT | Performed by: PHYSICIAN ASSISTANT

## 2023-06-19 NOTE — PROGRESS NOTES
Clinic Consult:  [facility] Consultation Note    Reason for Consult:  The encounter diagnosis was Elevated LFTs.    PCP: Monserrat Asif   [id number] [facility]  / [place] [id number]    CC: Results (From blood work/)      HPI:  This is a 63 y.o. male here for evaluation of the above. Referred by cardiology for chronic LFT elevation. From chart review, it appears he has had mild elevation of LFTs since at least 2016. Denies alcohol use, previous hepatitis C infection, IVDU, tattoos. He does have high cholesterol, HTN, a fib, aflutter, and additional cardiac history. Cardiology wanting to start on Lipitor. Patient reports he exercises about 5x per week on average.     Review of Systems   Constitutional:  Negative for activity change, appetite change, chills, diaphoresis, fatigue, fever and unexpected weight change.   HENT:  Negative for trouble swallowing.    Respiratory:  Negative for shortness of breath.    Cardiovascular:  Negative for chest pain.   Gastrointestinal:  Negative for abdominal distention, abdominal pain, anal bleeding, blood in stool, constipation, diarrhea, nausea and vomiting.   Skin:  Negative for color change.   Neurological:  Negative for dizziness and weakness.   Psychiatric/Behavioral:  Negative for dysphoric mood. The patient is not nervous/anxious.       Medical History:   Past Medical History:   Diagnosis Date    Abnormal ECG 10/4/2022    Essential hypertension 2/24/2020    Hypertension     MVP (mitral valve prolapse) 10/26/2022    Nonrheumatic mitral valve regurgitation 10/26/2022    Palpitation 3/5/2020    Paroxysmal atrial fibrillation 3/5/2020    Paroxysmal atrial flutter 10/4/2022    Raynaud's syndrome     Vasovagal syncopes        Surgical History:   Past Surgical History:   Procedure Laterality Date    ABLATION, ATRIAL FLUTTER, TYPICAL N/A 12/15/2022    Procedure: Ablation, Atrial Flutter, Typical;  Surgeon: Matty Hendricks MD;  Location: [facility] EP LAB;  Service:  Cardiology;  Laterality: N/A;  AFL, RFA, NICOLASA, ARIELA, anes, MB, 3 Prep    ankle/foot surgery      ECHOCARDIOGRAM,TRANSESOPHAGEAL N/A 12/15/2022    Procedure: Transesophageal echo (ARIELA) intra-procedure log documentation;  Surgeon: Nisreen Fenton MD;  Location: [facility] EP LAB;  Service: Cardiology;  Laterality: N/A;    EYE SURGERY      GALLBLADDER SURGERY      HERNIA REPAIR      LEFT HEART CATHETERIZATION Left 5/24/2023    Procedure: Left heart cath;  Surgeon: Kal Garzon MD;  Location: [facility] CATH LAB;  Service: Cardiology;  Laterality: Left;  may need to sign consent//    THROAT SURGERY      TRANSESOPHAGEAL ECHOCARDIOGRAPHY N/A 2/27/2023    Procedure: ECHOCARDIOGRAM, TRANSESOPHAGEAL;  Surgeon: Samuel Del Valle MD;  Location: [facility] CATH LAB;  Service: Cardiology;  Laterality: N/A;    TREATMENT OF CARDIAC ARRHYTHMIA  12/15/2022    Procedure: Cardioversion or Defibrillation;  Surgeon: Matty Hendricks MD;  Location: [facility] EP LAB;  Service: Cardiology;;       Family History:    Family History   Problem Relation Age of Onset    Pacemaker/defibrilator Father     Macular degeneration Father     Macular degeneration Brother        Social History:   Social History     Socioeconomic History    Marital status: Other   Tobacco Use    Smoking status: Never    Smokeless tobacco: Never   Substance and Sexual Activity    Alcohol use: No    Drug use: No    Sexual activity: Not Currently       Allergies:   Review of patient's allergies indicates:   Allergen Reactions    Iodinated contrast media     Iodine and iodide containing products Hives       Home Medications:   Current Outpatient Medications on File Prior to Visit   Medication Sig Dispense Refill    apixaban (ELIQUIS) 5 mg Tab Take 1 tablet (5 mg total) by mouth 2 (two) times daily. 60 tablet 12    atenoloL (TENORMIN) 50 MG tablet Take 1 tablet (50 mg total) by mouth once daily. 90 tablet 3    b complex vitamins tablet Take 1 tablet by mouth once daily.      baclofen (LIORESAL) 10  "MG tablet Take 1 tablet (10 mg total) by mouth 3 (three) times daily. 90 tablet 2    co-enzyme Q-10 30 mg capsule Take 30 mg by mouth 3 (three) times daily.      DULoxetine (CYMBALTA) 60 MG capsule Take 60 mg by mouth every morning.      ferrous sulfate 325 (65 FE) MG EC tablet Take 325 mg by mouth 3 (three) times daily with meals.      furosemide (LASIX) 20 MG tablet Take 2 tablets (40 mg total) by mouth once daily. 90 tablet 1    levothyroxine (SYNTHROID) 50 MCG tablet Take 1 tablet (50 mcg total) by mouth every Mon, Tues, Wed, Thurs, Fri. 64 tablet 1    losartan (COZAAR) 50 MG tablet TAKE 1 TABLET BY MOUTH EVERY DAY 90 tablet 3    magnesium 30 mg Tab Take 1 tablet by mouth once.      multivitamin (THERAGRAN) per tablet Take 1 tablet by mouth once daily.      vitamin D (VITAMIN D3) 1000 units Tab Take 1,000 Units by mouth once daily.       No current facility-administered medications on file prior to visit.       Physical Exam:  BP (!) 110/56   Pulse (!) 56   Ht 5' 10" (1.778 m)   Wt 94.5 kg (208 lb 5.4 oz)   SpO2 96%   BMI 29.89 kg/m²   Body mass index is 29.89 kg/m².  Physical Exam  Constitutional:       General: He is not in acute distress.     Appearance: Normal appearance. He is not ill-appearing, toxic-appearing or diaphoretic.   HENT:      Head: Normocephalic and atraumatic.   Eyes:      General: No scleral icterus.     Extraocular Movements: Extraocular movements intact.   Cardiovascular:      Rate and Rhythm: Normal rate and regular rhythm.   Pulmonary:      Effort: Pulmonary effort is normal. No respiratory distress.   Abdominal:      General: Bowel sounds are normal. There is no distension.      Palpations: Abdomen is soft. There is no mass.      Tenderness: There is no abdominal tenderness. There is no guarding or rebound.      Hernia: No hernia is present.   Musculoskeletal:         General: Normal range of motion.      Cervical back: Normal range of motion.   Skin:     General: Skin is warm and " dry.      Coloration: Skin is not jaundiced or pale.   Neurological:      Mental Status: He is alert and oriented to person, place, and time.         Labs: Pertinent labs reviewed.  Endoscopy: --  CRC Screening: cologuard negative 5/2023  Anticoagulation: Eliquis    Assessment:  1. Elevated LFTs         Recommendations:  -US and labs due to chronic elevation of LFTs.   -Mediterranean diet discussed along with recommendation for weight loss, exercise, control of cholesterol. Explained fatty liver in depth, however, will need US and possibly fibroscan before diagnosing this.   -No alcohol. Patient denies use.   -Follow up with hepatology after labs and US completed.     Elevated LFTs  -     Ambulatory referral/consult to Gastroenterology  -     US Abdomen Complete; Future; Expected date: 06/19/2023  -     Hepatitis C Antibody; Future; Expected date: 06/19/2023  -     Hepatitis B Core Antibody, Total; Future; Expected date: 06/19/2023  -     Hepatitis B Surface Ab, Qualitative; Future; Expected date: 06/19/2023  -     HEPATITIS B SURFACE ANTIGEN; Future; Expected date: 06/19/2023  -     HEPATITIS A ANTIBODY, IGG; Future; Expected date: 06/19/2023  -     Anti-Smooth Muscle Antibody; Future; Expected date: 06/19/2023  -     ANTIMITOCHONDRIAL ANTIBODY; Future; Expected date: 06/19/2023  -     Protime-INR; Future; Expected date: 06/19/2023  -     IRON AND TIBC; Future; Expected date: 06/19/2023  -     MERLINE; Future; Expected date: 06/19/2023        No follow-ups on file.    Thank you so much for allowing me to participate in the care of Cedric Kelly PA-C

## 2023-06-20 LAB
ANA SER QL IF: NORMAL
HAV IGG SER QL IA: NORMAL
HBV CORE AB SERPL QL IA: NORMAL
HBV SURFACE AB SER-ACNC: 922.15 MIU/ML
HBV SURFACE AB SER-ACNC: REACTIVE M[IU]/ML
HBV SURFACE AG SERPL QL IA: NORMAL

## 2023-06-21 LAB
MITOCHONDRIA AB TITR SER IF: NORMAL {TITER}
SMOOTH MUSCLE AB TITR SER IF: NORMAL {TITER}

## 2023-06-22 ENCOUNTER — PATIENT MESSAGE (OUTPATIENT)
Dept: GASTROENTEROLOGY | Facility: CLINIC | Age: 63
End: 2023-06-22
Payer: COMMERCIAL

## 2023-06-23 ENCOUNTER — HOSPITAL ENCOUNTER (OUTPATIENT)
Dept: RADIOLOGY | Facility: HOSPITAL | Age: 63
Discharge: HOME | End: 2023-06-23
Attending: PHYSICIAN ASSISTANT
Payer: COMMERCIAL

## 2023-06-23 DIAGNOSIS — R79.89 ELEVATED LFTS: ICD-10-CM

## 2023-06-23 PROCEDURE — 76700 US ABDOMEN COMPLETE: ICD-10-PCS | Mod: 26,,, | Performed by: RADIOLOGY

## 2023-06-23 PROCEDURE — 76700 US EXAM ABDOM COMPLETE: CPT | Mod: TC

## 2023-06-23 PROCEDURE — 76700 US EXAM ABDOM COMPLETE: CPT | Mod: 26,,, | Performed by: RADIOLOGY

## 2023-06-26 DIAGNOSIS — R79.89 ELEVATED LFTS: Primary | ICD-10-CM

## 2023-06-26 DIAGNOSIS — N28.1 RENAL CYST, RIGHT: ICD-10-CM

## 2023-07-03 ENCOUNTER — OFFICE VISIT (OUTPATIENT)
Dept: HEPATOLOGY | Facility: CLINIC | Age: 63
End: 2023-07-03
Payer: COMMERCIAL

## 2023-07-03 ENCOUNTER — LAB VISIT (OUTPATIENT)
Dept: LAB | Facility: HOSPITAL | Age: 63
End: 2023-07-03
Attending: INTERNAL MEDICINE
Payer: COMMERCIAL

## 2023-07-03 VITALS
HEIGHT: 70 IN | SYSTOLIC BLOOD PRESSURE: 135 MMHG | BODY MASS INDEX: 29.57 KG/M2 | HEART RATE: 57 BPM | WEIGHT: 206.56 LBS | DIASTOLIC BLOOD PRESSURE: 76 MMHG

## 2023-07-03 DIAGNOSIS — R79.89 ELEVATED LFTS: ICD-10-CM

## 2023-07-03 LAB
A1AT SERPL-MCNC: 166 MG/DL (ref 100–190)
CERULOPLASMIN SERPL-MCNC: 22 MG/DL (ref 15–45)
IGA SERPL-MCNC: 212 MG/DL (ref 40–350)
IGG SERPL-MCNC: 860 MG/DL (ref 650–1600)
IGM SERPL-MCNC: 91 MG/DL (ref 50–300)

## 2023-07-03 PROCEDURE — 99214 OFFICE O/P EST MOD 30 MIN: CPT | Mod: S$GLB,,, | Performed by: INTERNAL MEDICINE

## 2023-07-03 PROCEDURE — 4010F ACE/ARB THERAPY RXD/TAKEN: CPT | Mod: CPTII,S$GLB,, | Performed by: INTERNAL MEDICINE

## 2023-07-03 PROCEDURE — 82103 ALPHA-1-ANTITRYPSIN TOTAL: CPT | Performed by: INTERNAL MEDICINE

## 2023-07-03 PROCEDURE — 3008F BODY MASS INDEX DOCD: CPT | Mod: CPTII,S$GLB,, | Performed by: INTERNAL MEDICINE

## 2023-07-03 PROCEDURE — 3008F PR BODY MASS INDEX (BMI) DOCUMENTED: ICD-10-PCS | Mod: CPTII,S$GLB,, | Performed by: INTERNAL MEDICINE

## 2023-07-03 PROCEDURE — 99214 PR OFFICE/OUTPT VISIT, EST, LEVL IV, 30-39 MIN: ICD-10-PCS | Mod: S$GLB,,, | Performed by: INTERNAL MEDICINE

## 2023-07-03 PROCEDURE — 3075F SYST BP GE 130 - 139MM HG: CPT | Mod: CPTII,S$GLB,, | Performed by: INTERNAL MEDICINE

## 2023-07-03 PROCEDURE — 1160F PR REVIEW ALL MEDS BY PRESCRIBER/CLIN PHARMACIST DOCUMENTED: ICD-10-PCS | Mod: CPTII,S$GLB,, | Performed by: INTERNAL MEDICINE

## 2023-07-03 PROCEDURE — 3075F PR MOST RECENT SYSTOLIC BLOOD PRESS GE 130-139MM HG: ICD-10-PCS | Mod: CPTII,S$GLB,, | Performed by: INTERNAL MEDICINE

## 2023-07-03 PROCEDURE — 1159F MED LIST DOCD IN RCRD: CPT | Mod: CPTII,S$GLB,, | Performed by: INTERNAL MEDICINE

## 2023-07-03 PROCEDURE — 36415 COLL VENOUS BLD VENIPUNCTURE: CPT | Performed by: INTERNAL MEDICINE

## 2023-07-03 PROCEDURE — 99999 PR PBB SHADOW E&M-EST. PATIENT-LVL V: ICD-10-PCS | Mod: PBBFAC,,, | Performed by: INTERNAL MEDICINE

## 2023-07-03 PROCEDURE — 1159F PR MEDICATION LIST DOCUMENTED IN MEDICAL RECORD: ICD-10-PCS | Mod: CPTII,S$GLB,, | Performed by: INTERNAL MEDICINE

## 2023-07-03 PROCEDURE — 4010F PR ACE/ARB THEARPY RXD/TAKEN: ICD-10-PCS | Mod: CPTII,S$GLB,, | Performed by: INTERNAL MEDICINE

## 2023-07-03 PROCEDURE — 99999 PR PBB SHADOW E&M-EST. PATIENT-LVL V: CPT | Mod: PBBFAC,,, | Performed by: INTERNAL MEDICINE

## 2023-07-03 PROCEDURE — 82784 ASSAY IGA/IGD/IGG/IGM EACH: CPT | Performed by: INTERNAL MEDICINE

## 2023-07-03 PROCEDURE — 3044F HG A1C LEVEL LT 7.0%: CPT | Mod: CPTII,S$GLB,, | Performed by: INTERNAL MEDICINE

## 2023-07-03 PROCEDURE — 3078F DIAST BP <80 MM HG: CPT | Mod: CPTII,S$GLB,, | Performed by: INTERNAL MEDICINE

## 2023-07-03 PROCEDURE — 1160F RVW MEDS BY RX/DR IN RCRD: CPT | Mod: CPTII,S$GLB,, | Performed by: INTERNAL MEDICINE

## 2023-07-03 PROCEDURE — 3044F PR MOST RECENT HEMOGLOBIN A1C LEVEL <7.0%: ICD-10-PCS | Mod: CPTII,S$GLB,, | Performed by: INTERNAL MEDICINE

## 2023-07-03 PROCEDURE — 82390 ASSAY OF CERULOPLASMIN: CPT | Performed by: INTERNAL MEDICINE

## 2023-07-03 PROCEDURE — 82525 ASSAY OF COPPER: CPT | Performed by: INTERNAL MEDICINE

## 2023-07-03 PROCEDURE — 3078F PR MOST RECENT DIASTOLIC BLOOD PRESSURE < 80 MM HG: ICD-10-PCS | Mod: CPTII,S$GLB,, | Performed by: INTERNAL MEDICINE

## 2023-07-03 NOTE — PROGRESS NOTES
Subjective:     Cedric Santoro is here for evaluation of abnormal LFTs    History of Present Illness:  Cedric Santoro is a 63-year-old male without prior history of liver disease, was aware of abnormal liver enzymes since 2016, he is to start statins for coronary artery disease soon, so referred here for further evaluation of his abnormal transaminases.      Duration of abnormality- 2016( as far as we could go in EMR )   Medications/OTC/Herbal- oxbile ( started recently) flexjoint   ETOH- occasional  Metabolic issues- HTN, hyperlipidemia  BMI- 29  Family Hx- no   Diet; he admits to eating high carb diet, but his sister-in-law who is in the room with him says his intake is moderate not excessive. He quit drinking soda few months ago was drinking only 1 per day      Review of Systems   Constitutional:  Negative for fatigue, fever and unexpected weight change.   Gastrointestinal:  Negative for abdominal distention, abdominal pain, blood in stool, nausea and vomiting.   Musculoskeletal:  Negative for arthralgias and gait problem.   Psychiatric/Behavioral:  Negative for confusion and sleep disturbance.      Objective:     Physical Exam  Vitals reviewed.   Constitutional:       Appearance: Normal appearance.   Eyes:      General: No scleral icterus.  Pulmonary:      Effort: Pulmonary effort is normal.   Abdominal:      General: Bowel sounds are normal. There is no distension.      Palpations: There is no mass.      Tenderness: There is no abdominal tenderness.   Musculoskeletal:      Right lower leg: No edema.      Left lower leg: No edema.   Skin:     Coloration: Skin is not jaundiced.   Neurological:      Mental Status: He is alert and oriented to person, place, and time.   Psychiatric:         Thought Content: Thought content normal.       MELD-Na: 10 at 5/18/2023 10:05 AM  MELD: 10 at 5/18/2023 10:05 AM  Calculated from:  Serum Creatinine: 1.4 mg/dL at 5/18/2023 10:05 AM  Serum Sodium: 143 mmol/L (Using max of 137  mmol/L) at 5/18/2023 10:05 AM  Total Bilirubin: 0.4 mg/dL (Using min of 1 mg/dL) at 5/18/2023 10:05 AM  INR(ratio): 1.0 at 5/18/2023 10:05 AM      WBC   Date Value Ref Range Status   05/18/2023 6.00 3.90 - 12.70 K/uL Final     Hemoglobin   Date Value Ref Range Status   05/18/2023 14.3 14.0 - 18.0 g/dL Final     Hematocrit   Date Value Ref Range Status   05/18/2023 44.8 40.0 - 54.0 % Final     Platelets   Date Value Ref Range Status   05/18/2023 187 150 - 450 K/uL Final     BUN   Date Value Ref Range Status   06/08/2023 25 (H) 8 - 23 mg/dL Final     Creatinine   Date Value Ref Range Status   06/08/2023 1.4 0.5 - 1.4 mg/dL Final     Glucose   Date Value Ref Range Status   06/08/2023 112 (H) 70 - 110 mg/dL Final     Calcium   Date Value Ref Range Status   06/08/2023 10.3 8.7 - 10.5 mg/dL Final     Sodium   Date Value Ref Range Status   06/08/2023 138 136 - 145 mmol/L Final     Potassium   Date Value Ref Range Status   06/08/2023 4.9 3.5 - 5.1 mmol/L Final     Chloride   Date Value Ref Range Status   06/08/2023 97 95 - 110 mmol/L Final     Magnesium   Date Value Ref Range Status   10/04/2022 2.3 1.6 - 2.6 mg/dL Final     AST   Date Value Ref Range Status   06/08/2023 54 (H) 10 - 40 U/L Final     ALT   Date Value Ref Range Status   06/08/2023 42 10 - 44 U/L Final     Alkaline Phosphatase   Date Value Ref Range Status   06/08/2023 69 55 - 135 U/L Final     Total Bilirubin   Date Value Ref Range Status   06/08/2023 0.7 0.1 - 1.0 mg/dL Final     Comment:     For infants and newborns, interpretation of results should be based  on gestational age, weight and in agreement with clinical  observations.    Premature Infant recommended reference ranges:  Up to 24 hours.............<8.0 mg/dL  Up to 48 hours............<12.0 mg/dL  3-5 days..................<15.0 mg/dL  6-29 days.................<15.0 mg/dL       Albumin   Date Value Ref Range Status   06/08/2023 4.0 3.5 - 5.2 g/dL Final     INR   Date Value Ref Range Status    06/19/2023 1.0 0.8 - 1.2 Final     Comment:     Coumadin Therapy:  2.0 - 3.0 for INR for all indicators except mechanical heart valves  and antiphospholipid syndromes which should use 2.5 - 3.5.           Assessment/Plan:     1.Abnormal LFTs:  Existing workup shows normal viral serologies, MERLINE, ASMA, AMA are all within normal limits.  Ferritin level and iron level are also within normal limits.  Ultrasound of the abdomen shows right renal cyst, for which he is scheduled to see Urology, otherwise liver appears normal.  Will initiate work up to rule out autoimmune/genetic disorders of the liver to complete work up  Obtain FibroScan, if he has advanced fibrosis recommended to proceed with liver biopsy, if fibrosis is mild he can be started on statins, will follow transaminases initially Q 3-6 months if transaminases increase 3 times normal then would recommend discontinuing statin otherwise he can continue    RTC: 3-6 mo    Zuri Banks MD  [facility]

## 2023-07-05 ENCOUNTER — OFFICE VISIT (OUTPATIENT)
Dept: UROLOGY | Facility: CLINIC | Age: 63
End: 2023-07-05
Payer: COMMERCIAL

## 2023-07-05 VITALS
WEIGHT: 209 LBS | RESPIRATION RATE: 16 BRPM | DIASTOLIC BLOOD PRESSURE: 68 MMHG | SYSTOLIC BLOOD PRESSURE: 103 MMHG | HEART RATE: 76 BPM | BODY MASS INDEX: 29.92 KG/M2 | HEIGHT: 70 IN

## 2023-07-05 DIAGNOSIS — N28.1 RENAL CYST, RIGHT: Primary | ICD-10-CM

## 2023-07-05 DIAGNOSIS — N28.89 OTHER SPECIFIED DISORDERS OF KIDNEY AND URETER: ICD-10-CM

## 2023-07-05 DIAGNOSIS — N40.0 BPH WITHOUT OBSTRUCTION/LOWER URINARY TRACT SYMPTOMS: ICD-10-CM

## 2023-07-05 LAB
BILIRUB SERPL-MCNC: NEGATIVE MG/DL
BLOOD URINE, POC: NEGATIVE
CLARITY, POC UA: CLEAR
COLOR, POC UA: YELLOW
GLUCOSE UR QL STRIP: NEGATIVE
KETONES UR QL STRIP: NEGATIVE
LEUKOCYTE ESTERASE URINE, POC: NEGATIVE
NITRITE, POC UA: NEGATIVE
PH, POC UA: 7
PROTEIN, POC: NEGATIVE
SPECIFIC GRAVITY, POC UA: 1.02
UROBILINOGEN, POC UA: NORMAL

## 2023-07-05 PROCEDURE — 99204 OFFICE O/P NEW MOD 45 MIN: CPT | Mod: S$GLB,,, | Performed by: UROLOGY

## 2023-07-05 PROCEDURE — 3078F PR MOST RECENT DIASTOLIC BLOOD PRESSURE < 80 MM HG: ICD-10-PCS | Mod: CPTII,S$GLB,, | Performed by: UROLOGY

## 2023-07-05 PROCEDURE — 3074F SYST BP LT 130 MM HG: CPT | Mod: CPTII,S$GLB,, | Performed by: UROLOGY

## 2023-07-05 PROCEDURE — 81002 URINALYSIS NONAUTO W/O SCOPE: CPT | Mod: S$GLB,,, | Performed by: UROLOGY

## 2023-07-05 PROCEDURE — 81002 POCT URINE DIPSTICK WITHOUT MICROSCOPE: ICD-10-PCS | Mod: S$GLB,,, | Performed by: UROLOGY

## 2023-07-05 PROCEDURE — 99999 PR PBB SHADOW E&M-EST. PATIENT-LVL V: CPT | Mod: PBBFAC,,, | Performed by: UROLOGY

## 2023-07-05 PROCEDURE — 4010F PR ACE/ARB THEARPY RXD/TAKEN: ICD-10-PCS | Mod: CPTII,S$GLB,, | Performed by: UROLOGY

## 2023-07-05 PROCEDURE — 3044F HG A1C LEVEL LT 7.0%: CPT | Mod: CPTII,S$GLB,, | Performed by: UROLOGY

## 2023-07-05 PROCEDURE — 3008F BODY MASS INDEX DOCD: CPT | Mod: CPTII,S$GLB,, | Performed by: UROLOGY

## 2023-07-05 PROCEDURE — 99204 PR OFFICE/OUTPT VISIT, NEW, LEVL IV, 45-59 MIN: ICD-10-PCS | Mod: S$GLB,,, | Performed by: UROLOGY

## 2023-07-05 PROCEDURE — 99999 PR PBB SHADOW E&M-EST. PATIENT-LVL V: ICD-10-PCS | Mod: PBBFAC,,, | Performed by: UROLOGY

## 2023-07-05 PROCEDURE — 3044F PR MOST RECENT HEMOGLOBIN A1C LEVEL <7.0%: ICD-10-PCS | Mod: CPTII,S$GLB,, | Performed by: UROLOGY

## 2023-07-05 PROCEDURE — 4010F ACE/ARB THERAPY RXD/TAKEN: CPT | Mod: CPTII,S$GLB,, | Performed by: UROLOGY

## 2023-07-05 PROCEDURE — 3074F PR MOST RECENT SYSTOLIC BLOOD PRESSURE < 130 MM HG: ICD-10-PCS | Mod: CPTII,S$GLB,, | Performed by: UROLOGY

## 2023-07-05 PROCEDURE — 3078F DIAST BP <80 MM HG: CPT | Mod: CPTII,S$GLB,, | Performed by: UROLOGY

## 2023-07-05 PROCEDURE — 3008F PR BODY MASS INDEX (BMI) DOCUMENTED: ICD-10-PCS | Mod: CPTII,S$GLB,, | Performed by: UROLOGY

## 2023-07-05 NOTE — PROGRESS NOTES
Chief Complaint:   Encounter Diagnoses   Name Primary?    Renal cyst, right Yes    Other specified disorders of kidney and ureter     BPH without obstruction/lower urinary tract symptoms        HPI:  HPI Cedric Santoro bassam 63 y.o. male who presents with   Incidental discovery of  renal cyst.  Patient was undergoing evaluation for elevated LFTs when an abdominal ultrasound showed this finding.  Patient was referred for evaluation.  He denies any abdominal pain or flank pain.  He is had no history of kidney stones.  He does have some mild lower urinary tract symptoms including nocturia x1.  These are not bothersome.    History:  Past Medical History:   Diagnosis Date    Abnormal ECG 10/4/2022    Essential hypertension 2/24/2020    Hypertension     MVP (mitral valve prolapse) 10/26/2022    Nonrheumatic mitral valve regurgitation 10/26/2022    Palpitation 3/5/2020    Paroxysmal atrial fibrillation 3/5/2020    Paroxysmal atrial flutter 10/4/2022    Raynaud's syndrome     Vasovagal syncopes      Past Surgical History:   Procedure Laterality Date    ABLATION, ATRIAL FLUTTER, TYPICAL N/A 12/15/2022    Procedure: Ablation, Atrial Flutter, Typical;  Surgeon: Matty Hendricks MD;  Location: [facility] EP LAB;  Service: Cardiology;  Laterality: N/A;  AFL, RFA, NICOLASA, ARIELA, anes, MB, 3 Prep    ankle/foot surgery      ECHOCARDIOGRAM,TRANSESOPHAGEAL N/A 12/15/2022    Procedure: Transesophageal echo (ARIELA) intra-procedure log documentation;  Surgeon: Nisreen Fenton MD;  Location: [facility] EP LAB;  Service: Cardiology;  Laterality: N/A;    EYE SURGERY      GALLBLADDER SURGERY      HERNIA REPAIR      LEFT HEART CATHETERIZATION Left 5/24/2023    Procedure: Left heart cath;  Surgeon: Kal Garzon MD;  Location: [facility] CATH LAB;  Service: Cardiology;  Laterality: Left;  may need to sign consent//    THROAT SURGERY      TRANSESOPHAGEAL ECHOCARDIOGRAPHY N/A 2/27/2023    Procedure: ECHOCARDIOGRAM, TRANSESOPHAGEAL;  Surgeon: Samuel Del Valle MD;   "Location: [facility] CATH LAB;  Service: Cardiology;  Laterality: N/A;    TREATMENT OF CARDIAC ARRHYTHMIA  12/15/2022    Procedure: Cardioversion or Defibrillation;  Surgeon: Matty Hendricks MD;  Location: [facility] EP LAB;  Service: Cardiology;;     Family History   Problem Relation Age of Onset    Pacemaker/defibrilator Father     Macular degeneration Father     Macular degeneration Brother        Current Outpatient Medications on File Prior to Visit   Medication Sig Dispense Refill    apixaban (ELIQUIS) 5 mg Tab Take 1 tablet (5 mg total) by mouth 2 (two) times daily. 60 tablet 12    atenoloL (TENORMIN) 50 MG tablet Take 1 tablet (50 mg total) by mouth once daily. 90 tablet 3    b complex vitamins tablet Take 1 tablet by mouth once daily.      baclofen (LIORESAL) 10 MG tablet Take 1 tablet (10 mg total) by mouth 3 (three) times daily. 90 tablet 2    co-enzyme Q-10 30 mg capsule Take 30 mg by mouth 3 (three) times daily.      DULoxetine (CYMBALTA) 60 MG capsule Take 60 mg by mouth every morning.      ferrous sulfate 325 (65 FE) MG EC tablet Take 325 mg by mouth 3 (three) times daily with meals.      furosemide (LASIX) 20 MG tablet Take 2 tablets (40 mg total) by mouth once daily. 90 tablet 1    levothyroxine (SYNTHROID) 50 MCG tablet Take 1 tablet (50 mcg total) by mouth every Mon, Tues, Wed, Thurs, Fri. 64 tablet 1    losartan (COZAAR) 50 MG tablet TAKE 1 TABLET BY MOUTH EVERY DAY 90 tablet 3    magnesium 30 mg Tab Take 1 tablet by mouth once.      multivitamin (THERAGRAN) per tablet Take 1 tablet by mouth once daily.      vitamin D (VITAMIN D3) 1000 units Tab Take 1,000 Units by mouth once daily.       No current facility-administered medications on file prior to visit.        Objective:     Vitals:    07/05/23 1055   BP: 103/68   BP Location: Right arm   Patient Position: Sitting   Pulse: 76   Resp: 16   Weight: 94.8 kg (208 lb 15.9 oz)   Height: 5' 10" (1.778 m)      BMI Readings from Last 1 Encounters:   07/05/23 " 29.99 kg/m²          Physical Exam      No acute distress oriented x3   Respirations even unlabored  Abdomen is soft nontender   Prostate enlarged approximately 50 g with smooth no nodules   Abdomen is without any palpable mass no CVA tenderness      Lab Results   Component Value Date    PSA 3.1 04/19/2023    PSA 3.0 04/12/2022    PSA 2.4 02/05/2020        Lab Results   Component Value Date    CREATININE 1.4 06/08/2023     US Abdomen Complete  Narrative: EXAMINATION:  US ABDOMEN COMPLETE    CLINICAL HISTORY:  Other specified abnormal findings of blood chemistry    TECHNIQUE:  Complete abdominal ultrasound (including pancreas, liver, gallbladder, common bile duct, spleen, aorta, IVC, and kidneys) was performed.    COMPARISON:  None    FINDINGS:  Pancreas: The visualized portions of pancreas appear normal .  Note that the pancreas is partially obscured by bowel gas    Aorta: No aneurysm visualized.    Inferior vena cava: Visualized portions are normal in appearance.    Liver: Normal in size, measuring 15.8 cm. Homogeneous echotexture with no focal hepatic lesions visualized.    Biliary system: The gallbladder has no calculi. No gallbladder wall thickening.  Sonographic Olivares sign is reported as negative.  No pericholecystic fluid. The common duct is not dilated, measuring 2.3 mm.  No intrahepatic ductal dilatation.    Right kidney: Normal in size, measuring 11.5 cm with no hydronephrosis.There is a complex exophytic cyst measuring 6 cm arising from the upper pole of the right kidney which contains some low level internal echogenic material as well as possible thin internal septation.    Left kidney: Normal in size, measuring 10.6 cm with no hydronephrosis.    Spleen: Normal in size, measuring 11.9 cm with a homogeneous echotexture.    Miscellaneous: No ascites.  Impression: 1. Complex right upper pole renal cyst measuring 6 cm.  Recommend further characterization with abdominal CT renal mass protocol with and without  contrast.  This report was flagged in Epic as abnormal.    Electronically signed by: Dionicio Jose MD  Date:    06/23/2023  Time:    15:30     Assessment:       1. Renal cyst, right    2. Other specified disorders of kidney and ureter    3. BPH without obstruction/lower urinary tract symptoms        Plan:     1. Renal cyst, right    2. Other specified disorders of kidney and ureter    3. BPH without obstruction/lower urinary tract symptoms        Ultrasound was reviewed and I recommend CT renal mass protocol.  This will be ordered and follow-up next week for evaluation.  He does have BPH but no significant bothersome symptoms.  Will recommend annual prostate screen.

## 2023-07-05 NOTE — LETTER
July 5, 2023        Tessa Kelly PA-C  [address] [place] [address]             [facility]  [address]  [place] [address]  Phone: [phone]  Fax: [phone]   Patient: Cedric Santoro   MR Number: [id number]   YOB: 1960   Date of Visit: 7/5/2023       Dear Dr. Kelly:    Thank you for referring Cedric Santoro to me for evaluation.  I have reviewed his imaging and recommend CT for further evaluation.  I will arrange this   And follow-up.    If you have questions, please do not hesitate to call me. I look forward to following Cedric Santoro along with you.    Sincerely,      José Miguel Cintron MD            CC  No Recipients    Enclosure

## 2023-07-06 ENCOUNTER — PATIENT MESSAGE (OUTPATIENT)
Dept: CARDIOLOGY | Facility: CLINIC | Age: 63
End: 2023-07-06
Payer: COMMERCIAL

## 2023-07-06 RX ORDER — BACLOFEN 10 MG/1
TABLET ORAL
Qty: 270 TABLET | Refills: 0 | Status: SHIPPED | OUTPATIENT
Start: 2023-07-06 | End: 2023-07-26

## 2023-07-06 NOTE — TELEPHONE ENCOUNTER
Refill Routing Note   Medication(s) are not appropriate for processing by [facility] for the following reason(s):      Medication outside of protocol  Non-participating provider    [facility] action(s):  Route Care Due:  None identified          Appointments  past 12m or future 3m with PCP    Date Provider   Last Visit   6/9/2023 Monserrat Asif MD   Next Visit   Visit date not found Monserrat Asif MD   ED visits in past 90 days: 0        Note composed:11:39 AM 07/06/2023

## 2023-07-07 ENCOUNTER — PATIENT MESSAGE (OUTPATIENT)
Dept: UROLOGY | Facility: CLINIC | Age: 63
End: 2023-07-07
Payer: COMMERCIAL

## 2023-07-07 ENCOUNTER — HOSPITAL ENCOUNTER (OUTPATIENT)
Dept: RADIOLOGY | Facility: HOSPITAL | Age: 63
Discharge: HOME | End: 2023-07-07
Attending: UROLOGY
Payer: COMMERCIAL

## 2023-07-07 ENCOUNTER — TELEPHONE (OUTPATIENT)
Dept: UROLOGY | Facility: CLINIC | Age: 63
End: 2023-07-07
Payer: COMMERCIAL

## 2023-07-07 DIAGNOSIS — N28.89 OTHER SPECIFIED DISORDERS OF KIDNEY AND URETER: ICD-10-CM

## 2023-07-07 RX ORDER — PREDNISONE 50 MG/1
50 TABLET ORAL DAILY
Qty: 3 TABLET | Refills: 0 | Status: SHIPPED | OUTPATIENT
Start: 2023-07-07 | End: 2023-07-10

## 2023-07-07 RX ORDER — DIPHENHYDRAMINE HCL 50 MG
50 CAPSULE ORAL ONCE
Qty: 1 CAPSULE | Refills: 0 | Status: SHIPPED | OUTPATIENT
Start: 2023-07-07 | End: 2023-07-07

## 2023-07-07 NOTE — TELEPHONE ENCOUNTER
Spoke with CT and had them reschedule to Monday for pt to do prep.    Spoke with pt and advised of prep prior to CT and meds sent to pharmacy. Pt v/u    Kary MATTHEW RN

## 2023-07-07 NOTE — TELEPHONE ENCOUNTER
----- Message from José Miguel Cintron MD sent at 7/7/2023  1:35 PM CDT -----  Regarding: contrast allergy ct prep  Please contact the patient and notify he needs to take the medications that were prescribed for contrast allergy prior to his court.  Please review these instructions with him.  I have placed the order already.

## 2023-07-10 ENCOUNTER — HOSPITAL ENCOUNTER (OUTPATIENT)
Dept: RADIOLOGY | Facility: HOSPITAL | Age: 63
Discharge: HOME | End: 2023-07-10
Attending: UROLOGY
Payer: COMMERCIAL

## 2023-07-10 PROCEDURE — 74170 CT ABD WO CNTRST FLWD CNTRST: CPT | Mod: TC

## 2023-07-10 PROCEDURE — 74170 CT ABD WO CNTRST FLWD CNTRST: CPT | Mod: 26,,, | Performed by: RADIOLOGY

## 2023-07-10 PROCEDURE — 25500020 PHARM REV CODE 255: Performed by: UROLOGY

## 2023-07-10 PROCEDURE — 74170 CT ABDOMEN W WO CONTRAST: ICD-10-PCS | Mod: 26,,, | Performed by: RADIOLOGY

## 2023-07-10 RX ADMIN — IOHEXOL 100 ML: 350 INJECTION, SOLUTION INTRAVENOUS at 02:07

## 2023-07-11 ENCOUNTER — OFFICE VISIT (OUTPATIENT)
Dept: UROLOGY | Facility: CLINIC | Age: 63
End: 2023-07-11
Payer: COMMERCIAL

## 2023-07-11 VITALS
HEART RATE: 70 BPM | BODY MASS INDEX: 30.31 KG/M2 | WEIGHT: 211.75 LBS | DIASTOLIC BLOOD PRESSURE: 68 MMHG | SYSTOLIC BLOOD PRESSURE: 114 MMHG | HEIGHT: 70 IN

## 2023-07-11 DIAGNOSIS — N28.1 RENAL CYST, RIGHT: Primary | ICD-10-CM

## 2023-07-11 DIAGNOSIS — R33.9 INCOMPLETE EMPTYING OF BLADDER: ICD-10-CM

## 2023-07-11 DIAGNOSIS — N40.0 BPH WITHOUT OBSTRUCTION/LOWER URINARY TRACT SYMPTOMS: ICD-10-CM

## 2023-07-11 DIAGNOSIS — N13.8 BPH WITH URINARY OBSTRUCTION: ICD-10-CM

## 2023-07-11 DIAGNOSIS — N40.1 BPH WITH URINARY OBSTRUCTION: ICD-10-CM

## 2023-07-11 LAB — COPPER SERPL-MCNC: 668 UG/L (ref 665–1480)

## 2023-07-11 PROCEDURE — 99999 PR PBB SHADOW E&M-EST. PATIENT-LVL III: CPT | Mod: PBBFAC,,, | Performed by: UROLOGY

## 2023-07-11 PROCEDURE — 1159F MED LIST DOCD IN RCRD: CPT | Mod: CPTII,S$GLB,, | Performed by: UROLOGY

## 2023-07-11 PROCEDURE — 99999 PR PBB SHADOW E&M-EST. PATIENT-LVL III: ICD-10-PCS | Mod: PBBFAC,,, | Performed by: UROLOGY

## 2023-07-11 PROCEDURE — 3044F HG A1C LEVEL LT 7.0%: CPT | Mod: CPTII,S$GLB,, | Performed by: UROLOGY

## 2023-07-11 PROCEDURE — 51798 PR MEAS,POST-VOID RES,US,NON-IMAGING: ICD-10-PCS | Mod: S$GLB,,, | Performed by: UROLOGY

## 2023-07-11 PROCEDURE — 3078F DIAST BP <80 MM HG: CPT | Mod: CPTII,S$GLB,, | Performed by: UROLOGY

## 2023-07-11 PROCEDURE — 3008F PR BODY MASS INDEX (BMI) DOCUMENTED: ICD-10-PCS | Mod: CPTII,S$GLB,, | Performed by: UROLOGY

## 2023-07-11 PROCEDURE — 3074F PR MOST RECENT SYSTOLIC BLOOD PRESSURE < 130 MM HG: ICD-10-PCS | Mod: CPTII,S$GLB,, | Performed by: UROLOGY

## 2023-07-11 PROCEDURE — 4010F ACE/ARB THERAPY RXD/TAKEN: CPT | Mod: CPTII,S$GLB,, | Performed by: UROLOGY

## 2023-07-11 PROCEDURE — 3078F PR MOST RECENT DIASTOLIC BLOOD PRESSURE < 80 MM HG: ICD-10-PCS | Mod: CPTII,S$GLB,, | Performed by: UROLOGY

## 2023-07-11 PROCEDURE — 3044F PR MOST RECENT HEMOGLOBIN A1C LEVEL <7.0%: ICD-10-PCS | Mod: CPTII,S$GLB,, | Performed by: UROLOGY

## 2023-07-11 PROCEDURE — 99214 OFFICE O/P EST MOD 30 MIN: CPT | Mod: S$GLB,,, | Performed by: UROLOGY

## 2023-07-11 PROCEDURE — 3008F BODY MASS INDEX DOCD: CPT | Mod: CPTII,S$GLB,, | Performed by: UROLOGY

## 2023-07-11 PROCEDURE — 1159F PR MEDICATION LIST DOCUMENTED IN MEDICAL RECORD: ICD-10-PCS | Mod: CPTII,S$GLB,, | Performed by: UROLOGY

## 2023-07-11 PROCEDURE — 99214 PR OFFICE/OUTPT VISIT, EST, LEVL IV, 30-39 MIN: ICD-10-PCS | Mod: S$GLB,,, | Performed by: UROLOGY

## 2023-07-11 PROCEDURE — 3074F SYST BP LT 130 MM HG: CPT | Mod: CPTII,S$GLB,, | Performed by: UROLOGY

## 2023-07-11 PROCEDURE — 51798 US URINE CAPACITY MEASURE: CPT | Mod: S$GLB,,, | Performed by: UROLOGY

## 2023-07-11 PROCEDURE — 4010F PR ACE/ARB THEARPY RXD/TAKEN: ICD-10-PCS | Mod: CPTII,S$GLB,, | Performed by: UROLOGY

## 2023-07-11 RX ORDER — TAMSULOSIN HYDROCHLORIDE 0.4 MG/1
0.4 CAPSULE ORAL DAILY
Qty: 90 CAPSULE | Refills: 3 | Status: SHIPPED | OUTPATIENT
Start: 2023-07-11 | End: 2024-07-10

## 2023-07-11 NOTE — PROGRESS NOTES
Chief Complaint:   Encounter Diagnoses   Name Primary?    Renal cyst, right Yes    Incomplete emptying of bladder     BPH without obstruction/lower urinary tract symptoms        HPI:  HPI Cedric Santoro bassam 63 y.o. male who presents with   Incidental discovery of  renal cyst.  Patient was undergoing evaluation for elevated LFTs when an abdominal ultrasound showed this finding.  Patient was referred for evaluation.  He denies any abdominal pain or flank pain.  He is had no history of kidney stones.  He does have some mild lower urinary tract symptoms including nocturia x1.  These are not bothersome.    He returns after having a CT urogram that verifies the cyst is in fact simple.  On the CT there is note of severely distended bladder.      History:  Past Medical History:   Diagnosis Date    Abnormal ECG 10/4/2022    Essential hypertension 2/24/2020    Hypertension     MVP (mitral valve prolapse) 10/26/2022    Nonrheumatic mitral valve regurgitation 10/26/2022    Palpitation 3/5/2020    Paroxysmal atrial fibrillation 3/5/2020    Paroxysmal atrial flutter 10/4/2022    Raynaud's syndrome     Vasovagal syncopes      Past Surgical History:   Procedure Laterality Date    ABLATION, ATRIAL FLUTTER, TYPICAL N/A 12/15/2022    Procedure: Ablation, Atrial Flutter, Typical;  Surgeon: Matty Hendricks MD;  Location: [facility] EP LAB;  Service: Cardiology;  Laterality: N/A;  AFL, RFA, NICOLASA, ARIELA, RADHA mazariegos, 3 Prep    ankle/foot surgery      ECHOCARDIOGRAM,TRANSESOPHAGEAL N/A 12/15/2022    Procedure: Transesophageal echo (ARIELA) intra-procedure log documentation;  Surgeon: Nisreen Fenton MD;  Location: [facility] EP LAB;  Service: Cardiology;  Laterality: N/A;    EYE SURGERY      GALLBLADDER SURGERY      HERNIA REPAIR      LEFT HEART CATHETERIZATION Left 5/24/2023    Procedure: Left heart cath;  Surgeon: Kal Garzon MD;  Location: [facility] CATH LAB;  Service: Cardiology;  Laterality: Left;  may need to sign consent//    THROAT SURGERY       TRANSESOPHAGEAL ECHOCARDIOGRAPHY N/A 2023    Procedure: ECHOCARDIOGRAM, TRANSESOPHAGEAL;  Surgeon: Samuel Del Valle MD;  Location: [facility] CATH LAB;  Service: Cardiology;  Laterality: N/A;    TREATMENT OF CARDIAC ARRHYTHMIA  12/15/2022    Procedure: Cardioversion or Defibrillation;  Surgeon: Matty Hendricks MD;  Location: [facility] EP LAB;  Service: Cardiology;;     Family History   Problem Relation Age of Onset    Pacemaker/defibrilator Father     Macular degeneration Father     Macular degeneration Brother        Current Outpatient Medications on File Prior to Visit   Medication Sig Dispense Refill    apixaban (ELIQUIS) 5 mg Tab Take 1 tablet (5 mg total) by mouth 2 (two) times daily. 60 tablet 12    atenoloL (TENORMIN) 50 MG tablet Take 1 tablet (50 mg total) by mouth once daily. 90 tablet 3    b complex vitamins tablet Take 1 tablet by mouth once daily.      baclofen (LIORESAL) 10 MG tablet TAKE 1 TABLET BY MOUTH THREE TIMES A  tablet 0    co-enzyme Q-10 30 mg capsule Take 30 mg by mouth 3 (three) times daily.      DULoxetine (CYMBALTA) 60 MG capsule Take 60 mg by mouth every morning.      ferrous sulfate 325 (65 FE) MG EC tablet Take 325 mg by mouth 3 (three) times daily with meals.      furosemide (LASIX) 20 MG tablet Take 2 tablets (40 mg total) by mouth once daily. 90 tablet 1    levothyroxine (SYNTHROID) 50 MCG tablet Take 1 tablet (50 mcg total) by mouth every Mon, Tu, Wed, Th, Fri. 64 tablet 1    losartan (COZAAR) 50 MG tablet TAKE 1 TABLET BY MOUTH EVERY DAY 90 tablet 3    magnesium 30 mg Tab Take 1 tablet by mouth once.      multivitamin (THERAGRAN) per tablet Take 1 tablet by mouth once daily.      [] predniSONE (DELTASONE) 50 MG Tab Take 1 tablet (50 mg total) by mouth once daily. 13 hour prep for a CT scan.  Patient needs to take 50 mg tablet 13 hours before.  Then take her second tablet 7 hours before.  Then the final tablet 1 hour before her CT scan. for 3 doses 3 tablet 0     vitamin D (VITAMIN D3) 1000 units Tab Take 1,000 Units by mouth once daily.       Current Facility-Administered Medications on File Prior to Visit   Medication Dose Route Frequency Provider Last Rate Last Admin    [COMPLETED] iohexoL (OMNIPAQUE 350) injection 100 mL  100 mL Intravenous ONCE PRN José Miguel Cintron MD   100 mL at 07/10/23 1429        Objective:   There were no vitals filed for this visit.   BMI Readings from Last 1 Encounters:   07/05/23 29.99 kg/m²          Physical Exam    No acute distress alert and oriented   Abdomen is soft      Lab Results   Component Value Date    PSA 3.1 04/19/2023    PSA 3.0 04/12/2022    PSA 2.4 02/05/2020        Lab Results   Component Value Date    CREATININE 1.4 06/08/2023      PVR:    CT Abdomen With Without Contrast  Narrative: EXAMINATION:  CT ABDOMEN W WO CONTRAST    CLINICAL HISTORY:  Other specified disorders of kidney and ureterRenal mass;    TECHNIQUE:  Axial CT imaging was performed through the abdomen without and with 100cc  of intravenous contrast. Multiplanar reformats were performed and interpreted.    COMPARISON:  Abdominal ultrasound 06/23/2023    FINDINGS:  Lung bases are clear.    There is a benign right upper pole renal cyst that protrudes into the upper pole medulla measuring up to 5.6 cm in craniocaudal dimension by 3.8 x 4.1 cm.  The cyst splays the upper pole collecting system which is otherwise within normal limits.  No hydronephrosis.  There is a subcentimeter benign left lower pole renal cyst with a faint wall calcification.  No perinephric stranding.  No urolithiasis.    The gallbladder has been removed.  The remaining upper abdominal organs are within normal limits.    No free fluid, free air, or inflammatory change.    The visualized bowel is normal.    The abdominal aorta is normal in caliber.    Multilevel thoracolumbar spondylosis.  Grade 1 anterolisthesis at L4-5 with severe degenerative disc disease and facet DJD.    No significant osseous  abnormality is identified.  Impression: Benign bilateral renal cysts.    All CT scans at this facility are performed  using dose modulation techniques as appropriate to performed exam including the following:  automated exposure control; adjustment of mA and/or kV according to the patients size (this includes techniques or standardized protocols for targeted exams where dose is matched to indication/reason for exam: i.e. extremities or head);  iterative reconstruction technique.    Electronically signed by: Abner Rothman MD  Date:    07/10/2023  Time:    15:00     Assessment:       1. Renal cyst, right    2. Incomplete emptying of bladder    3. BPH without obstruction/lower urinary tract symptoms        Plan:     1. Renal cyst, right    2. Incomplete emptying of bladder    3. BPH without obstruction/lower urinary tract symptoms         I have personally reviewed his CT scan.  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  Third does appear to be some mild dilation of the side.  I suspect this could be due to chronically distended bladder.  I have reassured the patient that no further follow-up or imaging is needed for this cyst.  Appears to have BPH with chronic incomplete emptying.  We will start tamsulosin and re-evaluate bladder scan in 3 months.

## 2023-07-18 ENCOUNTER — OFFICE VISIT (OUTPATIENT)
Dept: HEPATOLOGY | Facility: CLINIC | Age: 63
End: 2023-07-18
Attending: INTERNAL MEDICINE
Payer: COMMERCIAL

## 2023-07-18 ENCOUNTER — TELEPHONE (OUTPATIENT)
Dept: HEPATOLOGY | Facility: CLINIC | Age: 63
End: 2023-07-18
Payer: COMMERCIAL

## 2023-07-18 VITALS — BODY MASS INDEX: 29.67 KG/M2 | WEIGHT: 207.25 LBS | HEIGHT: 70 IN

## 2023-07-18 DIAGNOSIS — R79.89 ELEVATED LFTS: ICD-10-CM

## 2023-07-18 PROCEDURE — 99499 NO LOS: ICD-10-PCS | Mod: S$GLB,,, | Performed by: NURSE PRACTITIONER

## 2023-07-18 PROCEDURE — 99999 PR PBB SHADOW E&M-EST. PATIENT-LVL III: ICD-10-PCS | Mod: PBBFAC,,, | Performed by: NURSE PRACTITIONER

## 2023-07-18 PROCEDURE — 99999 PR PBB SHADOW E&M-EST. PATIENT-LVL III: CPT | Mod: PBBFAC,,, | Performed by: NURSE PRACTITIONER

## 2023-07-18 PROCEDURE — 76981 PR US, ELASTOGRAPHY, PARENCHYMA: ICD-10-PCS | Mod: S$GLB,,, | Performed by: NURSE PRACTITIONER

## 2023-07-18 PROCEDURE — 76981 USE PARENCHYMA: CPT | Mod: S$GLB,,, | Performed by: NURSE PRACTITIONER

## 2023-07-18 PROCEDURE — 99499 UNLISTED E&M SERVICE: CPT | Mod: S$GLB,,, | Performed by: NURSE PRACTITIONER

## 2023-07-18 NOTE — PROGRESS NOTES
Patient presented in clinic today for fibroscan examination of their liver. Patient verbalized that they have fasted 4 hours prior to their examination. Patient denies having any active implantable metal devices; such as a pacemaker, defibrillator, or pump.     SEGUNDO GomezN, RN  RN Nurse Navigator  Hepatology Department  [facility]

## 2023-07-18 NOTE — PROGRESS NOTES
Fibroscan Procedure     Name: Cedric Santoro  Date of Procedure : 2023   :: NAHUM Soliz  Diagnosis: other    Probe: M    Fibroscan readin.9 KPa    Fibrosis:F 0-1     CAP readin dB/m    Steatosis: :S1       Interpretation:   Mild steatosis without fibrosis

## 2023-07-18 NOTE — TELEPHONE ENCOUNTER
----- Message from NAHUM Andersen sent at 7/18/2023  1:41 PM CDT -----  Mild steatosis without fibrosis

## 2023-07-20 ENCOUNTER — PATIENT MESSAGE (OUTPATIENT)
Dept: CARDIOLOGY | Facility: CLINIC | Age: 63
End: 2023-07-20
Payer: COMMERCIAL

## 2023-07-26 ENCOUNTER — OFFICE VISIT (OUTPATIENT)
Dept: FAMILY MEDICINE | Facility: CLINIC | Age: 63
End: 2023-07-26
Attending: FAMILY MEDICINE
Payer: COMMERCIAL

## 2023-07-26 VITALS
WEIGHT: 208 LBS | DIASTOLIC BLOOD PRESSURE: 70 MMHG | HEIGHT: 70 IN | BODY MASS INDEX: 29.78 KG/M2 | TEMPERATURE: 97 F | HEART RATE: 59 BPM | SYSTOLIC BLOOD PRESSURE: 108 MMHG | OXYGEN SATURATION: 96 %

## 2023-07-26 DIAGNOSIS — F43.21 GRIEF: ICD-10-CM

## 2023-07-26 DIAGNOSIS — M50.30 DDD (DEGENERATIVE DISC DISEASE), CERVICAL: ICD-10-CM

## 2023-07-26 DIAGNOSIS — K76.0 STEATOSIS OF LIVER: ICD-10-CM

## 2023-07-26 DIAGNOSIS — I48.0 PAROXYSMAL ATRIAL FIBRILLATION: Primary | ICD-10-CM

## 2023-07-26 DIAGNOSIS — E03.1 CONGENITAL HYPOTHYROIDISM WITHOUT GOITER: ICD-10-CM

## 2023-07-26 DIAGNOSIS — N18.31 STAGE 3A CHRONIC KIDNEY DISEASE (CKD): ICD-10-CM

## 2023-07-26 PROBLEM — R94.31 PROLONGED Q-T INTERVAL ON ECG: Status: RESOLVED | Noted: 2020-02-13 | Resolved: 2023-07-26

## 2023-07-26 PROBLEM — R06.09 DOE (DYSPNEA ON EXERTION): Status: RESOLVED | Noted: 2022-10-04 | Resolved: 2023-07-26

## 2023-07-26 PROBLEM — E03.9 HYPOTHYROID: Status: ACTIVE | Noted: 2023-07-26

## 2023-07-26 PROBLEM — R60.0 EDEMA OF BOTH LEGS: Status: RESOLVED | Noted: 2022-10-04 | Resolved: 2023-07-26

## 2023-07-26 PROBLEM — R94.31 ABNORMAL ECG: Status: RESOLVED | Noted: 2022-10-04 | Resolved: 2023-07-26

## 2023-07-26 PROBLEM — R00.2 PALPITATION: Status: RESOLVED | Noted: 2020-03-05 | Resolved: 2023-07-26

## 2023-07-26 PROBLEM — Z79.01 CHRONIC ANTICOAGULATION: Status: RESOLVED | Noted: 2023-04-19 | Resolved: 2023-07-26

## 2023-07-26 PROBLEM — R74.8 ELEVATED LIVER ENZYMES: Status: RESOLVED | Noted: 2020-02-05 | Resolved: 2023-07-26

## 2023-07-26 PROCEDURE — 1160F RVW MEDS BY RX/DR IN RCRD: CPT | Mod: CPTII,S$GLB,, | Performed by: FAMILY MEDICINE

## 2023-07-26 PROCEDURE — 99214 OFFICE O/P EST MOD 30 MIN: CPT | Mod: S$GLB,,, | Performed by: FAMILY MEDICINE

## 2023-07-26 PROCEDURE — 3008F BODY MASS INDEX DOCD: CPT | Mod: CPTII,S$GLB,, | Performed by: FAMILY MEDICINE

## 2023-07-26 PROCEDURE — 3074F SYST BP LT 130 MM HG: CPT | Mod: CPTII,S$GLB,, | Performed by: FAMILY MEDICINE

## 2023-07-26 PROCEDURE — 3074F PR MOST RECENT SYSTOLIC BLOOD PRESSURE < 130 MM HG: ICD-10-PCS | Mod: CPTII,S$GLB,, | Performed by: FAMILY MEDICINE

## 2023-07-26 PROCEDURE — 1159F MED LIST DOCD IN RCRD: CPT | Mod: CPTII,S$GLB,, | Performed by: FAMILY MEDICINE

## 2023-07-26 PROCEDURE — 3044F PR MOST RECENT HEMOGLOBIN A1C LEVEL <7.0%: ICD-10-PCS | Mod: CPTII,S$GLB,, | Performed by: FAMILY MEDICINE

## 2023-07-26 PROCEDURE — 3044F HG A1C LEVEL LT 7.0%: CPT | Mod: CPTII,S$GLB,, | Performed by: FAMILY MEDICINE

## 2023-07-26 PROCEDURE — 3008F PR BODY MASS INDEX (BMI) DOCUMENTED: ICD-10-PCS | Mod: CPTII,S$GLB,, | Performed by: FAMILY MEDICINE

## 2023-07-26 PROCEDURE — 4010F PR ACE/ARB THEARPY RXD/TAKEN: ICD-10-PCS | Mod: CPTII,S$GLB,, | Performed by: FAMILY MEDICINE

## 2023-07-26 PROCEDURE — 3078F DIAST BP <80 MM HG: CPT | Mod: CPTII,S$GLB,, | Performed by: FAMILY MEDICINE

## 2023-07-26 PROCEDURE — 4010F ACE/ARB THERAPY RXD/TAKEN: CPT | Mod: CPTII,S$GLB,, | Performed by: FAMILY MEDICINE

## 2023-07-26 PROCEDURE — 3078F PR MOST RECENT DIASTOLIC BLOOD PRESSURE < 80 MM HG: ICD-10-PCS | Mod: CPTII,S$GLB,, | Performed by: FAMILY MEDICINE

## 2023-07-26 PROCEDURE — 99999 PR PBB SHADOW E&M-EST. PATIENT-LVL V: CPT | Mod: PBBFAC,,, | Performed by: FAMILY MEDICINE

## 2023-07-26 PROCEDURE — 1159F PR MEDICATION LIST DOCUMENTED IN MEDICAL RECORD: ICD-10-PCS | Mod: CPTII,S$GLB,, | Performed by: FAMILY MEDICINE

## 2023-07-26 PROCEDURE — 99214 PR OFFICE/OUTPT VISIT, EST, LEVL IV, 30-39 MIN: ICD-10-PCS | Mod: S$GLB,,, | Performed by: FAMILY MEDICINE

## 2023-07-26 PROCEDURE — 99999 PR PBB SHADOW E&M-EST. PATIENT-LVL V: ICD-10-PCS | Mod: PBBFAC,,, | Performed by: FAMILY MEDICINE

## 2023-07-26 PROCEDURE — 1160F PR REVIEW ALL MEDS BY PRESCRIBER/CLIN PHARMACIST DOCUMENTED: ICD-10-PCS | Mod: CPTII,S$GLB,, | Performed by: FAMILY MEDICINE

## 2023-07-26 NOTE — PROGRESS NOTES
Subjective:       Patient ID: Cedric Santoro is a 63 y.o. male.    Chief Complaint: Establish Care    63 y old male here to get est . On Cymbalta for Cervical DDD, sees Cardiology in [place] . In the process of starting  statin . Under Hepatology's care for liver steatosis . Going thru  a grieving process . He lost his wife last year . No s/I . He struggles with insomnia and anhedonia. He might start psychotherapy thru his employer .Current Opth exam . No symptoms of thyroid dysregulation nor history of thyroid nodules . Also sees Urology for BPH .   Review of Systems   Constitutional: Negative.    HENT: Negative.     Eyes: Negative.    Respiratory: Negative.     Cardiovascular: Negative.    Gastrointestinal: Negative.    Genitourinary: Negative.    Musculoskeletal: Negative.    Skin: Negative.    Hematological: Negative.    Psychiatric/Behavioral:  Positive for dysphoric mood.      Objective:      Physical Exam  Constitutional:       General: He is not in acute distress.     Appearance: He is well-developed. He is not diaphoretic.   HENT:      Head: Normocephalic and atraumatic.      Right Ear: External ear normal.      Left Ear: External ear normal.      Nose: Nose normal.      Mouth/Throat:      Pharynx: No oropharyngeal exudate.   Eyes:      General: No scleral icterus.        Right eye: No discharge.         Left eye: No discharge.      Conjunctiva/sclera: Conjunctivae normal.      Pupils: Pupils are equal, round, and reactive to light.   Neck:      Thyroid: No thyromegaly.      Vascular: No JVD.      Trachea: No tracheal deviation.   Cardiovascular:      Rate and Rhythm: Normal rate and regular rhythm.      Heart sounds: Normal heart sounds. No murmur heard.    No friction rub. No gallop.   Pulmonary:      Effort: Pulmonary effort is normal. No respiratory distress.      Breath sounds: Normal breath sounds. No stridor. No wheezing or rales.   Chest:      Chest wall: No tenderness.   Abdominal:      General: Bowel  sounds are normal. There is no distension.      Palpations: Abdomen is soft.      Tenderness: There is no abdominal tenderness. There is no guarding or rebound.   Musculoskeletal:         General: No tenderness. Normal range of motion.      Cervical back: Normal range of motion and neck supple.   Lymphadenopathy:      Cervical: No cervical adenopathy.   Skin:     General: Skin is warm and dry.      Coloration: Skin is not pale.      Findings: No erythema or rash.   Neurological:      Mental Status: He is alert and oriented to person, place, and time.      Cranial Nerves: No cranial nerve deficit.      Motor: No abnormal muscle tone.      Coordination: Coordination normal.      Deep Tendon Reflexes: Reflexes are normal and symmetric. Reflexes normal.   Psychiatric:         Behavior: Behavior normal.         Thought Content: Thought content normal.         Judgment: Judgment normal.       Assessment:          Cedric was seen today for establish care.    Diagnoses and all orders for this visit:    Paroxysmal atrial fibrillation    Steatosis of liver    Congenital hypothyroidism without goiter    DDD (degenerative disc disease), cervical    Stage 3a chronic kidney disease (CKD)    Grief       Plan:     Cedric was seen today for establish care.    Diagnoses and all orders for this visit:    Steatosis of liver    Congenital hypothyroidism without goiter    DDD (degenerative disc disease), cervical    Stage 3a chronic kidney disease (CKD)     F.u with Card   Diet and exercise.   Continue levothyroxine   Cymbalta   Avoid NSAID  Declined meds  F.u in 6 m

## 2023-08-03 DIAGNOSIS — R94.39 ABNORMAL STRESS TEST: ICD-10-CM

## 2023-08-03 DIAGNOSIS — I10 ESSENTIAL HYPERTENSION: ICD-10-CM

## 2023-08-03 DIAGNOSIS — I48.0 PAROXYSMAL ATRIAL FIBRILLATION: ICD-10-CM

## 2023-08-03 DIAGNOSIS — R00.2 PALPITATION: ICD-10-CM

## 2023-08-03 RX ORDER — ATENOLOL 50 MG/1
50 TABLET ORAL
Qty: 90 TABLET | Refills: 3 | Status: SHIPPED | OUTPATIENT
Start: 2023-08-03 | End: 2023-09-27

## 2023-08-03 RX ORDER — FUROSEMIDE 20 MG/1
20 TABLET ORAL DAILY PRN
Qty: 90 TABLET | Refills: 1 | Status: SHIPPED | OUTPATIENT
Start: 2023-08-03 | End: 2023-09-06 | Stop reason: ALTCHOICE

## 2023-08-16 ENCOUNTER — PATIENT MESSAGE (OUTPATIENT)
Dept: CARDIOLOGY | Facility: CLINIC | Age: 63
End: 2023-08-16
Payer: COMMERCIAL

## 2023-08-25 ENCOUNTER — TELEPHONE (OUTPATIENT)
Dept: CARDIOLOGY | Facility: CLINIC | Age: 63
End: 2023-08-25
Payer: COMMERCIAL

## 2023-08-25 NOTE — TELEPHONE ENCOUNTER
Pt was contacted about results:     Please phone patient. Statin okaye'd by GI but his last LDL still fell within normal range. Would just repeat at f/u and if still up-trending we can initiate medication at that time.        Pt verbalized understanding with no questions or concerns.

## 2023-08-25 NOTE — TELEPHONE ENCOUNTER
Please phone patient. Statin okaye'd by GI but his last LDL still fell within normal range. Would just repeat at f/u and if still up-trending we can initiate medication at that time.

## 2023-09-06 ENCOUNTER — LAB VISIT (OUTPATIENT)
Dept: LAB | Facility: HOSPITAL | Age: 63
End: 2023-09-06
Attending: INTERNAL MEDICINE
Payer: COMMERCIAL

## 2023-09-06 ENCOUNTER — OFFICE VISIT (OUTPATIENT)
Dept: CARDIOLOGY | Facility: CLINIC | Age: 63
End: 2023-09-06
Payer: COMMERCIAL

## 2023-09-06 VITALS
WEIGHT: 210.56 LBS | SYSTOLIC BLOOD PRESSURE: 115 MMHG | BODY MASS INDEX: 30.14 KG/M2 | OXYGEN SATURATION: 98 % | HEART RATE: 56 BPM | DIASTOLIC BLOOD PRESSURE: 76 MMHG | RESPIRATION RATE: 16 BRPM | HEIGHT: 70 IN

## 2023-09-06 DIAGNOSIS — R94.31 ABNORMAL ECG: ICD-10-CM

## 2023-09-06 DIAGNOSIS — R60.0 EDEMA OF BOTH LEGS: ICD-10-CM

## 2023-09-06 DIAGNOSIS — I10 ESSENTIAL HYPERTENSION: ICD-10-CM

## 2023-09-06 DIAGNOSIS — I48.92 PAROXYSMAL ATRIAL FLUTTER: ICD-10-CM

## 2023-09-06 DIAGNOSIS — I50.20 HFREF (HEART FAILURE WITH REDUCED EJECTION FRACTION): ICD-10-CM

## 2023-09-06 DIAGNOSIS — I25.118 CORONARY ARTERY DISEASE OF NATIVE ARTERY OF NATIVE HEART WITH STABLE ANGINA PECTORIS: Primary | ICD-10-CM

## 2023-09-06 DIAGNOSIS — R00.2 PALPITATION: ICD-10-CM

## 2023-09-06 DIAGNOSIS — I34.1 MVP (MITRAL VALVE PROLAPSE): ICD-10-CM

## 2023-09-06 DIAGNOSIS — I34.0 NONRHEUMATIC MITRAL VALVE REGURGITATION: ICD-10-CM

## 2023-09-06 DIAGNOSIS — R93.1 ELEVATED CORONARY ARTERY CALCIUM SCORE: ICD-10-CM

## 2023-09-06 DIAGNOSIS — I48.0 PAROXYSMAL ATRIAL FIBRILLATION: ICD-10-CM

## 2023-09-06 LAB
ALBUMIN SERPL BCP-MCNC: 4.2 G/DL (ref 3.5–5.2)
ALP SERPL-CCNC: 62 U/L (ref 55–135)
ALT SERPL W/O P-5'-P-CCNC: 43 U/L (ref 10–44)
ANION GAP SERPL CALC-SCNC: 11 MMOL/L (ref 8–16)
AST SERPL-CCNC: 63 U/L (ref 10–40)
BILIRUB SERPL-MCNC: 0.6 MG/DL (ref 0.1–1)
BUN SERPL-MCNC: 23 MG/DL (ref 8–23)
CALCIUM SERPL-MCNC: 9.7 MG/DL (ref 8.7–10.5)
CHLORIDE SERPL-SCNC: 99 MMOL/L (ref 95–110)
CO2 SERPL-SCNC: 31 MMOL/L (ref 23–29)
CREAT SERPL-MCNC: 1.6 MG/DL (ref 0.5–1.4)
EST. GFR  (NO RACE VARIABLE): 48.1 ML/MIN/1.73 M^2
GLUCOSE SERPL-MCNC: 92 MG/DL (ref 70–110)
POTASSIUM SERPL-SCNC: 4.5 MMOL/L (ref 3.5–5.1)
PROT SERPL-MCNC: 7.1 G/DL (ref 6–8.4)
SODIUM SERPL-SCNC: 141 MMOL/L (ref 136–145)

## 2023-09-06 PROCEDURE — 3074F PR MOST RECENT SYSTOLIC BLOOD PRESSURE < 130 MM HG: ICD-10-PCS | Mod: CPTII,S$GLB,, | Performed by: INTERNAL MEDICINE

## 2023-09-06 PROCEDURE — 1159F MED LIST DOCD IN RCRD: CPT | Mod: CPTII,S$GLB,, | Performed by: INTERNAL MEDICINE

## 2023-09-06 PROCEDURE — 80053 COMPREHEN METABOLIC PANEL: CPT | Performed by: INTERNAL MEDICINE

## 2023-09-06 PROCEDURE — 3008F PR BODY MASS INDEX (BMI) DOCUMENTED: ICD-10-PCS | Mod: CPTII,S$GLB,, | Performed by: INTERNAL MEDICINE

## 2023-09-06 PROCEDURE — 3008F BODY MASS INDEX DOCD: CPT | Mod: CPTII,S$GLB,, | Performed by: INTERNAL MEDICINE

## 2023-09-06 PROCEDURE — 99214 PR OFFICE/OUTPT VISIT, EST, LEVL IV, 30-39 MIN: ICD-10-PCS | Mod: S$GLB,,, | Performed by: INTERNAL MEDICINE

## 2023-09-06 PROCEDURE — 3078F DIAST BP <80 MM HG: CPT | Mod: CPTII,S$GLB,, | Performed by: INTERNAL MEDICINE

## 2023-09-06 PROCEDURE — 1160F PR REVIEW ALL MEDS BY PRESCRIBER/CLIN PHARMACIST DOCUMENTED: ICD-10-PCS | Mod: CPTII,S$GLB,, | Performed by: INTERNAL MEDICINE

## 2023-09-06 PROCEDURE — 36415 COLL VENOUS BLD VENIPUNCTURE: CPT | Performed by: INTERNAL MEDICINE

## 2023-09-06 PROCEDURE — 1160F RVW MEDS BY RX/DR IN RCRD: CPT | Mod: CPTII,S$GLB,, | Performed by: INTERNAL MEDICINE

## 2023-09-06 PROCEDURE — 3074F SYST BP LT 130 MM HG: CPT | Mod: CPTII,S$GLB,, | Performed by: INTERNAL MEDICINE

## 2023-09-06 PROCEDURE — 4010F PR ACE/ARB THEARPY RXD/TAKEN: ICD-10-PCS | Mod: CPTII,S$GLB,, | Performed by: INTERNAL MEDICINE

## 2023-09-06 PROCEDURE — 4010F ACE/ARB THERAPY RXD/TAKEN: CPT | Mod: CPTII,S$GLB,, | Performed by: INTERNAL MEDICINE

## 2023-09-06 PROCEDURE — 99999 PR PBB SHADOW E&M-EST. PATIENT-LVL III: ICD-10-PCS | Mod: PBBFAC,,, | Performed by: INTERNAL MEDICINE

## 2023-09-06 PROCEDURE — 99999 PR PBB SHADOW E&M-EST. PATIENT-LVL III: CPT | Mod: PBBFAC,,, | Performed by: INTERNAL MEDICINE

## 2023-09-06 PROCEDURE — 3044F PR MOST RECENT HEMOGLOBIN A1C LEVEL <7.0%: ICD-10-PCS | Mod: CPTII,S$GLB,, | Performed by: INTERNAL MEDICINE

## 2023-09-06 PROCEDURE — 3078F PR MOST RECENT DIASTOLIC BLOOD PRESSURE < 80 MM HG: ICD-10-PCS | Mod: CPTII,S$GLB,, | Performed by: INTERNAL MEDICINE

## 2023-09-06 PROCEDURE — 99214 OFFICE O/P EST MOD 30 MIN: CPT | Mod: S$GLB,,, | Performed by: INTERNAL MEDICINE

## 2023-09-06 PROCEDURE — 3044F HG A1C LEVEL LT 7.0%: CPT | Mod: CPTII,S$GLB,, | Performed by: INTERNAL MEDICINE

## 2023-09-06 PROCEDURE — 1159F PR MEDICATION LIST DOCUMENTED IN MEDICAL RECORD: ICD-10-PCS | Mod: CPTII,S$GLB,, | Performed by: INTERNAL MEDICINE

## 2023-09-06 RX ORDER — FUROSEMIDE 40 MG/1
40 TABLET ORAL DAILY
Qty: 90 TABLET | Refills: 5 | Status: SHIPPED | OUTPATIENT
Start: 2023-09-06 | End: 2024-09-05

## 2023-09-06 RX ORDER — ROSUVASTATIN CALCIUM 10 MG/1
10 TABLET, COATED ORAL NIGHTLY
Qty: 90 TABLET | Refills: 3 | Status: SHIPPED | OUTPATIENT
Start: 2023-09-06 | End: 2024-09-05

## 2023-09-06 NOTE — PROGRESS NOTES
Subjective:    Patient ID:  Cedric Santoro is a 63 y.o. male who presents for evaluation of Hyperlipidemia, Hypertension, Coronary Artery Disease, Atrial Fibrillation, and Valvular Heart Disease        HPIPt presents for eval.  His current med conditions include MVP, MR, HTN, diastolic CHF, palpitations, PAF, PAFL.  Past hx pertinent for following:  - stress test 2020.  Reportedly had brief PAF w stress test, resolving on its own.  Echo 2020 normal LV function, mild MR.  Pt had ecg done 10/3/22 twice:  a flutter w rate 99 - 116 bpm.  New dx.  Lost his wife to cancer 7/22 then had dyspnea few months.  Under much stress so whirlwind w loss of wife.  S/p ARIELA Oct 2022 for cardioversion.  Left atrial appendage thrombus noted so no cardioversion was performed.  ARIELA: Left atrial appendage thrombus noted, LVEF 55%, mild posterior leaflet MV prolapse, mild-mod MR.  S/p a flutter ablation w Dr. Hendricks, EP, Dec 2022.  Had low BP w procedure, and required inotropes.  ARIELA showed EF 35%, decline thought to be due to a flutter.  Ecg 2/3/23 NSR, 1 av block, incomplete RBBB.  Echo Feb 2023: normal LVEF, LAE, mod-severe MR with MVP, JOSE, mild TR, PAP wnl.  ARIELA was advised again.  S/p ARIELA Feb 2023 w Dr. Del Valle: normal EF, mild MVP, mild-mod MR, mild TR.  Now here.  Coronary calcium score May 2023:  597  S/p LHC May 2023: nonobstructive CAD noted, normal EF.  OMT advised.  CAD is stable.  No angina.  Denies CP sxs.  No CHF sxs right now.  Denies dyspnea.  No palpitations.  Mindful of diet.  BP is controlled.  Stable leg edema.   B LE arterial u/s April 2023 no stenosis, normal FARIDA B LE.  B LE venous u/s April 2023 no DVT.        Past Medical History:   Diagnosis Date    Abnormal ECG 10/4/2022    Coronary artery disease of native artery of native heart with stable angina pectoris 9/6/2023    Essential hypertension 2/24/2020    Hypertension     MVP (mitral valve prolapse) 10/26/2022    Nonrheumatic mitral valve regurgitation 10/26/2022     Palpitation 3/5/2020    Paroxysmal atrial fibrillation 3/5/2020    Paroxysmal atrial flutter 10/4/2022    Raynaud's syndrome     Vasovagal syncopes        Current Outpatient Medications:     apixaban (ELIQUIS) 5 mg Tab, Take 1 tablet (5 mg total) by mouth 2 (two) times daily., Disp: 60 tablet, Rfl: 12    atenoloL (TENORMIN) 50 MG tablet, TAKE 1 TABLET BY MOUTH EVERY DAY, Disp: 90 tablet, Rfl: 3    b complex vitamins tablet, Take 1 tablet by mouth once daily., Disp: , Rfl:     co-enzyme Q-10 30 mg capsule, Take 30 mg by mouth once daily., Disp: , Rfl:     DULoxetine (CYMBALTA) 60 MG capsule, Take 60 mg by mouth every evening., Disp: , Rfl:     ferrous sulfate 325 (65 FE) MG EC tablet, Take 325 mg by mouth once daily., Disp: , Rfl:     levothyroxine (SYNTHROID) 50 MCG tablet, Take 1 tablet (50 mcg total) by mouth every Mon, Tues, Wed, Thurs, Fri., Disp: 64 tablet, Rfl: 1    losartan (COZAAR) 50 MG tablet, TAKE 1 TABLET BY MOUTH EVERY DAY, Disp: 90 tablet, Rfl: 3    magnesium 30 mg Tab, Take 1 tablet by mouth once daily., Disp: , Rfl:     multivitamin (THERAGRAN) per tablet, Take 1 tablet by mouth once daily., Disp: , Rfl:     tamsulosin (FLOMAX) 0.4 mg Cap, Take 1 capsule (0.4 mg total) by mouth once daily., Disp: 90 capsule, Rfl: 3    vitamin D (VITAMIN D3) 1000 units Tab, Take 1,000 Units by mouth once daily., Disp: , Rfl:     furosemide (LASIX) 40 MG tablet, Take 1 tablet (40 mg total) by mouth once daily., Disp: 90 tablet, Rfl: 5    rosuvastatin (CRESTOR) 10 MG tablet, Take 1 tablet (10 mg total) by mouth every evening., Disp: 90 tablet, Rfl: 3      Review of Systems   Constitutional: Negative.   HENT: Negative.     Eyes: Negative.    Cardiovascular:  Positive for leg swelling.   Respiratory: Negative.     Endocrine: Negative.    Hematologic/Lymphatic: Negative.    Skin: Negative.    Musculoskeletal:  Positive for arthritis, back pain and neck pain.   Gastrointestinal: Negative.    Genitourinary: Negative.   "  Neurological: Negative.    Psychiatric/Behavioral: Negative.     Allergic/Immunologic: Negative.           /76 (BP Location: Left arm, Patient Position: Sitting, BP Method: Large (Manual))   Pulse (!) 56   Resp 16   Ht 5' 10" (1.778 m)   Wt 95.5 kg (210 lb 8.6 oz)   SpO2 98%   BMI 30.21 kg/m²     Wt Readings from Last 3 Encounters:   09/06/23 95.5 kg (210 lb 8.6 oz)   07/26/23 94.3 kg (208 lb 0.1 oz)   07/18/23 94 kg (207 lb 3.7 oz)     Temp Readings from Last 3 Encounters:   07/26/23 96.9 °F (36.1 °C) (Tympanic)   06/09/23 96.4 °F (35.8 °C) (Tympanic)   05/24/23 97.5 °F (36.4 °C) (Temporal)     BP Readings from Last 3 Encounters:   09/06/23 115/76   07/26/23 108/70   07/11/23 114/68     Pulse Readings from Last 3 Encounters:   09/06/23 (!) 56   07/26/23 (!) 59   07/11/23 70       Objective:    Physical Exam  Vitals and nursing note reviewed.   Constitutional:       Appearance: He is well-developed.   HENT:      Head: Normocephalic.   Neck:      Thyroid: No thyromegaly.      Vascular: Normal carotid pulses. No carotid bruit or JVD.   Cardiovascular:      Rate and Rhythm: Normal rate and regular rhythm.      Pulses:           Radial pulses are 2+ on the right side and 2+ on the left side.      Heart sounds: S1 normal and S2 normal. Heart sounds not distant. No midsystolic click and no opening snap. Murmur heard.      High-pitched blowing holosystolic murmur is present with a grade of 1/6 at the apex.      No friction rub. No S3 or S4 sounds.   Pulmonary:      Effort: Pulmonary effort is normal.      Breath sounds: Normal breath sounds. No wheezing or rales.   Abdominal:      General: Bowel sounds are normal. There is no distension or abdominal bruit.      Palpations: Abdomen is soft.      Tenderness: There is no abdominal tenderness.   Musculoskeletal:      Cervical back: Neck supple.      Right lower leg: Edema present.      Left lower leg: Edema present.   Skin:     General: Skin is warm. "   Neurological:      Mental Status: He is alert and oriented to person, place, and time.   Psychiatric:         Behavior: Behavior normal.       I have reviewed all pertinent labs and cardiac studies.      Chemistry        Component Value Date/Time     06/08/2023 1045    K 4.9 06/08/2023 1045    CL 97 06/08/2023 1045    CO2 30 (H) 06/08/2023 1045    BUN 25 (H) 06/08/2023 1045    CREATININE 1.4 06/08/2023 1045     (H) 06/08/2023 1045        Component Value Date/Time    CALCIUM 10.3 06/08/2023 1045    ALKPHOS 69 06/08/2023 1045    AST 54 (H) 06/08/2023 1045    ALT 42 06/08/2023 1045    BILITOT 0.7 06/08/2023 1045    ESTGFRAFRICA >60.0 04/12/2022 1010    EGFRNONAA >60.0 04/12/2022 1010        Lab Results   Component Value Date    WBC 6.00 05/18/2023    HGB 14.3 05/18/2023    HCT 44.8 05/18/2023    MCV 97 05/18/2023     05/18/2023       Lab Results   Component Value Date    HGBA1C 5.6 04/19/2023     Lab Results   Component Value Date    CHOL 203 (H) 04/19/2023    CHOL 197 04/12/2022    CHOL 166 03/18/2021     Lab Results   Component Value Date    HDL 59 04/19/2023    HDL 68 04/12/2022    HDL 59 03/18/2021     Lab Results   Component Value Date    LDLCALC 130.2 04/19/2023    LDLCALC 118.8 04/12/2022    LDLCALC 97.0 03/18/2021     Lab Results   Component Value Date    TRIG 69 04/19/2023    TRIG 51 04/12/2022    TRIG 50 03/18/2021     Lab Results   Component Value Date    CHOLHDL 29.1 04/19/2023    CHOLHDL 34.5 04/12/2022    CHOLHDL 35.5 03/18/2021       Results for orders placed during the hospital encounter of 02/09/23    Echo    Interpretation Summary  · The left ventricle is normal in size with concentric remodeling and normal systolic function.  · Moderate left atrial enlargement.  · The estimated ejection fraction is 60%.  · Normal left ventricular diastolic function.  · Normal right ventricular size with normal right ventricular systolic function.  · Moderate right atrial enlargement.  ·  Moderate-to-severe mitral regurgitation.  · Mild tricuspid regurgitation.  · There is bileaflet mitral prolapse.  · Normal central venous pressure (3 mmHg).  · The estimated PA systolic pressure is 28 mmHg.                   Assessment:       1. Coronary artery disease of native artery of native heart with stable angina pectoris    2. Abnormal ECG    3. Essential hypertension    4. HFrEF (heart failure with reduced ejection fraction)    5. Elevated coronary artery calcium score    6. MVP (mitral valve prolapse)    7. Nonrheumatic mitral valve regurgitation    8. Paroxysmal atrial flutter    9. Paroxysmal atrial fibrillation    10. Palpitation    11. Edema of both legs           Plan:             Stable CV status on current med tx.  Continue Lasix 40 mg qd.  CMP today.  CAD: S/p [facility] May 2023: nonobstructive disease noted.  OMT advised.  MVP/MR: stable on ARIELA 2/23.  Will continue to monitor.  PAF/PAF: S/p ablation.  Continue Eliquis.  F/u w EP as directed.  Reviewed all tests and above medical conditions with patient in detail and formulated treatment plan.  Continue optimal medical treatment for cardiovascular conditions.  Cardiac low salt diet advised.  Daily exercise encouraged, with the goal 30 +  minutes aerobic exercise as tolerated.  Maintaining healthy weight and weight loss goals (if needed) were discussed in clinic.  BP control.  Lipid control.  Add Rosuvastatin 10 mg qhs.  Patient advised of indications for above medications, risks/benefits and side effect profile.   Repeat CMP, FLP 2 months.  Abnl ecg: stable. F/u ecg.    Leg edema: stable. Elevate legs, compression stockings, Lasix and low salt diet.    PHONE REVIEW FOR LABS.    F/u in 6 months.      I have reviewed all pertinent labs and cardiac studies independently. Plans and recommendations have been formulated under my direct supervision. All questions answered and patient voiced understanding.

## 2023-09-07 ENCOUNTER — TELEPHONE (OUTPATIENT)
Dept: CARDIOLOGY | Facility: HOSPITAL | Age: 63
End: 2023-09-07
Payer: COMMERCIAL

## 2023-09-07 DIAGNOSIS — I10 ESSENTIAL HYPERTENSION: Primary | ICD-10-CM

## 2023-09-07 DIAGNOSIS — I50.20 HFREF (HEART FAILURE WITH REDUCED EJECTION FRACTION): ICD-10-CM

## 2023-09-07 NOTE — TELEPHONE ENCOUNTER
Please call pt.  Renal function mildly abnormal.    Cut lasix dose back.    Alternate days: 40 mg once day, next day take 20 mg day, then 40 mg daily, etc.    Needs repeat CMP in 3 weeks.    Dr Garzon

## 2023-09-08 NOTE — TELEPHONE ENCOUNTER
Please call pt.  Renal function mildly abnormal.    Cut lasix dose back.    Alternate days: 40 mg once day, next day take 20 mg day, then 40 mg daily, etc.    Needs repeat CMP in 3 weeks.    Dr Garzon    Called patient to advise per Dr. Garzon     Spoke to patient verbalized understanding repeat labs scheduled

## 2023-09-13 ENCOUNTER — PATIENT MESSAGE (OUTPATIENT)
Dept: ADMINISTRATIVE | Facility: OTHER | Age: 63
End: 2023-09-13
Payer: COMMERCIAL

## 2023-09-25 DIAGNOSIS — I50.20 HFREF (HEART FAILURE WITH REDUCED EJECTION FRACTION): Primary | ICD-10-CM

## 2023-09-25 DIAGNOSIS — I48.92 PAROXYSMAL ATRIAL FLUTTER: ICD-10-CM

## 2023-09-27 ENCOUNTER — HOSPITAL ENCOUNTER (OUTPATIENT)
Dept: CARDIOLOGY | Facility: HOSPITAL | Age: 63
Discharge: HOME | End: 2023-09-27
Attending: INTERNAL MEDICINE
Payer: COMMERCIAL

## 2023-09-27 ENCOUNTER — OFFICE VISIT (OUTPATIENT)
Dept: CARDIOLOGY | Facility: CLINIC | Age: 63
End: 2023-09-27
Payer: COMMERCIAL

## 2023-09-27 VITALS
HEIGHT: 70 IN | WEIGHT: 207.88 LBS | OXYGEN SATURATION: 93 % | DIASTOLIC BLOOD PRESSURE: 70 MMHG | SYSTOLIC BLOOD PRESSURE: 112 MMHG | HEART RATE: 64 BPM | BODY MASS INDEX: 29.76 KG/M2

## 2023-09-27 DIAGNOSIS — I10 ESSENTIAL HYPERTENSION: ICD-10-CM

## 2023-09-27 DIAGNOSIS — R94.39 ABNORMAL STRESS TEST: ICD-10-CM

## 2023-09-27 DIAGNOSIS — R00.2 PALPITATION: ICD-10-CM

## 2023-09-27 DIAGNOSIS — I48.0 PAROXYSMAL ATRIAL FIBRILLATION: ICD-10-CM

## 2023-09-27 DIAGNOSIS — I48.92 PAROXYSMAL ATRIAL FLUTTER: ICD-10-CM

## 2023-09-27 DIAGNOSIS — I48.92 PAROXYSMAL ATRIAL FLUTTER: Primary | ICD-10-CM

## 2023-09-27 DIAGNOSIS — I50.20 HFREF (HEART FAILURE WITH REDUCED EJECTION FRACTION): ICD-10-CM

## 2023-09-27 PROCEDURE — 99999 PR PBB SHADOW E&M-EST. PATIENT-LVL IV: CPT | Mod: PBBFAC,,, | Performed by: INTERNAL MEDICINE

## 2023-09-27 PROCEDURE — 3078F DIAST BP <80 MM HG: CPT | Mod: CPTII,S$GLB,, | Performed by: INTERNAL MEDICINE

## 2023-09-27 PROCEDURE — 3074F PR MOST RECENT SYSTOLIC BLOOD PRESSURE < 130 MM HG: ICD-10-PCS | Mod: CPTII,S$GLB,, | Performed by: INTERNAL MEDICINE

## 2023-09-27 PROCEDURE — 4010F PR ACE/ARB THEARPY RXD/TAKEN: ICD-10-PCS | Mod: CPTII,S$GLB,, | Performed by: INTERNAL MEDICINE

## 2023-09-27 PROCEDURE — 4010F ACE/ARB THERAPY RXD/TAKEN: CPT | Mod: CPTII,S$GLB,, | Performed by: INTERNAL MEDICINE

## 2023-09-27 PROCEDURE — 3074F SYST BP LT 130 MM HG: CPT | Mod: CPTII,S$GLB,, | Performed by: INTERNAL MEDICINE

## 2023-09-27 PROCEDURE — 1159F PR MEDICATION LIST DOCUMENTED IN MEDICAL RECORD: ICD-10-PCS | Mod: CPTII,S$GLB,, | Performed by: INTERNAL MEDICINE

## 2023-09-27 PROCEDURE — 3044F HG A1C LEVEL LT 7.0%: CPT | Mod: CPTII,S$GLB,, | Performed by: INTERNAL MEDICINE

## 2023-09-27 PROCEDURE — 1159F MED LIST DOCD IN RCRD: CPT | Mod: CPTII,S$GLB,, | Performed by: INTERNAL MEDICINE

## 2023-09-27 PROCEDURE — 93010 EKG 12-LEAD: ICD-10-PCS | Mod: ,,, | Performed by: INTERNAL MEDICINE

## 2023-09-27 PROCEDURE — 3008F BODY MASS INDEX DOCD: CPT | Mod: CPTII,S$GLB,, | Performed by: INTERNAL MEDICINE

## 2023-09-27 PROCEDURE — 99214 OFFICE O/P EST MOD 30 MIN: CPT | Mod: S$GLB,,, | Performed by: INTERNAL MEDICINE

## 2023-09-27 PROCEDURE — 99214 PR OFFICE/OUTPT VISIT, EST, LEVL IV, 30-39 MIN: ICD-10-PCS | Mod: S$GLB,,, | Performed by: INTERNAL MEDICINE

## 2023-09-27 PROCEDURE — 93010 ELECTROCARDIOGRAM REPORT: CPT | Mod: ,,, | Performed by: INTERNAL MEDICINE

## 2023-09-27 PROCEDURE — 3008F PR BODY MASS INDEX (BMI) DOCUMENTED: ICD-10-PCS | Mod: CPTII,S$GLB,, | Performed by: INTERNAL MEDICINE

## 2023-09-27 PROCEDURE — 99999 PR PBB SHADOW E&M-EST. PATIENT-LVL IV: ICD-10-PCS | Mod: PBBFAC,,, | Performed by: INTERNAL MEDICINE

## 2023-09-27 PROCEDURE — 93005 ELECTROCARDIOGRAM TRACING: CPT

## 2023-09-27 PROCEDURE — 3044F PR MOST RECENT HEMOGLOBIN A1C LEVEL <7.0%: ICD-10-PCS | Mod: CPTII,S$GLB,, | Performed by: INTERNAL MEDICINE

## 2023-09-27 PROCEDURE — 3078F PR MOST RECENT DIASTOLIC BLOOD PRESSURE < 80 MM HG: ICD-10-PCS | Mod: CPTII,S$GLB,, | Performed by: INTERNAL MEDICINE

## 2023-09-27 RX ORDER — BACLOFEN 10 MG/1
10 TABLET ORAL 3 TIMES DAILY
COMMUNITY

## 2023-09-27 RX ORDER — ATENOLOL 25 MG/1
25 TABLET ORAL DAILY
Qty: 90 TABLET | Refills: 3 | Status: SHIPPED | OUTPATIENT
Start: 2023-09-27

## 2023-09-27 NOTE — PROGRESS NOTES
Subjective:   Patient ID:  Cedric Santoro is a 63 y.o. male who presents for follow-up of Atrial Fibrillation (Ablation was done 12-15-22)      63 yoM referred for AFL management.     11/22: He developed AFL/RVR leading to ARIELA/CV attempt 10/12/22. He had KP thrombus on ARIELA leading to cancellation of the CV. He was placed on apixaban for CVA prophylaxis 10/4/22 when he as diagnosed. EF normal on ARIELA. He developed LEBLANC and fatigue in the months after his wife passed away. He remains in AFL today and has mild symptoms. He is here to discuss ablation. Of note, AF was placed on his chart 3/2020. I reviewed the monitor and do not feel he had AF. The event was non-sustained AT lasting less than 30s.     3/23: CTI ablation 12/15/22, no recurrence. Symptoms markedly improved.     Interval history: No recurrence since his ablation.     Ablation 12/22:  ·  Emergent cardioversion for AFL/RVR with hypotensopn was successfully performed with restoration of normal sinus rhythm prior to vascular access  ·  Empiric CTI ablation.  ·  3D mapping performed with Ensite.    Echo 1/23:  · The left ventricle is normal in size with concentric remodeling and normal systolic function.  · Moderate left atrial enlargement.  · The estimated ejection fraction is 60%.  · Normal left ventricular diastolic function.  · Normal right ventricular size with normal right ventricular systolic function.  · Moderate right atrial enlargement.  · Moderate-to-severe mitral regurgitation.  · Mild tricuspid regurgitation.  · There is bileaflet mitral prolapse.  · Normal central venous pressure (3 mmHg).  · The estimated PA systolic pressure is 28 mmHg.      ARIELA 10/12:  · The left ventricle is normal in size with normal systolic function.  · Normal right ventricular size with normal right ventricular systolic function.  · The estimated ejection fraction is 55%.  · Thrombus is present in the appendage.  · Mild left atrial enlargement.  · There is mild mitral  prolapse of the posterior mitral leaflet.  · Mild-to-moderate mitral regurgitation.  · Grade 1 plaque present.  · Mild tricuspid regurgitation.  · No cardioversion performed due to left atrial appendage thrombus.    Past Medical History:  10/4/2022: Abnormal ECG  9/6/2023: Coronary artery disease of native artery of native heart   with stable angina pectoris  2/24/2020: Essential hypertension  No date: Hypertension  10/26/2022: MVP (mitral valve prolapse)  10/26/2022: Nonrheumatic mitral valve regurgitation  3/5/2020: Palpitation  3/5/2020: Paroxysmal atrial fibrillation  10/4/2022: Paroxysmal atrial flutter  No date: Raynaud's syndrome  No date: Vasovagal syncopes    Past Surgical History:  12/15/2022: ABLATION, ATRIAL FLUTTER, TYPICAL; N/A      Comment:  Procedure: Ablation, Atrial Flutter, Typical;  Surgeon:                Matty Hendricks MD;  Location: [facility] EP LAB;  Service:               Cardiology;  Laterality: N/A;  AFL, RFA, NICOLASA, ARIELA, anes,                MB, 3 Prep  No date: ankle/foot surgery  12/15/2022: ECHOCARDIOGRAM,TRANSESOPHAGEAL; N/A      Comment:  Procedure: Transesophageal echo (ARIELA) intra-procedure                log documentation;  Surgeon: Nisreen Fenton MD;                 Location: [facility] EP LAB;  Service: Cardiology;  Laterality:               N/A;  No date: EYE SURGERY  No date: GALLBLADDER SURGERY  No date: HERNIA REPAIR  5/24/2023: LEFT HEART CATHETERIZATION; Left      Comment:  Procedure: Left heart cath;  Surgeon: Kal Garzon MD;  Location: [facility] CATH LAB;  Service: Cardiology;                 Laterality: Left;  may need to sign consent//  No date: THROAT SURGERY  2/27/2023: TRANSESOPHAGEAL ECHOCARDIOGRAPHY; N/A      Comment:  Procedure: ECHOCARDIOGRAM, TRANSESOPHAGEAL;  Surgeon:                Samuel Del Valle MD;  Location: [facility] CATH LAB;  Service:                Cardiology;  Laterality: N/A;  12/15/2022: TREATMENT OF CARDIAC ARRHYTHMIA      Comment:  Procedure:  Cardioversion or Defibrillation;  Surgeon:                Matty Hendricks MD;  Location: [facility] EP LAB;  Service:               Cardiology;;    Social History    Socioeconomic History      Marital status: Other    Occupational History      Occupation: Retired     Tobacco Use      Smoking status: Never      Smokeless tobacco: Never    Substance and Sexual Activity      Alcohol use: No      Drug use: No      Sexual activity: Not Currently    Social Determinants of Health  Financial Resource Strain: Unknown (4/12/2022)      Overall Financial Resource Strain (CARDIA)          Difficulty of Paying Living Expenses: Patient refused  Food Insecurity: No Food Insecurity (7/24/2023)      Hunger Vital Sign          Worried About Running Out of Food in the Last Year: Never true          Ran Out of Food in the Last Year: Never true  Transportation Needs: No Transportation Needs (7/24/2023)      PRAPARE - Transportation          Lack of Transportation (Medical): No          Lack of Transportation (Non-Medical): No  Physical Activity: Sufficiently Active (7/24/2023)      Exercise Vital Sign          Days of Exercise per Week: 5 days          Minutes of Exercise per Session: 70 min  Stress: Stress Concern Present (7/24/2023)      [employer] - Occupational Stress Questionnaire          Feeling of Stress : To some extent  Social Connections: Unknown (7/24/2023)      Social Connection and Isolation Panel [NHANES]          Frequency of Communication with Friends and Family: More than three times a week          Frequency of Social Gatherings with Friends and Family: Once a week          Active Member of Clubs or Organizations: Yes          Attends Club or Organization Meetings: More than 4 times per year          Marital Status:   Housing Stability: Low Risk  (7/24/2023)      Housing Stability Vital Sign          Unable to Pay for Housing in the Last Year: No          Number of  Places Lived in the Last Year: 1          Unstable Housing in the Last Year: No    Review of patient's family history indicates:  Problem: Cancer      Relation: Mother          Age of Onset: (Not Specified)          Comment: Multiple myeloma  Problem: Pacemaker/defibrilator      Relation: Father          Age of Onset: (Not Specified)  Problem: Macular degeneration      Relation: Father          Age of Onset: (Not Specified)  Problem: Macular degeneration      Relation: Brother          Age of Onset: (Not Specified)      Current Outpatient Medications:  apixaban (ELIQUIS) 5 mg Tab, Take 1 tablet (5 mg total) by mouth 2 (two) times daily., Disp: 60 tablet, Rfl: 12  atenoloL (TENORMIN) 50 MG tablet, TAKE 1 TABLET BY MOUTH EVERY DAY, Disp: 90 tablet, Rfl: 3  b complex vitamins tablet, Take 1 tablet by mouth once daily., Disp: , Rfl:    co-enzyme Q-10 30 mg capsule, Take 30 mg by mouth once daily., Disp: , Rfl:    DULoxetine (CYMBALTA) 60 MG capsule, Take 60 mg by mouth every evening., Disp: , Rfl:   ferrous sulfate 325 (65 FE) MG EC tablet, Take 325 mg by mouth once daily., Disp: , Rfl:   furosemide (LASIX) 40 MG tablet, Take 1 tablet (40 mg total) by mouth once daily., Disp: 90 tablet, Rfl: 5  levothyroxine (SYNTHROID) 50 MCG tablet, Take 1 tablet (50 mcg total) by mouth every Mon, Tues, Wed, Thurs, Fri., Disp: 64 tablet, Rfl: 1  losartan (COZAAR) 50 MG tablet, TAKE 1 TABLET BY MOUTH EVERY DAY, Disp: 90 tablet, Rfl: 3  magnesium 30 mg Tab, Take 1 tablet by mouth once daily., Disp: , Rfl:   multivitamin (THERAGRAN) per tablet, Take 1 tablet by mouth once daily., Disp: , Rfl:   rosuvastatin (CRESTOR) 10 MG tablet, Take 1 tablet (10 mg total) by mouth every evening., Disp: 90 tablet, Rfl: 3  tamsulosin (FLOMAX) 0.4 mg Cap, Take 1 capsule (0.4 mg total) by mouth once daily., Disp: 90 capsule, Rfl: 3  vitamin D (VITAMIN D3) 1000 units Tab, Take 1,000 Units by mouth once daily., Disp: , Rfl:     No current  facility-administered medications for this visit.        Review of Systems   Constitutional: Negative.   HENT: Negative.     Eyes: Negative.    Cardiovascular:  Positive for leg swelling.   Respiratory: Negative.     Endocrine: Negative.    Hematologic/Lymphatic: Negative.    Skin: Negative.    Musculoskeletal:  Positive for arthritis, back pain and neck pain.   Gastrointestinal: Negative.    Genitourinary: Negative.    Neurological: Negative.    Psychiatric/Behavioral: Negative.     Allergic/Immunologic: Negative.        Objective:   Physical Exam  Vitals and nursing note reviewed.   Constitutional:       Appearance: He is well-developed.   HENT:      Head: Normocephalic.   Neck:      Thyroid: No thyromegaly.      Vascular: Normal carotid pulses. No carotid bruit or JVD.   Cardiovascular:      Rate and Rhythm: Normal rate and regular rhythm.      Pulses:           Radial pulses are 2+ on the right side and 2+ on the left side.      Heart sounds: S1 normal and S2 normal. Heart sounds not distant. No midsystolic click and no opening snap. Murmur heard.      High-pitched blowing holosystolic murmur is present with a grade of 1/6 at the apex.      No friction rub. No S3 or S4 sounds.   Pulmonary:      Effort: Pulmonary effort is normal.      Breath sounds: Normal breath sounds. No wheezing or rales.   Abdominal:      General: Bowel sounds are normal. There is no distension or abdominal bruit.      Palpations: Abdomen is soft.      Tenderness: There is no abdominal tenderness.   Musculoskeletal:      Cervical back: Neck supple.      Right lower leg: Edema present.      Left lower leg: Edema present.   Skin:     General: Skin is warm.   Neurological:      Mental Status: He is alert and oriented to person, place, and time.   Psychiatric:         Behavior: Behavior normal.       ECG: NSR First degree AVB  ms PVC    Assessment:      1. Paroxysmal atrial flutter    2. Essential hypertension    3. Palpitation    4.  Abnormal stress test    5. Paroxysmal atrial fibrillation        Plan:   63 yoM here for AFL management. No recurrence 9m post ablation. Can stop OAC. Drop atenolol to 25 mg qd die to first degree AVB. RTC 1y

## 2023-09-29 ENCOUNTER — LAB VISIT (OUTPATIENT)
Dept: LAB | Facility: HOSPITAL | Age: 63
End: 2023-09-29
Attending: FAMILY MEDICINE
Payer: COMMERCIAL

## 2023-09-29 DIAGNOSIS — I10 ESSENTIAL HYPERTENSION: ICD-10-CM

## 2023-09-29 DIAGNOSIS — I50.20 HFREF (HEART FAILURE WITH REDUCED EJECTION FRACTION): ICD-10-CM

## 2023-09-29 LAB
ALBUMIN SERPL BCP-MCNC: 4.2 G/DL (ref 3.5–5.2)
ALP SERPL-CCNC: 76 U/L (ref 55–135)
ALT SERPL W/O P-5'-P-CCNC: 43 U/L (ref 10–44)
ANION GAP SERPL CALC-SCNC: 9 MMOL/L (ref 8–16)
AST SERPL-CCNC: 60 U/L (ref 10–40)
BILIRUB SERPL-MCNC: 0.7 MG/DL (ref 0.1–1)
BUN SERPL-MCNC: 19 MG/DL (ref 8–23)
CALCIUM SERPL-MCNC: 9.8 MG/DL (ref 8.7–10.5)
CHLORIDE SERPL-SCNC: 98 MMOL/L (ref 95–110)
CO2 SERPL-SCNC: 31 MMOL/L (ref 23–29)
CREAT SERPL-MCNC: 1.4 MG/DL (ref 0.5–1.4)
EST. GFR  (NO RACE VARIABLE): 56.5 ML/MIN/1.73 M^2
GLUCOSE SERPL-MCNC: 94 MG/DL (ref 70–110)
POTASSIUM SERPL-SCNC: 4.2 MMOL/L (ref 3.5–5.1)
PROT SERPL-MCNC: 7.1 G/DL (ref 6–8.4)
SODIUM SERPL-SCNC: 138 MMOL/L (ref 136–145)

## 2023-09-29 PROCEDURE — 80053 COMPREHEN METABOLIC PANEL: CPT | Performed by: INTERNAL MEDICINE

## 2023-09-29 PROCEDURE — 36415 COLL VENOUS BLD VENIPUNCTURE: CPT | Mod: PO | Performed by: INTERNAL MEDICINE

## 2023-10-10 ENCOUNTER — OFFICE VISIT (OUTPATIENT)
Dept: UROLOGY | Facility: CLINIC | Age: 63
End: 2023-10-10
Payer: COMMERCIAL

## 2023-10-10 VITALS
DIASTOLIC BLOOD PRESSURE: 79 MMHG | WEIGHT: 207.88 LBS | HEART RATE: 58 BPM | SYSTOLIC BLOOD PRESSURE: 120 MMHG | BODY MASS INDEX: 29.83 KG/M2 | TEMPERATURE: 98 F

## 2023-10-10 DIAGNOSIS — N13.8 BPH WITH URINARY OBSTRUCTION: Primary | ICD-10-CM

## 2023-10-10 DIAGNOSIS — R33.9 INCOMPLETE EMPTYING OF BLADDER: ICD-10-CM

## 2023-10-10 DIAGNOSIS — N28.1 RENAL CYST, RIGHT: ICD-10-CM

## 2023-10-10 DIAGNOSIS — N40.1 BPH WITH URINARY OBSTRUCTION: Primary | ICD-10-CM

## 2023-10-10 PROCEDURE — 3074F SYST BP LT 130 MM HG: CPT | Mod: CPTII,S$GLB,, | Performed by: UROLOGY

## 2023-10-10 PROCEDURE — 3008F PR BODY MASS INDEX (BMI) DOCUMENTED: ICD-10-PCS | Mod: CPTII,S$GLB,, | Performed by: UROLOGY

## 2023-10-10 PROCEDURE — 1159F PR MEDICATION LIST DOCUMENTED IN MEDICAL RECORD: ICD-10-PCS | Mod: CPTII,S$GLB,, | Performed by: UROLOGY

## 2023-10-10 PROCEDURE — 4010F ACE/ARB THERAPY RXD/TAKEN: CPT | Mod: CPTII,S$GLB,, | Performed by: UROLOGY

## 2023-10-10 PROCEDURE — 99999 PR PBB SHADOW E&M-EST. PATIENT-LVL IV: CPT | Mod: PBBFAC,,, | Performed by: UROLOGY

## 2023-10-10 PROCEDURE — 1160F RVW MEDS BY RX/DR IN RCRD: CPT | Mod: CPTII,S$GLB,, | Performed by: UROLOGY

## 2023-10-10 PROCEDURE — 3074F PR MOST RECENT SYSTOLIC BLOOD PRESSURE < 130 MM HG: ICD-10-PCS | Mod: CPTII,S$GLB,, | Performed by: UROLOGY

## 2023-10-10 PROCEDURE — 99999 PR PBB SHADOW E&M-EST. PATIENT-LVL IV: ICD-10-PCS | Mod: PBBFAC,,, | Performed by: UROLOGY

## 2023-10-10 PROCEDURE — 3044F HG A1C LEVEL LT 7.0%: CPT | Mod: CPTII,S$GLB,, | Performed by: UROLOGY

## 2023-10-10 PROCEDURE — 4010F PR ACE/ARB THEARPY RXD/TAKEN: ICD-10-PCS | Mod: CPTII,S$GLB,, | Performed by: UROLOGY

## 2023-10-10 PROCEDURE — 1160F PR REVIEW ALL MEDS BY PRESCRIBER/CLIN PHARMACIST DOCUMENTED: ICD-10-PCS | Mod: CPTII,S$GLB,, | Performed by: UROLOGY

## 2023-10-10 PROCEDURE — 3078F DIAST BP <80 MM HG: CPT | Mod: CPTII,S$GLB,, | Performed by: UROLOGY

## 2023-10-10 PROCEDURE — 3078F PR MOST RECENT DIASTOLIC BLOOD PRESSURE < 80 MM HG: ICD-10-PCS | Mod: CPTII,S$GLB,, | Performed by: UROLOGY

## 2023-10-10 PROCEDURE — 3044F PR MOST RECENT HEMOGLOBIN A1C LEVEL <7.0%: ICD-10-PCS | Mod: CPTII,S$GLB,, | Performed by: UROLOGY

## 2023-10-10 PROCEDURE — 3008F BODY MASS INDEX DOCD: CPT | Mod: CPTII,S$GLB,, | Performed by: UROLOGY

## 2023-10-10 PROCEDURE — 99214 PR OFFICE/OUTPT VISIT, EST, LEVL IV, 30-39 MIN: ICD-10-PCS | Mod: S$GLB,,, | Performed by: UROLOGY

## 2023-10-10 PROCEDURE — 1159F MED LIST DOCD IN RCRD: CPT | Mod: CPTII,S$GLB,, | Performed by: UROLOGY

## 2023-10-10 PROCEDURE — 99214 OFFICE O/P EST MOD 30 MIN: CPT | Mod: S$GLB,,, | Performed by: UROLOGY

## 2023-10-10 NOTE — PROGRESS NOTES
Chief Complaint:   Encounter Diagnoses   Name Primary?    BPH with urinary obstruction Yes    Renal cyst, right     Incomplete emptying of bladder        HPI:  HPI Cedric Santoro bassam 63 y.o. male who presents with follow up to BPH and incomplete bladder emptying.  Patient was initially was referred for a renal cyst.  This was confirmed to be simple renal cyst based on CT.  However his bladder was noted to be quite distended.  He was started on tamsulosin.  He states he is not having any difficulty voiding however he does have a slow stream.  He is not noticed much change while taking his Flomax.    History:  Social History     Tobacco Use    Smoking status: Never    Smokeless tobacco: Never   Substance Use Topics    Alcohol use: No    Drug use: No     Past Medical History:   Diagnosis Date    Abnormal ECG 10/4/2022    Coronary artery disease of native artery of native heart with stable angina pectoris 9/6/2023    Essential hypertension 2/24/2020    Hypertension     MVP (mitral valve prolapse) 10/26/2022    Nonrheumatic mitral valve regurgitation 10/26/2022    Palpitation 3/5/2020    Paroxysmal atrial fibrillation 3/5/2020    Paroxysmal atrial flutter 10/4/2022    Raynaud's syndrome     Vasovagal syncopes      Past Surgical History:   Procedure Laterality Date    ABLATION, ATRIAL FLUTTER, TYPICAL N/A 12/15/2022    Procedure: Ablation, Atrial Flutter, Typical;  Surgeon: Matty Hendricks MD;  Location: [facility] EP LAB;  Service: Cardiology;  Laterality: N/A;  AFL, RFA, NICOLASA, ARIELA, anes, MB, 3 Prep    ankle/foot surgery      ECHOCARDIOGRAM,TRANSESOPHAGEAL N/A 12/15/2022    Procedure: Transesophageal echo (ARIELA) intra-procedure log documentation;  Surgeon: Nisreen Fenton MD;  Location: [facility] EP LAB;  Service: Cardiology;  Laterality: N/A;    EYE SURGERY      GALLBLADDER SURGERY      HERNIA REPAIR      LEFT HEART CATHETERIZATION Left 5/24/2023    Procedure: Left heart cath;  Surgeon: Kal Garzon MD;  Location: [facility]  CATH LAB;  Service: Cardiology;  Laterality: Left;  may need to sign consent//    THROAT SURGERY      TRANSESOPHAGEAL ECHOCARDIOGRAPHY N/A 2/27/2023    Procedure: ECHOCARDIOGRAM, TRANSESOPHAGEAL;  Surgeon: Samuel Del Valle MD;  Location: [facility] CATH LAB;  Service: Cardiology;  Laterality: N/A;    TREATMENT OF CARDIAC ARRHYTHMIA  12/15/2022    Procedure: Cardioversion or Defibrillation;  Surgeon: Matty Hendricks MD;  Location: [facility] EP LAB;  Service: Cardiology;;     Family History   Problem Relation Age of Onset    Cancer Mother         Multiple myeloma    Pacemaker/defibrilator Father     Macular degeneration Father     Macular degeneration Brother        Current Outpatient Medications on File Prior to Visit   Medication Sig Dispense Refill    atenoloL (TENORMIN) 25 MG tablet Take 1 tablet (25 mg total) by mouth once daily. 90 tablet 3    b complex vitamins tablet Take 1 tablet by mouth once daily.      baclofen (LIORESAL) 10 MG tablet Take 10 mg by mouth 3 (three) times daily.      co-enzyme Q-10 30 mg capsule Take 30 mg by mouth once daily.      DULoxetine (CYMBALTA) 60 MG capsule Take 60 mg by mouth every evening.      ferrous sulfate 325 (65 FE) MG EC tablet Take 325 mg by mouth once daily.      furosemide (LASIX) 40 MG tablet Take 1 tablet (40 mg total) by mouth once daily. 90 tablet 5    levothyroxine (SYNTHROID) 50 MCG tablet Take 1 tablet (50 mcg total) by mouth every Mon, Tues, Wed, Thurs, Fri. 64 tablet 1    losartan (COZAAR) 50 MG tablet TAKE 1 TABLET BY MOUTH EVERY DAY 90 tablet 3    magnesium 30 mg Tab Take 1 tablet by mouth once daily.      multivitamin (THERAGRAN) per tablet Take 1 tablet by mouth once daily.      rosuvastatin (CRESTOR) 10 MG tablet Take 1 tablet (10 mg total) by mouth every evening. 90 tablet 3    tamsulosin (FLOMAX) 0.4 mg Cap Take 1 capsule (0.4 mg total) by mouth once daily. 90 capsule 3    vitamin D (VITAMIN D3) 1000 units Tab Take 1,000 Units by mouth once daily.       No current  facility-administered medications on file prior to visit.        Objective:     Vitals:    10/10/23 1637   BP: 120/79   Pulse: (!) 58   Temp: 97.5 °F (36.4 °C)   Weight: 94.3 kg (207 lb 14.3 oz)      BMI Readings from Last 1 Encounters:   10/10/23 29.83 kg/m²          Physical Exam    No acute distress alert and oriented   Respirations even unlabored   Abdomen is soft nontender    Postvoid residual today was 260 mL.     Lab Results   Component Value Date    PSA 3.1 04/19/2023    PSA 3.0 04/12/2022    PSA 2.4 02/05/2020        Lab Results   Component Value Date    CREATININE 1.4 09/29/2023          Assessment:       1. BPH with urinary obstruction    2. Renal cyst, right    3. Incomplete emptying of bladder        Plan:     1. BPH with urinary obstruction    2. Renal cyst, right    3. Incomplete emptying of bladder       Orders Placed This Encounter    Prostate Specific Antigen, Diagnostic      Chronic incomplete emptying.  Patient was emptying better based on bladder scan.  We will continue tamsulosin.  We will continue to monitor postvoid residuals.  We will check a PSA and digital rectal exam in 6 months.  Discussed cystoscopy to rule out urethral stricture if continues to have difficulties.

## 2023-10-31 ENCOUNTER — HOSPITAL ENCOUNTER (OUTPATIENT)
Dept: RADIOLOGY | Facility: HOSPITAL | Age: 63
Discharge: HOME | End: 2023-10-31
Attending: PHYSICIAN ASSISTANT
Payer: COMMERCIAL

## 2023-10-31 ENCOUNTER — OFFICE VISIT (OUTPATIENT)
Dept: INTERNAL MEDICINE | Facility: CLINIC | Age: 63
End: 2023-10-31
Payer: COMMERCIAL

## 2023-10-31 ENCOUNTER — OFFICE VISIT (OUTPATIENT)
Dept: PODIATRY | Facility: CLINIC | Age: 63
End: 2023-10-31
Payer: COMMERCIAL

## 2023-10-31 VITALS — SYSTOLIC BLOOD PRESSURE: 146 MMHG | DIASTOLIC BLOOD PRESSURE: 91 MMHG | TEMPERATURE: 97 F | HEART RATE: 56 BPM

## 2023-10-31 VITALS — HEIGHT: 70 IN | BODY MASS INDEX: 29.63 KG/M2 | WEIGHT: 207 LBS

## 2023-10-31 DIAGNOSIS — I50.20 HFREF (HEART FAILURE WITH REDUCED EJECTION FRACTION): ICD-10-CM

## 2023-10-31 DIAGNOSIS — M84.375A STRESS FRACTURE OF METATARSAL BONE OF LEFT FOOT, INITIAL ENCOUNTER: Primary | ICD-10-CM

## 2023-10-31 DIAGNOSIS — M79.89 SWELLING OF LEFT FOOT: ICD-10-CM

## 2023-10-31 DIAGNOSIS — I48.0 PAROXYSMAL ATRIAL FIBRILLATION: ICD-10-CM

## 2023-10-31 DIAGNOSIS — I48.92 PAROXYSMAL ATRIAL FLUTTER: ICD-10-CM

## 2023-10-31 DIAGNOSIS — I34.0 NONRHEUMATIC MITRAL VALVE REGURGITATION: ICD-10-CM

## 2023-10-31 DIAGNOSIS — M79.672 LEFT FOOT PAIN: Primary | ICD-10-CM

## 2023-10-31 DIAGNOSIS — Z01.818 PREOP TESTING: Primary | ICD-10-CM

## 2023-10-31 DIAGNOSIS — N18.31 STAGE 3A CHRONIC KIDNEY DISEASE (CKD): ICD-10-CM

## 2023-10-31 DIAGNOSIS — M25.572 LEFT ANKLE PAIN, UNSPECIFIED CHRONICITY: ICD-10-CM

## 2023-10-31 DIAGNOSIS — M25.572 LEFT ANKLE PAIN, UNSPECIFIED CHRONICITY: Primary | ICD-10-CM

## 2023-10-31 DIAGNOSIS — Z01.818 PREOP EXAMINATION: ICD-10-CM

## 2023-10-31 DIAGNOSIS — I34.1 MVP (MITRAL VALVE PROLAPSE): ICD-10-CM

## 2023-10-31 DIAGNOSIS — S92.342A CLOSED DISPLACED FRACTURE OF FOURTH METATARSAL BONE OF LEFT FOOT, INITIAL ENCOUNTER: ICD-10-CM

## 2023-10-31 DIAGNOSIS — I10 ESSENTIAL HYPERTENSION: ICD-10-CM

## 2023-10-31 DIAGNOSIS — M79.672 LEFT FOOT PAIN: ICD-10-CM

## 2023-10-31 DIAGNOSIS — M84.375K: ICD-10-CM

## 2023-10-31 DIAGNOSIS — I25.118 CORONARY ARTERY DISEASE OF NATIVE ARTERY OF NATIVE HEART WITH STABLE ANGINA PECTORIS: ICD-10-CM

## 2023-10-31 DIAGNOSIS — M79.672 PAIN IN LEFT FOOT: ICD-10-CM

## 2023-10-31 PROCEDURE — 1160F PR REVIEW ALL MEDS BY PRESCRIBER/CLIN PHARMACIST DOCUMENTED: ICD-10-PCS | Mod: CPTII,S$GLB,, | Performed by: PODIATRIST

## 2023-10-31 PROCEDURE — 99204 PR OFFICE/OUTPT VISIT, NEW, LEVL IV, 45-59 MIN: ICD-10-PCS | Mod: S$GLB,,, | Performed by: PODIATRIST

## 2023-10-31 PROCEDURE — 3008F PR BODY MASS INDEX (BMI) DOCUMENTED: ICD-10-PCS | Mod: CPTII,S$GLB,, | Performed by: PODIATRIST

## 2023-10-31 PROCEDURE — 73610 X-RAY EXAM OF ANKLE: CPT | Mod: TC,LT

## 2023-10-31 PROCEDURE — 99204 OFFICE O/P NEW MOD 45 MIN: CPT | Mod: S$GLB,,, | Performed by: PODIATRIST

## 2023-10-31 PROCEDURE — 99999 PR PBB SHADOW E&M-EST. PATIENT-LVL V: CPT | Mod: PBBFAC,,, | Performed by: PODIATRIST

## 2023-10-31 PROCEDURE — 73610 X-RAY EXAM OF ANKLE: CPT | Mod: 26,LT,, | Performed by: RADIOLOGY

## 2023-10-31 PROCEDURE — 73610 XR ANKLE COMPLETE 3 VIEW LEFT: ICD-10-PCS | Mod: 26,LT,, | Performed by: RADIOLOGY

## 2023-10-31 PROCEDURE — 73630 X-RAY EXAM OF FOOT: CPT | Mod: 26,LT,, | Performed by: RADIOLOGY

## 2023-10-31 PROCEDURE — 4010F PR ACE/ARB THEARPY RXD/TAKEN: ICD-10-PCS | Mod: CPTII,S$GLB,, | Performed by: PODIATRIST

## 2023-10-31 PROCEDURE — 4010F ACE/ARB THERAPY RXD/TAKEN: CPT | Mod: CPTII,S$GLB,, | Performed by: PODIATRIST

## 2023-10-31 PROCEDURE — 3044F PR MOST RECENT HEMOGLOBIN A1C LEVEL <7.0%: ICD-10-PCS | Mod: CPTII,S$GLB,, | Performed by: PODIATRIST

## 2023-10-31 PROCEDURE — 73630 X-RAY EXAM OF FOOT: CPT | Mod: TC,LT

## 2023-10-31 PROCEDURE — 3044F HG A1C LEVEL LT 7.0%: CPT | Mod: CPTII,S$GLB,, | Performed by: PODIATRIST

## 2023-10-31 PROCEDURE — 1159F PR MEDICATION LIST DOCUMENTED IN MEDICAL RECORD: ICD-10-PCS | Mod: CPTII,S$GLB,, | Performed by: PODIATRIST

## 2023-10-31 PROCEDURE — 1160F RVW MEDS BY RX/DR IN RCRD: CPT | Mod: CPTII,S$GLB,, | Performed by: PODIATRIST

## 2023-10-31 PROCEDURE — 99999 PR PBB SHADOW E&M-EST. PATIENT-LVL V: ICD-10-PCS | Mod: PBBFAC,,, | Performed by: PODIATRIST

## 2023-10-31 PROCEDURE — 73630 XR FOOT COMPLETE 3 VIEW LEFT: ICD-10-PCS | Mod: 26,LT,, | Performed by: RADIOLOGY

## 2023-10-31 PROCEDURE — 3008F BODY MASS INDEX DOCD: CPT | Mod: CPTII,S$GLB,, | Performed by: PODIATRIST

## 2023-10-31 PROCEDURE — 1159F MED LIST DOCD IN RCRD: CPT | Mod: CPTII,S$GLB,, | Performed by: PODIATRIST

## 2023-10-31 NOTE — H&P (VIEW-ONLY)
Pre op instructions reviewed with patient during Clinic Visit with Provider, verbalized understanding.    To confirm, Surgery is scheduled on 11/06/23. We will call you late afternoon the business day prior to surgery with your arrival time.    *Please report to the [facility] (1st Floor) located off of [facility] (2nd Entrance/Building on the left, in front of the flag pole).  Address: [address] [place] [address] [place] [id number]      INSTRUCTIONS IMPORTANT!!!  DO NOT Eat, Drink, or Smoke after 12 midnight unless instructed otherwise by your Surgeon. OK to brush teeth, no gum, candy or mints!    MORNING OF SURGERY, drink small sip of water with the following medications instructed by Pre-Admit Provider:  ATENOLOL  LEVOTHYROXINE  FLOMAX    *Additional Medication Instructions: STOP TAKING THE FOLLOWING MEDICATIONS TODAY: CO-Q 10 ENZYME, MULTI-VITAMIN AND VITAMIN D    Diabetic Patients: If you take diabetic or weight loss medication, Do NOT take morning of surgery unless instructed by Doctor. Metformin to be stopped 24 hrs prior to surgery. Ozempic/ Mounjaro/ Wegovy/ Trulicity/ Semaglutide or any weight loss injections to be stopped 7 days prior to surgery. DO NOT take long-acting insulin the evening before surgery. Blood sugars will be checked in pre-op by Nurse.    *Patients should HOLD all vitamins, herbal supplements, weight loss medication, aspirin products & NSAIDS 7 days prior to surgery, as these can thin the blood. Ok to take Tylenol.    ____  Avoid Alcoholic beverages 3 days prior to surgery, as it can thin the blood.  ____  NO Acrylic/fake nails or nail polish worn day of surgery (specifically hand/arm & foot surgeries).  ____  NO powder, lotions, deodorants, oils or cream on body.  ____  Remove all jewelry, piercings, & foreign objects prior to arrival and leave at home.  ____  Remove Dentures, Hearing Aids & Contact Lens prior to surgery.  ____  Bring photo ID and insurance information to  hospital (Leave Valuables at Home).  ____  If going home the same day, arrange for a ride home. You will not be able to drive for 24 hrs if Anesthesia was used.   ____  Males: Stop ED medications (Viagra, Cialis) 24 hrs prior to surgery.  ____  Wear clean, loose fitting clothing to allow for dressings/ bandages.      Bathing Instructions:    -Shower with anti-bacterial Soap (Hibiclens or Dial) the night before surgery and the morning of.   -Do not use Hibiclens on your face or genitals.   -Apply clean clothes after shower.  -Do not shave your face or body 2 days prior to surgery unless instructed otherwise by your Surgeon.    [facility]/Ride Policy:  Only 2 adults allowed in pre op/recovery area during your procedure. You MUST HAVE A RIDE HOME from a responsible adult that you know and trust. Medical Transport, Uber or Lyft can ONLY be used if patient has a responsible adult to accompany them during ride home.    Discharge Instructions: You will receive Post-op/Discharge instructions by your Discharge Nurse prior to going home.   *Prevention of surgical site infections:   -Keep incisions clean and dry.   -Do not soak/submerge incisions in water until completely healed.   -Do not apply lotions, powders, creams, or deodorants to site.   -Always make sure hands are cleaned with antibacterial soap/ alcohol-based  prior to touching the surgical site.        *Signs and symptoms of Infection Before or After Surgery:               !!!If you experience any fever, chills, nausea/ vomiting, foul odor/ excessive drainage from surgical site, flu-like symptoms, new wounds or cuts, PLEASE CALL THE SURGEON OFFICE at [phone] or SEND MESSAGE THROUGH [place] PORTAL!!!       *If you are running late day of surgery, please call the Surgery Dept @ [phone].    *Billing question, please call  [phone]       Thank you,  -[facility] Pre Admit Dept.  [phone] or [phone]  M-F 7:30  am-4:00 pm (Closed Major Holidays)    Additional Tests Scheduled Today:  LABS (1ST Floor) Check in at the !

## 2023-10-31 NOTE — PROGRESS NOTES
Subjective:       Patient ID: Cedric Santoro is a 63 y.o. male.    Chief Complaint: Foot Swelling and Foot Pain (C/o pain and swelling in left foot, pt was last seen on , rates pain 8, nondiabetic, )      HPI: Cedric Santoro presents to the clinic today for evaluation concerning stated moderate pains to the left foot/ankle at the midfoot. Patient states pains are approx. 8/10. Pains are described as achy and sharp. Pains have been present for duration of several hours. Patient states the pains are exacerbated with walking and standing and prolonged activities. States no prior medical evaluation by a MD/DO/DPM/NP. Trauma is not stated. States walking on yesterday and experiencing severe foot pains. Patient's Primary Care Provider is Andreia Strickland MD.     Review of patient's allergies indicates:   Allergen Reactions    Iodinated contrast media     Iodine and iodide containing products Hives       Past Medical History:   Diagnosis Date    Abnormal ECG 10/4/2022    Coronary artery disease of native artery of native heart with stable angina pectoris 9/6/2023    Essential hypertension 2/24/2020    Hypertension     MVP (mitral valve prolapse) 10/26/2022    Nonrheumatic mitral valve regurgitation 10/26/2022    Palpitation 3/5/2020    Paroxysmal atrial fibrillation 3/5/2020    Paroxysmal atrial flutter 10/4/2022    Raynaud's syndrome     Vasovagal syncopes        Family History   Problem Relation Age of Onset    Cancer Mother         Multiple myeloma    Pacemaker/defibrilator Father     Macular degeneration Father     Macular degeneration Brother        Social History     Socioeconomic History    Marital status: Other   Occupational History    Occupation: Retired    Tobacco Use    Smoking status: Never    Smokeless tobacco: Never   Substance and Sexual Activity    Alcohol use: No    Drug use: No    Sexual activity: Not Currently     Social Determinants of Health     Financial Resource Strain:  Unknown (4/12/2022)    Overall Financial Resource Strain (CARDIA)     Difficulty of Paying Living Expenses: Patient refused   Food Insecurity: No Food Insecurity (7/24/2023)    Hunger Vital Sign     Worried About Running Out of Food in the Last Year: Never true     Ran Out of Food in the Last Year: Never true   Transportation Needs: No Transportation Needs (7/24/2023)    PRAPARE - Transportation     Lack of Transportation (Medical): No     Lack of Transportation (Non-Medical): No   Physical Activity: Sufficiently Active (7/24/2023)    Exercise Vital Sign     Days of Exercise per Week: 5 days     Minutes of Exercise per Session: 70 min   Stress: Stress Concern Present (7/24/2023)    [employer] [place] [employer] - Occupational Stress Questionnaire     Feeling of Stress : To some extent   Social Connections: Unknown (7/24/2023)    Social Connection and Isolation Panel [NHANES]     Frequency of Communication with Friends and Family: More than three times a week     Frequency of Social Gatherings with Friends and Family: Once a week     Active Member of Clubs or Organizations: Yes     Attends Club or Organization Meetings: More than 4 times per year     Marital Status:    Housing Stability: Low Risk  (7/24/2023)    Housing Stability Vital Sign     Unable to Pay for Housing in the Last Year: No     Number of Places Lived in the Last Year: 1     Unstable Housing in the Last Year: No       Past Surgical History:   Procedure Laterality Date    ABLATION, ATRIAL FLUTTER, TYPICAL N/A 12/15/2022    Procedure: Ablation, Atrial Flutter, Typical;  Surgeon: Matty Hendricks MD;  Location: [facility] EP LAB;  Service: Cardiology;  Laterality: N/A;  AFL, RFA, NICOLASA, ARIELA, anes, MB, 3 Prep    ankle/foot surgery      ECHOCARDIOGRAM,TRANSESOPHAGEAL N/A 12/15/2022    Procedure: Transesophageal echo (ARIELA) intra-procedure log documentation;  Surgeon: Nisreen Fenton MD;  Location: [facility] EP LAB;  Service: Cardiology;   "Laterality: N/A;    EYE SURGERY      GALLBLADDER SURGERY      HERNIA REPAIR      LEFT HEART CATHETERIZATION Left 5/24/2023    Procedure: Left heart cath;  Surgeon: Kal Garzon MD;  Location: [facility] CATH LAB;  Service: Cardiology;  Laterality: Left;  may need to sign consent//    THROAT SURGERY      TRANSESOPHAGEAL ECHOCARDIOGRAPHY N/A 2/27/2023    Procedure: ECHOCARDIOGRAM, TRANSESOPHAGEAL;  Surgeon: Samuel Del Valle MD;  Location: [facility] CATH LAB;  Service: Cardiology;  Laterality: N/A;    TREATMENT OF CARDIAC ARRHYTHMIA  12/15/2022    Procedure: Cardioversion or Defibrillation;  Surgeon: Matty Hendricks MD;  Location: [facility] EP LAB;  Service: Cardiology;;       Review of Systems   Constitutional:  Negative for chills, fatigue and fever.   HENT:  Negative for hearing loss.    Eyes:  Negative for photophobia and visual disturbance.   Respiratory:  Negative for cough, chest tightness, shortness of breath and wheezing.    Cardiovascular:  Positive for leg swelling. Negative for chest pain and palpitations.   Gastrointestinal:  Negative for constipation, diarrhea, nausea and vomiting.   Endocrine: Negative for cold intolerance and heat intolerance.   Genitourinary:  Negative for flank pain.   Musculoskeletal:  Positive for gait problem. Negative for neck pain and neck stiffness.   Neurological:  Negative for light-headedness and headaches.   Psychiatric/Behavioral:  Negative for sleep disturbance.          Objective:   Ht 5' 10" (1.778 m)   Wt 93.9 kg (207 lb)   BMI 29.70 kg/m²     X-Ray Foot Complete Left  Narrative: EXAMINATION:  XR FOOT COMPLETE 3 VIEW LEFT    CLINICAL HISTORY:  .  Pain in left foot    TECHNIQUE:  AP, lateral and oblique views of the left foot were performed.    COMPARISON:  None    FINDINGS:  There is an obliquely oriented fracture seen involving the mid shaft of the 4th metatarsal with slight medial displacement of the distal fracture fragment by 1-2 mm.  No evidence of dislocation.  There is " moderate to severe osteoarthritis at the 1st MTP joint.  No erosive changes demonstrated.  Impression: Fourth metatarsal fracture as above    Electronically signed by: Dionicio Jose MD  Date:    10/31/2023  Time:    11:49  X-Ray Ankle Complete Left  Narrative: EXAMINATION:  XR ANKLE COMPLETE 3 VIEW LEFT    CLINICAL HISTORY:  Pain in left ankle and joints of left foot    TECHNIQUE:  AP, lateral and oblique views of the left ankle were performed.    COMPARISON:  None    FINDINGS:  Mild nonspecific soft tissue edema surrounds the ankle.  No acute fractures or dislocations visualized.  Mild degenerative spurring seen at the distal tip of the medial malleolus.No abnormal joint space widening demonstrated at the ankle mortise.  Impression: As above.  No acute fractures or dislocations visualized.    Electronically signed by: Dionicio Jose MD  Date:    10/31/2023  Time:    11:47      Physical Exam    LOWER EXTREMITY PHYSICAL EXAMINATION    VASCULAR: The LLE DP is 2/4 and the PT is 1/4. CFT is WNL. Non-pitting edema is noted B/L, moderate. No calf pains are noted.    DERMATOLOGY: Skin is supple, dry and intact. No ecchymosis is noted. No open lesions are noted. No erythema or cellulitis is noted. No hematoma is noted.     ORTHOPEDIC: MMT is 5/5 on the LLE as compared to the contra-lateral. Moderate edema is noted at the midfoot. Moderate pains to the midfoot in the area of pathology. Antalgic gait.     Assessment:     1. Stress fracture of metatarsal bone of left foot, initial encounter    2. Closed displaced fracture of fourth metatarsal bone of left foot, initial encounter    3. Pain in left foot    4. Swelling of left foot    5. Paroxysmal atrial fibrillation    6. Paroxysmal atrial flutter    7. MVP (mitral valve prolapse)    8. Nonrheumatic mitral valve regurgitation    9. HFrEF (heart failure with reduced ejection fraction)    10. Coronary artery disease of native artery of native heart with stable angina  pectoris    11. Stage 3a chronic kidney disease (CKD)    12. Preop examination          Plan:     Stress fracture of metatarsal bone of left foot, initial encounter  -     Case Request Operating Room: ORIF, FRACTURE, METATARSAL BONE    Closed displaced fracture of fourth metatarsal bone of left foot, initial encounter    Pain in left foot  -     Case Request Operating Room: ORIF, FRACTURE, METATARSAL BONE    Swelling of left foot    Paroxysmal atrial fibrillation    Paroxysmal atrial flutter    MVP (mitral valve prolapse)    Nonrheumatic mitral valve regurgitation    HFrEF (heart failure with reduced ejection fraction)    Coronary artery disease of native artery of native heart with stable angina pectoris    Stage 3a chronic kidney disease (CKD)    Preop examination  -     Ambulatory referral/consult to Pre-Admit; Future; Expected date: 11/07/2023      Thorough discussion is had with the patient today, concerning the diagnosis, its etiology, and the treatment algorithm at present.     XRAYS are reviewed in detail with the patient. All questions and concerns regarding findings and its/their implications are outlined and discussed.    Patient's past medical history is thoroughly reviewed today with the patient and/or family members. Complete chart review is performed at this time.    Given displacement and fracture location (diaphysis), would recommend ORIF over conservative care with WBAT with CAM Walker/Sx. Shoe.    States Vit. D. Therapy.    No AC for Afib, but has MMP relating to cardiac.    Was seen by Cardiology on 9/27/23.     Start WBAT with Sx. Shoe with [facility] prn.    The procedure of (ORIF LLE 4th metatarsal fracture) was thoroughly explained to the patient. Its necessity and/or purpose and the implications therein were outlined, including any pertinent advantages and/or disadvantages, and possible complications, if any. Possible complications include recurrence of pathology and/or deformity, infection  (cellulitis, drainage, purulence, malodor, etc...), pain, numbness, neuritis, edema, burning, loss of function, need for further surgery, possible need for removal of any implanted hardware, soft tissue contracture and/or scarring, etc... No guarantees were given and/or implied. Post-operative expectations and weightbearing protocol is thoroughly explained to the patient, who acknowledges understanding.         Future Appointments   Date Time Provider Department [place]   10/31/2023  2:45 PM LUZ-OPERATIVE NADIYA, RIYA [facility] PREOPC [facility]   10/31/2023  3:30 PM LABORATORY, CRISTHIAN [facility] LAB [place]   11/6/2023  8:10 AM LABORATORYIDANIA [facility] LAB [facility]   4/12/2024 10:45 AM José Miguel Cintron MD [facility] UROLOGY [facility]

## 2023-10-31 NOTE — ASSESSMENT & PLAN NOTE
ORIF, Metatarsal, Left Foot with Dr. Aleksandr Cheek on 11/6/23    Known risk factors for perioperative complications: Coronary disease  Congestive heart failure  Renal dysfunction    Difficulty with intubation is not anticipated.    Cardiac Risk Estimation: Based on the Revised Cardiac Risk index, patient is a Class risk with a % risk of a major cardiac event in a low risk procedure.    1.) Preoperative workup as follows: ECG, hemoglobin, hematocrit, electrolytes, creatinine, glucose, liver function studies.  2.) Change in medication regimen before surgery: discontinue ASA, NSAIDs 7 days before surgery, hold Metformin 24 hours prior to surgery.  3.) Prophylaxis for cardiac events with perioperative beta-blockers: Cont as prescribed.  4.) Invasive hemodynamic monitoring perioperatively: at the discretion of anesthesiologist.  5.) Deep vein thrombosis prophylaxis postoperatively: intermittent pneumatic compression boots and regimen to be chosen by surgical team.  6.) Surveillance for postoperative MI with ECG immediately postoperatively and on postoperati ve days 1 and 2 AND troponin levels 24 hours postoperatively and on day 4 or hospital discharge (whichever comes first): not indicated.  7.) Current medications which may produce withdrawal symptoms if withheld perioperatively: None  8.) Other measures: None    --Morning of Procedure: atenolol, tamsulosin, levothyroxine  --Resume all medications post-operatively  --Hold ASA, NSAIDs and all vitamins/supplements 7 days prior to procedure

## 2023-10-31 NOTE — PROGRESS NOTES
Preoperative History and Physical                                                              Hospital Medicine                                                                      Chief Complaint: Preoperative evaluation     History of Present Illness:      Cedric Santoro is a 63 y.o. male who presents to the office today for a preoperative consultation at the request of Dr. Aleksandr Cheek who plans on performing ORIF, FRACTURE, METATARSAL BONE, Left on November 6.     Functional Status:      The patient is able to climb a flight of stairs. The patient is able to ambulate without difficulty. The patient's functional status is affected by the surgical problem. The patient's functional status is not affected by shortness of breath, chest pain, dyspnea on exertion and isfatigue.    MET score greater than 4    Past Medical History:      Past Medical History:   Diagnosis Date    Abnormal ECG 10/04/2022    Coronary artery disease of native artery of native heart with stable angina pectoris 09/06/2023    Essential hypertension 02/24/2020    Hypertension     MVP (mitral valve prolapse) 10/26/2022    Nonrheumatic mitral valve regurgitation 10/26/2022    Palpitation 03/05/2020    Paroxysmal atrial fibrillation 03/05/2020    Paroxysmal atrial flutter 10/04/2022    Raynaud's syndrome     Vasovagal syncopes         Past Surgical History:      Past Surgical History:   Procedure Laterality Date    ABLATION, ATRIAL FLUTTER, TYPICAL N/A 12/15/2022    Procedure: Ablation, Atrial Flutter, Typical;  Surgeon: Matty Hendricks MD;  Location: [facility] EP LAB;  Service: Cardiology;  Laterality: N/A;  AFL, RFA, NICOLASA, ARIELA, anes, MB, 3 Prep    CHOLECYSTECTOMY      ECHOCARDIOGRAM,TRANSESOPHAGEAL N/A 12/15/2022    Procedure: Transesophageal echo (ARIELA) intra-procedure log documentation;  Surgeon: Nisreen Fenton MD;  Location: [facility] EP LAB;  Service: Cardiology;  Laterality: N/A;    EYE SURGERY       GALLBLADDER SURGERY      HERNIA REPAIR      LEFT HEART CATHETERIZATION Left 05/24/2023    Procedure: Left heart cath;  Surgeon: Kal Garzon MD;  Location: [facility] CATH LAB;  Service: Cardiology;  Laterality: Left;  may need to sign consent//    THROAT SURGERY      TRANSESOPHAGEAL ECHOCARDIOGRAPHY N/A 02/27/2023    Procedure: ECHOCARDIOGRAM, TRANSESOPHAGEAL;  Surgeon: Samuel Del Valle MD;  Location: [facility] CATH LAB;  Service: Cardiology;  Laterality: N/A;    TREATMENT OF CARDIAC ARRHYTHMIA  12/15/2022    Procedure: Cardioversion or Defibrillation;  Surgeon: Matty Hendricks MD;  Location: [facility] EP LAB;  Service: Cardiology;;        Social History:      Social History     Socioeconomic History    Marital status: Other   Occupational History    Occupation: Retired    Tobacco Use    Smoking status: Never    Smokeless tobacco: Never   Substance and Sexual Activity    Alcohol use: No    Drug use: No    Sexual activity: Not Currently     Social Determinants of Health     Financial Resource Strain: Unknown (4/12/2022)    Overall Financial Resource Strain (CARDIA)     Difficulty of Paying Living Expenses: Patient refused   Food Insecurity: No Food Insecurity (7/24/2023)    Hunger Vital Sign     Worried About Running Out of Food in the Last Year: Never true     Ran Out of Food in the Last Year: Never true   Transportation Needs: No Transportation Needs (7/24/2023)    PRAPARE - Transportation     Lack of Transportation (Medical): No     Lack of Transportation (Non-Medical): No   Physical Activity: Sufficiently Active (7/24/2023)    Exercise Vital Sign     Days of Exercise per Week: 5 days     Minutes of Exercise per Session: 70 min   Stress: Stress Concern Present (7/24/2023)    [employer] - Occupational Stress Questionnaire     Feeling of Stress : To some extent   Social Connections: Unknown (7/24/2023)    Social Connection and Isolation Panel [NHANES]     Frequency of Communication  with Friends and Family: More than three times a week     Frequency of Social Gatherings with Friends and Family: Once a week     Active Member of Clubs or Organizations: Yes     Attends Club or Organization Meetings: More than 4 times per year     Marital Status:    Housing Stability: Low Risk  (7/24/2023)    Housing Stability Vital Sign     Unable to Pay for Housing in the Last Year: No     Number of Places Lived in the Last Year: 1     Unstable Housing in the Last Year: No        Family History:      Family History   Problem Relation Age of Onset    Cancer Mother         Multiple myeloma    Pacemaker/defibrilator Father     Macular degeneration Father     Macular degeneration Brother        Allergies:      Review of patient's allergies indicates:   Allergen Reactions    Iodinated contrast media     Iodine and iodide containing products Hives       Medications:      Current Outpatient Medications   Medication Sig    atenoloL (TENORMIN) 25 MG tablet Take 1 tablet (25 mg total) by mouth once daily.    b complex vitamins tablet Take 1 tablet by mouth once daily.    baclofen (LIORESAL) 10 MG tablet Take 10 mg by mouth 3 (three) times daily.    co-enzyme Q-10 30 mg capsule Take 30 mg by mouth once daily.    DULoxetine (CYMBALTA) 60 MG capsule Take 60 mg by mouth every evening.    ferrous sulfate 325 (65 FE) MG EC tablet Take 325 mg by mouth once daily.    furosemide (LASIX) 40 MG tablet Take 1 tablet (40 mg total) by mouth once daily.    levothyroxine (SYNTHROID) 50 MCG tablet Take 1 tablet (50 mcg total) by mouth every Mon, Tues, Wed, Thurs, Fri.    losartan (COZAAR) 50 MG tablet TAKE 1 TABLET BY MOUTH EVERY DAY    magnesium 30 mg Tab Take 1 tablet by mouth once daily.    multivitamin (THERAGRAN) per tablet Take 1 tablet by mouth once daily.    rosuvastatin (CRESTOR) 10 MG tablet Take 1 tablet (10 mg total) by mouth every evening.    tamsulosin (FLOMAX) 0.4 mg Cap Take 1 capsule (0.4 mg total) by mouth once  daily.    vitamin D (VITAMIN D3) 1000 units Tab Take 1,000 Units by mouth once daily.     No current facility-administered medications for this visit.       Vitals:      Vitals:    10/31/23 1451   BP: (!) 146/91   Pulse: (!) 56   Temp: 97.3 °F (36.3 °C)       Review of Systems:        Constitutional: Negative for fever, chills, weight loss, malaise/fatigue and diaphoresis.   HENT: Negative for hearing loss, ear pain, nosebleeds, congestion, sore throat, neck pain, tinnitus and ear discharge.    Eyes: Negative for blurred vision, double vision, photophobia, pain, discharge and redness.   Respiratory: Negative for cough, hemoptysis, sputum production, shortness of breath, wheezing and stridor.    Cardiovascular: Negative for chest pain, palpitations, orthopnea, claudication, leg swelling and PND.   Gastrointestinal: Negative for heartburn, nausea, vomiting, abdominal pain, diarrhea, constipation, blood in stool and melena.   Genitourinary: Negative for dysuria, urgency, frequency, hematuria and flank pain.   Musculoskeletal: Negative for myalgias, back pain, joint pain and falls.   Skin: Negative for itching and rash.   Neurological: Negative for dizziness, tingling, tremors, sensory change, speech change, focal weakness, seizures, loss of consciousness, weakness and headaches.   Endo/Heme/Allergies: Negative for environmental allergies and polydipsia. Does not bruise/bleed easily.   Psychiatric/Behavioral: Negative for depression, suicidal ideas, hallucinations, memory loss and substance abuse. The patient is not nervous/anxious and does not have insomnia.    All 14 systems reviewed and negative except as noted above.    Physical Exam:      Constitutional: Appears well-developed, well-nourished and in no acute distress.  Patient is oriented to person, place, and time.   Head: Normocephalic and atraumatic. Mucous membranes moist.  Neck: Neck supple no mass.   Cardiovascular: Normal rate and regular rhythm.  S1 S2  appreciated by ascultation.  Pulmonary/Chest: Effort normal and clear to auscultation bilaterally. No respiratory distress.   Abdomen: Soft. Non-tender and non-distended. Bowel sounds are normal.   Neurological: Patient is alert and oriented to person, place and time. Moves all extremities.  Skin: Warm and dry. No lesions.  Extremities: No clubbing, cyanosis or edema.    Laboratory data:      Reviewed and noted in plan where applicable. Please see chart for full laboratory data.    @IBJJYGKCS02(cpk,cpkmb,troponini,mb)@ @GWIPIUQUJ04(poctglucose)@     Lab Results   Component Value Date    INR 1.0 06/19/2023    INR 1.0 05/18/2023    INR 1.0 02/27/2023       Lab Results   Component Value Date    WBC 5.65 10/31/2023    HGB 14.8 10/31/2023    HCT 44.1 10/31/2023    MCV 93 10/31/2023     10/31/2023       @JBJBVYPVP78(GLU,NA,K,Cl,CO2,BUN,Creatinine,Calcium,MG)@    Predictors of intubation difficulty:       Morbid obesity? no   Anatomically abnormal facies? no   Prominent incisors? no   Receding mandible? no   Short, thick neck? no   Neck range of motion: abnormal, Cervical Stenosis   Dentition: No chipped, loose, or missing teeth.  Based on the Modified Mallampati, patient is a mallampati score: I (soft palate, uvula, fauces, and tonsillar pillars visible)    Cardiographics:      ECG: Sinus rhythm with 1st degree A-V block with Premature atrial complexes   Left axis deviation   Incomplete right bundle branch block   Abnormal ECG   When compared with ECG of 27-FEB-2023 10:47,   Premature atrial complexes are now Present   Incomplete right bundle branch block is now Present   Confirmed by DANA BESS MD ([id number]) on 9/28/2023 5:06:20 PM     Echocardiogram: not indicated    Imaging:      Chest x-ray: not indicated    Assessment and Plan:      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 Aleksandr Cheek on 11/6/23    Known risk factors for perioperative complications: Coronary  disease  Congestive heart failure  Renal dysfunction    Difficulty with intubation is not anticipated.    Cardiac Risk Estimation: Based on the Revised Cardiac Risk index, patient is a Class risk with a % risk of a major cardiac event in a low risk procedure.    1.) Preoperative workup as follows: ECG, hemoglobin, hematocrit, electrolytes, creatinine, glucose, liver function studies.  2.) Change in medication regimen before surgery: discontinue ASA, NSAIDs 7 days before surgery, hold Metformin 24 hours prior to surgery.  3.) Prophylaxis for cardiac events with perioperative beta-blockers: Cont as prescribed.  4.) Invasive hemodynamic monitoring perioperatively: at the discretion of anesthesiologist.  5.) Deep vein thrombosis prophylaxis postoperatively: intermittent pneumatic compression boots and regimen to be chosen by surgical team.  6.) Surveillance for postoperative MI with ECG immediately postoperatively and on postoperati ve days 1 and 2 AND troponin levels 24 hours postoperatively and on day 4 or hospital discharge (whichever comes first): not indicated.  7.) Current medications which may produce withdrawal symptoms if withheld perioperatively: None  8.) Other measures: None    --Morning of Procedure: atenolol, tamsulosin, levothyroxine  --Resume all medications post-operatively  --Hold ASA, NSAIDs and all vitamins/supplements 7 days prior to procedure    Paroxysmal atrial fibrillation  Followed by EP, treated by ablation. Currently sinus rhythm. Last visit with EP, 9/2023, decreased atenolol due to AV block secondary to BB.    --See medication recommendations as above    HFrEF (heart failure with reduced ejection fraction)  [facility] 5/2023, normal EF    See medication recommendations as above    Essential hypertension  Chronic, well controlled    See medication recommendations as above          Electronically signed by Cyndi Davis NP on 11/1/2023 at 2:51 PM.

## 2023-10-31 NOTE — H&P (VIEW-ONLY)
Preoperative History and Physical                                                              Hospital Medicine                                                                      Chief Complaint: Preoperative evaluation     History of Present Illness:      Cedric Santoro is a 63 y.o. male who presents to the office today for a preoperative consultation at the request of Dr. Aleksandr Cheek who plans on performing ORIF, FRACTURE, METATARSAL BONE, Left on November 6.     Functional Status:      The patient is able to climb a flight of stairs. The patient is able to ambulate without difficulty. The patient's functional status is affected by the surgical problem. The patient's functional status is not affected by shortness of breath, chest pain, dyspnea on exertion and isfatigue.    MET score greater than 4    Past Medical History:      Past Medical History:   Diagnosis Date    Abnormal ECG 10/04/2022    Coronary artery disease of native artery of native heart with stable angina pectoris 09/06/2023    Essential hypertension 02/24/2020    Hypertension     MVP (mitral valve prolapse) 10/26/2022    Nonrheumatic mitral valve regurgitation 10/26/2022    Palpitation 03/05/2020    Paroxysmal atrial fibrillation 03/05/2020    Paroxysmal atrial flutter 10/04/2022    Raynaud's syndrome     Vasovagal syncopes         Past Surgical History:      Past Surgical History:   Procedure Laterality Date    ABLATION, ATRIAL FLUTTER, TYPICAL N/A 12/15/2022    Procedure: Ablation, Atrial Flutter, Typical;  Surgeon: Matty Hendricks MD;  Location: [facility] EP LAB;  Service: Cardiology;  Laterality: N/A;  AFL, RFA, NICOLASA, ARIELA, anes, MB, 3 Prep    CHOLECYSTECTOMY      ECHOCARDIOGRAM,TRANSESOPHAGEAL N/A 12/15/2022    Procedure: Transesophageal echo (ARIELA) intra-procedure log documentation;  Surgeon: Nisreen Fenton MD;  Location: [facility] EP LAB;  Service: Cardiology;  Laterality: N/A;    EYE SURGERY       GALLBLADDER SURGERY      HERNIA REPAIR      LEFT HEART CATHETERIZATION Left 05/24/2023    Procedure: Left heart cath;  Surgeon: Kal Garzon MD;  Location: [facility] CATH LAB;  Service: Cardiology;  Laterality: Left;  may need to sign consent//    THROAT SURGERY      TRANSESOPHAGEAL ECHOCARDIOGRAPHY N/A 02/27/2023    Procedure: ECHOCARDIOGRAM, TRANSESOPHAGEAL;  Surgeon: Samuel Del Valle MD;  Location: [facility] CATH LAB;  Service: Cardiology;  Laterality: N/A;    TREATMENT OF CARDIAC ARRHYTHMIA  12/15/2022    Procedure: Cardioversion or Defibrillation;  Surgeon: Matty Hendricks MD;  Location: [facility] EP LAB;  Service: Cardiology;;        Social History:      Social History     Socioeconomic History    Marital status: Other   Occupational History    Occupation: Retired    Tobacco Use    Smoking status: Never    Smokeless tobacco: Never   Substance and Sexual Activity    Alcohol use: No    Drug use: No    Sexual activity: Not Currently     Social Determinants of Health     Financial Resource Strain: Unknown (4/12/2022)    Overall Financial Resource Strain (CARDIA)     Difficulty of Paying Living Expenses: Patient refused   Food Insecurity: No Food Insecurity (7/24/2023)    Hunger Vital Sign     Worried About Running Out of Food in the Last Year: Never true     Ran Out of Food in the Last Year: Never true   Transportation Needs: No Transportation Needs (7/24/2023)    PRAPARE - Transportation     Lack of Transportation (Medical): No     Lack of Transportation (Non-Medical): No   Physical Activity: Sufficiently Active (7/24/2023)    Exercise Vital Sign     Days of Exercise per Week: 5 days     Minutes of Exercise per Session: 70 min   Stress: Stress Concern Present (7/24/2023)    [employer] - Occupational Stress Questionnaire     Feeling of Stress : To some extent   Social Connections: Unknown (7/24/2023)    Social Connection and Isolation Panel [NHANES]     Frequency of Communication  with Friends and Family: More than three times a week     Frequency of Social Gatherings with Friends and Family: Once a week     Active Member of Clubs or Organizations: Yes     Attends Club or Organization Meetings: More than 4 times per year     Marital Status:    Housing Stability: Low Risk  (7/24/2023)    Housing Stability Vital Sign     Unable to Pay for Housing in the Last Year: No     Number of Places Lived in the Last Year: 1     Unstable Housing in the Last Year: No        Family History:      Family History   Problem Relation Age of Onset    Cancer Mother         Multiple myeloma    Pacemaker/defibrilator Father     Macular degeneration Father     Macular degeneration Brother        Allergies:      Review of patient's allergies indicates:   Allergen Reactions    Iodinated contrast media     Iodine and iodide containing products Hives       Medications:      Current Outpatient Medications   Medication Sig    atenoloL (TENORMIN) 25 MG tablet Take 1 tablet (25 mg total) by mouth once daily.    b complex vitamins tablet Take 1 tablet by mouth once daily.    baclofen (LIORESAL) 10 MG tablet Take 10 mg by mouth 3 (three) times daily.    co-enzyme Q-10 30 mg capsule Take 30 mg by mouth once daily.    DULoxetine (CYMBALTA) 60 MG capsule Take 60 mg by mouth every evening.    ferrous sulfate 325 (65 FE) MG EC tablet Take 325 mg by mouth once daily.    furosemide (LASIX) 40 MG tablet Take 1 tablet (40 mg total) by mouth once daily.    levothyroxine (SYNTHROID) 50 MCG tablet Take 1 tablet (50 mcg total) by mouth every Mon, Tues, Wed, Thurs, Fri.    losartan (COZAAR) 50 MG tablet TAKE 1 TABLET BY MOUTH EVERY DAY    magnesium 30 mg Tab Take 1 tablet by mouth once daily.    multivitamin (THERAGRAN) per tablet Take 1 tablet by mouth once daily.    rosuvastatin (CRESTOR) 10 MG tablet Take 1 tablet (10 mg total) by mouth every evening.    tamsulosin (FLOMAX) 0.4 mg Cap Take 1 capsule (0.4 mg total) by mouth once  daily.    vitamin D (VITAMIN D3) 1000 units Tab Take 1,000 Units by mouth once daily.     No current facility-administered medications for this visit.       Vitals:      Vitals:    10/31/23 1451   BP: (!) 146/91   Pulse: (!) 56   Temp: 97.3 °F (36.3 °C)       Review of Systems:        Constitutional: Negative for fever, chills, weight loss, malaise/fatigue and diaphoresis.   HENT: Negative for hearing loss, ear pain, nosebleeds, congestion, sore throat, neck pain, tinnitus and ear discharge.    Eyes: Negative for blurred vision, double vision, photophobia, pain, discharge and redness.   Respiratory: Negative for cough, hemoptysis, sputum production, shortness of breath, wheezing and stridor.    Cardiovascular: Negative for chest pain, palpitations, orthopnea, claudication, leg swelling and PND.   Gastrointestinal: Negative for heartburn, nausea, vomiting, abdominal pain, diarrhea, constipation, blood in stool and melena.   Genitourinary: Negative for dysuria, urgency, frequency, hematuria and flank pain.   Musculoskeletal: Negative for myalgias, back pain, joint pain and falls.   Skin: Negative for itching and rash.   Neurological: Negative for dizziness, tingling, tremors, sensory change, speech change, focal weakness, seizures, loss of consciousness, weakness and headaches.   Endo/Heme/Allergies: Negative for environmental allergies and polydipsia. Does not bruise/bleed easily.   Psychiatric/Behavioral: Negative for depression, suicidal ideas, hallucinations, memory loss and substance abuse. The patient is not nervous/anxious and does not have insomnia.    All 14 systems reviewed and negative except as noted above.    Physical Exam:      Constitutional: Appears well-developed, well-nourished and in no acute distress.  Patient is oriented to person, place, and time.   Head: Normocephalic and atraumatic. Mucous membranes moist.  Neck: Neck supple no mass.   Cardiovascular: Normal rate and regular rhythm.  S1 S2  appreciated by ascultation.  Pulmonary/Chest: Effort normal and clear to auscultation bilaterally. No respiratory distress.   Abdomen: Soft. Non-tender and non-distended. Bowel sounds are normal.   Neurological: Patient is alert and oriented to person, place and time. Moves all extremities.  Skin: Warm and dry. No lesions.  Extremities: No clubbing, cyanosis or edema.    Laboratory data:      Reviewed and noted in plan where applicable. Please see chart for full laboratory data.    @[id number](cpk,cpkmb,troponini,mb)@ @BFKTQKIRT98(poctglucose)@     Lab Results   Component Value Date    INR 1.0 06/19/2023    INR 1.0 05/18/2023    INR 1.0 02/27/2023       Lab Results   Component Value Date    WBC 5.65 10/31/2023    HGB 14.8 10/31/2023    HCT 44.1 10/31/2023    MCV 93 10/31/2023     10/31/2023       @KTKNNJFBY08(GLU,NA,K,Cl,CO2,BUN,Creatinine,Calcium,MG)@    Predictors of intubation difficulty:       Morbid obesity? no   Anatomically abnormal facies? no   Prominent incisors? no   Receding mandible? no   Short, thick neck? no   Neck range of motion: abnormal, Cervical Stenosis   Dentition: No chipped, loose, or missing teeth.  Based on the Modified Mallampati, patient is a mallampati score: I (soft palate, uvula, fauces, and tonsillar pillars visible)    Cardiographics:      ECG: Sinus rhythm with 1st degree A-V block with Premature atrial complexes   Left axis deviation   Incomplete right bundle branch block   Abnormal ECG   When compared with ECG of 27-FEB-2023 10:47,   Premature atrial complexes are now Present   Incomplete right bundle branch block is now Present   Confirmed by DANA BESS MD ([id number]) on 9/28/2023 5:06:20 PM     Echocardiogram: not indicated    Imaging:      Chest x-ray: not indicated    Assessment and Plan:      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 Aleksandr Cheek on 11/6/23    Known risk factors for perioperative complications: Coronary  disease  Congestive heart failure  Renal dysfunction    Difficulty with intubation is not anticipated.    Cardiac Risk Estimation: Based on the Revised Cardiac Risk index, patient is a Class risk with a % risk of a major cardiac event in a low risk procedure.    1.) Preoperative workup as follows: ECG, hemoglobin, hematocrit, electrolytes, creatinine, glucose, liver function studies.  2.) Change in medication regimen before surgery: discontinue ASA, NSAIDs 7 days before surgery, hold Metformin 24 hours prior to surgery.  3.) Prophylaxis for cardiac events with perioperative beta-blockers: Cont as prescribed.  4.) Invasive hemodynamic monitoring perioperatively: at the discretion of anesthesiologist.  5.) Deep vein thrombosis prophylaxis postoperatively: intermittent pneumatic compression boots and regimen to be chosen by surgical team.  6.) Surveillance for postoperative MI with ECG immediately postoperatively and on postoperati ve days 1 and 2 AND troponin levels 24 hours postoperatively and on day 4 or hospital discharge (whichever comes first): not indicated.  7.) Current medications which may produce withdrawal symptoms if withheld perioperatively: None  8.) Other measures: None    --Morning of Procedure: atenolol, tamsulosin, levothyroxine  --Resume all medications post-operatively  --Hold ASA, NSAIDs and all vitamins/supplements 7 days prior to procedure    Paroxysmal atrial fibrillation  Followed by EP, treated by ablation. Currently sinus rhythm. Last visit with EP, 9/2023, decreased atenolol due to AV block secondary to BB.    --See medication recommendations as above    HFrEF (heart failure with reduced ejection fraction)  [facility] 5/2023, normal EF    See medication recommendations as above    Essential hypertension  Chronic, well controlled    See medication recommendations as above          Electronically signed by Cyndi Davis NP on 11/1/2023 at 2:51 PM.

## 2023-10-31 NOTE — DISCHARGE INSTRUCTIONS
Pre op instructions reviewed with patient during Clinic Visit with Provider, verbalized understanding.    To confirm, Surgery is scheduled on 11/06/23. We will call you late afternoon the business day prior to surgery with your arrival time.    *Please report to the [facility] (1st Floor) located off of [facility] (2nd Entrance/Building on the left, in front of the flag pole).  Address: [address] [place] [address] [place] [address]      INSTRUCTIONS IMPORTANT!!!  DO NOT Eat, Drink, or Smoke after 12 midnight unless instructed otherwise by your Surgeon. OK to brush teeth, no gum, candy or mints!    MORNING OF SURGERY, drink small sip of water with the following medications instructed by Pre-Admit Provider:  ATENOLOL  LEVOTHYROXINE  FLOMAX    *Additional Medication Instructions: STOP TAKING THE FOLLOWING MEDICATIONS TODAY: CO-Q 10 ENZYME, MULTI-VITAMIN AND VITAMIN D    Diabetic Patients: If you take diabetic or weight loss medication, Do NOT take morning of surgery unless instructed by Doctor. Metformin to be stopped 24 hrs prior to surgery. Ozempic/ Mounjaro/ Wegovy/ Trulicity/ Semaglutide or any weight loss injections to be stopped 7 days prior to surgery. DO NOT take long-acting insulin the evening before surgery. Blood sugars will be checked in pre-op by Nurse.    *Patients should HOLD all vitamins, herbal supplements, weight loss medication, aspirin products & NSAIDS 7 days prior to surgery, as these can thin the blood. Ok to take Tylenol.    ____  Avoid Alcoholic beverages 3 days prior to surgery, as it can thin the blood.  ____  NO Acrylic/fake nails or nail polish worn day of surgery (specifically hand/arm & foot surgeries).  ____  NO powder, lotions, deodorants, oils or cream on body.  ____  Remove all jewelry, piercings, & foreign objects prior to arrival and leave at home.  ____  Remove Dentures, Hearing Aids & Contact Lens prior to surgery.  ____  Bring photo ID and insurance information to  hospital (Leave Valuables at Home).  ____  If going home the same day, arrange for a ride home. You will not be able to drive for 24 hrs if Anesthesia was used.   ____  Males: Stop ED medications (Viagra, Cialis) 24 hrs prior to surgery.  ____  Wear clean, loose fitting clothing to allow for dressings/ bandages.      Bathing Instructions:    -Shower with anti-bacterial Soap (Hibiclens or Dial) the night before surgery and the morning of.   -Do not use Hibiclens on your face or genitals.   -Apply clean clothes after shower.  -Do not shave your face or body 2 days prior to surgery unless instructed otherwise by your Surgeon.    [facility]Ride Policy:  Only 2 adults allowed in pre op/recovery area during your procedure. You MUST HAVE A RIDE HOME from a responsible adult that you know and trust. Medical Transport, Uber or Lyft can ONLY be used if patient has a responsible adult to accompany them during ride home.    Discharge Instructions: You will receive Post-op/Discharge instructions by your Discharge Nurse prior to going home.   *Prevention of surgical site infections:   -Keep incisions clean and dry.   -Do not soak/submerge incisions in water until completely healed.   -Do not apply lotions, powders, creams, or deodorants to site.   -Always make sure hands are cleaned with antibacterial soap/ alcohol-based  prior to touching the surgical site.        *Signs and symptoms of Infection Before or After Surgery:               !!!If you experience any fever, chills, nausea/ vomiting, foul odor/ excessive drainage from surgical site, flu-like symptoms, new wounds or cuts, PLEASE CALL THE SURGEON OFFICE at [phone] or SEND MESSAGE THROUGH [facility] PORTAL!!!       *If you are running late day of surgery, please call the Surgery Dept @ [phone].    *Billing question, please call  [phone]       Thank you,  -[facility] Pre [facility].  [phone] or [phone]  M-F 7:30  am-4:00 pm (Closed Major Holidays)    Additional Tests Scheduled Today:  LABS (1ST Floor) Check in at the !

## 2023-10-31 NOTE — PRE ADMISSION SCREENING
Pre op instructions reviewed with patient during Clinic Visit with Provider, verbalized understanding.    To confirm, Surgery is scheduled on 11/06/23. We will call you late afternoon the business day prior to surgery with your arrival time.    *Please report to the [facility] (1st Floor) located off of [facility] (2nd Entrance/Building on the left, in front of the flag pole).  Address: [address] [place] [address] [place] [address]      INSTRUCTIONS IMPORTANT!!!  DO NOT Eat, Drink, or Smoke after 12 midnight unless instructed otherwise by your Surgeon. OK to brush teeth, no gum, candy or mints!    MORNING OF SURGERY, drink small sip of water with the following medications instructed by Pre-Admit Provider:  ATENOLOL  LEVOTHYROXINE  FLOMAX    *Additional Medication Instructions: STOP TAKING THE FOLLOWING MEDICATIONS TODAY: CO-Q 10 ENZYME, MULTI-VITAMIN AND VITAMIN D    Diabetic Patients: If you take diabetic or weight loss medication, Do NOT take morning of surgery unless instructed by Doctor. Metformin to be stopped 24 hrs prior to surgery. Ozempic/ Mounjaro/ Wegovy/ Trulicity/ Semaglutide or any weight loss injections to be stopped 7 days prior to surgery. DO NOT take long-acting insulin the evening before surgery. Blood sugars will be checked in pre-op by Nurse.    *Patients should HOLD all vitamins, herbal supplements, weight loss medication, aspirin products & NSAIDS 7 days prior to surgery, as these can thin the blood. Ok to take Tylenol.    ____  Avoid Alcoholic beverages 3 days prior to surgery, as it can thin the blood.  ____  NO Acrylic/fake nails or nail polish worn day of surgery (specifically hand/arm & foot surgeries).  ____  NO powder, lotions, deodorants, oils or cream on body.  ____  Remove all jewelry, piercings, & foreign objects prior to arrival and leave at home.  ____  Remove Dentures, Hearing Aids & Contact Lens prior to surgery.  ____  Bring photo ID and insurance information to  hospital (Leave Valuables at Home).  ____  If going home the same day, arrange for a ride home. You will not be able to drive for 24 hrs if Anesthesia was used.   ____  Males: Stop ED medications (Viagra, Cialis) 24 hrs prior to surgery.  ____  Wear clean, loose fitting clothing to allow for dressings/ bandages.      Bathing Instructions:    -Shower with anti-bacterial Soap (Hibiclens or Dial) the night before surgery and the morning of.   -Do not use Hibiclens on your face or genitals.   -Apply clean clothes after shower.  -Do not shave your face or body 2 days prior to surgery unless instructed otherwise by your Surgeon.    [facility] Policy:  Only 2 adults allowed in pre op/recovery area during your procedure. You MUST HAVE A RIDE HOME from a responsible adult that you know and trust. Medical Transport, Uber or Lyft can ONLY be used if patient has a responsible adult to accompany them during ride home.    Discharge Instructions: You will receive Post-op/Discharge instructions by your Discharge Nurse prior to going home.   *Prevention of surgical site infections:   -Keep incisions clean and dry.   -Do not soak/submerge incisions in water until completely healed.   -Do not apply lotions, powders, creams, or deodorants to site.   -Always make sure hands are cleaned with antibacterial soap/ alcohol-based  prior to touching the surgical site.        *Signs and symptoms of Infection Before or After Surgery:               !!!If you experience any fever, chills, nausea/ vomiting, foul odor/ excessive drainage from surgical site, flu-like symptoms, new wounds or cuts, PLEASE CALL THE SURGEON OFFICE at [phone] or SEND MESSAGE THROUGH [facility] PORTAL!!!       *If you are running late day of surgery, please call the Surgery Dept @ [phone].    *Billing question, please call  [phone]       Thank you,  -[facility]  [phone] or [phone]  M-F 7:30  am-4:00 pm (Closed Major Holidays)    Additional Tests Scheduled Today:  LABS (1ST Floor) Check in at the !

## 2023-11-01 ENCOUNTER — TELEPHONE (OUTPATIENT)
Dept: CARDIOLOGY | Facility: CLINIC | Age: 63
End: 2023-11-01
Payer: COMMERCIAL

## 2023-11-01 NOTE — TELEPHONE ENCOUNTER
May hold Eliquis x 2 days for surgery and proceed at moderate CV risk.    Dr Garzon      From: Juana Day, RN   Sent: 11/1/2023  11:11 AM CDT   To: [facility] Staff   Subject: CARDIAC CLEARANCE                                 Hello,   This pt will be having a ORIF METATARSAL BONE FRACTION surgery on 11/06/23 with Dr Cheek. This pt was seen by Dr. Garzon on 9/06/23. We are requesting a cardiac clearance note. Please advise on whether this patient can be cleared to proceed from your standpoint or will need any further testing to ensure they are optimized to proceed.     -Thanks in advance,   [facility]   [phone] or [phone]   Mon-Fri 7:30 am- 4 pm (Closed Major Holidays)

## 2023-11-01 NOTE — ASSESSMENT & PLAN NOTE
Followed by EP, treated by ablation. Currently sinus rhythm. Last visit with EP, 9/2023, decreased atenolol due to AV block secondary to BB.    --See medication recommendations as above

## 2023-11-03 ENCOUNTER — TELEPHONE (OUTPATIENT)
Dept: PREADMISSION TESTING | Facility: HOSPITAL | Age: 63
End: 2023-11-03
Payer: COMMERCIAL

## 2023-11-03 ENCOUNTER — ANESTHESIA EVENT (OUTPATIENT)
Dept: SURGERY | Facility: HOSPITAL | Age: 63
End: 2023-11-03
Payer: COMMERCIAL

## 2023-11-03 NOTE — ANESTHESIA PREPROCEDURE EVALUATION
11/03/2023  Cedric Santoro is a 63 y.o., male.    Patient Active Problem List   Diagnosis    Essential hypertension    Paroxysmal atrial fibrillation    Unspecified inflammatory spondylopathy, cervical region    Paroxysmal atrial flutter    Nonrheumatic mitral valve regurgitation    MVP (mitral valve prolapse)    HFrEF (heart failure with reduced ejection fraction)    Elevated coronary artery calcium score    BPH with urinary obstruction    Steatosis of liver    Hypothyroid    DDD (degenerative disc disease), cervical    Stage 3a chronic kidney disease (CKD)    Coronary artery disease of native artery of native heart with stable angina pectoris    PAC (premature atrial contraction)    Raynaud phenomenon    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     I have reviewed the Nursing Notes. I have reviewed the NPO Status.   I have reviewed the Medications.     Review of Systems  Hematology/Oncology:  Hematology Normal   Oncology Normal     EENT/Dental:EENT/Dental Normal   Cardiovascular:   Hypertension Valvular problems/Murmurs, MR, MVP CAD (coronary calcifications) asymptomatic Dysrhythmias atrial fibrillation ECG has been reviewed. A Fib ablation    Cleared by cardiologist with instruction to stop ELIQUIS two days before surgery   Pulmonary:  Pulmonary Normal    Renal/:   Chronic Renal Disease, CKD    Hepatic/GI:   Liver Disease, (hepatic steatosis)    Musculoskeletal:  Spine Disorders: cervical Degenerative disease    Neurological:  Neurology Normal    Endocrine:   Hypothyroidism    Dermatological:  Skin Normal    Psych:  Psychiatric Normal           Physical Exam  General: Alert, Well nourished and Cooperative    Airway:  Mallampati: II / II  Mouth Opening: Normal  TM Distance: Normal  Tongue: Normal  Neck ROM: Normal  ROM    2023 ECHO   The left ventricle is normal in size with concentric remodeling and normal systolic function.   Moderate left atrial enlargement.   The estimated ejection fraction is 60%.   Normal left ventricular diastolic function.   Normal right ventricular size with normal right ventricular systolic function.   Moderate right atrial enlargement.   Moderate-to-severe mitral regurgitation.   Mild tricuspid regurgitation.   There is bileaflet mitral prolapse.   Normal central venous pressure (3 mmHg).   The estimated PA systolic pressure is 28 mmHg.    2023 LHC  Nonobstructive CAD         Anesthesia Plan  Type of Anesthesia, risks & benefits discussed:    Anesthesia Type: MAC, Gen ETT, Gen Supraglottic Airway  Intra-op Monitoring Plan: Standard ASA Monitors  ASA Score: 3  Day of Surgery Review of History & Physical: H&P Update referred to the surgeon/provider.I have interviewed and examined the patient. I have reviewed the patient's H&P dated: There are no significant changes. H&P completed by Anesthesiologist.    Ready For Surgery From Anesthesia Perspective.     .

## 2023-11-03 NOTE — TELEPHONE ENCOUNTER
Called and spoke with pt about the following:     Please arrive to [facility] ([facility] at 0530am on 11/6/23 for your scheduled procedure.  Address: [address] [place] [address] [place] [address] (2nd Building on left, 1st Floor Lobby)  >>>NO eating or drinking after midnight unless instructed otherwise by your Surgeon<<<    Thank you,  -[facility].  Mon-Fri 7:30 am - 4 pm [phone]

## 2023-11-06 ENCOUNTER — HOSPITAL ENCOUNTER (OUTPATIENT)
Facility: HOSPITAL | Age: 63
Discharge: HOME | End: 2023-11-06
Attending: PODIATRIST | Admitting: PODIATRIST
Payer: COMMERCIAL

## 2023-11-06 ENCOUNTER — ANESTHESIA (OUTPATIENT)
Dept: SURGERY | Facility: HOSPITAL | Age: 63
End: 2023-11-06
Payer: COMMERCIAL

## 2023-11-06 DIAGNOSIS — Z98.890 POSTOPERATIVE STATE: Primary | ICD-10-CM

## 2023-11-06 DIAGNOSIS — S92.302A: ICD-10-CM

## 2023-11-06 DIAGNOSIS — M84.375A STRESS FRACTURE OF METATARSAL BONE OF LEFT FOOT, INITIAL ENCOUNTER: ICD-10-CM

## 2023-11-06 DIAGNOSIS — S92.342A CLOSED DISPLACED FRACTURE OF FOURTH METATARSAL BONE OF LEFT FOOT, INITIAL ENCOUNTER: ICD-10-CM

## 2023-11-06 PROCEDURE — 63600175 PHARM REV CODE 636 W HCPCS: Performed by: PODIATRIST

## 2023-11-06 PROCEDURE — 27800903 OPTIME MED/SURG SUP & DEVICES OTHER IMPLANTS: Performed by: PODIATRIST

## 2023-11-06 PROCEDURE — 28485 PR OPEN TREATMENT METATARSAL FRACTURE EACH: ICD-10-PCS | Mod: T3,,, | Performed by: PODIATRIST

## 2023-11-06 PROCEDURE — C1713 ANCHOR/SCREW BN/BN,TIS/BN: HCPCS | Performed by: PODIATRIST

## 2023-11-06 PROCEDURE — 27201423 OPTIME MED/SURG SUP & DEVICES STERILE SUPPLY: Performed by: PODIATRIST

## 2023-11-06 PROCEDURE — 25000003 PHARM REV CODE 250: Performed by: CLINIC/CENTER

## 2023-11-06 PROCEDURE — 37000009 HC ANESTHESIA EA ADD 15 MINS: Performed by: PODIATRIST

## 2023-11-06 PROCEDURE — 28485 OPTX METATARSAL FX EACH: CPT | Mod: T3,,, | Performed by: PODIATRIST

## 2023-11-06 PROCEDURE — 71000039 HC RECOVERY, EACH ADD'L HOUR: Performed by: PODIATRIST

## 2023-11-06 PROCEDURE — 37000008 HC ANESTHESIA 1ST 15 MINUTES: Performed by: PODIATRIST

## 2023-11-06 PROCEDURE — 36000708 HC OR TIME LEV III 1ST 15 MIN: Performed by: PODIATRIST

## 2023-11-06 PROCEDURE — 71000033 HC RECOVERY, INTIAL HOUR: Performed by: PODIATRIST

## 2023-11-06 PROCEDURE — 71000015 HC POSTOP RECOV 1ST HR: Performed by: PODIATRIST

## 2023-11-06 PROCEDURE — 36000709 HC OR TIME LEV III EA ADD 15 MIN: Performed by: PODIATRIST

## 2023-11-06 PROCEDURE — 63600175 PHARM REV CODE 636 W HCPCS: Performed by: CLINIC/CENTER

## 2023-11-06 DEVICE — PASTE ALLOSYNC CB DBM 1CC: Type: IMPLANTABLE DEVICE | Site: FOOT | Status: FUNCTIONAL

## 2023-11-06 DEVICE — IMPLANTABLE DEVICE: Type: IMPLANTABLE DEVICE | Site: FOOT | Status: FUNCTIONAL

## 2023-11-06 RX ORDER — FENTANYL CITRATE 50 UG/ML
25 INJECTION, SOLUTION INTRAMUSCULAR; INTRAVENOUS EVERY 5 MIN PRN
Status: DISCONTINUED | OUTPATIENT
Start: 2023-11-06 | End: 2023-11-06 | Stop reason: HOSPADM

## 2023-11-06 RX ORDER — CEFAZOLIN SODIUM 2 G/50ML
2 SOLUTION INTRAVENOUS
Status: COMPLETED | OUTPATIENT
Start: 2023-11-06 | End: 2023-11-06

## 2023-11-06 RX ORDER — OXYCODONE AND ACETAMINOPHEN 5; 325 MG/1; MG/1
1 TABLET ORAL
Status: DISCONTINUED | OUTPATIENT
Start: 2023-11-06 | End: 2023-11-06 | Stop reason: HOSPADM

## 2023-11-06 RX ORDER — MEPERIDINE HYDROCHLORIDE 25 MG/ML
12.5 INJECTION INTRAMUSCULAR; INTRAVENOUS; SUBCUTANEOUS ONCE AS NEEDED
Status: DISCONTINUED | OUTPATIENT
Start: 2023-11-06 | End: 2023-11-06 | Stop reason: HOSPADM

## 2023-11-06 RX ORDER — KETAMINE HCL IN 0.9 % NACL 50 MG/5 ML
SYRINGE (ML) INTRAVENOUS
Status: DISCONTINUED | OUTPATIENT
Start: 2023-11-06 | End: 2023-11-06

## 2023-11-06 RX ORDER — LIDOCAINE HYDROCHLORIDE 20 MG/ML
INJECTION INTRAVENOUS
Status: DISCONTINUED | OUTPATIENT
Start: 2023-11-06 | End: 2023-11-06

## 2023-11-06 RX ORDER — BUPIVACAINE HYDROCHLORIDE 5 MG/ML
INJECTION, SOLUTION EPIDURAL; INTRACAUDAL
Status: DISCONTINUED | OUTPATIENT
Start: 2023-11-06 | End: 2023-11-06 | Stop reason: HOSPADM

## 2023-11-06 RX ORDER — CEFADROXIL 500 MG/1
500 CAPSULE ORAL EVERY 12 HOURS
Qty: 10 CAPSULE | Refills: 0 | Status: SHIPPED | OUTPATIENT
Start: 2023-11-06 | End: 2023-11-11

## 2023-11-06 RX ORDER — HYDROMORPHONE HYDROCHLORIDE 2 MG/ML
0.2 INJECTION, SOLUTION INTRAMUSCULAR; INTRAVENOUS; SUBCUTANEOUS EVERY 5 MIN PRN
Status: DISCONTINUED | OUTPATIENT
Start: 2023-11-06 | End: 2023-11-06 | Stop reason: HOSPADM

## 2023-11-06 RX ORDER — DEXAMETHASONE SODIUM PHOSPHATE 4 MG/ML
INJECTION, SOLUTION INTRA-ARTICULAR; INTRALESIONAL; INTRAMUSCULAR; INTRAVENOUS; SOFT TISSUE
Status: DISCONTINUED | OUTPATIENT
Start: 2023-11-06 | End: 2023-11-06

## 2023-11-06 RX ORDER — FENTANYL CITRATE 50 UG/ML
INJECTION, SOLUTION INTRAMUSCULAR; INTRAVENOUS
Status: DISCONTINUED | OUTPATIENT
Start: 2023-11-06 | End: 2023-11-06

## 2023-11-06 RX ORDER — GABAPENTIN 100 MG/1
100 CAPSULE ORAL 2 TIMES DAILY
Qty: 60 CAPSULE | Refills: 0 | Status: SHIPPED | OUTPATIENT
Start: 2023-11-06 | End: 2023-12-27

## 2023-11-06 RX ORDER — ONDANSETRON 2 MG/ML
4 INJECTION INTRAMUSCULAR; INTRAVENOUS DAILY PRN
Status: DISCONTINUED | OUTPATIENT
Start: 2023-11-06 | End: 2023-11-06 | Stop reason: HOSPADM

## 2023-11-06 RX ORDER — ONDANSETRON 2 MG/ML
INJECTION INTRAMUSCULAR; INTRAVENOUS
Status: DISCONTINUED | OUTPATIENT
Start: 2023-11-06 | End: 2023-11-06

## 2023-11-06 RX ORDER — OXYCODONE AND ACETAMINOPHEN 10; 325 MG/1; MG/1
1 TABLET ORAL EVERY 6 HOURS PRN
Qty: 28 TABLET | Refills: 0 | Status: SHIPPED | OUTPATIENT
Start: 2023-11-06 | End: 2023-11-13

## 2023-11-06 RX ORDER — SODIUM CHLORIDE 0.9 % (FLUSH) 0.9 %
2 SYRINGE (ML) INJECTION
Status: DISCONTINUED | OUTPATIENT
Start: 2023-11-06 | End: 2023-11-06 | Stop reason: HOSPADM

## 2023-11-06 RX ORDER — PROPOFOL 10 MG/ML
VIAL (ML) INTRAVENOUS
Status: DISCONTINUED | OUTPATIENT
Start: 2023-11-06 | End: 2023-11-06

## 2023-11-06 RX ORDER — MIDAZOLAM HYDROCHLORIDE 1 MG/ML
INJECTION, SOLUTION INTRAMUSCULAR; INTRAVENOUS
Status: DISCONTINUED | OUTPATIENT
Start: 2023-11-06 | End: 2023-11-06

## 2023-11-06 RX ADMIN — ONDANSETRON 4 MG: 2 INJECTION INTRAMUSCULAR; INTRAVENOUS at 07:11

## 2023-11-06 RX ADMIN — DEXAMETHASONE SODIUM PHOSPHATE 4 MG: 4 INJECTION, SOLUTION INTRA-ARTICULAR; INTRALESIONAL; INTRAMUSCULAR; INTRAVENOUS; SOFT TISSUE at 07:11

## 2023-11-06 RX ADMIN — GLYCOPYRROLATE 0.2 MG: 0.2 INJECTION, SOLUTION INTRAMUSCULAR; INTRAVENOUS at 07:11

## 2023-11-06 RX ADMIN — PROPOFOL 100 MCG/KG/MIN: 10 INJECTION, EMULSION INTRAVENOUS at 07:11

## 2023-11-06 RX ADMIN — LIDOCAINE HYDROCHLORIDE 100 MG: 20 INJECTION INTRAVENOUS at 07:11

## 2023-11-06 RX ADMIN — Medication 25 MG: at 07:11

## 2023-11-06 RX ADMIN — MIDAZOLAM 2 MG: 1 INJECTION INTRAMUSCULAR; INTRAVENOUS at 07:11

## 2023-11-06 RX ADMIN — FENTANYL CITRATE 50 MCG: 50 INJECTION, SOLUTION INTRAMUSCULAR; INTRAVENOUS at 07:11

## 2023-11-06 RX ADMIN — SODIUM CHLORIDE, SODIUM LACTATE, POTASSIUM CHLORIDE, AND CALCIUM CHLORIDE: .6; .31; .03; .02 INJECTION, SOLUTION INTRAVENOUS at 07:11

## 2023-11-06 RX ADMIN — CEFAZOLIN SODIUM 2 G: 2 SOLUTION INTRAVENOUS at 07:11

## 2023-11-06 NOTE — DISCHARGE INSTRUCTIONS
Weightbearing Status and Wound Care:  1. When walking, wear the surgical shoe or walking boot at all times.    2. During daytime/awake hours, if a CAST or POSTERIOR SPLINT is in place, ice the posterior aspect of the leg, behind the knee, 20 minutes on (w/ ice) and followed by 20 minutes off (w/o ice) until follow up; repeat accordingly until follow up. IF no CAST or POSTERIOR SPLINT is in place, ice the anterior aspect of the ankle, in a similar manner. During night time/sleep hours, simply elevating the limb above the level of the heart is sufficient.     3. Elevate the extremity above the level of the heart at all times when sitting.    4. Take the pain mediations as prescribed for the initial 3 or so days, then only as needed.    5. If no overt medical contra-indication, take Motrin or Advil or Ibuprofen or Aleve in between the pain medication doses.    6. Do not change the dressings.    7. Do not get the dressings wet.     8. Please be reminded of the harmful effects of nicotine/tobacco/cigarette/cigar/marijuana smoking, especially in relation to the lower extremity, particularly in reference to post operative healing. I do rec. complete or near complete cessation of any or all of the aforementioned until you are well out of the post-operative period.    9. If you were prescribed more than one short acting opioid, e.g., (Morphine or Dilaudid), with Percocet or Norco or Tramadol, the Morphine (MSIR) or Dilaudid (Hydromorphone) is to be taken during the immediate initial days s/p, at an interval of every 6 hours or 5 hours or 4 hours, as needed for pain control. Once you are complete with the Morphine (MSIR) or Dilaudid (Hydromorphone), you may/should convert to the secondary medication. DO NOT take both medications together at any time. It is not advisable to start with the less potent medication, Percocet or Norco or Tramadol, and then convert to the stronger medication.     10. Stool Softeners to avoid  constipation should start today/tomorrow AM.    11. Laxatives or Enemas as necessary.

## 2023-11-06 NOTE — BRIEF OP NOTE
[facility] - Surgery (Hospital)  Brief Operative Note    Surgery Date: 11/6/2023     Surgeon(s) and Role:     * Aleksandr Cheek DPMARU - Primary    Assisting Surgeon: None    Pre-op Diagnosis:  Stress fracture of metatarsal bone of left foot, initial encounter [M84.375A]  Pain in left foot [M79.672]    Post-op Diagnosis:  Post-Op Diagnosis Codes:     * Stress fracture of metatarsal bone of left foot, initial encounter [M84.375A]     * Pain in left foot [M79.672]    Procedure(s) (LRB):  ORIF, FRACTURE, METATARSAL BONE (Left)    Anesthesia: Local MAC    Operative Findings: As per Dx.    Estimated Blood Loss: * No values recorded between 11/6/2023  7:31 AM and 11/6/2023  8:16 AM *         Specimens:   Specimen (24h ago, onward)      None              Discharge Note    OUTCOME: Patient tolerated treatment/procedure well without complication and is now ready for discharge.    DISPOSITION: Home or Self Care    FINAL DIAGNOSIS:      * Stress fracture of metatarsal bone of left foot, initial encounter [M84.375A]     * Pain in left foot [M79.672]    FOLLOWUP: In clinic    DISCHARGE INSTRUCTIONS:    Weightbearing Status and Wound Care:  1. When walking, wear the surgical shoe or walking boot at all times.    2. During daytime/awake hours, if a CAST or POSTERIOR SPLINT is in place, ice the posterior aspect of the leg, behind the knee, 20 minutes on (w/ ice) and followed by 20 minutes off (w/o ice) until follow up; repeat accordingly until follow up. IF no CAST or POSTERIOR SPLINT is in place, ice the anterior aspect of the ankle, in a similar manner. During night time/sleep hours, simply elevating the limb above the level of the heart is sufficient.     3. Elevate the extremity above the level of the heart at all times when sitting.    4. Take the pain mediations as prescribed for the initial 3 or so days, then only as needed.    5. If no overt medical contra-indication, take Motrin or Advil or Ibuprofen or Aleve in between the  pain medication doses.    6. Do not change the dressings.    7. Do not get the dressings wet.     8. Please be reminded of the harmful effects of nicotine/tobacco/cigarette/cigar/marijuana smoking, especially in relation to the lower extremity, particularly in reference to post operative healing. I do rec. complete or near complete cessation of any or all of the aforementioned until you are well out of the post-operative period.    9. If you were prescribed more than one short acting opioid, e.g., (Morphine or Dilaudid), with Percocet or Norco or Tramadol, the Morphine (MSIR) or Dilaudid (Hydromorphone) is to be taken during the immediate initial days s/p, at an interval of every 6 hours or 5 hours or 4 hours, as needed for pain control. Once you are complete with the Morphine (MSIR) or Dilaudid (Hydromorphone), you may/should convert to the secondary medication. DO NOT take both medications together at any time. It is not advisable to start with the less potent medication, Percocet or Norco or Tramadol, and then convert to the stronger medication.     10. Stool Softeners to avoid constipation should start today/tomorrow AM.    11. Laxatives or Enemas as necessary.

## 2023-11-06 NOTE — OP NOTE
[facility]  Podiatric Medicine & Surgery  Operative Report    SUMMARY     Date of Procedure: 11/6/2023    Procedure: Procedure(s):  ORIF, FRACTURE, METATARSAL BONE    Surgeon(s) and Role: Surgeon(s) and Role:     * Aleksandr Cheek, DPM - Primary    Pre-Operative Diagnosis: Pre-Op Diagnosis Codes:     * Stress fracture of metatarsal bone of left foot, initial encounter [M84.375A]     * Pain in left foot [M79.672]    Post-Operative Diagnosis: Post-Op Diagnosis Codes:     * Stress fracture of metatarsal bone of left foot, initial encounter [M84.375A]     * Pain in left foot [M79.672]    Anesthesia: Local MAC    Technical Procedures Used:   1. Open reduction with internal fixation LLE 4th metatarsal fracture.    Description of the Findings of the Procedure: The patient was seen in the Holding Room. The risks, benefits, complications, treatment options, and expected outcomes were discussed with the patient. The risks and potential complications of their problem and purposed treatment include but are not limited to infection, nerve injury, vascular injury, nonunion/malunion/delayed union of the surgical site, persistent pain, potential skin necrosis, deep vein thrombosis, possible pulmonary embolus, complications of the anesthetics and failure of the implant.  The patient concurred with the proposed plan, giving informed consent. The patient is aware that the procedure may be a part of a staged collection of procedures for definitive cure and/or alleviation of symptoms. The site of surgery properly noted/marked. Preoperative intravenous antibiotics are hanging at bedside, and are currently being administered via the heparin lock. The patient was taken to Operating Suite.    Once in the operative suite, the patient is transferred onto the operative table in the supine position. A TIME-OUT is taken as per protocol to identify the proper patient, procedure to be performed, and laterality.  The  patient is properly positioned on the operating room table for ease of dissection and for any ancillary imaging. A well-padded tourniquet was applied to the left thigh. Nursing and ancillary OR staff prepared the patient for the procedure. The patient is adequately sedated by the Attending Anesthesiologist and/or covering CRNA.  Following this, a preoperative regional/local block was given to the left midfoot.  Next, the operative limb was rendered sterile using chlorhexidine paint and scrub.  Sterile sheets and drapes were applied thereafter. Another TIME-OUT is taken as per protocol to identify the proper patient, procedure to be performed, and laterality.      The tourniquet is elevated to 340mmHg after the limb is exsanguinated for approximately 2 min with an Esmarch bandage. Following this, sharp skin incision atop the LLE 4th metatarsal diaphysis extending to the metatarsophalangeal joint with a #15 blade. Care is taken to ensure adequate skin bridge. Deep dissection with tenotomy scissor.  Care is taken to retract the neurovascular structures.  Electrocautery as necessitated.  The respective extensor tendons are retracted medial and lateral atop the diaphysis.  Periosteal stripping about the fracture site with a Key elevator.  Manual distraction of the toes is performed.  The fracture sites are cleaned and debrided with a dental pick.  Following debridement, copious lavage with sterile saline solution.  Anatomic reduction is achieved.    Following this, the appropriately sized plate is trialed atop the bone with the assistance of fluoroscopy.  Once the appropriate size plate is obtained, plate fixation in standard fashion.  Plate fixation and screw fixation with an Arthrex plate. Fluoroscopy confirmed anatomic alignment and proper fixation.      The tourniquet is deflated, and hemostasis achieved. The fracture sites her back-filled using DBM putty.  Deep closure using Vicryl suture.  The skin is closed using  Nylon. All incisions are dressed with Xeroform/Adaptic nonadherent dressings followed by sterile 4 x 4 gauze, abdominal pad, Reny/Kerlix and light ACE.    The anesthesia is weaned. There were no complications to this procedure. Any final necessary imaging to be performed in the PACU if it was not performed here in the OR suite.  The patient is transferred to the [facility] bed[facility]stretcher, [facility]. The patient is transferred to the PACU.    Significant Surgical Tasks Conducted by the Assistant(s), if Applicable: N/A    Complications: * No complications entered in OR log *    Estimated Blood Loss (EBL): Minimal    Drains: N/A    Implants:   Implant Name Type Inv. Item Serial No.  Lot No. LRB No. Used Action   [id number]  PASTE [employer] [facility] 1C [id number] [employer] [id number] Left 1 Implanted   2.7 Locking Screw - 9    [employer]  Left 1 Implanted   2.7 Locking Screw - 10    [employer]  Left 1 Implanted   2.7 Locking Screw - 11    [employer]  Left 1 Implanted   2.7 Locking Screw - 12    [employer]  Left 1 Implanted   2.7 Straight Plate     [employer]  Left 1 Implanted       Specimens: * No specimens in log *    Condition: stable    Disposition: PACU - hemodynamically stable.    Attestation: I performed the procedure.

## 2023-11-06 NOTE — ANESTHESIA POSTPROCEDURE EVALUATION
Anesthesia Post Evaluation    Patient: Cedric Santoro    Procedure(s) Performed: Procedure(s) (LRB):  ORIF, FRACTURE, METATARSAL BONE (Left)    Final Anesthesia Type: MAC      Patient location during evaluation: PACU  Patient participation: Yes- Able to Participate  Level of consciousness: awake  Post-procedure vital signs: reviewed and stable  Pain management: adequate  Airway patency: patent    PONV status at discharge: No PONV  Anesthetic complications: no      Cardiovascular status: hemodynamically stable  Respiratory status: unassisted  Hydration status: euvolemic  Follow-up not needed.          Vitals Value Taken Time   /64 11/06/23 0940   Temp 36.7 °C (98.1 °F) 11/06/23 0940   Pulse 58 11/06/23 0940   Resp 18 11/06/23 0940   SpO2 96 % 11/06/23 0940         Event Time   Out of Recovery 09:45:26         Pain/Richa Score: Richa Score: 9 (11/6/2023  9:15 AM)

## 2023-11-06 NOTE — DISCHARGE SUMMARY
[facility] - [facility] (Hospital)  Discharge Note  Short Stay    Procedure(s) (LRB):  ORIF, FRACTURE, METATARSAL BONE (Left)      OUTCOME: Patient tolerated treatment/procedure well without complication and is now ready for discharge.    DISPOSITION: Home or Self Care    FINAL DIAGNOSIS:      * Stress fracture of metatarsal bone of left foot, initial encounter [M84.375A]     * Pain in left foot [M79.672]    FOLLOWUP: In clinic    DISCHARGE INSTRUCTIONS:    Weightbearing Status and Wound Care:  1. When walking, wear the surgical shoe or walking boot at all times.    2. During daytime/awake hours, if a CAST or POSTERIOR SPLINT is in place, ice the posterior aspect of the leg, behind the knee, 20 minutes on (w/ ice) and followed by 20 minutes off (w/o ice) until follow up; repeat accordingly until follow up. IF no CAST or POSTERIOR SPLINT is in place, ice the anterior aspect of the ankle, in a similar manner. During night time/sleep hours, simply elevating the limb above the level of the heart is sufficient.     3. Elevate the extremity above the level of the heart at all times when sitting.    4. Take the pain mediations as prescribed for the initial 3 or so days, then only as needed.    5. If no overt medical contra-indication, take Motrin or Advil or Ibuprofen or Aleve in between the pain medication doses.    6. Do not change the dressings.    7. Do not get the dressings wet.     8. Please be reminded of the harmful effects of nicotine/tobacco/cigarette/cigar/marijuana smoking, especially in relation to the lower extremity, particularly in reference to post operative healing. I do rec. complete or near complete cessation of any or all of the aforementioned until you are well out of the post-operative period.    9. If you were prescribed more than one short acting opioid, e.g., (Morphine or Dilaudid), with Percocet or Norco or Tramadol, the Morphine (MSIR) or Dilaudid (Hydromorphone) is to be taken during the immediate  initial days s/p, at an interval of every 6 hours or 5 hours or 4 hours, as needed for pain control. Once you are complete with the Morphine (MSIR) or Dilaudid (Hydromorphone), you may/should convert to the secondary medication. DO NOT take both medications together at any time. It is not advisable to start with the less potent medication, Percocet or Norco or Tramadol, and then convert to the stronger medication.     10. Stool Softeners to avoid constipation should start today/tomorrow AM.    11. Laxatives or Enemas as necessary.         TIME SPENT ON DISCHARGE: 10 minutes

## 2023-11-06 NOTE — TRANSFER OF CARE
"Anesthesia Transfer of Care Note    Patient: Cedric Santoro    Procedure(s) Performed: Procedure(s) (LRB):  ORIF, FRACTURE, METATARSAL BONE (Left)    Patient location: PACU    Anesthesia Type: MAC    Transport from OR: Transported from OR on room air with adequate spontaneous ventilation    Post pain: adequate analgesia    Post assessment: no apparent anesthetic complications and tolerated procedure well    Post vital signs: stable    Level of consciousness: sedated    Nausea/Vomiting: no nausea/vomiting    Complications: none          Last vitals:   Visit Vitals  /63   Pulse (!) 56   Temp 36.7 °C (98 °F) (Temporal)   Resp 16   Ht 5' 10" (1.778 m)   Wt 96.1 kg (211 lb 13.8 oz)   SpO2 (!) 94%   BMI 30.40 kg/m²     "

## 2023-11-07 VITALS
TEMPERATURE: 98 F | OXYGEN SATURATION: 96 % | RESPIRATION RATE: 18 BRPM | HEART RATE: 58 BPM | HEIGHT: 70 IN | DIASTOLIC BLOOD PRESSURE: 64 MMHG | BODY MASS INDEX: 30.33 KG/M2 | SYSTOLIC BLOOD PRESSURE: 127 MMHG | WEIGHT: 211.88 LBS

## 2023-11-07 PROCEDURE — G0180 PR HOME HEALTH MD CERTIFICATION: ICD-10-PCS | Mod: ,,, | Performed by: PODIATRIST

## 2023-11-07 PROCEDURE — G0180 MD CERTIFICATION HHA PATIENT: HCPCS | Mod: ,,, | Performed by: PODIATRIST

## 2023-11-13 ENCOUNTER — LAB VISIT (OUTPATIENT)
Dept: LAB | Facility: HOSPITAL | Age: 63
End: 2023-11-13
Attending: INTERNAL MEDICINE
Payer: COMMERCIAL

## 2023-11-13 DIAGNOSIS — I25.118 CORONARY ARTERY DISEASE OF NATIVE ARTERY OF NATIVE HEART WITH STABLE ANGINA PECTORIS: ICD-10-CM

## 2023-11-13 DIAGNOSIS — I50.20 HFREF (HEART FAILURE WITH REDUCED EJECTION FRACTION): ICD-10-CM

## 2023-11-13 LAB
ALBUMIN SERPL BCP-MCNC: 3.9 G/DL (ref 3.5–5.2)
ALP SERPL-CCNC: 66 U/L (ref 55–135)
ALT SERPL W/O P-5'-P-CCNC: 31 U/L (ref 10–44)
ANION GAP SERPL CALC-SCNC: 7 MMOL/L (ref 8–16)
AST SERPL-CCNC: 46 U/L (ref 10–40)
BILIRUB SERPL-MCNC: 0.7 MG/DL (ref 0.1–1)
BNP SERPL-MCNC: 17 PG/ML (ref 0–99)
BUN SERPL-MCNC: 14 MG/DL (ref 8–23)
CALCIUM SERPL-MCNC: 9.5 MG/DL (ref 8.7–10.5)
CHLORIDE SERPL-SCNC: 100 MMOL/L (ref 95–110)
CHOLEST SERPL-MCNC: 145 MG/DL (ref 120–199)
CHOLEST/HDLC SERPL: 2.5 {RATIO} (ref 2–5)
CO2 SERPL-SCNC: 34 MMOL/L (ref 23–29)
CREAT SERPL-MCNC: 1.3 MG/DL (ref 0.5–1.4)
EST. GFR  (NO RACE VARIABLE): >60 ML/MIN/1.73 M^2
GLUCOSE SERPL-MCNC: 100 MG/DL (ref 70–110)
HDLC SERPL-MCNC: 58 MG/DL (ref 40–75)
HDLC SERPL: 40 % (ref 20–50)
LDLC SERPL CALC-MCNC: 75.2 MG/DL (ref 63–159)
NONHDLC SERPL-MCNC: 87 MG/DL
POTASSIUM SERPL-SCNC: 4 MMOL/L (ref 3.5–5.1)
PROT SERPL-MCNC: 7 G/DL (ref 6–8.4)
SODIUM SERPL-SCNC: 141 MMOL/L (ref 136–145)
TRIGL SERPL-MCNC: 59 MG/DL (ref 30–150)

## 2023-11-13 PROCEDURE — 80053 COMPREHEN METABOLIC PANEL: CPT | Performed by: INTERNAL MEDICINE

## 2023-11-13 PROCEDURE — 83880 ASSAY OF NATRIURETIC PEPTIDE: CPT | Performed by: INTERNAL MEDICINE

## 2023-11-13 PROCEDURE — 80061 LIPID PANEL: CPT | Performed by: INTERNAL MEDICINE

## 2023-11-13 PROCEDURE — 36415 COLL VENOUS BLD VENIPUNCTURE: CPT | Mod: PO | Performed by: INTERNAL MEDICINE

## 2023-11-14 ENCOUNTER — PATIENT MESSAGE (OUTPATIENT)
Dept: CARDIOLOGY | Facility: CLINIC | Age: 63
End: 2023-11-14
Payer: COMMERCIAL

## 2023-11-20 ENCOUNTER — OFFICE VISIT (OUTPATIENT)
Dept: PODIATRY | Facility: CLINIC | Age: 63
End: 2023-11-20
Payer: COMMERCIAL

## 2023-11-20 VITALS — HEIGHT: 70 IN | BODY MASS INDEX: 30.21 KG/M2 | WEIGHT: 211 LBS

## 2023-11-20 DIAGNOSIS — Z98.890 POSTOPERATIVE STATE: Primary | ICD-10-CM

## 2023-11-20 DIAGNOSIS — S92.342A CLOSED DISPLACED FRACTURE OF FOURTH METATARSAL BONE OF LEFT FOOT, INITIAL ENCOUNTER: ICD-10-CM

## 2023-11-20 DIAGNOSIS — M84.375A STRESS FRACTURE OF METATARSAL BONE OF LEFT FOOT, INITIAL ENCOUNTER: ICD-10-CM

## 2023-11-20 DIAGNOSIS — M79.672 PAIN IN LEFT FOOT: ICD-10-CM

## 2023-11-20 PROCEDURE — 1159F MED LIST DOCD IN RCRD: CPT | Mod: CPTII,S$GLB,, | Performed by: PODIATRIST

## 2023-11-20 PROCEDURE — 4010F PR ACE/ARB THEARPY RXD/TAKEN: ICD-10-PCS | Mod: CPTII,S$GLB,, | Performed by: PODIATRIST

## 2023-11-20 PROCEDURE — 3044F HG A1C LEVEL LT 7.0%: CPT | Mod: CPTII,S$GLB,, | Performed by: PODIATRIST

## 2023-11-20 PROCEDURE — 4010F ACE/ARB THERAPY RXD/TAKEN: CPT | Mod: CPTII,S$GLB,, | Performed by: PODIATRIST

## 2023-11-20 PROCEDURE — 1160F PR REVIEW ALL MEDS BY PRESCRIBER/CLIN PHARMACIST DOCUMENTED: ICD-10-PCS | Mod: CPTII,S$GLB,, | Performed by: PODIATRIST

## 2023-11-20 PROCEDURE — 3044F PR MOST RECENT HEMOGLOBIN A1C LEVEL <7.0%: ICD-10-PCS | Mod: CPTII,S$GLB,, | Performed by: PODIATRIST

## 2023-11-20 PROCEDURE — 99024 POSTOP FOLLOW-UP VISIT: CPT | Mod: S$GLB,,, | Performed by: PODIATRIST

## 2023-11-20 PROCEDURE — 99999 PR PBB SHADOW E&M-EST. PATIENT-LVL III: ICD-10-PCS | Mod: PBBFAC,,, | Performed by: PODIATRIST

## 2023-11-20 PROCEDURE — 99999 PR PBB SHADOW E&M-EST. PATIENT-LVL III: CPT | Mod: PBBFAC,,, | Performed by: PODIATRIST

## 2023-11-20 PROCEDURE — 99024 PR POST-OP FOLLOW-UP VISIT: ICD-10-PCS | Mod: S$GLB,,, | Performed by: PODIATRIST

## 2023-11-20 PROCEDURE — 1160F RVW MEDS BY RX/DR IN RCRD: CPT | Mod: CPTII,S$GLB,, | Performed by: PODIATRIST

## 2023-11-20 PROCEDURE — 1159F PR MEDICATION LIST DOCUMENTED IN MEDICAL RECORD: ICD-10-PCS | Mod: CPTII,S$GLB,, | Performed by: PODIATRIST

## 2023-11-20 NOTE — PROGRESS NOTES
Subjective:       Patient ID: Cedric Santoro is a 63 y.o. male.    Chief Complaint: Post-op Evaluation (Post op, pt was last seen by PCP Cyndi Davis NP on 10/31/2023, nondiabetic, no pain, pt is wearing post op shoe on the left and tennis shoes on the right)    HPI:  Cedric Santoro presents to the office today, s/p 11/6/23 ORIF LLE 4th metatarsal fracture ORIF. Presents this afternoon with daughter. States no pains. WB with Sx. Shoe.     Hemoglobin A1C   Date Value Ref Range Status   04/19/2023 5.6 4.0 - 5.6 % Final     Comment:     ADA Screening Guidelines:  5.7-6.4%  Consistent with prediabetes  >or=6.5%  Consistent with diabetes    High levels of fetal hemoglobin interfere with the HbA1C  assay. Heterozygous hemoglobin variants (HbS, HgC, etc)do  not significantly interfere with this assay.   However, presence of multiple variants may affect accuracy.     02/05/2020 5.5 4.0 - 5.6 % Final     Comment:     ADA Screening Guidelines:  5.7-6.4%  Consistent with prediabetes  >or=6.5%  Consistent with diabetes  High levels of fetal hemoglobin interfere with the HbA1C  assay. Heterozygous hemoglobin variants (HbS, HgC, etc)do  not significantly interfere with this assay.   However, presence of multiple variants may affect accuracy.         Review of patient's allergies indicates:   Allergen Reactions    Iodinated contrast media     Iodine and iodide containing products Hives       Past Medical History:   Diagnosis Date    Abnormal ECG 10/04/2022    Coronary artery disease of native artery of native heart with stable angina pectoris 09/06/2023    Essential hypertension 02/24/2020    Hypertension     MVP (mitral valve prolapse) 10/26/2022    Nonrheumatic mitral valve regurgitation 10/26/2022    Palpitation 03/05/2020    Paroxysmal atrial fibrillation 03/05/2020    Paroxysmal atrial flutter 10/04/2022    Raynaud's syndrome     Vasovagal syncopes        Family History   Problem Relation Age of Onset    Cancer Mother          Multiple myeloma    Pacemaker/defibrilator Father     Macular degeneration Father     Macular degeneration Brother        Social History     Socioeconomic History    Marital status: Other   Occupational History    Occupation: Retired    Tobacco Use    Smoking status: Never    Smokeless tobacco: Never   Substance and Sexual Activity    Alcohol use: No    Drug use: No    Sexual activity: Not Currently     Social Determinants of Health     Financial Resource Strain: Unknown (4/12/2022)    Overall Financial Resource Strain (CARDIA)     Difficulty of Paying Living Expenses: Patient refused   Food Insecurity: No Food Insecurity (7/24/2023)    Hunger Vital Sign     Worried About Running Out of Food in the Last Year: Never true     Ran Out of Food in the Last Year: Never true   Transportation Needs: No Transportation Needs (7/24/2023)    PRAPARE - Transportation     Lack of Transportation (Medical): No     Lack of Transportation (Non-Medical): No   Physical Activity: Sufficiently Active (7/24/2023)    Exercise Vital Sign     Days of Exercise per Week: 5 days     Minutes of Exercise per Session: 70 min   Stress: Stress Concern Present (7/24/2023)    [employer] - Occupational Stress Questionnaire     Feeling of Stress : To some extent   Social Connections: Unknown (7/24/2023)    Social Connection and Isolation Panel [NHANES]     Frequency of Communication with Friends and Family: More than three times a week     Frequency of Social Gatherings with Friends and Family: Once a week     Active Member of Clubs or Organizations: Yes     Attends Club or Organization Meetings: More than 4 times per year     Marital Status:    Housing Stability: Low Risk  (7/24/2023)    Housing Stability Vital Sign     Unable to Pay for Housing in the Last Year: No     Number of Places Lived in the Last Year: 1     Unstable Housing in the Last Year: No       Past Surgical History:    Procedure Laterality Date    ABLATION, ATRIAL FLUTTER, TYPICAL N/A 12/15/2022    Procedure: Ablation, Atrial Flutter, Typical;  Surgeon: Matty Hendricks MD;  Location: [facility] EP LAB;  Service: Cardiology;  Laterality: N/A;  AFL, RFA, NICOLASA, ARIELA, anes, MB, 3 Prep    CHOLECYSTECTOMY      ECHOCARDIOGRAM,TRANSESOPHAGEAL N/A 12/15/2022    Procedure: Transesophageal echo (ARIELA) intra-procedure log documentation;  Surgeon: Nisreen Fenton MD;  Location: [facility] EP LAB;  Service: Cardiology;  Laterality: N/A;    EYE SURGERY      GALLBLADDER SURGERY      HERNIA REPAIR      LEFT HEART CATHETERIZATION Left 05/24/2023    Procedure: Left heart cath;  Surgeon: Kal Garzon MD;  Location: [facility] CATH LAB;  Service: Cardiology;  Laterality: Left;  may need to sign consent//    OPEN REDUCTION AND INTERNAL FIXATION (ORIF) OF FRACTURE OF METATARSAL BONE Left 11/6/2023    Procedure: ORIF, FRACTURE, METATARSAL BONE;  Surgeon: Aleksandr Cheek DPM;  Location: [facility] OR;  Service: Podiatry;  Laterality: Left;    THROAT SURGERY      TRANSESOPHAGEAL ECHOCARDIOGRAPHY N/A 02/27/2023    Procedure: ECHOCARDIOGRAM, TRANSESOPHAGEAL;  Surgeon: Samuel Del Valle MD;  Location: [facility] CATH LAB;  Service: Cardiology;  Laterality: N/A;    TREATMENT OF CARDIAC ARRHYTHMIA  12/15/2022    Procedure: Cardioversion or Defibrillation;  Surgeon: Matty Hendricks MD;  Location: [facility] EP LAB;  Service: Cardiology;;       Review of Systems   Constitutional:  Negative for chills, fatigue and fever.   HENT:  Negative for hearing loss.    Eyes:  Negative for photophobia and visual disturbance.   Respiratory:  Negative for cough, chest tightness, shortness of breath and wheezing.    Cardiovascular:  Negative for chest pain and palpitations.   Gastrointestinal:  Negative for constipation, diarrhea, nausea and vomiting.   Endocrine: Negative for cold intolerance and heat intolerance.   Genitourinary:  Negative for flank pain.   Musculoskeletal:  Positive for gait problem.  "Negative for neck pain and neck stiffness.   Neurological:  Negative for light-headedness and headaches.   Psychiatric/Behavioral:  Negative for sleep disturbance.           Objective:   Ht 5' 10" (1.778 m)   Wt 95.7 kg (211 lb)   BMI 30.28 kg/m²       Physical Exam  LOWER EXTREMITY PHYSICAL EXAMINATION  DERMATOLOGY: No evidence of wound healing complications are noted, s/p D/C of sutures.    ORTHOPEDIC: Mild to moderate edema is noted. No pains are noted.         Assessment:     1. Postoperative state    2. Closed displaced fracture of fourth metatarsal bone of left foot, initial encounter    3. Stress fracture of metatarsal bone of left foot, initial encounter    4. Pain in left foot          Plan:     Postoperative state  -     X-Ray Foot Complete Left; Future; Expected date: 11/20/2023    Closed displaced fracture of fourth metatarsal bone of left foot, initial encounter  -     X-Ray Foot Complete Left; Future; Expected date: 11/20/2023    Stress fracture of metatarsal bone of left foot, initial encounter    Pain in left foot      Thorough discussion is had with the patient today, concerning the diagnosis, its etiology, and the treatment algorithm at present.     XRAYS are reviewed in detail with the patient. All questions and concerns regarding findings and its/their implications are outlined and discussed.    Repeat XR in 4 weeks.    RICE therapy.    Compression ATC.    Vit. D.     OsCal.         Future Appointments   Date Time Provider Department Center   4/12/2024 10:45 AM José Miguel Cintron MD ON UROLOGY  [facility]       "

## 2023-12-01 ENCOUNTER — EXTERNAL HOME HEALTH (OUTPATIENT)
Dept: HOME HEALTH SERVICES | Facility: HOSPITAL | Age: 63
End: 2023-12-01
Payer: COMMERCIAL

## 2023-12-01 DIAGNOSIS — Z98.890 POSTOPERATIVE STATE: Primary | ICD-10-CM

## 2023-12-20 ENCOUNTER — HOSPITAL ENCOUNTER (OUTPATIENT)
Dept: RADIOLOGY | Facility: HOSPITAL | Age: 63
Discharge: HOME | End: 2023-12-20
Attending: PODIATRIST
Payer: COMMERCIAL

## 2023-12-20 DIAGNOSIS — Z98.890 POSTOPERATIVE STATE: ICD-10-CM

## 2023-12-20 DIAGNOSIS — S92.342A CLOSED DISPLACED FRACTURE OF FOURTH METATARSAL BONE OF LEFT FOOT, INITIAL ENCOUNTER: ICD-10-CM

## 2023-12-20 PROCEDURE — 73630 X-RAY EXAM OF FOOT: CPT | Mod: 26,LT,, | Performed by: RADIOLOGY

## 2023-12-20 PROCEDURE — 73630 XR FOOT COMPLETE 3 VIEW LEFT: ICD-10-PCS | Mod: 26,LT,, | Performed by: RADIOLOGY

## 2023-12-20 PROCEDURE — 73630 X-RAY EXAM OF FOOT: CPT | Mod: TC,PO,LT

## 2023-12-21 ENCOUNTER — PATIENT MESSAGE (OUTPATIENT)
Dept: PODIATRY | Facility: CLINIC | Age: 63
End: 2023-12-21
Payer: COMMERCIAL

## 2023-12-21 DIAGNOSIS — Z98.890 POSTOPERATIVE STATE: ICD-10-CM

## 2023-12-21 DIAGNOSIS — S92.342A CLOSED DISPLACED FRACTURE OF FOURTH METATARSAL BONE OF LEFT FOOT, INITIAL ENCOUNTER: Primary | ICD-10-CM

## 2023-12-25 ENCOUNTER — HOSPITAL ENCOUNTER (EMERGENCY)
Facility: HOSPITAL | Age: 63
Discharge: HOME | End: 2023-12-25
Attending: EMERGENCY MEDICINE
Payer: COMMERCIAL

## 2023-12-25 VITALS
BODY MASS INDEX: 31.63 KG/M2 | OXYGEN SATURATION: 95 % | TEMPERATURE: 99 F | RESPIRATION RATE: 20 BRPM | HEART RATE: 89 BPM | DIASTOLIC BLOOD PRESSURE: 59 MMHG | SYSTOLIC BLOOD PRESSURE: 114 MMHG | WEIGHT: 220.44 LBS

## 2023-12-25 DIAGNOSIS — R05.9 COUGH: ICD-10-CM

## 2023-12-25 DIAGNOSIS — A41.9 SEPSIS: ICD-10-CM

## 2023-12-25 DIAGNOSIS — J10.1 INFLUENZA A: Primary | ICD-10-CM

## 2023-12-25 LAB
ALBUMIN SERPL BCP-MCNC: 3.9 G/DL (ref 3.5–5.2)
ALLENS TEST: ABNORMAL
ALP SERPL-CCNC: 63 U/L (ref 55–135)
ALT SERPL W/O P-5'-P-CCNC: 59 U/L (ref 10–44)
ANION GAP SERPL CALC-SCNC: 12 MMOL/L (ref 8–16)
AST SERPL-CCNC: 100 U/L (ref 10–40)
BASOPHILS # BLD AUTO: 0.02 K/UL (ref 0–0.2)
BASOPHILS NFR BLD: 0.3 % (ref 0–1.9)
BILIRUB SERPL-MCNC: 0.5 MG/DL (ref 0.1–1)
BILIRUB UR QL STRIP: NEGATIVE
BNP SERPL-MCNC: 27 PG/ML (ref 0–99)
BUN SERPL-MCNC: 21 MG/DL (ref 8–23)
CALCIUM SERPL-MCNC: 9.1 MG/DL (ref 8.7–10.5)
CHLORIDE SERPL-SCNC: 96 MMOL/L (ref 95–110)
CLARITY UR: CLEAR
CO2 SERPL-SCNC: 29 MMOL/L (ref 23–29)
COLOR UR: YELLOW
CREAT SERPL-MCNC: 1.5 MG/DL (ref 0.5–1.4)
DELSYS: ABNORMAL
DIFFERENTIAL METHOD BLD: ABNORMAL
EOSINOPHIL # BLD AUTO: 0 K/UL (ref 0–0.5)
EOSINOPHIL NFR BLD: 0.3 % (ref 0–8)
ERYTHROCYTE [DISTWIDTH] IN BLOOD BY AUTOMATED COUNT: 12.3 % (ref 11.5–14.5)
EST. GFR  (NO RACE VARIABLE): 52 ML/MIN/1.73 M^2
FIO2: 21
GLUCOSE SERPL-MCNC: 117 MG/DL (ref 70–110)
GLUCOSE UR QL STRIP: NEGATIVE
HCO3 UR-SCNC: 28.8 MMOL/L (ref 24–28)
HCT VFR BLD AUTO: 39 % (ref 40–54)
HGB BLD-MCNC: 13.2 G/DL (ref 14–18)
HGB UR QL STRIP: NEGATIVE
IMM GRANULOCYTES # BLD AUTO: 0.01 K/UL (ref 0–0.04)
IMM GRANULOCYTES NFR BLD AUTO: 0.2 % (ref 0–0.5)
INFLUENZA A, MOLECULAR: POSITIVE
INFLUENZA B, MOLECULAR: NEGATIVE
INR PPP: 1 (ref 0.8–1.2)
KETONES UR QL STRIP: NEGATIVE
LACTATE SERPL-SCNC: 1.2 MMOL/L (ref 0.5–2.2)
LEUKOCYTE ESTERASE UR QL STRIP: NEGATIVE
LYMPHOCYTES # BLD AUTO: 0.4 K/UL (ref 1–4.8)
LYMPHOCYTES NFR BLD: 6.7 % (ref 18–48)
MAGNESIUM SERPL-MCNC: 2 MG/DL (ref 1.6–2.6)
MCH RBC QN AUTO: 31.3 PG (ref 27–31)
MCHC RBC AUTO-ENTMCNC: 33.8 G/DL (ref 32–36)
MCV RBC AUTO: 92 FL (ref 82–98)
MODE: ABNORMAL
MONOCYTES # BLD AUTO: 0.7 K/UL (ref 0.3–1)
MONOCYTES NFR BLD: 12.3 % (ref 4–15)
NEUTROPHILS # BLD AUTO: 4.7 K/UL (ref 1.8–7.7)
NEUTROPHILS NFR BLD: 80.2 % (ref 38–73)
NITRITE UR QL STRIP: NEGATIVE
NRBC BLD-RTO: 0 /100 WBC
PCO2 BLDA: 40.3 MMHG (ref 35–45)
PH SMN: 7.46 [PH] (ref 7.35–7.45)
PH UR STRIP: 6 [PH] (ref 5–8)
PLATELET # BLD AUTO: 146 K/UL (ref 150–450)
PMV BLD AUTO: 10.3 FL (ref 9.2–12.9)
PO2 BLDA: 70 MMHG (ref 80–100)
POC BE: 5 MMOL/L
POC SATURATED O2: 95 % (ref 95–100)
POTASSIUM SERPL-SCNC: 4.5 MMOL/L (ref 3.5–5.1)
PROCALCITONIN SERPL IA-MCNC: 0.24 NG/ML
PROT SERPL-MCNC: 6.6 G/DL (ref 6–8.4)
PROT UR QL STRIP: ABNORMAL
PROTHROMBIN TIME: 10.9 SEC (ref 9–12.5)
RBC # BLD AUTO: 4.22 M/UL (ref 4.6–6.2)
SAMPLE: ABNORMAL
SARS-COV-2 RDRP RESP QL NAA+PROBE: NEGATIVE
SITE: ABNORMAL
SODIUM SERPL-SCNC: 137 MMOL/L (ref 136–145)
SP GR UR STRIP: 1.02 (ref 1–1.03)
SPECIMEN SOURCE: ABNORMAL
TROPONIN I SERPL DL<=0.01 NG/ML-MCNC: <0.006 NG/ML (ref 0–0.03)
URN SPEC COLLECT METH UR: ABNORMAL
UROBILINOGEN UR STRIP-ACNC: NEGATIVE EU/DL
WBC # BLD AUTO: 5.84 K/UL (ref 3.9–12.7)

## 2023-12-25 PROCEDURE — 87040 BLOOD CULTURE FOR BACTERIA: CPT | Mod: 59 | Performed by: EMERGENCY MEDICINE

## 2023-12-25 PROCEDURE — 87502 INFLUENZA DNA AMP PROBE: CPT | Performed by: NURSE PRACTITIONER

## 2023-12-25 PROCEDURE — 25000003 PHARM REV CODE 250: Performed by: EMERGENCY MEDICINE

## 2023-12-25 PROCEDURE — 93005 ELECTROCARDIOGRAM TRACING: CPT

## 2023-12-25 PROCEDURE — 83605 ASSAY OF LACTIC ACID: CPT | Performed by: EMERGENCY MEDICINE

## 2023-12-25 PROCEDURE — 84145 PROCALCITONIN (PCT): CPT | Performed by: EMERGENCY MEDICINE

## 2023-12-25 PROCEDURE — 36600 WITHDRAWAL OF ARTERIAL BLOOD: CPT

## 2023-12-25 PROCEDURE — 87502 INFLUENZA DNA AMP PROBE: CPT

## 2023-12-25 PROCEDURE — 87205 SMEAR GRAM STAIN: CPT | Performed by: EMERGENCY MEDICINE

## 2023-12-25 PROCEDURE — 99900035 HC TECH TIME PER 15 MIN (STAT)

## 2023-12-25 PROCEDURE — 82800 BLOOD PH: CPT

## 2023-12-25 PROCEDURE — 87070 CULTURE OTHR SPECIMN AEROBIC: CPT | Mod: 59 | Performed by: EMERGENCY MEDICINE

## 2023-12-25 PROCEDURE — 84484 ASSAY OF TROPONIN QUANT: CPT | Performed by: EMERGENCY MEDICINE

## 2023-12-25 PROCEDURE — 85610 PROTHROMBIN TIME: CPT | Performed by: EMERGENCY MEDICINE

## 2023-12-25 PROCEDURE — 81003 URINALYSIS AUTO W/O SCOPE: CPT | Performed by: EMERGENCY MEDICINE

## 2023-12-25 PROCEDURE — 83735 ASSAY OF MAGNESIUM: CPT | Performed by: EMERGENCY MEDICINE

## 2023-12-25 PROCEDURE — 83880 ASSAY OF NATRIURETIC PEPTIDE: CPT | Performed by: EMERGENCY MEDICINE

## 2023-12-25 PROCEDURE — 85025 COMPLETE CBC W/AUTO DIFF WBC: CPT | Performed by: EMERGENCY MEDICINE

## 2023-12-25 PROCEDURE — 25000242 PHARM REV CODE 250 ALT 637 W/ HCPCS: Performed by: EMERGENCY MEDICINE

## 2023-12-25 PROCEDURE — 25000003 PHARM REV CODE 250: Performed by: NURSE PRACTITIONER

## 2023-12-25 PROCEDURE — 63600175 PHARM REV CODE 636 W HCPCS: Performed by: EMERGENCY MEDICINE

## 2023-12-25 PROCEDURE — U0002 COVID-19 LAB TEST NON-CDC: HCPCS | Performed by: NURSE PRACTITIONER

## 2023-12-25 PROCEDURE — 94640 AIRWAY INHALATION TREATMENT: CPT | Mod: XB

## 2023-12-25 PROCEDURE — 96374 THER/PROPH/DIAG INJ IV PUSH: CPT

## 2023-12-25 PROCEDURE — 93010 ELECTROCARDIOGRAM REPORT: CPT | Mod: ,,, | Performed by: INTERNAL MEDICINE

## 2023-12-25 PROCEDURE — 99285 EMERGENCY DEPT VISIT HI MDM: CPT | Mod: 25

## 2023-12-25 PROCEDURE — 82803 BLOOD GASES ANY COMBINATION: CPT

## 2023-12-25 PROCEDURE — 80053 COMPREHEN METABOLIC PANEL: CPT | Performed by: EMERGENCY MEDICINE

## 2023-12-25 RX ORDER — ACETAMINOPHEN 500 MG
1000 TABLET ORAL
Status: COMPLETED | OUTPATIENT
Start: 2023-12-25 | End: 2023-12-25

## 2023-12-25 RX ORDER — METHYLPREDNISOLONE SOD SUCC 125 MG
125 VIAL (EA) INJECTION
Status: COMPLETED | OUTPATIENT
Start: 2023-12-25 | End: 2023-12-25

## 2023-12-25 RX ORDER — IBUPROFEN 800 MG/1
800 TABLET ORAL
Status: DISCONTINUED | OUTPATIENT
Start: 2023-12-25 | End: 2023-12-25 | Stop reason: HOSPADM

## 2023-12-25 RX ORDER — OSELTAMIVIR PHOSPHATE 75 MG/1
75 CAPSULE ORAL 2 TIMES DAILY
Qty: 10 CAPSULE | Refills: 0 | Status: SHIPPED | OUTPATIENT
Start: 2023-12-25 | End: 2023-12-30

## 2023-12-25 RX ORDER — IPRATROPIUM BROMIDE AND ALBUTEROL SULFATE 2.5; .5 MG/3ML; MG/3ML
3 SOLUTION RESPIRATORY (INHALATION)
Status: COMPLETED | OUTPATIENT
Start: 2023-12-25 | End: 2023-12-25

## 2023-12-25 RX ORDER — OSELTAMIVIR PHOSPHATE 75 MG/1
75 CAPSULE ORAL
Status: COMPLETED | OUTPATIENT
Start: 2023-12-25 | End: 2023-12-25

## 2023-12-25 RX ADMIN — OSELTAMIVIR PHOSPHATE 75 MG: 75 CAPSULE ORAL at 08:12

## 2023-12-25 RX ADMIN — IPRATROPIUM BROMIDE AND ALBUTEROL SULFATE 3 ML: 2.5; .5 SOLUTION RESPIRATORY (INHALATION) at 07:12

## 2023-12-25 RX ADMIN — METHYLPREDNISOLONE SODIUM SUCCINATE 125 MG: 125 INJECTION, POWDER, FOR SOLUTION INTRAMUSCULAR; INTRAVENOUS at 07:12

## 2023-12-25 RX ADMIN — ACETAMINOPHEN 1000 MG: 500 TABLET ORAL at 07:12

## 2023-12-26 NOTE — ED NOTES
IBU not given; pt reports taking IBU @ 1700 PTA for elevated temp. Temp 99.5. MD (Miguel) notified

## 2023-12-26 NOTE — ED NOTES
Pt aware that urine specimen is needed. Denies need to urinate. Urinal/labeled specimen cup @ bedside

## 2023-12-26 NOTE — ED PROVIDER NOTES
SCRIBE #1 NOTE: I, Zari Clark, am scribing for, and in the presence of, Mony Rivera MD. I have scribed the HPI, ROS, PEx.    SCRIBE #2 NOTE: I, Noreen Patel, am scribing for, and in the presence of,  Ame Monson MD. I have scribed the remaining portions of the note not scribed by Scribe #1.      History     Chief Complaint   Patient presents with    Cough     Productive cough, nasal congestion, SOB X 7 days. Seen at urgent care for same, not getting any better.     Review of patient's allergies indicates:   Allergen Reactions    Iodinated contrast media     Iodine and iodide containing products Hives         History of Present Illness     HPI    12/25/2023, 7:11 PM  History obtained from the patient      History of Present Illness: Cedric Santoro is a 63 y.o. male patient with a PMHx of raynaud's syndrome, palpitations, HTN, paroxysmal atrial flutter, MVP, and CAD who presents to the Emergency Department for evaluation of cough which onset gradually Tuesday. Pt states that he had a slight cough since Thanksgiving, but that it became full force on Tuesday with some associated SOB. Pt visited urgent care and received an albuterol inhaler and some antihistamines. He wasn't prescribed any steroids due to having a recent surgery on his L foot. Pt states that the medications that he was prescribed does not help and that his sxs are only getting worse. His daughter also mentions that his abdomen looks slightly more swollen. Symptoms are constant and moderate in severity. Pt states that his SOB is exacerbated by movement, O2 stats being 90 when he walked in and 99 while sitting down. Associated sxs include SOB, slight fever, and distended abdomen. Patient denies any n/v/d, abdominal pain, CP, and all other sxs at this time. He did not receive a chest xray at urgent care. No further complaints or concerns at this time.       Arrival mode: Personal vehicle     PCP: Andreia Strickland MD        Past Medical  History:  Past Medical History:   Diagnosis Date    Abnormal ECG 10/04/2022    Coronary artery disease of native artery of native heart with stable angina pectoris 09/06/2023    Essential hypertension 02/24/2020    Hypertension     MVP (mitral valve prolapse) 10/26/2022    Nonrheumatic mitral valve regurgitation 10/26/2022    Palpitation 03/05/2020    Paroxysmal atrial fibrillation 03/05/2020    Paroxysmal atrial flutter 10/04/2022    Raynaud's syndrome     Vasovagal syncopes        Past Surgical History:  Past Surgical History:   Procedure Laterality Date    ABLATION, ATRIAL FLUTTER, TYPICAL N/A 12/15/2022    Procedure: Ablation, Atrial Flutter, Typical;  Surgeon: Matty Hendricks MD;  Location: [facility] EP LAB;  Service: Cardiology;  Laterality: N/A;  AFL, RFA, NICOLASA, ARIELA, anes, MB, 3 Prep    CHOLECYSTECTOMY      ECHOCARDIOGRAM,TRANSESOPHAGEAL N/A 12/15/2022    Procedure: Transesophageal echo (ARIELA) intra-procedure log documentation;  Surgeon: Nisreen Fenton MD;  Location: [facility] EP LAB;  Service: Cardiology;  Laterality: N/A;    EYE SURGERY      GALLBLADDER SURGERY      HERNIA REPAIR      LEFT HEART CATHETERIZATION Left 05/24/2023    Procedure: Left heart cath;  Surgeon: Kal Garzon MD;  Location: [facility] CATH LAB;  Service: Cardiology;  Laterality: Left;  may need to sign consent//    OPEN REDUCTION AND INTERNAL FIXATION (ORIF) OF FRACTURE OF METATARSAL BONE Left 11/6/2023    Procedure: ORIF, FRACTURE, METATARSAL BONE;  Surgeon: Aleksandr Cheek DPM;  Location: [facility] OR;  Service: Podiatry;  Laterality: Left;    THROAT SURGERY      TRANSESOPHAGEAL ECHOCARDIOGRAPHY N/A 02/27/2023    Procedure: ECHOCARDIOGRAM, TRANSESOPHAGEAL;  Surgeon: Samuel Del Valle MD;  Location: [facility] CATH LAB;  Service: Cardiology;  Laterality: N/A;    TREATMENT OF CARDIAC ARRHYTHMIA  12/15/2022    Procedure: Cardioversion or Defibrillation;  Surgeon: Matty Hendricks MD;  Location: [facility] EP LAB;  Service: Cardiology;;         Family  History:  Family History   Problem Relation Age of Onset    Cancer Mother         Multiple myeloma    Pacemaker/defibrilator Father     Macular degeneration Father     Macular degeneration Brother        Social History:  Social History     Tobacco Use    Smoking status: Never    Smokeless tobacco: Never   Substance and Sexual Activity    Alcohol use: No    Drug use: No    Sexual activity: Not Currently        Review of Systems     Review of Systems   Constitutional:  Positive for fever (slight).   HENT:  Negative for sore throat.    Respiratory:  Positive for cough and shortness of breath.    Cardiovascular:  Negative for chest pain.   Gastrointestinal:  Positive for abdominal distention. Negative for abdominal pain, diarrhea, nausea and vomiting.   Genitourinary:  Negative for dysuria.   Musculoskeletal:  Negative for back pain.   Skin:  Negative for rash.   Neurological:  Negative for weakness.   Hematological:  Does not bruise/bleed easily.   All other systems reviewed and are negative.       Physical Exam     Initial Vitals [12/25/23 1827]   BP Pulse Resp Temp SpO2   (!) 102/56 90 18 (!) 101.4 °F (38.6 °C) (!) 91 %      MAP       --          Physical Exam  Nursing Notes and Vital Signs Reviewed.  Constitutional: Patient is in no apparent distress. Well-developed and well-nourished.  Head: Atraumatic. Normocephalic.  Eyes: PERRL. EOM intact. Conjunctivae are not pale. No scleral icterus.  ENT: Mucous membranes are moist. Oropharynx is clear and symmetric.    Neck: Supple. Full ROM. No lymphadenopathy.  Cardiovascular: Regular rate. Regular rhythm. No murmurs, rubs, or gallops. Distal pulses are 2+ and symmetric.  Pulmonary/Chest: No respiratory distress. Dry cough and rhonchi bilaterally. Faint wheeze present.  Abdominal: Soft and non-distended.  There is no tenderness.  No rebound, guarding, or rigidity. Good bowel sounds.  Genitourinary: No CVA tenderness  Musculoskeletal: Moves all extremities. No obvious  deformities. No edema. No calf tenderness.  Skin: Warm and dry.  Neurological:  Alert, awake, and appropriate.  Normal speech.  No acute focal neurological deficits are appreciated.  Psychiatric: Normal affect. Good eye contact. Appropriate in content.     ED Course   Procedures  ED Vital Signs:  Vitals:    12/25/23 1827 12/25/23 1900 12/25/23 1913 12/25/23 1915   BP: (!) 102/56  (!) 116/55    Pulse: 90   80   Resp: 18      Temp: (!) 101.4 °F (38.6 °C) 99.5 °F (37.5 °C)     TempSrc: Oral Oral     SpO2: (!) 91%   96%   Weight: 100 kg (220 lb 7.4 oz)       12/25/23 1927 12/25/23 1934 12/25/23 1940 12/25/23 1942   BP:   126/64    Pulse: 80  79 82   Resp:   (!) 22 (!) 22   Temp:  99.5 °F (37.5 °C)     TempSrc:       SpO2: (!) 92%   95%   Weight:        12/25/23 1950 12/25/23 1955 12/25/23 1957 12/25/23 1959   BP:       Pulse: 79 77 77 81   Resp: 20 20 16 18   Temp:       TempSrc:       SpO2:  100% 100% 100%   Weight:        12/25/23 2034 12/25/23 2058   BP: (!) 115/59 (!) 114/59   Pulse: 89 89   Resp: 20 20   Temp:  98.8 °F (37.1 °C)   TempSrc:  Oral   SpO2:  95%   Weight:         Abnormal Lab Results:  Labs Reviewed   INFLUENZA A & B BY MOLECULAR - Abnormal; Notable for the following components:       Result Value    Influenza A, Molecular Positive (*)     All other components within normal limits   CBC W/ AUTO DIFFERENTIAL - Abnormal; Notable for the following components:    RBC 4.22 (*)     Hemoglobin 13.2 (*)     Hematocrit 39.0 (*)     MCH 31.3 (*)     Platelets 146 (*)     Lymph # 0.4 (*)     Gran % 80.2 (*)     Lymph % 6.7 (*)     All other components within normal limits   COMPREHENSIVE METABOLIC PANEL - Abnormal; Notable for the following components:    Glucose 117 (*)     Creatinine 1.5 (*)      (*)     ALT 59 (*)     eGFR 52 (*)     All other components within normal limits   URINALYSIS, REFLEX TO URINE CULTURE - Abnormal; Notable for the following components:    Protein, UA Trace (*)     All other  components within normal limits    Narrative:     Specimen Source->Urine   ISTAT PROCEDURE - Abnormal; Notable for the following components:    POC PH 7.462 (*)     POC PO2 70 (*)     POC HCO3 28.8 (*)     POC BE 5 (*)     All other components within normal limits   CULTURE, BLOOD   CULTURE, BLOOD   CULTURE, RESPIRATORY   SARS-COV-2 RNA AMPLIFICATION, QUAL   LACTIC ACID, PLASMA   B-TYPE NATRIURETIC PEPTIDE   PROCALCITONIN   TROPONIN I   MAGNESIUM   PROTIME-INR        All Lab Results:  Results for orders placed or performed during the hospital encounter of 12/25/23   Influenza A & B by Molecular    Specimen: Nasopharyngeal Swab   Result Value Ref Range    Influenza A, Molecular Positive (A) Negative    Influenza B, Molecular Negative Negative    Flu A & B Source NP    COVID-19 Rapid Screening   Result Value Ref Range    SARS-CoV-2 RNA, Amplification, Qual Negative Negative   CBC auto differential   Result Value Ref Range    WBC 5.84 3.90 - 12.70 K/uL    RBC 4.22 (L) 4.60 - 6.20 M/uL    Hemoglobin 13.2 (L) 14.0 - 18.0 g/dL    Hematocrit 39.0 (L) 40.0 - 54.0 %    MCV 92 82 - 98 fL    MCH 31.3 (H) 27.0 - 31.0 pg    MCHC 33.8 32.0 - 36.0 g/dL    RDW 12.3 11.5 - 14.5 %    Platelets 146 (L) 150 - 450 K/uL    MPV 10.3 9.2 - 12.9 fL    Immature Granulocytes 0.2 0.0 - 0.5 %    Gran # (ANC) 4.7 1.8 - 7.7 K/uL    Immature Grans (Abs) 0.01 0.00 - 0.04 K/uL    Lymph # 0.4 (L) 1.0 - 4.8 K/uL    Mono # 0.7 0.3 - 1.0 K/uL    Eos # 0.0 0.0 - 0.5 K/uL    Baso # 0.02 0.00 - 0.20 K/uL    nRBC 0 0 /100 WBC    Gran % 80.2 (H) 38.0 - 73.0 %    Lymph % 6.7 (L) 18.0 - 48.0 %    Mono % 12.3 4.0 - 15.0 %    Eosinophil % 0.3 0.0 - 8.0 %    Basophil % 0.3 0.0 - 1.9 %    Differential Method Automated    Comprehensive metabolic panel   Result Value Ref Range    Sodium 137 136 - 145 mmol/L    Potassium 4.5 3.5 - 5.1 mmol/L    Chloride 96 95 - 110 mmol/L    CO2 29 23 - 29 mmol/L    Glucose 117 (H) 70 - 110 mg/dL    BUN 21 8 - 23 mg/dL    Creatinine  1.5 (H) 0.5 - 1.4 mg/dL    Calcium 9.1 8.7 - 10.5 mg/dL    Total Protein 6.6 6.0 - 8.4 g/dL    Albumin 3.9 3.5 - 5.2 g/dL    Total Bilirubin 0.5 0.1 - 1.0 mg/dL    Alkaline Phosphatase 63 55 - 135 U/L     (H) 10 - 40 U/L    ALT 59 (H) 10 - 44 U/L    eGFR 52 (A) >60 mL/min/1.73 m^2    Anion Gap 12 8 - 16 mmol/L   Lactic acid, plasma #1   Result Value Ref Range    Lactate (Lactic Acid) 1.2 0.5 - 2.2 mmol/L   Urinalysis, Reflex to Urine Culture Urine, Clean Catch    Specimen: Urine   Result Value Ref Range    Specimen UA Urine, Clean Catch     Color, UA Yellow Yellow, Straw, Nisha    Appearance, UA Clear Clear    pH, UA 6.0 5.0 - 8.0    Specific Gravity, UA 1.020 1.005 - 1.030    Protein, UA Trace (A) Negative    Glucose, UA Negative Negative    Ketones, UA Negative Negative    Bilirubin (UA) Negative Negative    Occult Blood UA Negative Negative    Nitrite, UA Negative Negative    Urobilinogen, UA Negative <2.0 EU/dL    Leukocytes, UA Negative Negative   Brain natriuretic peptide   Result Value Ref Range    BNP 27 0 - 99 pg/mL   Procalcitonin   Result Value Ref Range    Procalcitonin 0.24 <0.25 ng/mL   Troponin I   Result Value Ref Range    Troponin I <0.006 0.000 - 0.026 ng/mL   Magnesium   Result Value Ref Range    Magnesium 2.0 1.6 - 2.6 mg/dL   Protime-INR   Result Value Ref Range    Prothrombin Time 10.9 9.0 - 12.5 sec    INR 1.0 0.8 - 1.2   ISTAT PROCEDURE   Result Value Ref Range    POC PH 7.462 (H) 7.35 - 7.45    POC PCO2 40.3 35 - 45 mmHg    POC PO2 70 (L) 80 - 100 mmHg    POC HCO3 28.8 (H) 24 - 28 mmol/L    POC BE 5 (H) -2 to 2 mmol/L    POC SATURATED O2 95 95 - 100 %    Sample ARTERIAL     Site LR     Allens Test Pass     DelSys Room Air     Mode SPONT     FiO2 21          Imaging Results:  Imaging Results              CT Chest Without Contrast (Final result)  Result time 12/25/23 20:00:57      Final result by Barak Fink MD (12/25/23 20:00:57)                   Impression:      Few scattered  subsolid opacities, nonspecific possibly related to atelectasis but with other etiologies such as viral infection not excluded.  Correlation is advised.    Complete findings as above.    All CT scans at this facility are performed  using dose modulation techniques as appropriate to performed exam including the following:  automated exposure control; adjustment of mA and/or kV according to the patients size (this includes techniques or standardized protocols for targeted exams where dose is matched to indication/reason for exam: i.e. extremities or head);  iterative reconstruction technique.      Electronically signed by: Barak Fink  Date:    12/25/2023  Time:    20:00               Narrative:    EXAMINATION:  CT CHEST WITHOUT CONTRAST    CLINICAL HISTORY:  Cough, persistent;    TECHNIQUE:  Low dose axial images, sagittal and coronal reformations were obtained from the thoracic inlet to the lung bases. Contrast was not administered.    COMPARISON:  None    FINDINGS:  Base of Neck: No significant abnormality.    Thoracic soft tissues: Normal.    Aorta: Left-sided aortic arch.  No aneurysm and no significant atherosclerosis    Heart: Normal size. No effusion.    Pulmonary vasculature: Pulmonary arteries distribute normally.  There are four pulmonary veins.    Josefina/Mediastinum: No pathologic dorys enlargement.    Airways: Patent.    Lungs/Pleura: Few scattered subsolid opacities which can be seen with atelectasis.  Viral infection such as COVID-19 pneumonia not excluded.    Esophagus: Normal.    Upper Abdomen: No abnormality of the partially imaged upper abdomen.    Bones: No acute fracture. No suspicious lytic or sclerotic lesions.  Age expected degenerative changes.                                       X-Ray Chest 1 View (Final result)  Result time 12/25/23 19:36:19      Final result by Barak Fink MD (12/25/23 19:36:19)                   Impression:      No acute abnormality.      Electronically signed  by: Barak Fink  Date:    12/25/2023  Time:    19:36               Narrative:    EXAMINATION:  XR CHEST 1 VIEW    CLINICAL HISTORY:  Cough, unspecified    TECHNIQUE:  Single frontal view of the chest was performed.    COMPARISON:  Multiple priors.    FINDINGS:  The lungs are clear, with normal appearance of pulmonary vasculature and no pleural effusion or pneumothorax.    The cardiac silhouette is normal in size. The hilar and mediastinal contours are unremarkable.    Degenerative change of the shoulders.                                       The EKG was ordered, reviewed, and independently interpreted by the ED provider.  Interpretation time: 19:23  Rate: 81 BPM  Rhythm:  sinus rhythm with 1st degree AV block  Interpretation: Incomplete right bundle branch block. Septal infarct, age undetermined. No STEMI.             The Emergency Provider reviewed the vital signs and test results, which are outlined above.     ED Discussion     8:00 PM: Dr. Rivera transfers care of patient to Dr. Monson pending lab results.    8:06 PM: Dr. Monson agrees with Dr. Rivera's assessment and plan of care. Evaluated pt. Pt is resting comfortably and is in no acute distress.  D/w pt all pertinent results. D/w pt any concerns expressed at this time. Answered all questions. Pt expresses understanding at this time.    8:50 PM: Reassessed pt at this time. Discussed with pt all pertinent ED information and results. Discussed pt dx and plan of tx. Gave pt all f/u and return to the ED instructions. All questions and concerns were addressed at this time. Pt expresses understanding of information and instructions, and is comfortable with plan to discharge. Pt is stable for discharge.    I discussed with patient and/or family/caretaker that evaluation in the ED does not suggest any emergent or life threatening medical conditions requiring immediate intervention beyond what was provided in the ED, and I believe patient is safe for discharge.   Regardless, an unremarkable evaluation in the ED does not preclude the development or presence of a serious of life threatening condition. As such, patient was instructed to return immediately for any worsening or change in current symptoms.           Medical Decision Making  DDX:  1. Pneumonia  2. Flu  3. Covid    Presents with cough, shortness of breath, nasal congestion for past week. Reports was seen at urgent care twice given inhaler and steroids was told he did not have the flu, symptoms not improved. Not on home oxygen, oxygen level boarderline in the ER, patient walked and oxygen level stayed above 95%, lab work reviewed and he is flu positive, CXR negative, CT chest was done as well and shows scattered opacities. Cardiac markers and sepsis markers negative. ECG reviewed and no acute ischemic changes noted. Admission considered overall vital signs are stable, no concerns for sepsis, not hypoxemic, he feels comfortable being discharged with tamiflu, inhaler, steroids, cough meds, reasons to return given.     Amount and/or Complexity of Data Reviewed  Labs: ordered. Decision-making details documented in ED Course.  Radiology: ordered. Decision-making details documented in ED Course.  ECG/medicine tests: ordered and independent interpretation performed. Decision-making details documented in ED Course.    Risk  OTC drugs.  Prescription drug management.  Decision regarding hospitalization.                ED Medication(s):  Medications   ibuprofen tablet 800 mg (800 mg Oral Not Given 12/25/23 1845)   acetaminophen tablet 1,000 mg (1,000 mg Oral Given 12/25/23 1934)   albuterol-ipratropium 2.5 mg-0.5 mg/3 mL nebulizer solution 3 mL (3 mLs Nebulization Given 12/25/23 1959)   methylPREDNISolone sodium succinate injection 125 mg (125 mg Intravenous Given 12/25/23 1935)   oseltamivir capsule 75 mg (75 mg Oral Given 12/25/23 2022)       Discharge Medication List as of 12/25/2023  8:47 PM        START taking these  medications    Details   oseltamivir (TAMIFLU) 75 MG capsule Take 1 capsule (75 mg total) by mouth 2 (two) times daily. for 5 days, Starting Mon 12/25/2023, Until Sat 12/30/2023, Print              Follow-up Information       Andreia Strickland MD In 3 days.    Specialty: Family Medicine  Contact information:  [address] [facility] [address]  [phone]               [facility] - Emergency Dept..    Specialty: Emergency Medicine  Why: As needed, If symptoms worsen  Contact information:  [id number] [place] [id number] [phone]                               Scribe Attestation:   Scribe #1: I performed the above scribed service and the documentation accurately describes the services I performed. I attest to the accuracy of the note.     Attending:   Physician Attestation Statement for Scribe #1: I, Mony Rivera MD, personally performed the services described in this documentation, as scribed by Zari Clark, in my presence, and it is both accurate and complete.       Scribe Attestation:   Scribe #2: I performed the above scribed service and the documentation accurately describes the services I performed. I attest to the accuracy of the note.    Attending Attestation:           Physician Attestation for Scribe:    Physician Attestation Statement for Scribe #2: I, Ame Monson MD, reviewed documentation, as scribed by Noreen Patel in my presence, and it is both accurate and complete. I also acknowledge and confirm the content of the note done by Scribe #1.           Clinical Impression       ICD-10-CM ICD-9-CM   1. Influenza A  J10.1 487.1   2. Cough  R05.9 786.2   3. Sepsis  A41.9 038.9     995.91       Disposition:   Disposition: Discharged  Condition: Stable         Mony Rivera MD  12/26/23 [id number]

## 2023-12-27 ENCOUNTER — OFFICE VISIT (OUTPATIENT)
Dept: INTERNAL MEDICINE | Facility: CLINIC | Age: 63
End: 2023-12-27
Payer: COMMERCIAL

## 2023-12-27 VITALS
SYSTOLIC BLOOD PRESSURE: 114 MMHG | RESPIRATION RATE: 18 BRPM | BODY MASS INDEX: 30.99 KG/M2 | TEMPERATURE: 97 F | WEIGHT: 216.5 LBS | OXYGEN SATURATION: 97 % | HEIGHT: 70 IN | HEART RATE: 68 BPM | DIASTOLIC BLOOD PRESSURE: 72 MMHG

## 2023-12-27 DIAGNOSIS — R05.9 COUGH, UNSPECIFIED TYPE: ICD-10-CM

## 2023-12-27 DIAGNOSIS — J18.9 PNEUMONIA DUE TO INFECTIOUS ORGANISM, UNSPECIFIED LATERALITY, UNSPECIFIED PART OF LUNG: Primary | ICD-10-CM

## 2023-12-27 PROCEDURE — 1159F PR MEDICATION LIST DOCUMENTED IN MEDICAL RECORD: ICD-10-PCS | Mod: CPTII,S$GLB,, | Performed by: FAMILY MEDICINE

## 2023-12-27 PROCEDURE — 3044F HG A1C LEVEL LT 7.0%: CPT | Mod: CPTII,S$GLB,, | Performed by: FAMILY MEDICINE

## 2023-12-27 PROCEDURE — 99999 PR PBB SHADOW E&M-EST. PATIENT-LVL V: ICD-10-PCS | Mod: PBBFAC,,, | Performed by: FAMILY MEDICINE

## 2023-12-27 PROCEDURE — 3008F BODY MASS INDEX DOCD: CPT | Mod: CPTII,S$GLB,, | Performed by: FAMILY MEDICINE

## 2023-12-27 PROCEDURE — 99999 PR PBB SHADOW E&M-EST. PATIENT-LVL V: CPT | Mod: PBBFAC,,, | Performed by: FAMILY MEDICINE

## 2023-12-27 PROCEDURE — 3044F PR MOST RECENT HEMOGLOBIN A1C LEVEL <7.0%: ICD-10-PCS | Mod: CPTII,S$GLB,, | Performed by: FAMILY MEDICINE

## 2023-12-27 PROCEDURE — 3074F SYST BP LT 130 MM HG: CPT | Mod: CPTII,S$GLB,, | Performed by: FAMILY MEDICINE

## 2023-12-27 PROCEDURE — 3078F PR MOST RECENT DIASTOLIC BLOOD PRESSURE < 80 MM HG: ICD-10-PCS | Mod: CPTII,S$GLB,, | Performed by: FAMILY MEDICINE

## 2023-12-27 PROCEDURE — 3078F DIAST BP <80 MM HG: CPT | Mod: CPTII,S$GLB,, | Performed by: FAMILY MEDICINE

## 2023-12-27 PROCEDURE — 4010F ACE/ARB THERAPY RXD/TAKEN: CPT | Mod: CPTII,S$GLB,, | Performed by: FAMILY MEDICINE

## 2023-12-27 PROCEDURE — 3074F PR MOST RECENT SYSTOLIC BLOOD PRESSURE < 130 MM HG: ICD-10-PCS | Mod: CPTII,S$GLB,, | Performed by: FAMILY MEDICINE

## 2023-12-27 PROCEDURE — 1160F RVW MEDS BY RX/DR IN RCRD: CPT | Mod: CPTII,S$GLB,, | Performed by: FAMILY MEDICINE

## 2023-12-27 PROCEDURE — 1159F MED LIST DOCD IN RCRD: CPT | Mod: CPTII,S$GLB,, | Performed by: FAMILY MEDICINE

## 2023-12-27 PROCEDURE — 1160F PR REVIEW ALL MEDS BY PRESCRIBER/CLIN PHARMACIST DOCUMENTED: ICD-10-PCS | Mod: CPTII,S$GLB,, | Performed by: FAMILY MEDICINE

## 2023-12-27 PROCEDURE — 3008F PR BODY MASS INDEX (BMI) DOCUMENTED: ICD-10-PCS | Mod: CPTII,S$GLB,, | Performed by: FAMILY MEDICINE

## 2023-12-27 PROCEDURE — 4010F PR ACE/ARB THEARPY RXD/TAKEN: ICD-10-PCS | Mod: CPTII,S$GLB,, | Performed by: FAMILY MEDICINE

## 2023-12-27 PROCEDURE — 99214 OFFICE O/P EST MOD 30 MIN: CPT | Mod: S$GLB,,, | Performed by: FAMILY MEDICINE

## 2023-12-27 PROCEDURE — 99214 PR OFFICE/OUTPT VISIT, EST, LEVL IV, 30-39 MIN: ICD-10-PCS | Mod: S$GLB,,, | Performed by: FAMILY MEDICINE

## 2023-12-27 RX ORDER — ALBUTEROL SULFATE 90 UG/1
2 AEROSOL, METERED RESPIRATORY (INHALATION) EVERY 4 HOURS PRN
COMMUNITY
Start: 2023-12-20

## 2023-12-27 RX ORDER — INHALER, ASSIST DEVICES
SPACER (EA) MISCELLANEOUS
Qty: 1 EACH | Refills: 0 | Status: SHIPPED | OUTPATIENT
Start: 2023-12-27

## 2023-12-27 RX ORDER — AZITHROMYCIN 250 MG/1
TABLET, FILM COATED ORAL
Qty: 6 TABLET | Refills: 0 | Status: SHIPPED | OUTPATIENT
Start: 2023-12-27 | End: 2024-01-01

## 2023-12-27 NOTE — PROGRESS NOTES
Subjective:       Patient ID: Cedric Santoro is a 63 y.o. male.    Chief Complaint: Influenza    Patient presents to clinic for ER follow up. Also desires to establish care in the future, previous PCP Dr. Fuller. His daughter is present with him today. Patient has had cough since November. About 1 week ago his cough worsened and he was seen at urgent care. He tested negative for flu and covid at that time. He was seen in the ER 12/25 for worsening symptoms and fever. He was diagnosed with influenza A and given tamiflu. Daughter reports he had CT done after CXR was suspicious for pneumonia, but they did not receive the results. He has been using albuterol inhaler without spacer for cough with little relief. He reports feeling slightly better since leaving ER. Daughter concerned that his abdomen is bloated. He denies abdominal pain or heavy drinking. Patient and his daughter are otherwise without concerns today.      Review of Systems   Constitutional:  Positive for fever. Negative for chills, fatigue and unexpected weight change.   Eyes:  Negative for visual disturbance.   Respiratory:  Positive for cough. Negative for shortness of breath.    Cardiovascular:  Negative for chest pain.   Musculoskeletal:  Negative for myalgias.   Neurological:  Negative for headaches.         Objective:      Physical Exam  Vitals reviewed.   Constitutional:       General: He is not in acute distress.     Appearance: He is well-developed.   HENT:      Head: Normocephalic and atraumatic.   Eyes:      General: Lids are normal. No scleral icterus.     Extraocular Movements: Extraocular movements intact.      Conjunctiva/sclera: Conjunctivae normal.      Pupils: Pupils are equal, round, and reactive to light.   Cardiovascular:      Rate and Rhythm: Normal rate and regular rhythm.      Heart sounds: No murmur heard.     No friction rub. No gallop.   Pulmonary:      Effort: Pulmonary effort is normal.      Breath sounds: Examination of the  right-lower field reveals decreased breath sounds. Examination of the left-lower field reveals decreased breath sounds. Decreased breath sounds present. No wheezing, rhonchi or rales.   Neurological:      Mental Status: He is alert and oriented to person, place, and time.      Cranial Nerves: No cranial nerve deficit.      Gait: Gait normal.   Psychiatric:         Mood and Affect: Mood and affect normal.       CT Chest Without Contrast  Narrative: EXAMINATION:  CT CHEST WITHOUT CONTRAST    CLINICAL HISTORY:  Cough, persistent;    TECHNIQUE:  Low dose axial images, sagittal and coronal reformations were obtained from the thoracic inlet to the lung bases. Contrast was not administered.    COMPARISON:  None    FINDINGS:  Base of Neck: No significant abnormality.    Thoracic soft tissues: Normal.    Aorta: Left-sided aortic arch.  No aneurysm and no significant atherosclerosis    Heart: Normal size. No effusion.    Pulmonary vasculature: Pulmonary arteries distribute normally.  There are four pulmonary veins.    Josefina/Mediastinum: No pathologic dorys enlargement.    Airways: Patent.    Lungs/Pleura: Few scattered subsolid opacities which can be seen with atelectasis.  Viral infection such as COVID-19 pneumonia not excluded.    Esophagus: Normal.    Upper Abdomen: No abnormality of the partially imaged upper abdomen.    Bones: No acute fracture. No suspicious lytic or sclerotic lesions.  Age expected degenerative changes.  Impression: Few scattered subsolid opacities, nonspecific possibly related to atelectasis but with other etiologies such as viral infection not excluded.  Correlation is advised.    Complete findings as above.    All CT scans at this facility are performed  using dose modulation techniques as appropriate to performed exam including the following:  automated exposure control; adjustment of mA and/or kV according to the patients size (this includes techniques or standardized protocols for targeted exams  where dose is matched to indication/reason for exam: i.e. extremities or head);  iterative reconstruction technique.    Electronically signed by: Barak Fink  Date:    12/25/2023  Time:    20:00  X-Ray Chest 1 View  Narrative: EXAMINATION:  XR CHEST 1 VIEW    CLINICAL HISTORY:  Cough, unspecified    TECHNIQUE:  Single frontal view of the chest was performed.    COMPARISON:  Multiple priors.    FINDINGS:  The lungs are clear, with normal appearance of pulmonary vasculature and no pleural effusion or pneumothorax.    The cardiac silhouette is normal in size. The hilar and mediastinal contours are unremarkable.    Degenerative change of the shoulders.  Impression: No acute abnormality.    Electronically signed by: Barak Fink  Date:    12/25/2023  Time:    19:36      Assessment:       1. Pneumonia due to infectious organism, unspecified laterality, unspecified part of lung    2. Cough, unspecified type        Plan:   1. Pneumonia due to infectious organism, unspecified laterality, unspecified part of lung  -     azithromycin (Z-MELYSSA) 250 MG tablet; Take 2 tablets by mouth on day 1; Take 1 tablet by mouth on days 2-5  Dispense: 6 tablet; Refill: 0    2. Cough, unspecified type  -     inhalation spacing device (BREATHERITE MDI SPACER); Use as directed for inhalation.  Dispense: 1 each; Refill: 0      Will treat with azithromycin for pneumonia. Complete course of tamiflu.  Given spacer to use with albuterol every 4 hours for cough.  ER if in distress.  Follow up if worsening.  Return in 2 weeks for follow up. Will plan to schedule routine visit in the future when due as well.  Patient expressed understanding and agreement with plan.

## 2023-12-28 LAB
BACTERIA SPEC AEROBE CULT: NORMAL
BACTERIA SPEC AEROBE CULT: NORMAL
GRAM STN SPEC: NORMAL

## 2023-12-31 LAB
BACTERIA BLD CULT: NORMAL
BACTERIA BLD CULT: NORMAL

## 2024-01-10 ENCOUNTER — OFFICE VISIT (OUTPATIENT)
Dept: INTERNAL MEDICINE | Facility: CLINIC | Age: 64
End: 2024-01-10
Payer: COMMERCIAL

## 2024-01-10 VITALS
OXYGEN SATURATION: 96 % | DIASTOLIC BLOOD PRESSURE: 82 MMHG | SYSTOLIC BLOOD PRESSURE: 118 MMHG | RESPIRATION RATE: 18 BRPM | WEIGHT: 211.44 LBS | HEIGHT: 70 IN | HEART RATE: 87 BPM | BODY MASS INDEX: 30.27 KG/M2 | TEMPERATURE: 98 F

## 2024-01-10 DIAGNOSIS — Z00.00 ROUTINE GENERAL MEDICAL EXAMINATION AT A HEALTH CARE FACILITY: ICD-10-CM

## 2024-01-10 DIAGNOSIS — I10 ESSENTIAL HYPERTENSION: Primary | ICD-10-CM

## 2024-01-10 DIAGNOSIS — J18.9 PNEUMONIA DUE TO INFECTIOUS ORGANISM, UNSPECIFIED LATERALITY, UNSPECIFIED PART OF LUNG: Primary | ICD-10-CM

## 2024-01-10 DIAGNOSIS — E03.9 HYPOTHYROIDISM, UNSPECIFIED TYPE: ICD-10-CM

## 2024-01-10 DIAGNOSIS — Z13.220 SCREENING FOR LIPOID DISORDERS: ICD-10-CM

## 2024-01-10 PROCEDURE — 1159F MED LIST DOCD IN RCRD: CPT | Mod: CPTII,S$GLB,, | Performed by: FAMILY MEDICINE

## 2024-01-10 PROCEDURE — 3008F BODY MASS INDEX DOCD: CPT | Mod: CPTII,S$GLB,, | Performed by: FAMILY MEDICINE

## 2024-01-10 PROCEDURE — 3079F DIAST BP 80-89 MM HG: CPT | Mod: CPTII,S$GLB,, | Performed by: FAMILY MEDICINE

## 2024-01-10 PROCEDURE — 99213 OFFICE O/P EST LOW 20 MIN: CPT | Mod: S$GLB,,, | Performed by: FAMILY MEDICINE

## 2024-01-10 PROCEDURE — 99999 PR PBB SHADOW E&M-EST. PATIENT-LVL V: CPT | Mod: PBBFAC,,, | Performed by: FAMILY MEDICINE

## 2024-01-10 PROCEDURE — 3074F SYST BP LT 130 MM HG: CPT | Mod: CPTII,S$GLB,, | Performed by: FAMILY MEDICINE

## 2024-01-10 PROCEDURE — 1160F RVW MEDS BY RX/DR IN RCRD: CPT | Mod: CPTII,S$GLB,, | Performed by: FAMILY MEDICINE

## 2024-01-10 NOTE — PATIENT INSTRUCTIONS
Please let us know how we did today, we appreciate your feedback    Dr. Tangela Perez MD - Family Medicine Specialist in [facility]

## 2024-01-10 NOTE — PROGRESS NOTES
Subjective:       Patient ID: Cedric Santoro is a 63 y.o. male.    Chief Complaint: Follow-up (Flu/pneumonia)    Patient presents to clinic today for followup of pneumonia. He reports he is feeling better, but still reports fatigue and shortness of breath, although these are improving.    Review of Systems   Constitutional:  Positive for fatigue. Negative for chills, fever and unexpected weight change.   Eyes:  Negative for visual disturbance.   Respiratory:  Positive for shortness of breath.    Cardiovascular:  Negative for chest pain.   Musculoskeletal:  Negative for myalgias.   Neurological:  Negative for headaches.       Objective:      Physical Exam  Vitals reviewed.   Constitutional:       General: He is not in acute distress.     Appearance: He is well-developed.   HENT:      Head: Normocephalic and atraumatic.      Comments: Right eyelid droop is baseline  Eyes:      General: Lids are normal. No scleral icterus.     Extraocular Movements: Extraocular movements intact.      Conjunctiva/sclera: Conjunctivae normal.      Pupils: Pupils are equal, round, and reactive to light.   Cardiovascular:      Rate and Rhythm: Normal rate and regular rhythm.      Heart sounds: No murmur heard.     No friction rub. No gallop.   Pulmonary:      Effort: Pulmonary effort is normal.      Breath sounds: Normal breath sounds. No decreased breath sounds, wheezing, rhonchi or rales.   Neurological:      Mental Status: He is alert and oriented to person, place, and time.      Cranial Nerves: No cranial nerve deficit.      Gait: Gait normal.   Psychiatric:         Mood and Affect: Mood and affect normal.         Assessment:       1. Pneumonia due to infectious organism, unspecified laterality, unspecified part of lung        Plan:   1. Pneumonia due to infectious organism, unspecified laterality, unspecified part of lung  -     CT Chest Without Contrast; Future; Expected date: 03/18/2024      Patient improving. Advised rest and  patience as he recovers. Will repeat CT chest 12 weeks out to ensure resolution.  Patient will return in 1 month for annual, sooner as needed.  He understands to notify me if symptoms worsen/persist.

## 2024-01-12 ENCOUNTER — HOSPITAL ENCOUNTER (OUTPATIENT)
Dept: RADIOLOGY | Facility: HOSPITAL | Age: 64
Discharge: HOME | End: 2024-01-12
Attending: PODIATRIST
Payer: COMMERCIAL

## 2024-01-12 ENCOUNTER — PATIENT MESSAGE (OUTPATIENT)
Dept: PODIATRY | Facility: CLINIC | Age: 64
End: 2024-01-12
Payer: COMMERCIAL

## 2024-01-12 DIAGNOSIS — S92.342A CLOSED DISPLACED FRACTURE OF FOURTH METATARSAL BONE OF LEFT FOOT, INITIAL ENCOUNTER: ICD-10-CM

## 2024-01-12 DIAGNOSIS — Z98.890 POSTOPERATIVE STATE: ICD-10-CM

## 2024-01-12 PROCEDURE — 73630 X-RAY EXAM OF FOOT: CPT | Mod: 26,LT,, | Performed by: RADIOLOGY

## 2024-01-12 PROCEDURE — 73630 X-RAY EXAM OF FOOT: CPT | Mod: TC,PO,LT

## 2024-01-16 ENCOUNTER — OFFICE VISIT (OUTPATIENT)
Dept: CARDIOLOGY | Facility: CLINIC | Age: 64
End: 2024-01-16
Payer: COMMERCIAL

## 2024-01-16 ENCOUNTER — HOSPITAL ENCOUNTER (OUTPATIENT)
Dept: CARDIOLOGY | Facility: HOSPITAL | Age: 64
Discharge: HOME | End: 2024-01-16
Attending: INTERNAL MEDICINE
Payer: COMMERCIAL

## 2024-01-16 VITALS
BODY MASS INDEX: 30.83 KG/M2 | HEIGHT: 70 IN | DIASTOLIC BLOOD PRESSURE: 70 MMHG | HEART RATE: 79 BPM | OXYGEN SATURATION: 96 % | WEIGHT: 215.38 LBS | SYSTOLIC BLOOD PRESSURE: 128 MMHG

## 2024-01-16 DIAGNOSIS — I10 ESSENTIAL HYPERTENSION: ICD-10-CM

## 2024-01-16 DIAGNOSIS — I49.1 PAC (PREMATURE ATRIAL CONTRACTION): ICD-10-CM

## 2024-01-16 DIAGNOSIS — R06.09 DOE (DYSPNEA ON EXERTION): Primary | ICD-10-CM

## 2024-01-16 DIAGNOSIS — I25.118 CORONARY ARTERY DISEASE OF NATIVE ARTERY OF NATIVE HEART WITH STABLE ANGINA PECTORIS: ICD-10-CM

## 2024-01-16 DIAGNOSIS — R74.8 ELEVATED LIVER ENZYMES: ICD-10-CM

## 2024-01-16 DIAGNOSIS — R60.0 EDEMA OF BOTH LEGS: ICD-10-CM

## 2024-01-16 DIAGNOSIS — I34.0 NONRHEUMATIC MITRAL VALVE REGURGITATION: ICD-10-CM

## 2024-01-16 DIAGNOSIS — I48.92 PAROXYSMAL ATRIAL FLUTTER: ICD-10-CM

## 2024-01-16 DIAGNOSIS — I48.0 PAROXYSMAL ATRIAL FIBRILLATION: ICD-10-CM

## 2024-01-16 DIAGNOSIS — I34.1 MVP (MITRAL VALVE PROLAPSE): ICD-10-CM

## 2024-01-16 DIAGNOSIS — R93.1 ELEVATED CORONARY ARTERY CALCIUM SCORE: ICD-10-CM

## 2024-01-16 DIAGNOSIS — N18.31 STAGE 3A CHRONIC KIDNEY DISEASE (CKD): ICD-10-CM

## 2024-01-16 DIAGNOSIS — I50.20 HFREF (HEART FAILURE WITH REDUCED EJECTION FRACTION): ICD-10-CM

## 2024-01-16 DIAGNOSIS — R94.31 ABNORMAL ECG: ICD-10-CM

## 2024-01-16 PROCEDURE — 1159F MED LIST DOCD IN RCRD: CPT | Mod: CPTII,S$GLB,, | Performed by: INTERNAL MEDICINE

## 2024-01-16 PROCEDURE — 93010 ELECTROCARDIOGRAM REPORT: CPT | Mod: ,,, | Performed by: INTERNAL MEDICINE

## 2024-01-16 PROCEDURE — 99215 OFFICE O/P EST HI 40 MIN: CPT | Mod: S$GLB,,, | Performed by: INTERNAL MEDICINE

## 2024-01-16 PROCEDURE — 93005 ELECTROCARDIOGRAM TRACING: CPT

## 2024-01-16 PROCEDURE — 99999 PR PBB SHADOW E&M-EST. PATIENT-LVL III: CPT | Mod: PBBFAC,,, | Performed by: INTERNAL MEDICINE

## 2024-01-16 PROCEDURE — 3074F SYST BP LT 130 MM HG: CPT | Mod: CPTII,S$GLB,, | Performed by: INTERNAL MEDICINE

## 2024-01-16 PROCEDURE — 3078F DIAST BP <80 MM HG: CPT | Mod: CPTII,S$GLB,, | Performed by: INTERNAL MEDICINE

## 2024-01-16 PROCEDURE — 1160F RVW MEDS BY RX/DR IN RCRD: CPT | Mod: CPTII,S$GLB,, | Performed by: INTERNAL MEDICINE

## 2024-01-16 PROCEDURE — 3008F BODY MASS INDEX DOCD: CPT | Mod: CPTII,S$GLB,, | Performed by: INTERNAL MEDICINE

## 2024-01-16 NOTE — PROGRESS NOTES
Subjective:    Patient ID:  Cedric Santoro is a 63 y.o. male who presents for evaluation of Atrial Fibrillation, Coronary Artery Disease, Hyperlipidemia, and Hypertension        HPIPt presents for eval.  His current med conditions include MVP, MR, HTN, diastolic CHF, palpitations, PAF, PAFL.  Past hx pertinent for following:  - stress test 2020.  Reportedly had brief PAF w stress test, resolving on its own.  Echo 2020 normal LV function, mild MR.  Pt had ecg done 10/3/22 twice:  a flutter w rate 99 - 116 bpm.  New dx.  Lost his wife to cancer 7/22 then had dyspnea few months.  Under much stress so whirlwind w loss of wife.  S/p ARIELA Oct 2022 for cardioversion.  Left atrial appendage thrombus noted so no cardioversion was performed.  ARIELA: Left atrial appendage thrombus noted, LVEF 55%, mild posterior leaflet MV prolapse, mild-mod MR.  S/p a flutter ablation w Dr. Hendricks, EP, Dec 2022.  Had low BP w procedure, and required inotropes.  ARIELA showed EF 35%, decline thought to be due to a flutter.  Echo Feb 2023: normal LVEF, LAE, mod-severe MR with MVP, JOSE, mild TR, PAP wnl.  ARIELA was advised again.  S/p ARIELA Feb 2023 w Dr. Del Valle: normal EF, mild MVP, mild-mod MR, mild TR.  Coronary calcium score May 2023:  597  S/p LHC May 2023: nonobstructive CAD noted, normal EF.  OMT advised.  B LE arterial u/s April 2023 no stenosis, normal FARIDA B LE.  B LE venous u/s April 2023 no DVT.  Now here.  Ecg 12/25/23 personally reviewed: NSR, 1 av block, incomplete RBBB, possible old septal infarct.  He saw Dr. Hendricks, EP, 9/23 and was taken off DOAC and Atenolol dose cut back.  Had flu and pneumonia recently, slow to recover.  No fevers or cough.  Has winded easily.  No pnd.  Using compression stockings and leg edema controlled.  Has put on weight since foot surgery few months ago.  Apple watch has stated a fib.  Lipids much improved.  On statin tx.  BNP test 12/23 wnl.  BP controlled.  LFTs abnl, PCP rechecking next month.  Ecg today  personally reviewed: 1/16/23 NSR, 1 av block, PACs, incomplete RBBB.      Past Medical History:   Diagnosis Date    Abnormal ECG 10/04/2022    Coronary artery disease of native artery of native heart with stable angina pectoris 09/06/2023    Essential hypertension 02/24/2020    Hypertension     MVP (mitral valve prolapse) 10/26/2022    Nonrheumatic mitral valve regurgitation 10/26/2022    Palpitation 03/05/2020    Paroxysmal atrial fibrillation 03/05/2020    Paroxysmal atrial flutter 10/04/2022    Raynaud's syndrome     Vasovagal syncopes        Current Outpatient Medications:     albuterol (PROVENTIL/VENTOLIN HFA) 90 mcg/actuation inhaler, Inhale 2 puffs into the lungs every 4 (four) hours as needed., Disp: , Rfl:     atenoloL (TENORMIN) 25 MG tablet, Take 1 tablet (25 mg total) by mouth once daily., Disp: 90 tablet, Rfl: 3    b complex vitamins tablet, Take 1 tablet by mouth once daily., Disp: , Rfl:     baclofen (LIORESAL) 10 MG tablet, Take 10 mg by mouth 3 (three) times daily., Disp: , Rfl:     co-enzyme Q-10 30 mg capsule, Take 30 mg by mouth once daily., Disp: , Rfl:     DULoxetine (CYMBALTA) 60 MG capsule, Take 60 mg by mouth every evening., Disp: , Rfl:     ferrous sulfate 325 (65 FE) MG EC tablet, Take 325 mg by mouth once daily., Disp: , Rfl:     furosemide (LASIX) 40 MG tablet, Take 1 tablet (40 mg total) by mouth once daily., Disp: 90 tablet, Rfl: 5    inhalation spacing device (BREATHERITE MDI SPACER), Use as directed for inhalation., Disp: 1 each, Rfl: 0    levothyroxine (SYNTHROID) 50 MCG tablet, Take 1 tablet (50 mcg total) by mouth every Mon, Tues, Wed, Thurs, Fri., Disp: 64 tablet, Rfl: 1    losartan (COZAAR) 50 MG tablet, TAKE 1 TABLET BY MOUTH EVERY DAY, Disp: 90 tablet, Rfl: 3    magnesium 30 mg Tab, Take 1 tablet by mouth once daily., Disp: , Rfl:     multivitamin (THERAGRAN) per tablet, Take 1 tablet by mouth once daily., Disp: , Rfl:     rosuvastatin (CRESTOR) 10 MG tablet, Take 1 tablet (10  "mg total) by mouth every evening., Disp: 90 tablet, Rfl: 3    tamsulosin (FLOMAX) 0.4 mg Cap, Take 1 capsule (0.4 mg total) by mouth once daily., Disp: 90 capsule, Rfl: 3    vitamin D (VITAMIN D3) 1000 units Tab, Take 1,000 Units by mouth once daily., Disp: , Rfl:     apixaban (ELIQUIS) 5 mg Tab, Take 1 tablet (5 mg total) by mouth 2 (two) times daily., Disp: 60 tablet, Rfl: 6      Review of Systems   Constitutional: Positive for weight gain.   HENT: Negative.     Eyes: Negative.    Cardiovascular:  Positive for dyspnea on exertion and leg swelling.   Respiratory:  Positive for shortness of breath.    Endocrine: Negative.    Hematologic/Lymphatic: Negative.    Skin: Negative.    Musculoskeletal:  Positive for arthritis, back pain and neck pain.   Gastrointestinal: Negative.    Genitourinary: Negative.    Neurological: Negative.    Psychiatric/Behavioral: Negative.     Allergic/Immunologic: Negative.           /70 (BP Location: Left arm, Patient Position: Sitting, BP Method: Large (Manual))   Pulse 79   Ht 5' 10" (1.778 m)   Wt 97.7 kg (215 lb 6.2 oz)   SpO2 96%   BMI 30.91 kg/m²     Wt Readings from Last 3 Encounters:   01/16/24 97.7 kg (215 lb 6.2 oz)   01/10/24 95.9 kg (211 lb 6.7 oz)   12/27/23 98.2 kg (216 lb 7.9 oz)     Temp Readings from Last 3 Encounters:   01/10/24 98.1 °F (36.7 °C) (Tympanic)   12/27/23 97.2 °F (36.2 °C) (Tympanic)   12/25/23 98.8 °F (37.1 °C) (Oral)     BP Readings from Last 3 Encounters:   01/16/24 128/70   01/10/24 118/82   12/27/23 114/72     Pulse Readings from Last 3 Encounters:   01/16/24 79   01/10/24 87   12/27/23 68       Objective:    Physical Exam  Vitals and nursing note reviewed.   Constitutional:       Appearance: He is well-developed.   HENT:      Head: Normocephalic.   Neck:      Thyroid: No thyromegaly.      Vascular: Normal carotid pulses. No carotid bruit or JVD.   Cardiovascular:      Rate and Rhythm: Normal rate. Rhythm regularly irregular.      Pulses:    "        Radial pulses are 2+ on the right side and 2+ on the left side.      Heart sounds: S1 normal and S2 normal. Heart sounds not distant. No midsystolic click and no opening snap. Murmur heard.      High-pitched blowing holosystolic murmur is present with a grade of 2/6 at the apex.      No friction rub. No S3 or S4 sounds.   Pulmonary:      Effort: Pulmonary effort is normal.      Breath sounds: Normal breath sounds. No wheezing or rales.   Abdominal:      General: Bowel sounds are normal. There is no distension or abdominal bruit.      Palpations: Abdomen is soft.      Tenderness: There is no abdominal tenderness.   Musculoskeletal:      Cervical back: Neck supple.      Right lower leg: Edema present.      Left lower leg: Edema present.   Skin:     General: Skin is warm.   Neurological:      Mental Status: He is alert and oriented to person, place, and time.   Psychiatric:         Behavior: Behavior normal.       I have reviewed all pertinent labs and cardiac studies.      Chemistry        Component Value Date/Time     12/25/2023 1938    K 4.5 12/25/2023 1938    CL 96 12/25/2023 1938    CO2 29 12/25/2023 1938    BUN 21 12/25/2023 1938    CREATININE 1.5 (H) 12/25/2023 1938     (H) 12/25/2023 1938        Component Value Date/Time    CALCIUM 9.1 12/25/2023 1938    ALKPHOS 63 12/25/2023 1938     (H) 12/25/2023 1938    ALT 59 (H) 12/25/2023 1938    BILITOT 0.5 12/25/2023 1938    ESTGFRAFRICA >60.0 04/12/2022 1010    EGFRNONAA >60.0 04/12/2022 1010        Lab Results   Component Value Date    WBC 5.84 12/25/2023    HGB 13.2 (L) 12/25/2023    HCT 39.0 (L) 12/25/2023    MCV 92 12/25/2023     (L) 12/25/2023       Lab Results   Component Value Date    HGBA1C 5.6 04/19/2023     Lab Results   Component Value Date    CHOL 145 11/13/2023    CHOL 203 (H) 04/19/2023    CHOL 197 04/12/2022     Lab Results   Component Value Date    HDL 58 11/13/2023    HDL 59 04/19/2023    HDL 68 04/12/2022     Lab  Results   Component Value Date    LDLCALC 75.2 11/13/2023    LDLCALC 130.2 04/19/2023    LDLCALC 118.8 04/12/2022     Lab Results   Component Value Date    TRIG 59 11/13/2023    TRIG 69 04/19/2023    TRIG 51 04/12/2022     Lab Results   Component Value Date    CHOLHDL 40.0 11/13/2023    CHOLHDL 29.1 04/19/2023    CHOLHDL 34.5 04/12/2022       Results for orders placed during the hospital encounter of 02/09/23    Echo    Interpretation Summary  · The left ventricle is normal in size with concentric remodeling and normal systolic function.  · Moderate left atrial enlargement.  · The estimated ejection fraction is 60%.  · Normal left ventricular diastolic function.  · Normal right ventricular size with normal right ventricular systolic function.  · Moderate right atrial enlargement.  · Moderate-to-severe mitral regurgitation.  · Mild tricuspid regurgitation.  · There is bileaflet mitral prolapse.  · Normal central venous pressure (3 mmHg).  · The estimated PA systolic pressure is 28 mmHg.                   Assessment:       1. LEBLANC (dyspnea on exertion)    2. Paroxysmal atrial fibrillation    3. Abnormal ECG    4. Coronary artery disease of native artery of native heart with stable angina pectoris    5. Elevated coronary artery calcium score    6. Essential hypertension    7. MVP (mitral valve prolapse)    8. Nonrheumatic mitral valve regurgitation    9. PAC (premature atrial contraction)    10. Paroxysmal atrial flutter    11. Stage 3a chronic kidney disease (CKD)    12. Elevated liver enzymes    13. Edema of both legs    14. HFrEF (heart failure with reduced ejection fraction)           Plan:             S/p pneumonia, still w LEBLANC w little activity and slow to recover.  PAF noted on Apple watch; today in clinic w sinus w PACs.  Need to assess for recurrent a fib further and reassess his  EF and valvular heart disease.  2 week Vital Holter.  Echocardiogram.  Restart Eliquis 5 mg bid for PAF CVA protection, at least  until Vital Holter results are known.  Can stop asa once Eliquis restarted.  Continue Lasix 40 mg qd.  CAD: S/p [place] May 2023: nonobstructive disease noted.  OMT advised.  MVP/MR: stable on ARIELA 2/23.  Will continue to monitor.  PAF/PAF: S/p ablation. F/u w EP as directed.  Reviewed all tests and above medical conditions with patient in detail and formulated treatment plan.  Continue optimal medical treatment for cardiovascular conditions.  Cardiac low salt diet advised.  Daily exercise encouraged, with the goal 30 +  minutes aerobic exercise as tolerated.  Maintaining healthy weight and weight loss goals (if needed) were discussed in clinic.  HTN: Goal < 130/80.  Continue current HTN meds.  Lipids: continue Rosuvastatin 10 mg qhs.  Abnl ecg: Monitor.  Leg edema: stable. Elevate legs, compression stockings, Lasix and low salt diet.  LFTs: f/u w PCP/monitor.  CRI: Monitor.    PHONE REVIEW.    F/u in 2 months.      I have reviewed all pertinent labs and cardiac studies independently.   Plans and recommendations have been formulated under my direct supervision. All questions answered and patient voiced understanding.

## 2024-01-18 ENCOUNTER — HOSPITAL ENCOUNTER (OUTPATIENT)
Dept: CARDIOLOGY | Facility: HOSPITAL | Age: 64
Discharge: HOME | End: 2024-01-18
Attending: INTERNAL MEDICINE
Payer: COMMERCIAL

## 2024-01-18 ENCOUNTER — TELEPHONE (OUTPATIENT)
Dept: CARDIOLOGY | Facility: CLINIC | Age: 64
End: 2024-01-18
Payer: COMMERCIAL

## 2024-01-18 ENCOUNTER — PATIENT MESSAGE (OUTPATIENT)
Dept: CARDIOLOGY | Facility: CLINIC | Age: 64
End: 2024-01-18
Payer: COMMERCIAL

## 2024-01-18 VITALS
BODY MASS INDEX: 30.78 KG/M2 | WEIGHT: 215 LBS | HEIGHT: 70 IN | DIASTOLIC BLOOD PRESSURE: 76 MMHG | HEART RATE: 75 BPM | SYSTOLIC BLOOD PRESSURE: 128 MMHG

## 2024-01-18 DIAGNOSIS — R06.09 DOE (DYSPNEA ON EXERTION): ICD-10-CM

## 2024-01-18 DIAGNOSIS — I48.92 PAROXYSMAL ATRIAL FLUTTER: ICD-10-CM

## 2024-01-18 DIAGNOSIS — I10 ESSENTIAL HYPERTENSION: ICD-10-CM

## 2024-01-18 DIAGNOSIS — I49.1 PAC (PREMATURE ATRIAL CONTRACTION): ICD-10-CM

## 2024-01-18 DIAGNOSIS — I10 ESSENTIAL HYPERTENSION: Primary | ICD-10-CM

## 2024-01-18 DIAGNOSIS — I48.0 PAROXYSMAL ATRIAL FIBRILLATION: ICD-10-CM

## 2024-01-18 DIAGNOSIS — R94.31 ABNORMAL ECG: ICD-10-CM

## 2024-01-18 DIAGNOSIS — R60.0 EDEMA OF BOTH LEGS: ICD-10-CM

## 2024-01-18 DIAGNOSIS — R93.1 ELEVATED CORONARY ARTERY CALCIUM SCORE: ICD-10-CM

## 2024-01-18 DIAGNOSIS — I34.1 MVP (MITRAL VALVE PROLAPSE): ICD-10-CM

## 2024-01-18 DIAGNOSIS — I34.0 NONRHEUMATIC MITRAL VALVE REGURGITATION: ICD-10-CM

## 2024-01-18 DIAGNOSIS — I25.118 CORONARY ARTERY DISEASE OF NATIVE ARTERY OF NATIVE HEART WITH STABLE ANGINA PECTORIS: ICD-10-CM

## 2024-01-18 LAB
AORTIC ROOT ANNULUS: 3.22 CM
ASCENDING AORTA: 3.34 CM
AV INDEX (PROSTH): 0.8
AV MEAN GRADIENT: 6 MMHG
AV PEAK GRADIENT: 10 MMHG
AV VALVE AREA BY VELOCITY RATIO: 3.19 CM²
AV VALVE AREA: 2.82 CM²
AV VELOCITY RATIO: 0.9
BSA FOR ECHO PROCEDURE: 2.19 M2
CV ECHO LV RWT: 0.4 CM
DOP CALC AO PEAK VEL: 1.56 M/S
DOP CALC AO VTI: 30 CM
DOP CALC LVOT AREA: 3.5 CM2
DOP CALC LVOT DIAMETER: 2.12 CM
DOP CALC LVOT PEAK VEL: 1.41 M/S
DOP CALC LVOT STROKE VOLUME: 84.67 CM3
DOP CALC RVOT PEAK VEL: 1.01 M/S
DOP CALC RVOT VTI: 20.6 CM
DOP CALCLVOT PEAK VEL VTI: 24 CM
E WAVE DECELERATION TIME: 171.38 MSEC
E/A RATIO: 1.09
E/E' RATIO: 9.11 M/S
ECHO LV POSTERIOR WALL: 1.02 CM (ref 0.6–1.1)
FRACTIONAL SHORTENING: 47 % (ref 28–44)
INTERVENTRICULAR SEPTUM: 1.44 CM (ref 0.6–1.1)
IVRT: 108.47 MSEC
LA MAJOR: 4.6 CM
LA MINOR: 4.31 CM
LA WIDTH: 4.6 CM
LEFT ATRIUM SIZE: 4.67 CM
LEFT ATRIUM VOLUME INDEX MOD: 38.1 ML/M2
LEFT ATRIUM VOLUME INDEX: 37.8 ML/M2
LEFT ATRIUM VOLUME MOD: 81.95 CM3
LEFT ATRIUM VOLUME: 81.26 CM3
LEFT INTERNAL DIMENSION IN SYSTOLE: 2.72 CM (ref 2.1–4)
LEFT VENTRICLE DIASTOLIC VOLUME INDEX: 57.22 ML/M2
LEFT VENTRICLE DIASTOLIC VOLUME: 123.03 ML
LEFT VENTRICLE MASS INDEX: 116 G/M2
LEFT VENTRICLE SYSTOLIC VOLUME INDEX: 12.8 ML/M2
LEFT VENTRICLE SYSTOLIC VOLUME: 27.57 ML
LEFT VENTRICULAR INTERNAL DIMENSION IN DIASTOLE: 5.09 CM (ref 3.5–6)
LEFT VENTRICULAR MASS: 248.95 G
LV LATERAL E/E' RATIO: 7.45 M/S
LV SEPTAL E/E' RATIO: 11.71 M/S
LVOT MG: 5.18 MMHG
LVOT MV: 1.09 CM/S
MV PEAK A VEL: 0.75 M/S
MV PEAK E VEL: 0.82 M/S
MV STENOSIS PRESSURE HALF TIME: 49.7 MS
MV VALVE AREA P 1/2 METHOD: 4.43 CM2
PISA MRMAX VEL: 5.97 M/S
PISA TR MAX VEL: 2.72 M/S
PV MEAN GRADIENT: 3 MMHG
PV PEAK GRADIENT: 12 MMHG
PV PEAK VELOCITY: 1.7 M/S
RA MAJOR: 4.45 CM
RA PRESSURE ESTIMATED: 3 MMHG
RA WIDTH: 3.73 CM
RIGHT VENTRICULAR END-DIASTOLIC DIMENSION: 4.59 CM
RV TB RVSP: 6 MMHG
SINUS: 3.09 CM
STJ: 3.02 CM
TDI LATERAL: 0.11 M/S
TDI SEPTAL: 0.07 M/S
TDI: 0.09 M/S
TR MAX PG: 30 MMHG
TRICUSPID ANNULAR PLANE SYSTOLIC EXCURSION: 2.2 CM
TV REST PULMONARY ARTERY PRESSURE: 33 MMHG
Z-SCORE OF LEFT VENTRICULAR DIMENSION IN END DIASTOLE: -3.15
Z-SCORE OF LEFT VENTRICULAR DIMENSION IN END SYSTOLE: -3.53

## 2024-01-18 PROCEDURE — 93248 EXT ECG>7D<15D REV&INTERPJ: CPT | Mod: ,,, | Performed by: INTERNAL MEDICINE

## 2024-01-18 PROCEDURE — 93306 TTE W/DOPPLER COMPLETE: CPT | Mod: 26,,, | Performed by: INTERNAL MEDICINE

## 2024-01-18 PROCEDURE — 93306 TTE W/DOPPLER COMPLETE: CPT

## 2024-01-18 NOTE — TELEPHONE ENCOUNTER
Please call pt.  Preliminary Vital Holter shows recurrent a fib.  Please make sure he is taking the Eliquis which was recently restarted at last appt.  Also he needs another f/u appt w Dr. Hendricks, EP, asap.  Please schedule.  Await final Holter results.    Dr Garzon    Spoke to patient verbalized understanding ep appt scheduled

## 2024-01-18 NOTE — TELEPHONE ENCOUNTER
Please call pt.  Preliminary Vital Holter shows recurrent a fib.  Please make sure he is taking the Eliquis which was recently restarted at last appt.  Also he needs another f/u appt w Dr. Hendricks, EP, asap.  Please schedule.  Await final Holter results.    Dr Garzon

## 2024-02-05 ENCOUNTER — LAB VISIT (OUTPATIENT)
Dept: LAB | Facility: HOSPITAL | Age: 64
End: 2024-02-05
Attending: FAMILY MEDICINE
Payer: COMMERCIAL

## 2024-02-05 DIAGNOSIS — I10 ESSENTIAL HYPERTENSION: ICD-10-CM

## 2024-02-05 DIAGNOSIS — E03.9 HYPOTHYROIDISM, UNSPECIFIED TYPE: ICD-10-CM

## 2024-02-05 LAB
ALBUMIN SERPL BCP-MCNC: 4.2 G/DL (ref 3.5–5.2)
ALP SERPL-CCNC: 60 U/L (ref 55–135)
ALT SERPL W/O P-5'-P-CCNC: 30 U/L (ref 10–44)
ANION GAP SERPL CALC-SCNC: 10 MMOL/L (ref 8–16)
AST SERPL-CCNC: 42 U/L (ref 10–40)
BASOPHILS # BLD AUTO: 0.06 K/UL (ref 0–0.2)
BASOPHILS NFR BLD: 1.3 % (ref 0–1.9)
BILIRUB SERPL-MCNC: 0.8 MG/DL (ref 0.1–1)
BUN SERPL-MCNC: 21 MG/DL (ref 8–23)
CALCIUM SERPL-MCNC: 9.9 MG/DL (ref 8.7–10.5)
CHLORIDE SERPL-SCNC: 103 MMOL/L (ref 95–110)
CHOLEST SERPL-MCNC: 157 MG/DL (ref 120–199)
CHOLEST/HDLC SERPL: 2.6 {RATIO} (ref 2–5)
CO2 SERPL-SCNC: 27 MMOL/L (ref 23–29)
CREAT SERPL-MCNC: 1.3 MG/DL (ref 0.5–1.4)
DIFFERENTIAL METHOD BLD: ABNORMAL
EOSINOPHIL # BLD AUTO: 0.2 K/UL (ref 0–0.5)
EOSINOPHIL NFR BLD: 3.8 % (ref 0–8)
ERYTHROCYTE [DISTWIDTH] IN BLOOD BY AUTOMATED COUNT: 12.6 % (ref 11.5–14.5)
EST. GFR  (NO RACE VARIABLE): >60 ML/MIN/1.73 M^2
GLUCOSE SERPL-MCNC: 97 MG/DL (ref 70–110)
HCT VFR BLD AUTO: 43.7 % (ref 40–54)
HDLC SERPL-MCNC: 60 MG/DL (ref 40–75)
HDLC SERPL: 38.2 % (ref 20–50)
HGB BLD-MCNC: 14.4 G/DL (ref 14–18)
IMM GRANULOCYTES # BLD AUTO: 0.01 K/UL (ref 0–0.04)
IMM GRANULOCYTES NFR BLD AUTO: 0.2 % (ref 0–0.5)
LDLC SERPL CALC-MCNC: 81 MG/DL (ref 63–159)
LYMPHOCYTES # BLD AUTO: 1.1 K/UL (ref 1–4.8)
LYMPHOCYTES NFR BLD: 25.1 % (ref 18–48)
MCH RBC QN AUTO: 31.2 PG (ref 27–31)
MCHC RBC AUTO-ENTMCNC: 33 G/DL (ref 32–36)
MCV RBC AUTO: 95 FL (ref 82–98)
MONOCYTES # BLD AUTO: 0.5 K/UL (ref 0.3–1)
MONOCYTES NFR BLD: 10.4 % (ref 4–15)
NEUTROPHILS # BLD AUTO: 2.7 K/UL (ref 1.8–7.7)
NEUTROPHILS NFR BLD: 59.2 % (ref 38–73)
NONHDLC SERPL-MCNC: 97 MG/DL
NRBC BLD-RTO: 0 /100 WBC
PLATELET # BLD AUTO: 184 K/UL (ref 150–450)
PMV BLD AUTO: 11.3 FL (ref 9.2–12.9)
POTASSIUM SERPL-SCNC: 4.1 MMOL/L (ref 3.5–5.1)
PROT SERPL-MCNC: 7.2 G/DL (ref 6–8.4)
RBC # BLD AUTO: 4.62 M/UL (ref 4.6–6.2)
SODIUM SERPL-SCNC: 140 MMOL/L (ref 136–145)
T4 FREE SERPL-MCNC: 0.93 NG/DL (ref 0.71–1.51)
TRIGL SERPL-MCNC: 80 MG/DL (ref 30–150)
TSH SERPL DL<=0.005 MIU/L-ACNC: 4.05 UIU/ML (ref 0.4–4)
WBC # BLD AUTO: 4.5 K/UL (ref 3.9–12.7)

## 2024-02-05 PROCEDURE — 80053 COMPREHEN METABOLIC PANEL: CPT | Performed by: FAMILY MEDICINE

## 2024-02-05 PROCEDURE — 80061 LIPID PANEL: CPT | Performed by: FAMILY MEDICINE

## 2024-02-05 PROCEDURE — 84443 ASSAY THYROID STIM HORMONE: CPT | Performed by: FAMILY MEDICINE

## 2024-02-05 PROCEDURE — 36415 COLL VENOUS BLD VENIPUNCTURE: CPT | Mod: PO | Performed by: FAMILY MEDICINE

## 2024-02-05 PROCEDURE — 85025 COMPLETE CBC W/AUTO DIFF WBC: CPT | Performed by: FAMILY MEDICINE

## 2024-02-05 PROCEDURE — 84439 ASSAY OF FREE THYROXINE: CPT | Performed by: FAMILY MEDICINE

## 2024-02-07 LAB
OHS CV HOLTER SINUS AVERAGE HR: 78
OHS CV HOLTER SINUS MAX HR: 156
OHS CV HOLTER SINUS MIN HR: 47

## 2024-02-12 ENCOUNTER — OFFICE VISIT (OUTPATIENT)
Dept: INTERNAL MEDICINE | Facility: CLINIC | Age: 64
End: 2024-02-12
Payer: COMMERCIAL

## 2024-02-12 VITALS
HEART RATE: 75 BPM | TEMPERATURE: 97 F | OXYGEN SATURATION: 96 % | BODY MASS INDEX: 30.29 KG/M2 | WEIGHT: 211.06 LBS | DIASTOLIC BLOOD PRESSURE: 80 MMHG | SYSTOLIC BLOOD PRESSURE: 104 MMHG

## 2024-02-12 DIAGNOSIS — I50.20 HFREF (HEART FAILURE WITH REDUCED EJECTION FRACTION): ICD-10-CM

## 2024-02-12 DIAGNOSIS — N40.1 BPH WITH URINARY OBSTRUCTION: ICD-10-CM

## 2024-02-12 DIAGNOSIS — N18.31 STAGE 3A CHRONIC KIDNEY DISEASE (CKD): ICD-10-CM

## 2024-02-12 DIAGNOSIS — M46.92 UNSPECIFIED INFLAMMATORY SPONDYLOPATHY, CERVICAL REGION: ICD-10-CM

## 2024-02-12 DIAGNOSIS — I25.118 CORONARY ARTERY DISEASE OF NATIVE ARTERY OF NATIVE HEART WITH STABLE ANGINA PECTORIS: ICD-10-CM

## 2024-02-12 DIAGNOSIS — N13.8 BPH WITH URINARY OBSTRUCTION: ICD-10-CM

## 2024-02-12 DIAGNOSIS — I48.92 PAROXYSMAL ATRIAL FLUTTER: ICD-10-CM

## 2024-02-12 DIAGNOSIS — I10 ESSENTIAL HYPERTENSION: ICD-10-CM

## 2024-02-12 DIAGNOSIS — I48.0 PAROXYSMAL ATRIAL FIBRILLATION: Primary | ICD-10-CM

## 2024-02-12 DIAGNOSIS — E03.1 CONGENITAL HYPOTHYROIDISM WITHOUT GOITER: ICD-10-CM

## 2024-02-12 PROBLEM — M84.375K: Status: RESOLVED | Noted: 2023-10-31 | Resolved: 2024-02-12

## 2024-02-12 PROCEDURE — 3079F DIAST BP 80-89 MM HG: CPT | Mod: CPTII,S$GLB,, | Performed by: PHYSICIAN ASSISTANT

## 2024-02-12 PROCEDURE — 99396 PREV VISIT EST AGE 40-64: CPT | Mod: S$GLB,,, | Performed by: PHYSICIAN ASSISTANT

## 2024-02-12 PROCEDURE — 3074F SYST BP LT 130 MM HG: CPT | Mod: CPTII,S$GLB,, | Performed by: PHYSICIAN ASSISTANT

## 2024-02-12 PROCEDURE — 99999 PR PBB SHADOW E&M-EST. PATIENT-LVL IV: CPT | Mod: PBBFAC,,, | Performed by: PHYSICIAN ASSISTANT

## 2024-02-12 PROCEDURE — 3008F BODY MASS INDEX DOCD: CPT | Mod: CPTII,S$GLB,, | Performed by: PHYSICIAN ASSISTANT

## 2024-02-12 PROCEDURE — 1159F MED LIST DOCD IN RCRD: CPT | Mod: CPTII,S$GLB,, | Performed by: PHYSICIAN ASSISTANT

## 2024-02-12 PROCEDURE — 1160F RVW MEDS BY RX/DR IN RCRD: CPT | Mod: CPTII,S$GLB,, | Performed by: PHYSICIAN ASSISTANT

## 2024-02-12 NOTE — PATIENT INSTRUCTIONS
Check with your pharmacist regarding Tdap (tetanus/diphtheria/pertussis) vaccine.     Please bring your medication or a written list to your next office visit.    If you check your blood pressure at home, please bring written your blood pressure log and your blood pressure machine to your next office visit.

## 2024-02-12 NOTE — PROGRESS NOTES
Subjective:       Patient ID: Cedric Santoro is a 64 y.o. male.    Chief Complaint: Annual Exam      Patient presents to clinic today for annual physical exam.        Review of Systems   Constitutional:  Positive for fatigue. Negative for chills, fever and unexpected weight change.   HENT:  Negative for congestion, dental problem, ear pain, hearing loss, rhinorrhea and trouble swallowing.    Eyes:  Negative for pain and visual disturbance.   Respiratory:  Positive for shortness of breath. Negative for cough.    Cardiovascular:  Positive for leg swelling (chronic). Negative for chest pain and palpitations.   Gastrointestinal:  Negative for abdominal distention, abdominal pain, blood in stool, constipation, diarrhea, nausea and vomiting.   Genitourinary:  Negative for difficulty urinating, scrotal swelling and testicular pain.   Musculoskeletal:  Positive for arthralgias and myalgias.   Skin:  Negative for rash.   Neurological:  Positive for weakness (less active, recovered from foot fracture last year) and numbness (chronic). Negative for dizziness and headaches.   Hematological:  Negative for adenopathy. Does not bruise/bleed easily.   Psychiatric/Behavioral:  Positive for sleep disturbance (chronic). Negative for dysphoric mood. The patient is not nervous/anxious.        Objective:      Physical Exam  Vitals and nursing note reviewed.   Constitutional:       General: He is not in acute distress.     Appearance: He is well-developed.   HENT:      Head: Normocephalic and atraumatic.      Right Ear: Tympanic membrane, ear canal and external ear normal.      Left Ear: Tympanic membrane, ear canal and external ear normal.      Nose: Nose normal.      Mouth/Throat:      Lips: Pink.      Mouth: Mucous membranes are moist.      Pharynx: Oropharynx is clear. Uvula midline.   Eyes:      General: Lids are normal. No scleral icterus.     Conjunctiva/sclera: Conjunctivae normal.      Pupils: Pupils are equal, round, and  reactive to light.   Neck:      Thyroid: No thyromegaly.   Cardiovascular:      Rate and Rhythm: Normal rate and regular rhythm.      Pulses: Normal pulses.   Pulmonary:      Effort: Pulmonary effort is normal.      Breath sounds: Normal breath sounds. No wheezing or rales.   Abdominal:      General: Bowel sounds are normal. There is no distension.      Palpations: Abdomen is soft. There is no mass.      Tenderness: There is no abdominal tenderness.   Musculoskeletal:         General: No tenderness. Normal range of motion.      Cervical back: Normal range of motion and neck supple. Deformity present.      Thoracic back: Deformity present.      Right lower leg: No edema.      Left lower leg: No edema.      Comments: Scoliotic curvature noted   Lymphadenopathy:      Cervical: No cervical adenopathy.   Skin:     General: Skin is warm and dry.      Findings: No rash.   Neurological:      Mental Status: He is alert.      Cranial Nerves: No cranial nerve deficit.   Psychiatric:         Mood and Affect: Mood and affect normal.         Assessment:       1. Paroxysmal atrial fibrillation    2. Paroxysmal atrial flutter    3. HFrEF (heart failure with reduced ejection fraction)    4. Coronary artery disease of native artery of native heart with stable angina pectoris    5. Stage 3a chronic kidney disease (CKD)    6. Unspecified inflammatory spondylopathy, cervical region    7. Essential hypertension    8. BPH with urinary obstruction    9. Congenital hypothyroidism without goiter        Plan:   1. Paroxysmal atrial fibrillation  Overview:  Followed by Cardiology, continue current treatment plan       2. Paroxysmal atrial flutter  Overview:  Followed by Cardiology, continue current treatment plan       3. HFrEF (heart failure with reduced ejection fraction)  Overview:  Followed by Cardiology, continue current treatment plan       4. Coronary artery disease of native artery of native heart with stable angina  pectoris  Overview:  Followed by Cardiology, continue current treatment plan     Orders:  -     Comprehensive Metabolic Panel; Future; Expected date: 08/12/2024  -     Lipid Panel; Future; Expected date: 08/12/2024    5. Stage 3a chronic kidney disease (CKD)  Assessment & Plan:  Stable, no NSAIDs  Lab Results   Component Value Date    BUN 21 02/05/2024    CREATININE 1.3 02/05/2024    ESTGFRAFRICA >60.0 04/12/2022    EGFRNONAA >60.0 04/12/2022    EGFRNORACEVR >60.0 02/05/2024          6. Unspecified inflammatory spondylopathy, cervical region  Overview:  Stable      7. Essential hypertension  Assessment & Plan:  /80, controlled, continue atenolol, Lasix, losartan  Lab Results   Component Value Date     02/05/2024    K 4.1 02/05/2024    BUN 21 02/05/2024    CREATININE 1.3 02/05/2024    ESTGFRAFRICA >60.0 04/12/2022    EGFRNONAA >60.0 04/12/2022    EGFRNORACEVR >60.0 02/05/2024          8. BPH with urinary obstruction  Overview:  Followed by Urology, continue current treatment plan       9. Congenital hypothyroidism without goiter  Assessment & Plan:  Controlled, continue levothyroxine  Lab Results   Component Value Date    TSH 4.050 (H) 02/05/2024    FREET4 0.93 02/05/2024        Orders:  -     TSH; Future; Expected date: 08/12/2024  -     T4, Free; Future; Expected date: 08/12/2024        Recent labs reviewed with patient.    6 month f/u with Dr. Perez scheduled with fasting labs PTA   Health Maintenance reviewed/updated.

## 2024-02-14 NOTE — ASSESSMENT & PLAN NOTE
Controlled, continue levothyroxine  Lab Results   Component Value Date    TSH 4.050 (H) 02/05/2024    FREET4 0.93 02/05/2024       HOSPITALIST PROGRESS NOTE:      Subjective: Admitted with knee pain  Patient reported black tarry stools also with declining hemoglobin     Vital 24 Hour Range Last Value   Temperature Temp  Min: 98 °F (36.7 °C)  Max: 99.6 °F (37.6 °C) 99.3 °F (37.4 °C) (05/30/17 0818)   Pulse Pulse  Min: 63  Max: 79 79 (05/30/17 0818)   Respiratory Resp  Min: 16  Max: 20 18 (05/30/17 0818)   Non-Invasive  Blood Pressure BP  Min: 119/59  Max: 162/76 143/65 (05/30/17 0818)   Pulse Oximetry SpO2  Min: 94 %  Max: 96 % 94 % (05/30/17 0818)     Admit Weight:   Weight: 60.6 kg (05/27/17 1519)    BMI:   BMI (Calculated): 26.15 (05/27/17 1519)    Intake/Output:         Physical Exam:   General appearance - alert and in no distress  Oropharynx - normal  Neck - supple  and no adenopathy  Lungs - clear to auscultation  Heart - normal, regular rate and rhythm, no murmur noted  Abd - soft and non-tender. BS audible  Ext - no edema. Pulses 2+  Neuro - no gross motor or sensory defecits    Laboratory Results:      Recent Labs  Lab 05/30/17  0510 05/29/17  2230 05/29/17  1901 05/29/17  1145 05/28/17  0530 05/27/17  0510   SODIUM 139  --   --  137  --  142   POTASSIUM 4.3  --   --  4.6  --  3.7   CHLORIDE 107  --   --  106  --  105   CO2 19*  --   --  22  --  24   BUN 35*  --   --  36*  --  30*   CREATININE 1.21*  --   --  1.24* 1.03* 1.09*   GLUCOSE 100*  --   --  102*  --  133*   WBC 1.5*  --   --  1.2*  --  3.4*   HGB 7.7* 7.4* 7.9* 6.2*  --  7.8*   HCT 24.4*  --   --  20.2*  --  25.5*   PLT 45*  --   --  37*  --  54*   PT 11.1  --   --   --   --   --    INR 1.0  --   --   --   --   --        No results found    Medications/Infusions:  Scheduled:   • pantoprazole  40 mg Oral QAM AC   • allopurinol  200 mg Oral Daily   • atenolol  75 mg Oral Daily   • fluticasone  2 spray Each Nare Daily   • furosemide  20 mg Oral Daily   • loratadine  10 mg Oral QAM AC   • losartan  50 mg Oral Daily   • naproxen  500 mg Oral BID WC   • colchicine  0.6 mg Oral  Daily   • sodium chloride (PF)  2 mL Injection 2 times per day       Continuous Infusions:   • sodium chloride 0.9% infusion     • sodium chloride 0.9% infusion 100 mL/hr at 05/30/17 0627       Assessment and Plan:  Marimar Solano is an 88-year-old woman who was admitted yesterday with failure to thrive and right knee pain    Black tarry stools possibly secondary to Naprosyn  Declining hemoglobin  Status post blood transfusion  FOBT pending, GI called on consultation    . Failure to thrive. This is related to patient's advanced age, arthritis, and possibly a flare of gout.  Continuing to work with PT OT, improving nutrition patient might need subacute rehabilitation     2. Right knee pain with suspected gouty attack. Patient is in colchicine and Naprosyn right now.  Pain much improved     3. Anemia of chronic disease most likely related to stage III CKD. Interval decrease in hemoglobin is most likely related to hemodilution after patient was hydrated intravenously. We'll continue monitoring.  Status post blood transfusion, will monitor H&H, GI called on consult     4. Stage III CKD. Appears to be stable at this time.     5. Chronic thrombocytopenia. Appears to be stable at this time      Code status: Full Resuscitation      Signed:  Guillermo Arnold MD  5/30/2017  9:45 AM

## 2024-02-14 NOTE — ASSESSMENT & PLAN NOTE
Stable, no NSAIDs  Lab Results   Component Value Date    BUN 21 02/05/2024    CREATININE 1.3 02/05/2024    ESTGFRAFRICA >60.0 04/12/2022    EGFRNONAA >60.0 04/12/2022    EGFRNORACEVR >60.0 02/05/2024

## 2024-02-14 NOTE — ASSESSMENT & PLAN NOTE
/80, controlled, continue atenolol, Lasix, losartan  Lab Results   Component Value Date     02/05/2024    K 4.1 02/05/2024    BUN 21 02/05/2024    CREATININE 1.3 02/05/2024    ESTGFRAFRICA >60.0 04/12/2022    EGFRNONAA >60.0 04/12/2022    EGFRNORACEVR >60.0 02/05/2024

## 2024-03-05 ENCOUNTER — PATIENT MESSAGE (OUTPATIENT)
Dept: CARDIOLOGY | Facility: CLINIC | Age: 64
End: 2024-03-05
Payer: COMMERCIAL

## 2024-03-05 NOTE — PROGRESS NOTES
Subjective:   Patient ID:  Cedric Santoro is a 64 y.o. male who presents for follow-up of Atrial Fibrillation      64 yoM referred for AFL management.     11/22: He developed AFL/RVR leading to ARIELA/CV attempt 10/12/22. He had KP thrombus on ARIELA leading to cancellation of the CV. He was placed on apixaban for CVA prophylaxis 10/4/22 when he as diagnosed. EF normal on ARIELA. He developed LEBLANC and fatigue in the months after his wife passed away. He remains in AFL today and has mild symptoms. He is here to discuss ablation. Of note, AF was placed on his chart 3/2020. I reviewed the monitor and do not feel he had AF. The event was non-sustained AT lasting less than 30s.     3/23: CTI ablation 12/15/22, no recurrence. Symptoms markedly improved.     9/23: No recurrence since his ablation.     Interval history: Recurrence of AF, 7% rate, on event monitor. He complains of daily LEBLANC, worsened since his influenza pneumonia late Dec/early Jan. He is on OAC due to prior KP thrombus.     Echo 1/24:  ·  Left Ventricle: The left ventricle is normal in size. Normal wall thickness. There is eccentric hypertrophy. Normal wall motion. There is normal systolic function with a visually estimated ejection fraction of 60 - 65%. There is normal diastolic function.  ·  Right Ventricle: Normal right ventricular cavity size. Wall thickness is normal. Right ventricle wall motion  is normal. Systolic function is normal.  ·  Left Atrium: Left atrium is mildly dilated.  ·  Mitral Valve: Mildly thickened anterior leaflet. There is moderate to severe regurgitation with a centrally directed jet.  ·  Pulmonary Artery: The estimated pulmonary artery systolic pressure is 33 mmHg.  ·  IVC/SVC: Normal venous pressure at 3 mmHg.    Event monitor 1/24:               Baseline rhythm is NSR with frequent runs of PAF (total burden reported as 7%)            Average diurnal HR is @ 75-80            Average nocturnal HR is @ 65-70            Average post  awakening HR is @ 85    Ablation 12/22:  ·  Emergent cardioversion for AFL/RVR with hypotensopn was successfully performed with restoration of normal sinus rhythm prior to vascular access  ·  Empiric CTI ablation.  ·  3D mapping performed with [employer].    Echo 1/23:  · The left ventricle is normal in size with concentric remodeling and normal systolic function.  · Moderate left atrial enlargement.  · The estimated ejection fraction is 60%.  · Normal left ventricular diastolic function.  · Normal right ventricular size with normal right ventricular systolic function.  · Moderate right atrial enlargement.  · Moderate-to-severe mitral regurgitation.  · Mild tricuspid regurgitation.  · There is bileaflet mitral prolapse.  · Normal central venous pressure (3 mmHg).  · The estimated PA systolic pressure is 28 mmHg.      ARIELA 10/12:  · The left ventricle is normal in size with normal systolic function.  · Normal right ventricular size with normal right ventricular systolic function.  · The estimated ejection fraction is 55%.  · Thrombus is present in the appendage.  · Mild left atrial enlargement.  · There is mild mitral prolapse of the posterior mitral leaflet.  · Mild-to-moderate mitral regurgitation.  · Grade 1 plaque present.  · Mild tricuspid regurgitation.  · No cardioversion performed due to left atrial appendage thrombus.    Past Medical History:  10/04/2022: Abnormal ECG  09/06/2023: Coronary artery disease of native artery of native heart   with stable angina pectoris  02/24/2020: Essential hypertension  No date: Hypertension  10/26/2022: MVP (mitral valve prolapse)  10/26/2022: Nonrheumatic mitral valve regurgitation  03/05/2020: Palpitation  03/05/2020: Paroxysmal atrial fibrillation  10/04/2022: Paroxysmal atrial flutter  No date: Raynaud's syndrome  No date: Vasovagal syncopes    Past Surgical History:  12/15/2022: ABLATION, ATRIAL FLUTTER, TYPICAL; N/A      Comment:  Procedure: Ablation, Atrial Flutter,  Typical;  Surgeon:                Matty Hendricks MD;  Location: [facility] EP LAB;  Service:               Cardiology;  Laterality: N/A;  AFL, RFA, NICOLASA, ARIELA, anes,                MB, 3 Prep  No date: CHOLECYSTECTOMY  12/15/2022: ECHOCARDIOGRAM,TRANSESOPHAGEAL; N/A      Comment:  Procedure: Transesophageal echo (ARIELA) intra-procedure                log documentation;  Surgeon: Nisreen Fenton MD;                 Location: [facility] EP LAB;  Service: Cardiology;  Laterality:               N/A;  No date: EYE SURGERY  No date: GALLBLADDER SURGERY  No date: HERNIA REPAIR  05/24/2023: LEFT HEART CATHETERIZATION; Left      Comment:  Procedure: Left heart cath;  Surgeon: Kal Garzon MD;  Location: [facility] CATH LAB;  Service: Cardiology;                 Laterality: Left;  may need to sign consent// 11/6/2023: OPEN REDUCTION AND INTERNAL FIXATION (ORIF) OF FRACTURE OF   METATARSAL BONE; Left      Comment:  Procedure: ORIF, FRACTURE, METATARSAL BONE;  Surgeon:                Aleksandr Cheek DPM;  Location: [facility] OR;  Service:                Podiatry;  Laterality: Left;  No date: THROAT SURGERY  02/27/2023: TRANSESOPHAGEAL ECHOCARDIOGRAPHY; N/A      Comment:  Procedure: ECHOCARDIOGRAM, TRANSESOPHAGEAL;  Surgeon:                Samuel Del Valle MD;  Location: [facility] CATH LAB;  Service:                Cardiology;  Laterality: N/A;  12/15/2022: TREATMENT OF CARDIAC ARRHYTHMIA      Comment:  Procedure: Cardioversion or Defibrillation;  Surgeon:                Matty Hendricks MD;  Location: [facility] EP LAB;  Service:               Cardiology;;    Social History    Socioeconomic History      Marital status: Other    Occupational History      Occupation: Retired     Tobacco Use      Smoking status: Never      Smokeless tobacco: Never    Substance and Sexual Activity      Alcohol use: No      Drug use: No      Sexual activity: Not Currently        Partners: Female        Birth control/protection: None    Social  Determinants of Health  Financial Resource Strain: Low Risk  (2/11/2024)      Overall Financial Resource Strain (CARDIA)          Difficulty of Paying Living Expenses: Not hard at all  Food Insecurity: No Food Insecurity (2/11/2024)      Hunger Vital Sign          Worried About Running Out of Food in the Last Year: Never true          Ran Out of Food in the Last Year: Never true  Transportation Needs: No Transportation Needs (2/11/2024)      PRAPARE - Transportation          Lack of Transportation (Medical): No          Lack of Transportation (Non-Medical): No  Physical Activity: Inactive (2/11/2024)      Exercise Vital Sign          Days of Exercise per Week: 0 days          Minutes of Exercise per Session: 30 min  Stress: Stress Concern Present (2/11/2024)      Citizen of [place] of Occupational Health - Occupational Stress Questionnaire          Feeling of Stress : To some extent  Social Connections: Unknown (2/11/2024)      Social Connection and Isolation Panel [NHANES]          Frequency of Communication with Friends and Family: More than three times a week          Frequency of Social Gatherings with Friends and Family: Twice a week           Active Member of Clubs or Organizations: Yes          Attends Club or Organization Meetings: More than 4 times per year          Marital Status:   Housing Stability: Low Risk  (2/11/2024)      Housing Stability Vital Sign          Unable to Pay for Housing in the Last Year: No          Number of Places Lived in the Last Year: 1          Unstable Housing in the Last Year: No    Review of patient's family history indicates:  Problem: Cancer      Relation: Mother          Age of Onset: (Not Specified)          Comment: Multiple myeloma  Problem: Pacemaker/defibrilator      Relation: Father          Age of Onset: (Not Specified)  Problem: Macular degeneration      Relation: Father          Age of Onset: (Not Specified)  Problem: COPD      Relation: Father          Age  of Onset: (Not Specified)  Problem: Hearing loss      Relation: Father          Age of Onset: (Not Specified)  Problem: Macular degeneration      Relation: Brother          Age of Onset: (Not Specified)      Current Outpatient Medications:  albuterol (PROVENTIL/VENTOLIN HFA) 90 mcg/actuation inhaler, Inhale 2 puffs into the lungs every 4 (four) hours as needed., Disp: , Rfl:   apixaban (ELIQUIS) 5 mg Tab, Take 1 tablet (5 mg total) by mouth 2 (two) times daily., Disp: 60 tablet, Rfl: 6  atenoloL (TENORMIN) 25 MG tablet, Take 1 tablet (25 mg total) by mouth once daily., Disp: 90 tablet, Rfl: 3  b complex vitamins tablet, Take 1 tablet by mouth once daily., Disp: , Rfl:    baclofen (LIORESAL) 10 MG tablet, Take 10 mg by mouth 3 (three) times daily., Disp: , Rfl:   co-enzyme Q-10 30 mg capsule, Take 30 mg by mouth once daily., Disp: , Rfl:    ferrous sulfate 325 (65 FE) MG EC tablet, Take 325 mg by mouth once daily., Disp: , Rfl:   furosemide (LASIX) 40 MG tablet, Take 1 tablet (40 mg total) by mouth once daily., Disp: 90 tablet, Rfl: 5  inhalation spacing device (BREATHERITE MDI SPACER), Use as directed for inhalation., Disp: 1 each, Rfl: 0  levothyroxine (SYNTHROID) 50 MCG tablet, Take 1 tablet (50 mcg total) by mouth every Mon, Tues, Wed, Thurs, Fri., Disp: 64 tablet, Rfl: 1  losartan (COZAAR) 50 MG tablet, TAKE 1 TABLET BY MOUTH EVERY DAY, Disp: 90 tablet, Rfl: 3  magnesium 30 mg Tab, Take 1 tablet by mouth once daily., Disp: , Rfl:   multivitamin (THERAGRAN) per tablet, Take 1 tablet by mouth once daily., Disp: , Rfl:   rosuvastatin (CRESTOR) 10 MG tablet, Take 1 tablet (10 mg total) by mouth every evening., Disp: 90 tablet, Rfl: 3  tamsulosin (FLOMAX) 0.4 mg Cap, Take 1 capsule (0.4 mg total) by mouth once daily., Disp: 90 capsule, Rfl: 3  vitamin D (VITAMIN D3) 1000 units Tab, Take 1,000 Units by mouth once daily., Disp: , Rfl:     No current facility-administered medications for this visit.          Review of  Systems   Constitutional: Negative.   HENT: Negative.     Eyes: Negative.    Cardiovascular:  Positive for leg swelling.   Respiratory: Negative.     Endocrine: Negative.    Hematologic/Lymphatic: Negative.    Skin: Negative.    Musculoskeletal:  Positive for arthritis, back pain and neck pain.   Gastrointestinal: Negative.    Genitourinary: Negative.    Neurological: Negative.    Psychiatric/Behavioral: Negative.     Allergic/Immunologic: Negative.        Objective:   Physical Exam  Vitals and nursing note reviewed.   Constitutional:       Appearance: He is well-developed.   HENT:      Head: Normocephalic.   Neck:      Thyroid: No thyromegaly.      Vascular: Normal carotid pulses. No carotid bruit or JVD.   Cardiovascular:      Rate and Rhythm: Normal rate and regular rhythm.      Pulses:           Radial pulses are 2+ on the right side and 2+ on the left side.      Heart sounds: S1 normal and S2 normal. Heart sounds not distant. No midsystolic click and no opening snap. Murmur heard.      High-pitched blowing holosystolic murmur is present with a grade of 1/6 at the apex.      No friction rub. No S3 or S4 sounds.   Pulmonary:      Effort: Pulmonary effort is normal.      Breath sounds: Normal breath sounds. No wheezing or rales.   Abdominal:      General: Bowel sounds are normal. There is no distension or abdominal bruit.      Palpations: Abdomen is soft.      Tenderness: There is no abdominal tenderness.   Musculoskeletal:      Cervical back: Neck supple.      Right lower leg: Edema present.      Left lower leg: Edema present.   Skin:     General: Skin is warm.   Neurological:      Mental Status: He is alert and oriented to person, place, and time.   Psychiatric:         Behavior: Behavior normal.         Assessment:      1. Paroxysmal atrial fibrillation    2. Paroxysmal atrial flutter        Plan:   64 yoM AFL s/p ablation here for AF management. He has 7% burden which would not explain daily LEBLANC. Will let him  continue to recover from his recent infection. RTC 3m

## 2024-03-06 ENCOUNTER — OFFICE VISIT (OUTPATIENT)
Dept: CARDIOLOGY | Facility: CLINIC | Age: 64
End: 2024-03-06
Payer: COMMERCIAL

## 2024-03-06 ENCOUNTER — PATIENT MESSAGE (OUTPATIENT)
Dept: INTERNAL MEDICINE | Facility: CLINIC | Age: 64
End: 2024-03-06
Payer: COMMERCIAL

## 2024-03-06 VITALS
HEIGHT: 70 IN | HEART RATE: 57 BPM | RESPIRATION RATE: 16 BRPM | OXYGEN SATURATION: 92 % | SYSTOLIC BLOOD PRESSURE: 112 MMHG | BODY MASS INDEX: 30.17 KG/M2 | WEIGHT: 210.75 LBS | DIASTOLIC BLOOD PRESSURE: 74 MMHG

## 2024-03-06 DIAGNOSIS — E03.9 HYPOTHYROIDISM, UNSPECIFIED TYPE: ICD-10-CM

## 2024-03-06 DIAGNOSIS — I48.92 PAROXYSMAL ATRIAL FLUTTER: ICD-10-CM

## 2024-03-06 DIAGNOSIS — I48.0 PAROXYSMAL ATRIAL FIBRILLATION: Primary | ICD-10-CM

## 2024-03-06 PROCEDURE — 3008F BODY MASS INDEX DOCD: CPT | Mod: CPTII,S$GLB,, | Performed by: INTERNAL MEDICINE

## 2024-03-06 PROCEDURE — 3078F DIAST BP <80 MM HG: CPT | Mod: CPTII,S$GLB,, | Performed by: INTERNAL MEDICINE

## 2024-03-06 PROCEDURE — 1159F MED LIST DOCD IN RCRD: CPT | Mod: CPTII,S$GLB,, | Performed by: INTERNAL MEDICINE

## 2024-03-06 PROCEDURE — 1160F RVW MEDS BY RX/DR IN RCRD: CPT | Mod: CPTII,S$GLB,, | Performed by: INTERNAL MEDICINE

## 2024-03-06 PROCEDURE — 3074F SYST BP LT 130 MM HG: CPT | Mod: CPTII,S$GLB,, | Performed by: INTERNAL MEDICINE

## 2024-03-06 PROCEDURE — 99999 PR PBB SHADOW E&M-EST. PATIENT-LVL IV: CPT | Mod: PBBFAC,,, | Performed by: INTERNAL MEDICINE

## 2024-03-06 PROCEDURE — 99214 OFFICE O/P EST MOD 30 MIN: CPT | Mod: S$GLB,,, | Performed by: INTERNAL MEDICINE

## 2024-03-06 NOTE — TELEPHONE ENCOUNTER
No care due was identified.  [facility] Embedded Care Due Messages. Reference number: [id number].   3/06/2024 3:31:55 PM CST

## 2024-03-08 RX ORDER — LEVOTHYROXINE SODIUM 50 UG/1
50 TABLET ORAL
Qty: 66 TABLET | Refills: 1 | Status: SHIPPED | OUTPATIENT
Start: 2024-03-08 | End: 2025-03-08

## 2024-03-11 ENCOUNTER — PATIENT MESSAGE (OUTPATIENT)
Dept: ADMINISTRATIVE | Facility: OTHER | Age: 64
End: 2024-03-11
Payer: COMMERCIAL

## 2024-03-25 ENCOUNTER — HOSPITAL ENCOUNTER (OUTPATIENT)
Dept: RADIOLOGY | Facility: HOSPITAL | Age: 64
Discharge: HOME | End: 2024-03-25
Attending: FAMILY MEDICINE
Payer: COMMERCIAL

## 2024-03-25 DIAGNOSIS — J18.9 PNEUMONIA DUE TO INFECTIOUS ORGANISM, UNSPECIFIED LATERALITY, UNSPECIFIED PART OF LUNG: ICD-10-CM

## 2024-03-25 PROCEDURE — 71250 CT THORAX DX C-: CPT | Mod: TC

## 2024-03-25 PROCEDURE — 71250 CT THORAX DX C-: CPT | Mod: 26,,, | Performed by: RADIOLOGY

## 2024-03-27 ENCOUNTER — PATIENT MESSAGE (OUTPATIENT)
Dept: INTERNAL MEDICINE | Facility: CLINIC | Age: 64
End: 2024-03-27
Payer: COMMERCIAL

## 2024-03-28 DIAGNOSIS — R59.0 LOCALIZED ENLARGED LYMPH NODES: Primary | ICD-10-CM

## 2024-03-29 ENCOUNTER — PATIENT MESSAGE (OUTPATIENT)
Dept: INTERNAL MEDICINE | Facility: CLINIC | Age: 64
End: 2024-03-29
Payer: COMMERCIAL

## 2024-03-29 DIAGNOSIS — R06.02 SHORTNESS OF BREATH: Primary | ICD-10-CM

## 2024-04-05 ENCOUNTER — PATIENT MESSAGE (OUTPATIENT)
Dept: INTERNAL MEDICINE | Facility: CLINIC | Age: 64
End: 2024-04-05
Payer: COMMERCIAL

## 2024-04-05 DIAGNOSIS — R00.2 PALPITATION: ICD-10-CM

## 2024-04-05 DIAGNOSIS — I10 ESSENTIAL HYPERTENSION: ICD-10-CM

## 2024-04-05 DIAGNOSIS — R94.39 ABNORMAL STRESS TEST: ICD-10-CM

## 2024-04-05 DIAGNOSIS — I48.0 PAROXYSMAL ATRIAL FIBRILLATION: ICD-10-CM

## 2024-04-05 RX ORDER — LOSARTAN POTASSIUM 50 MG/1
50 TABLET ORAL DAILY
Qty: 90 TABLET | Refills: 3 | Status: SHIPPED | OUTPATIENT
Start: 2024-04-05 | End: 2025-04-05

## 2024-04-05 NOTE — TELEPHONE ENCOUNTER
No care due was identified.  [facility] Embedded Care Due Messages. Reference number: [id number].   4/05/2024 8:38:33 AM CDT

## 2024-04-12 ENCOUNTER — LAB VISIT (OUTPATIENT)
Dept: LAB | Facility: HOSPITAL | Age: 64
End: 2024-04-12
Attending: UROLOGY
Payer: COMMERCIAL

## 2024-04-12 ENCOUNTER — OFFICE VISIT (OUTPATIENT)
Dept: UROLOGY | Facility: CLINIC | Age: 64
End: 2024-04-12
Payer: COMMERCIAL

## 2024-04-12 VITALS
HEIGHT: 70 IN | DIASTOLIC BLOOD PRESSURE: 78 MMHG | TEMPERATURE: 98 F | WEIGHT: 207.88 LBS | BODY MASS INDEX: 29.76 KG/M2 | RESPIRATION RATE: 14 BRPM | SYSTOLIC BLOOD PRESSURE: 139 MMHG | HEART RATE: 69 BPM

## 2024-04-12 DIAGNOSIS — N13.8 BPH WITH URINARY OBSTRUCTION: ICD-10-CM

## 2024-04-12 DIAGNOSIS — N40.0 BPH WITHOUT OBSTRUCTION/LOWER URINARY TRACT SYMPTOMS: ICD-10-CM

## 2024-04-12 DIAGNOSIS — R33.9 INCOMPLETE EMPTYING OF BLADDER: ICD-10-CM

## 2024-04-12 DIAGNOSIS — N40.1 BPH WITH URINARY OBSTRUCTION: Primary | ICD-10-CM

## 2024-04-12 DIAGNOSIS — N13.8 BPH WITH URINARY OBSTRUCTION: Primary | ICD-10-CM

## 2024-04-12 DIAGNOSIS — N40.1 BPH WITH URINARY OBSTRUCTION: ICD-10-CM

## 2024-04-12 LAB
BILIRUB UR QL STRIP: NEGATIVE
COMPLEXED PSA SERPL-MCNC: 3.7 NG/ML (ref 0–4)
GLUCOSE UR QL STRIP: NEGATIVE
KETONES UR QL STRIP: NEGATIVE
LEUKOCYTE ESTERASE UR QL STRIP: NEGATIVE
PH, POC UA: 7.5
POC BLOOD, URINE: NEGATIVE
POC NITRATES, URINE: NEGATIVE
POC RESIDUAL URINE VOLUME: 27 ML (ref 0–100)
PROT UR QL STRIP: NEGATIVE
SP GR UR STRIP: 1.01 (ref 1–1.03)
UROBILINOGEN UR STRIP-ACNC: 0.2 (ref 0.3–2.2)

## 2024-04-12 PROCEDURE — 99214 OFFICE O/P EST MOD 30 MIN: CPT | Mod: S$GLB,,, | Performed by: UROLOGY

## 2024-04-12 PROCEDURE — 81003 URINALYSIS AUTO W/O SCOPE: CPT | Mod: QW,S$GLB,, | Performed by: UROLOGY

## 2024-04-12 PROCEDURE — 1159F MED LIST DOCD IN RCRD: CPT | Mod: CPTII,S$GLB,, | Performed by: UROLOGY

## 2024-04-12 PROCEDURE — 36415 COLL VENOUS BLD VENIPUNCTURE: CPT | Performed by: UROLOGY

## 2024-04-12 PROCEDURE — 84153 ASSAY OF PSA TOTAL: CPT | Performed by: UROLOGY

## 2024-04-12 PROCEDURE — 3008F BODY MASS INDEX DOCD: CPT | Mod: CPTII,S$GLB,, | Performed by: UROLOGY

## 2024-04-12 PROCEDURE — 4010F ACE/ARB THERAPY RXD/TAKEN: CPT | Mod: CPTII,S$GLB,, | Performed by: UROLOGY

## 2024-04-12 PROCEDURE — 51798 US URINE CAPACITY MEASURE: CPT | Mod: S$GLB,,, | Performed by: UROLOGY

## 2024-04-12 PROCEDURE — 99999 PR PBB SHADOW E&M-EST. PATIENT-LVL IV: CPT | Mod: PBBFAC,,, | Performed by: UROLOGY

## 2024-04-12 PROCEDURE — 3078F DIAST BP <80 MM HG: CPT | Mod: CPTII,S$GLB,, | Performed by: UROLOGY

## 2024-04-12 PROCEDURE — 3075F SYST BP GE 130 - 139MM HG: CPT | Mod: CPTII,S$GLB,, | Performed by: UROLOGY

## 2024-04-12 RX ORDER — TAMSULOSIN HYDROCHLORIDE 0.4 MG/1
0.4 CAPSULE ORAL DAILY
Qty: 90 CAPSULE | Refills: 3 | Status: SHIPPED | OUTPATIENT
Start: 2024-04-12 | End: 2025-04-12

## 2024-04-12 NOTE — PROGRESS NOTES
Chief Complaint:   Encounter Diagnoses   Name Primary?    BPH with urinary obstruction Yes    BPH without obstruction/lower urinary tract symptoms     Incomplete emptying of bladder        HPI:  HPI Cedric Santoro bassam 64 y.o. male who presents with follow up to BPH.  Patient was undergoing evaluation for a renal cyst when he was found to have a distended bladder.  Examination revealed a very large prostate.  He was started on tamsulosin with improvement in his minimal lower urinary tract symptoms.  His AUA symptom score today is 6.  He is overall satisfied with his urination.  In the interim he is undergone carpal tunnel repair.  History:  Social History     Tobacco Use    Smoking status: Never    Smokeless tobacco: Never   Substance Use Topics    Alcohol use: No    Drug use: No     Past Medical History:   Diagnosis Date    Abnormal ECG 10/04/2022    Coronary artery disease of native artery of native heart with stable angina pectoris 09/06/2023    Essential hypertension 02/24/2020    Hypertension     MVP (mitral valve prolapse) 10/26/2022    Nonrheumatic mitral valve regurgitation 10/26/2022    Palpitation 03/05/2020    Paroxysmal atrial fibrillation 03/05/2020    Paroxysmal atrial flutter 10/04/2022    Raynaud's syndrome     Vasovagal syncopes      Past Surgical History:   Procedure Laterality Date    ABLATION, ATRIAL FLUTTER, TYPICAL N/A 12/15/2022    Procedure: Ablation, Atrial Flutter, Typical;  Surgeon: Matty Hendricks MD;  Location: [facility] EP LAB;  Service: Cardiology;  Laterality: N/A;  AFL, RFA, NICOLASA, ARIELA, anes, MB, 3 Prep    CHOLECYSTECTOMY      ECHOCARDIOGRAM,TRANSESOPHAGEAL N/A 12/15/2022    Procedure: Transesophageal echo (ARIELA) intra-procedure log documentation;  Surgeon: Nisreen Fenton MD;  Location: [facility] EP LAB;  Service: Cardiology;  Laterality: N/A;    EYE SURGERY      GALLBLADDER SURGERY      HERNIA REPAIR      LEFT HEART CATHETERIZATION Left 05/24/2023    Procedure: Left heart cath;   Surgeon: Kal Garzon MD;  Location: [facility] CATH LAB;  Service: Cardiology;  Laterality: Left;  may need to sign consent//    OPEN REDUCTION AND INTERNAL FIXATION (ORIF) OF FRACTURE OF METATARSAL BONE Left 11/6/2023    Procedure: ORIF, FRACTURE, METATARSAL BONE;  Surgeon: Aleksandr Cheek DPM;  Location: [facility] OR;  Service: Podiatry;  Laterality: Left;    THROAT SURGERY      TRANSESOPHAGEAL ECHOCARDIOGRAPHY N/A 02/27/2023    Procedure: ECHOCARDIOGRAM, TRANSESOPHAGEAL;  Surgeon: Samuel Del Valle MD;  Location: [facility] CATH LAB;  Service: Cardiology;  Laterality: N/A;    TREATMENT OF CARDIAC ARRHYTHMIA  12/15/2022    Procedure: Cardioversion or Defibrillation;  Surgeon: Matty Hendricks MD;  Location: [facility] EP LAB;  Service: Cardiology;;     Family History   Problem Relation Age of Onset    Cancer Mother         Multiple myeloma    Pacemaker/defibrilator Father     Macular degeneration Father     COPD Father     Hearing loss Father     Macular degeneration Brother        Current Outpatient Medications on File Prior to Visit   Medication Sig Dispense Refill    albuterol (PROVENTIL/VENTOLIN HFA) 90 mcg/actuation inhaler Inhale 2 puffs into the lungs every 4 (four) hours as needed.      apixaban (ELIQUIS) 5 mg Tab Take 1 tablet (5 mg total) by mouth 2 (two) times daily. 60 tablet 6    atenoloL (TENORMIN) 25 MG tablet Take 1 tablet (25 mg total) by mouth once daily. 90 tablet 3    b complex vitamins tablet Take 1 tablet by mouth once daily.      baclofen (LIORESAL) 10 MG tablet Take 10 mg by mouth 3 (three) times daily.      co-enzyme Q-10 30 mg capsule Take 30 mg by mouth once daily.      ferrous sulfate 325 (65 FE) MG EC tablet Take 325 mg by mouth once daily.      furosemide (LASIX) 40 MG tablet Take 1 tablet (40 mg total) by mouth once daily. 90 tablet 5    inhalation spacing device (BREATHERITE MDI SPACER) Use as directed for inhalation. 1 each 0    levothyroxine (SYNTHROID) 50 MCG tablet Take 1 tablet (50 mcg total) by  "mouth every Mon, Tues, Wed, Thurs, Fri. 66 tablet 1    losartan (COZAAR) 50 MG tablet Take 1 tablet (50 mg total) by mouth once daily. 90 tablet 3    magnesium 30 mg Tab Take 1 tablet by mouth once daily.      multivitamin (THERAGRAN) per tablet Take 1 tablet by mouth once daily.      rosuvastatin (CRESTOR) 10 MG tablet Take 1 tablet (10 mg total) by mouth every evening. 90 tablet 3    vitamin D (VITAMIN D3) 1000 units Tab Take 1,000 Units by mouth once daily.      [DISCONTINUED] tamsulosin (FLOMAX) 0.4 mg Cap Take 1 capsule (0.4 mg total) by mouth once daily. 90 capsule 3     No current facility-administered medications on file prior to visit.        Objective:     Vitals:    04/12/24 1051   BP: 139/78   Pulse: 69   Resp: 14   Temp: 97.5 °F (36.4 °C)   TempSrc: Oral   Weight: 94.3 kg (207 lb 14.3 oz)   Height: 5' 10" (1.778 m)      BMI Readings from Last 1 Encounters:   04/12/24 29.83 kg/m²          Physical Exam    Digital rectal exam reveals a greater than 60 g gland.    Lab Results   Component Value Date    PSA 3.1 04/19/2023    PSA 3.0 04/12/2022    PSA 2.4 02/05/2020        Lab Results   Component Value Date    CREATININE 1.3 02/05/2024      Assessment:       1. BPH with urinary obstruction    2. BPH without obstruction/lower urinary tract symptoms    3. Incomplete emptying of bladder        Plan:     1. BPH with urinary obstruction    2. BPH without obstruction/lower urinary tract symptoms    3. Incomplete emptying of bladder       Orders Placed This Encounter    Prostate Specific Antigen, Diagnostic    POCT Urinalysis, Dipstick, Automated, W/O Scope    POCT Bladder Scan    tamsulosin (FLOMAX) 0.4 mg Cap      BPH with incomplete emptying.  Continue tamsulosin.  Plan annual follow-up.  Check PSA today and annually.  "

## 2024-04-15 ENCOUNTER — OFFICE VISIT (OUTPATIENT)
Dept: PULMONOLOGY | Facility: CLINIC | Age: 64
End: 2024-04-15
Payer: COMMERCIAL

## 2024-04-15 ENCOUNTER — LAB VISIT (OUTPATIENT)
Dept: LAB | Facility: HOSPITAL | Age: 64
End: 2024-04-15
Attending: PHYSICIAN ASSISTANT
Payer: COMMERCIAL

## 2024-04-15 VITALS
HEART RATE: 54 BPM | RESPIRATION RATE: 16 BRPM | WEIGHT: 210.13 LBS | BODY MASS INDEX: 30.08 KG/M2 | HEIGHT: 70 IN | OXYGEN SATURATION: 99 %

## 2024-04-15 DIAGNOSIS — R06.02 SHORTNESS OF BREATH: Primary | ICD-10-CM

## 2024-04-15 DIAGNOSIS — R06.02 SHORTNESS OF BREATH: ICD-10-CM

## 2024-04-15 DIAGNOSIS — R91.8 LUNG NODULES: ICD-10-CM

## 2024-04-15 LAB — A1AT SERPL-MCNC: 151 MG/DL (ref 100–190)

## 2024-04-15 PROCEDURE — 99214 OFFICE O/P EST MOD 30 MIN: CPT | Mod: S$GLB,,, | Performed by: PHYSICIAN ASSISTANT

## 2024-04-15 PROCEDURE — 99999 PR PBB SHADOW E&M-EST. PATIENT-LVL V: CPT | Mod: PBBFAC,,, | Performed by: PHYSICIAN ASSISTANT

## 2024-04-15 PROCEDURE — 82103 ALPHA-1-ANTITRYPSIN TOTAL: CPT | Performed by: PHYSICIAN ASSISTANT

## 2024-04-15 PROCEDURE — 82103 ALPHA-1-ANTITRYPSIN TOTAL: CPT | Mod: 91 | Performed by: PHYSICIAN ASSISTANT

## 2024-04-15 PROCEDURE — 36415 COLL VENOUS BLD VENIPUNCTURE: CPT | Performed by: PHYSICIAN ASSISTANT

## 2024-04-15 PROCEDURE — 3008F BODY MASS INDEX DOCD: CPT | Mod: CPTII,S$GLB,, | Performed by: PHYSICIAN ASSISTANT

## 2024-04-15 PROCEDURE — 86038 ANTINUCLEAR ANTIBODIES: CPT | Performed by: PHYSICIAN ASSISTANT

## 2024-04-15 PROCEDURE — 1159F MED LIST DOCD IN RCRD: CPT | Mod: CPTII,S$GLB,, | Performed by: PHYSICIAN ASSISTANT

## 2024-04-15 PROCEDURE — 1160F RVW MEDS BY RX/DR IN RCRD: CPT | Mod: CPTII,S$GLB,, | Performed by: PHYSICIAN ASSISTANT

## 2024-04-15 PROCEDURE — 4010F ACE/ARB THERAPY RXD/TAKEN: CPT | Mod: CPTII,S$GLB,, | Performed by: PHYSICIAN ASSISTANT

## 2024-04-15 NOTE — ASSESSMENT & PLAN NOTE
Chest CT 12/2023 and 3/2024 with two 6mm nodules  Never smoker  Has follow up chest ct scheduled 7/2024

## 2024-04-15 NOTE — PROGRESS NOTES
Subjective:       Patient ID: Cedric Santoro is a 64 y.o. male.    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 Garzon    Immunization History   Administered Date(s) Administered    Tdap 02/08/2017    Zoster Recombinant 01/04/2023, 04/27/2023      Tobacco Use: Low Risk  (4/15/2024)    Patient History     Smoking Tobacco Use: Never     Smokeless Tobacco Use: Never     Passive Exposure: Not on file      Past Medical History:   Diagnosis Date    Abnormal ECG 10/04/2022    Coronary artery disease of native artery of native heart with stable angina pectoris 09/06/2023    Essential hypertension 02/24/2020    Hypertension     MVP (mitral valve prolapse) 10/26/2022    Nonrheumatic mitral valve regurgitation 10/26/2022    Palpitation 03/05/2020    Paroxysmal atrial fibrillation 03/05/2020    Paroxysmal atrial flutter 10/04/2022    Raynaud's syndrome     Vasovagal syncopes       Current Outpatient Medications on File Prior to Visit   Medication Sig Dispense Refill    albuterol (PROVENTIL/VENTOLIN HFA) 90 mcg/actuation inhaler Inhale 2 puffs into the lungs every 4 (four) hours as needed.      apixaban (ELIQUIS) 5 mg Tab Take 1 tablet (5 mg total) by mouth 2 (two) times daily. 60 tablet 6    atenoloL (TENORMIN) 25 MG tablet Take 1 tablet (25 mg total) by mouth once daily. 90 tablet 3    b complex vitamins tablet Take 1 tablet by mouth once daily.      baclofen (LIORESAL) 10 MG tablet Take 10 mg by mouth 3  "(three) times daily.      co-enzyme Q-10 30 mg capsule Take 30 mg by mouth once daily.      ferrous sulfate 325 (65 FE) MG EC tablet Take 325 mg by mouth once daily.      furosemide (LASIX) 40 MG tablet Take 1 tablet (40 mg total) by mouth once daily. 90 tablet 5    inhalation spacing device (BREATHERITE MDI SPACER) Use as directed for inhalation. 1 each 0    levothyroxine (SYNTHROID) 50 MCG tablet Take 1 tablet (50 mcg total) by mouth every Mon, Tues, Wed, Thurs, Fri. 66 tablet 1    losartan (COZAAR) 50 MG tablet Take 1 tablet (50 mg total) by mouth once daily. 90 tablet 3    magnesium 30 mg Tab Take 1 tablet by mouth once daily.      multivitamin (THERAGRAN) per tablet Take 1 tablet by mouth once daily.      rosuvastatin (CRESTOR) 10 MG tablet Take 1 tablet (10 mg total) by mouth every evening. 90 tablet 3    tamsulosin (FLOMAX) 0.4 mg Cap Take 1 capsule (0.4 mg total) by mouth once daily. 90 capsule 3    vitamin D (VITAMIN D3) 1000 units Tab Take 1,000 Units by mouth once daily.       No current facility-administered medications on file prior to visit.        Review of Systems   Constitutional:  Negative for fever, weight loss, appetite change and weakness.   HENT:  Negative for postnasal drip, rhinorrhea, sinus pressure, trouble swallowing and congestion.    Respiratory:  Positive for dyspnea on extertion. Negative for cough, sputum production, choking, chest tightness, shortness of breath and wheezing.    Cardiovascular:  Negative for chest pain and leg swelling.   Musculoskeletal:  Negative for joint swelling.   Gastrointestinal:  Negative for nausea, vomiting and abdominal pain.   Neurological:  Negative for dizziness, weakness and headaches.   All other systems reviewed and are negative.      Objective:       Vitals:    04/15/24 0841   BP: (P) 122/70   Pulse: (!) 54   Resp: 16   SpO2: 99%   Weight: 95.3 kg (210 lb 1.6 oz)   Height: 5' 10" (1.778 m)       Physical Exam   Constitutional: He is oriented to " person, place, and time. He appears well-developed and well-nourished. No distress.   HENT:   Head: Normocephalic.   Mouth/Throat: Oropharynx is clear and moist.   Cardiovascular: Normal rate and regular rhythm.   Pulmonary/Chest: Effort normal. No respiratory distress. He has no wheezes. He has no rhonchi. He has no rales.   Musculoskeletal:         General: No edema.      Cervical back: Normal range of motion and neck supple.   Neurological: He is alert and oriented to person, place, and time. Gait normal.   Skin: Skin is warm and dry.   Psychiatric: He has a normal mood and affect.   Vitals reviewed.    Personal Diagnostic Review    CT Chest Without Contrast  Narrative: EXAMINATION:  CT CHEST WITHOUT CONTRAST    CLINICAL HISTORY:  Pneumonia, unspecified organismpneumonia;    TECHNIQUE:  CT scan of the chest without contrast    COMPARISON:  12/25/2023    FINDINGS:  There is some dependent atelectatic change in both lung bases.  There are 2 6 mm nodules in the plane of the fissure in the lower left lung.  These are unchanged from 12/25/2023.  No new pulmonary nodules or masses are identified.    No pleural fluid or pneumothorax.    Aortic and coronary atherosclerosis.    No enlarged lymph nodes are identified.    No acute osseous abnormality is identified.  Mild rightward scoliotic curvature thoracic spine.  Mild arthritic change.    Patient is status post cholecystectomy.  Right renal cyst is not entirely included on the study.  Impression: Stable 6 mm nodules or lymph nodes in the plane of the fissure left lower lung.  Follow-up recommended per Fleischer society guidelines.    For multiple solid nodules with any 6 mm or greater, Fleischner Society guidelines recommend follow up with non-contrast chest CT at 3-6 months and 18-24 months after discovery.    Electronically signed by: Shahzad Cotto  Date:    03/25/2024  Time:    09:22            Assessment/Plan:       Problem List Items Addressed This Visit           Pulmonary    Lung nodules     Chest CT 12/2023 and 3/2024 with two 6mm nodules  Never smoker  Has follow up chest ct scheduled 7/2024          Other Visit Diagnoses       Shortness of breath    -  Primary    Relevant Orders    Complete PFT with bronchodilator    Stress test, pulmonary    PFT - related Arterial Blood Gas    MERLINE Screen w/Reflex    Alpha-1-Antitrypsin    ALPHA 1 ANTITRYPSIN PHENOTYPE          Follow up next available after pulmonary testing.    Discussed diagnosis, its evaluation, treatment and usual course. All questions answered.    Patient verbalized understanding of plan and left in no acute distress    Thank you for the courtesy of participating in the care of this patient    Chely Dorsey PA-C  [facility]

## 2024-04-16 LAB — ANA SER QL IF: NORMAL

## 2024-04-18 LAB
A1AT PHENOTYP SERPL-IMP: NORMAL BANDS
A1AT SERPL NEPH-MCNC: 149 MG/DL (ref 100–190)

## 2024-04-25 DIAGNOSIS — Z00.00 ROUTINE GENERAL MEDICAL EXAMINATION AT A HEALTH CARE FACILITY: Primary | ICD-10-CM

## 2024-05-08 ENCOUNTER — LAB VISIT (OUTPATIENT)
Dept: LAB | Facility: HOSPITAL | Age: 64
End: 2024-05-08
Attending: PHYSICIAN ASSISTANT
Payer: COMMERCIAL

## 2024-05-08 ENCOUNTER — OFFICE VISIT (OUTPATIENT)
Dept: PULMONOLOGY | Facility: CLINIC | Age: 64
End: 2024-05-08
Payer: COMMERCIAL

## 2024-05-08 ENCOUNTER — CLINICAL SUPPORT (OUTPATIENT)
Dept: PULMONOLOGY | Facility: CLINIC | Age: 64
End: 2024-05-08
Payer: COMMERCIAL

## 2024-05-08 VITALS
WEIGHT: 209.69 LBS | SYSTOLIC BLOOD PRESSURE: 115 MMHG | HEIGHT: 70 IN | OXYGEN SATURATION: 94 % | BODY MASS INDEX: 30.02 KG/M2 | BODY MASS INDEX: 30.02 KG/M2 | HEIGHT: 70 IN | RESPIRATION RATE: 18 BRPM | DIASTOLIC BLOOD PRESSURE: 61 MMHG | HEART RATE: 60 BPM | WEIGHT: 209.69 LBS

## 2024-05-08 DIAGNOSIS — R91.8 LUNG NODULES: ICD-10-CM

## 2024-05-08 DIAGNOSIS — R06.02 SHORTNESS OF BREATH: ICD-10-CM

## 2024-05-08 DIAGNOSIS — J98.4 MIXED RESTRICTIVE AND OBSTRUCTIVE LUNG DISEASE: ICD-10-CM

## 2024-05-08 DIAGNOSIS — R09.02 EXERCISE HYPOXEMIA: ICD-10-CM

## 2024-05-08 DIAGNOSIS — I50.20 HFREF (HEART FAILURE WITH REDUCED EJECTION FRACTION): ICD-10-CM

## 2024-05-08 DIAGNOSIS — J43.9 MIXED RESTRICTIVE AND OBSTRUCTIVE LUNG DISEASE: ICD-10-CM

## 2024-05-08 DIAGNOSIS — J43.9 MIXED RESTRICTIVE AND OBSTRUCTIVE LUNG DISEASE: Primary | ICD-10-CM

## 2024-05-08 DIAGNOSIS — J98.4 MIXED RESTRICTIVE AND OBSTRUCTIVE LUNG DISEASE: Primary | ICD-10-CM

## 2024-05-08 DIAGNOSIS — I10 ESSENTIAL HYPERTENSION: ICD-10-CM

## 2024-05-08 PROBLEM — J44.9 MIXED RESTRICTIVE AND OBSTRUCTIVE LUNG DISEASE: Status: ACTIVE | Noted: 2024-05-08

## 2024-05-08 LAB
ALLENS TEST: ABNORMAL
BRPFT: NORMAL
CRP SERPL-MCNC: 2.2 MG/L (ref 0–8.2)
DELSYS: ABNORMAL
DLCO ADJ PRE: 18.84 ML/(MIN*MMHG)
DLCO SINGLE BREATH LLN: 21.5
DLCO SINGLE BREATH PRE REF: 66.3 %
DLCO SINGLE BREATH REF: 28.43
DLCOC SBVA LLN: 2.82
DLCOC SBVA PRE REF: 126.8 %
DLCOC SBVA REF: 3.98
DLCOC SINGLE BREATH LLN: 21.5
DLCOC SINGLE BREATH PRE REF: 66.3 %
DLCOC SINGLE BREATH REF: 28.43
DLCOVA LLN: 2.82
DLCOVA PRE REF: 126.8 %
DLCOVA PRE: 5.05 ML/(MIN*MMHG*L)
DLCOVA REF: 3.98
DLVAADJ PRE: 5.05 ML/(MIN*MMHG*L)
ERV LLN: -16448.86
ERV PRE REF: 25.8 %
ERV REF: 1.14
ERYTHROCYTE [SEDIMENTATION RATE] IN BLOOD BY WESTERGREN METHOD: 3 MM/HR (ref 0–10)
FEF 25 75 LLN: 1.29
FEF 25 75 PRE REF: 60.3 %
FEF 25 75 REF: 2.75
FEV1 FVC LLN: 64
FEV1 FVC PRE REF: 101.3 %
FEV1 FVC REF: 77
FEV1 LLN: 2.55
FEV1 PRE REF: 56.5 %
FEV1 REF: 3.44
FIO2: 21
FRCPLETH LLN: 2.66
FRCPLETH PREREF: 52.3 %
FRCPLETH REF: 3.65
FVC LLN: 3.38
FVC PRE REF: 55.6 %
FVC REF: 4.49
HCO3 UR-SCNC: 28.1 MMOL/L (ref 24–28)
IVC PRE: 2.35 L
IVC SINGLE BREATH LLN: 3.38
IVC SINGLE BREATH PRE REF: 52.3 %
IVC SINGLE BREATH REF: 4.49
MODE: ABNORMAL
MVV LLN: 112
MVV PRE REF: 52.1 %
MVV REF: 132
PCO2 BLDA: 41.2 MMHG (ref 35–45)
PEF LLN: 6.6
PEF PRE REF: 75.8 %
PEF REF: 8.93
PH SMN: 7.44 [PH] (ref 7.35–7.45)
PO2 BLDA: 68 MMHG (ref 80–100)
POC BE: 4 MMOL/L
POC SATURATED O2: 94 % (ref 95–100)
PRE DLCO: 18.84 ML/(MIN*MMHG)
PRE ERV: 0.29 L
PRE FEF 25 75: 1.66 L/S
PRE FET 100: 7.73 SEC
PRE FEV1 FVC: 77.84 %
PRE FEV1: 1.94 L
PRE FRC PL: 1.91 L
PRE FVC: 2.5 L
PRE MVV: 69 L/MIN
PRE PEF: 6.77 L/S
PRE RV: 1.61 L
PRE TLC: 4.44 L
RAW LLN: 3.06
RAW PRE REF: 283.5 %
RAW PRE: 8.67 CMH2O*S/L
RAW REF: 3.06
RV LLN: 1.84
RV PRE REF: 64.1 %
RV REF: 2.51
RVTLC LLN: 30
RVTLC PRE REF: 93.2 %
RVTLC PRE: 36.26 %
RVTLC REF: 39
SAMPLE: ABNORMAL
SITE: ABNORMAL
TLC LLN: 5.99
TLC PRE REF: 62.2 %
TLC REF: 7.14
VA PRE: 3.74 L
VA SINGLE BREATH LLN: 6.99
VA SINGLE BREATH PRE REF: 53.5 %
VA SINGLE BREATH REF: 6.99
VC LLN: 3.38
VC PRE REF: 63.1 %
VC PRE: 2.83 L
VC REF: 4.49
VTGRAWPRE: 2.23 L

## 2024-05-08 PROCEDURE — 86038 ANTINUCLEAR ANTIBODIES: CPT | Performed by: PHYSICIAN ASSISTANT

## 2024-05-08 PROCEDURE — 4010F ACE/ARB THERAPY RXD/TAKEN: CPT | Mod: CPTII,S$GLB,, | Performed by: PHYSICIAN ASSISTANT

## 2024-05-08 PROCEDURE — 82164 ANGIOTENSIN I ENZYME TEST: CPT | Performed by: PHYSICIAN ASSISTANT

## 2024-05-08 PROCEDURE — 94726 PLETHYSMOGRAPHY LUNG VOLUMES: CPT | Mod: S$GLB,,, | Performed by: INTERNAL MEDICINE

## 2024-05-08 PROCEDURE — 99214 OFFICE O/P EST MOD 30 MIN: CPT | Mod: S$GLB,,, | Performed by: PHYSICIAN ASSISTANT

## 2024-05-08 PROCEDURE — 94618 PULMONARY STRESS TESTING: CPT | Mod: S$GLB,,, | Performed by: INTERNAL MEDICINE

## 2024-05-08 PROCEDURE — 3074F SYST BP LT 130 MM HG: CPT | Mod: CPTII,S$GLB,, | Performed by: PHYSICIAN ASSISTANT

## 2024-05-08 PROCEDURE — 82803 BLOOD GASES ANY COMBINATION: CPT | Mod: S$GLB,,, | Performed by: INTERNAL MEDICINE

## 2024-05-08 PROCEDURE — 85651 RBC SED RATE NONAUTOMATED: CPT | Performed by: PHYSICIAN ASSISTANT

## 2024-05-08 PROCEDURE — 94729 DIFFUSING CAPACITY: CPT | Mod: S$GLB,,, | Performed by: INTERNAL MEDICINE

## 2024-05-08 PROCEDURE — 86140 C-REACTIVE PROTEIN: CPT | Performed by: PHYSICIAN ASSISTANT

## 2024-05-08 PROCEDURE — 3008F BODY MASS INDEX DOCD: CPT | Mod: CPTII,S$GLB,, | Performed by: PHYSICIAN ASSISTANT

## 2024-05-08 PROCEDURE — 1160F RVW MEDS BY RX/DR IN RCRD: CPT | Mod: CPTII,S$GLB,, | Performed by: PHYSICIAN ASSISTANT

## 2024-05-08 PROCEDURE — 94010 BREATHING CAPACITY TEST: CPT | Mod: 59,S$GLB,, | Performed by: INTERNAL MEDICINE

## 2024-05-08 PROCEDURE — 1159F MED LIST DOCD IN RCRD: CPT | Mod: CPTII,S$GLB,, | Performed by: PHYSICIAN ASSISTANT

## 2024-05-08 PROCEDURE — 3078F DIAST BP <80 MM HG: CPT | Mod: CPTII,S$GLB,, | Performed by: PHYSICIAN ASSISTANT

## 2024-05-08 PROCEDURE — 86431 RHEUMATOID FACTOR QUANT: CPT | Performed by: PHYSICIAN ASSISTANT

## 2024-05-08 PROCEDURE — 36600 WITHDRAWAL OF ARTERIAL BLOOD: CPT | Mod: 59,S$GLB,, | Performed by: INTERNAL MEDICINE

## 2024-05-08 PROCEDURE — 36415 COLL VENOUS BLD VENIPUNCTURE: CPT | Performed by: PHYSICIAN ASSISTANT

## 2024-05-08 PROCEDURE — 99999 PR PBB SHADOW E&M-EST. PATIENT-LVL I: CPT | Mod: PBBFAC,,,

## 2024-05-08 PROCEDURE — 99999 PR PBB SHADOW E&M-EST. PATIENT-LVL V: CPT | Mod: PBBFAC,,, | Performed by: PHYSICIAN ASSISTANT

## 2024-05-08 RX ORDER — FLUTICASONE FUROATE, UMECLIDINIUM BROMIDE AND VILANTEROL TRIFENATATE 200; 62.5; 25 UG/1; UG/1; UG/1
1 POWDER RESPIRATORY (INHALATION) DAILY
Qty: 180 EACH | Refills: 3 | Status: SHIPPED | OUTPATIENT
Start: 2024-05-08

## 2024-05-08 NOTE — ASSESSMENT & PLAN NOTE
Post viral bronchitis? - improvement with albuterol  Start Trelegy once daily  Will get ILD workup, HRCT and labs today

## 2024-05-08 NOTE — PROGRESS NOTES
Abg done with one puncture     74 yo M h/o partial bowel obstruction (self resolved), transurethral prostate resection, hernias, sent in from PCP Dr. Mathew office for intermittent, worsening abd pain since this morning. Pt reports some discomfort last night, had eggs and guevara on a bagel with coffee this morning, played golf from 8-11am during which the discomfort and pain gradually increased. Pt states the pain would build up, go away, and come back. +Radiation to lower back, abd distension. Denies n/v, dysuria, frequency. Last normal BM this morning. 76 yo M h/o partial bowel obstruction (self resolved), transurethral prostate resection, hernias, sent in from PCP Dr. Mathew office for intermittent, worsening abd pain since this morning. Pt reports some discomfort last night, had eggs and guevara on a bagel with coffee this morning, played golf from 8-11am during which the discomfort and pain gradually increased. Pt states the pain would build up, go away, and come back. +Radiation to lower back, abd distension. Denies n/v, dysuria, frequency. Last normal BM this morning.  Surgeon is Dr. Guido Arambula.    Hx of celiac stent.

## 2024-05-08 NOTE — PROCEDURES
ABG completed. See ABG Below.          Interpretation:  [] Arterial blood gases on room air demonstrate normal pCO2 and pO2  [] Arterial blood gases on room air are abnormal demonstrating hypercarbia (pCO2 >45 mmHg)  [] Arterial blood gases on room air are abnormal demonstrating hypocarbia (pCO2 < 35 mmHg)  [] Arterial blood gases on room air are abnormal demonstrating hypoxemia (pO2 < 80 mmHg)  [] Arterial blood gases on room air are abnormal demonstrating hyperoxemia (pO2 >120 mmHg)  [] Arterial blood gases on room air are abnormal demonstrating hypoxemia (pO2 < 80 mmHg) and hypercarbia (pCO2>45 mmHg)    The table below summarizes the main interpretations of the relationship between the arterial blood gases, pH, pCO2 and HCO-3    []    I

## 2024-05-08 NOTE — PROGRESS NOTES
Subjective:       Patient ID: Cedric Santoro is a 64 y.o. male.    Chief Complaint: Shortness of Breath      5/8/2024  Here for SOB follow up  Completed PFT, ABG, walk testing  Using albuterol more frequently which has helped his SOB  Coughing up sputum after albuterol treatment  No fever, chest pain, or wheezing    4/15/24  New patient here for SOB  SOB since December 2023; had flu and pneumonia  Chest CT 12/2023 and 3/2024 with two 6mm nodules  Never smoker  No prior lung problems  Gets winded with minimal exertion  Had to stop walking after foot surgery October 2023, thinks SOB started around this time  Winded with minimal exertion such as walking few 100 feet or doing ADLs  Denies cough, chest pain, or wheezing  Denies palpitations or fever  Sleeping ok  Retired from oil refinery  History of afib, CAD, CHF; normal EF; followed by Dr. Garzon    Immunization History   Administered Date(s) Administered    Tdap 02/08/2017    Zoster Recombinant 01/04/2023, 04/27/2023      Tobacco Use: Low Risk  (5/8/2024)    Patient History     Smoking Tobacco Use: Never     Smokeless Tobacco Use: Never     Passive Exposure: Not on file      Past Medical History:   Diagnosis Date    Abnormal ECG 10/04/2022    Coronary artery disease of native artery of native heart with stable angina pectoris 09/06/2023    Essential hypertension 02/24/2020    Hypertension     MVP (mitral valve prolapse) 10/26/2022    Nonrheumatic mitral valve regurgitation 10/26/2022    Palpitation 03/05/2020    Paroxysmal atrial fibrillation 03/05/2020    Paroxysmal atrial flutter 10/04/2022    Raynaud's syndrome     Vasovagal syncopes       Current Outpatient Medications on File Prior to Visit   Medication Sig Dispense Refill    albuterol (PROVENTIL/VENTOLIN HFA) 90 mcg/actuation inhaler Inhale 2 puffs into the lungs every 4 (four) hours as needed.      apixaban (ELIQUIS) 5 mg Tab Take 1 tablet (5 mg total) by mouth 2 (two) times daily. 60 tablet 6    atenoloL  (TENORMIN) 25 MG tablet Take 1 tablet (25 mg total) by mouth once daily. 90 tablet 3    b complex vitamins tablet Take 1 tablet by mouth once daily.      baclofen (LIORESAL) 10 MG tablet Take 10 mg by mouth 3 (three) times daily.      co-enzyme Q-10 30 mg capsule Take 30 mg by mouth once daily.      ferrous sulfate 325 (65 FE) MG EC tablet Take 325 mg by mouth once daily.      furosemide (LASIX) 40 MG tablet Take 1 tablet (40 mg total) by mouth once daily. 90 tablet 5    inhalation spacing device (BREATHERITE MDI SPACER) Use as directed for inhalation. 1 each 0    levothyroxine (SYNTHROID) 50 MCG tablet Take 1 tablet (50 mcg total) by mouth every Mon, Tues, Wed, Thurs, Fri. 66 tablet 1    losartan (COZAAR) 50 MG tablet Take 1 tablet (50 mg total) by mouth once daily. 90 tablet 3    magnesium 30 mg Tab Take 1 tablet by mouth once daily.      multivitamin (THERAGRAN) per tablet Take 1 tablet by mouth once daily.      rosuvastatin (CRESTOR) 10 MG tablet Take 1 tablet (10 mg total) by mouth every evening. 90 tablet 3    tamsulosin (FLOMAX) 0.4 mg Cap Take 1 capsule (0.4 mg total) by mouth once daily. 90 capsule 3    vitamin D (VITAMIN D3) 1000 units Tab Take 1,000 Units by mouth once daily.       No current facility-administered medications on file prior to visit.        Review of Systems   Constitutional:  Negative for fever, weight loss, appetite change and weakness.   HENT:  Negative for postnasal drip, rhinorrhea, sinus pressure, trouble swallowing and congestion.    Respiratory:  Positive for sputum production, shortness of breath and dyspnea on extertion. Negative for cough, choking, chest tightness and wheezing.    Cardiovascular:  Negative for chest pain and leg swelling.   Musculoskeletal:  Negative for joint swelling.   Gastrointestinal:  Negative for nausea, vomiting and abdominal pain.   Neurological:  Negative for dizziness, weakness and headaches.   All other systems reviewed and are negative.     "  Objective:       Vitals:    05/08/24 0951   BP: 115/61   Pulse: 60   Resp: 18   SpO2: (!) 94%   Weight: 95.1 kg (209 lb 10.5 oz)   Height: 5' 10" (1.778 m)       Physical Exam   Constitutional: He is oriented to person, place, and time. He appears well-developed and well-nourished. No distress.   HENT:   Head: Normocephalic.   Mouth/Throat: Oropharynx is clear and moist.   Cardiovascular: Normal rate and regular rhythm.   Pulmonary/Chest: Effort normal. No respiratory distress. He has no wheezes. He has no rhonchi. He has no rales.   Musculoskeletal:         General: No edema.      Cervical back: Normal range of motion and neck supple.   Neurological: He is alert and oriented to person, place, and time. Gait normal.   Skin: Skin is warm and dry.   Psychiatric: He has a normal mood and affect.   Vitals reviewed.    Personal Diagnostic Review    CT Chest Without Contrast  Narrative: EXAMINATION:  CT CHEST WITHOUT CONTRAST    CLINICAL HISTORY:  Pneumonia, unspecified organismpneumonia;    TECHNIQUE:  CT scan of the chest without contrast    COMPARISON:  12/25/2023    FINDINGS:  There is some dependent atelectatic change in both lung bases.  There are 2 6 mm nodules in the plane of the fissure in the lower left lung.  These are unchanged from 12/25/2023.  No new pulmonary nodules or masses are identified.    No pleural fluid or pneumothorax.    Aortic and coronary atherosclerosis.    No enlarged lymph nodes are identified.    No acute osseous abnormality is identified.  Mild rightward scoliotic curvature thoracic spine.  Mild arthritic change.    Patient is status post cholecystectomy.  Right renal cyst is not entirely included on the study.  Impression: Stable 6 mm nodules or lymph nodes in the plane of the fissure left lower lung.  Follow-up recommended per Fleischer society guidelines.    For multiple solid nodules with any 6 mm or greater, Fleischner Society guidelines recommend follow up with non-contrast chest " CT at 3-6 months and 18-24 months after discovery.    Electronically signed by: Shahzad Cotto  Date:    03/25/2024  Time:    09:22    PFT  Spirometry shows a reduced vital capacity suggesting restriction. Lung volumes show moderate restriction is present. Airway mechanics are abnormal showing increased airway resistance and decreased conductance. DLCO is mildly decreased but normalizes when    adjusted for alveolar volume. MVV is moderately decreased.       Phase Oxygen Assessment Supplemental O2 Heart   Rate Blood Pressure Sujatha Dyspnea Scale Rating   Resting 94 % Room Air 60 bpm 115/61 3   Exercise             Minute             1 95 % Room Air 97 bpm       2 90 % Room Air 108 bpm       3 95 % Room Air 104 bpm       4 87 % Room Air 106 bpm       5 91 % 2 L/M 100 bpm       6  92 % 2 L/M 100 bpm 132/65 5-6   Recovery             Minute             1 99 % 2 L/M 70 bpm       2 100 % 2 L/M 66 bpm       3 99 % 2 L/M 67 bpm       4 98 % 2 L/M 67 bpm 122/64 3      Six Minute Walk Summary  6MWT Status: completed without stopping  Patient Reported: Dyspnea         Interpretation:  Did the patient stop or pause?: No  Total Time Walked (Calculated): 360 seconds  Final Partial Lap Distance (feet): 25 feet  Total Distance Meters (Calculated): 495.3 meters  Predicted Distance Meters (Calculated): 548.29 meters  Percentage of Predicted (Calculated): 90.34  Peak VO2 (Calculated): 18.84  Mets: 5.38  Has The Patient Had a Previous Six Minute Walk Test?: No     Previous 6MWT Results  Has The Patient Had a Previous Six Minute Walk Test?: No     Recent Labs     05/08/24  0943   PH 7.442   PCO2 41.2   PO2 68*   HCO3 28.1*   POCSATURATED 94   BE 4*       Assessment/Plan:       Problem List Items Addressed This Visit          Pulmonary    Lung nodules     Chest CT 12/2023 and 3/2024 with two 6mm nodules  Never smoker  Has follow up chest ct scheduled 7/2024 - change to HRCT         Exercise hypoxemia     2L oxygen with activity  He will get  home pulse ox         Relevant Orders    OXYGEN FOR HOME USE    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 - improvement with albuterol  Start Trelegy once daily  Will get ILD workup, HRCT and labs today         Relevant Medications    fluticasone-umeclidin-vilanter (TRELEGY ELLIPTA) 200-62.5-25 mcg inhaler    Other Relevant Orders    MERLINE Screen w/Reflex    Angiotensin Converting Enzyme    C-Reactive Protein    Rheumatoid Factor    Sedimentation rate    OXYGEN FOR HOME USE    CT Chest Without Contrast       Cardiac/Vascular    Essential hypertension     Stable, continue current medication management          HFrEF (heart failure with reduced ejection fraction)     F/u regularly with cardiology  Normal EF              Follow up in about 2 months (around 7/8/2024) for labs today; HRCT and f/u pulmonologist next.      Discussed diagnosis, its evaluation, treatment and usual course. All questions answered.    Patient verbalized understanding of plan and left in no acute distress    Thank you for the courtesy of participating in the care of this patient    AAYUSH GarcesHonorHealth [facility]

## 2024-05-08 NOTE — PROCEDURES
"[facility] - Pulmonary Function  Six Minute Walk     SUMMARY     Ordering Provider: AAYUSH Dorsey   Interpreting Provider: Dr. Pena  Performing nurse/tech/RT: AMALIA Dennison, RRT  Diagnosis: Shortness of Breath  Height: 5' 10" (177.8 cm)  Weight: 95.1 kg (209 lb 10.5 oz)  BMI (Calculated): 30.1   Patient Race:             Phase Oxygen Assessment Supplemental O2 Heart   Rate Blood Pressure Sujatha Dyspnea Scale Rating   Resting 94 % Room Air 60 bpm 115/61 3   Exercise        Minute        1 95 % Room Air 97 bpm     2 90 % Room Air 108 bpm     3 95 % Room Air 104 bpm     4 87 % Room Air 106 bpm     5 91 % 2 L/M 100 bpm     6  92 % 2 L/M 100 bpm 132/65 5-6   Recovery        Minute        1 99 % 2 L/M 70 bpm     2 100 % 2 L/M 66 bpm     3 99 % 2 L/M 67 bpm     4 98 % 2 L/M 67 bpm 122/64 3     Six Minute Walk Summary  6MWT Status: completed without stopping  Patient Reported: Dyspnea     Interpretation:  Did the patient stop or pause?: No                                         Total Time Walked (Calculated): 360 seconds  Final Partial Lap Distance (feet): 25 feet  Total Distance Meters (Calculated): 495.3 meters  Predicted Distance Meters (Calculated): 548.29 meters  Percentage of Predicted (Calculated): 90.34  Peak VO2 (Calculated): 18.84  Mets: 5.38  Has The Patient Had a Previous Six Minute Walk Test?: No       Previous 6MWT Results  Has The Patient Had a Previous Six Minute Walk Test?: No    "

## 2024-05-08 NOTE — ASSESSMENT & PLAN NOTE
Chest CT 12/2023 and 3/2024 with two 6mm nodules  Never smoker  Has follow up chest ct scheduled 7/2024 - change to HRCT

## 2024-05-09 LAB
ACE SERPL-CCNC: 39 U/L (ref 16–85)
ANA SER QL IF: NORMAL
RHEUMATOID FACT SERPL-ACNC: <13 IU/ML (ref 0–15)

## 2024-05-10 NOTE — PROGRESS NOTES
Subjective:      Patient ID: Cedric Santoro is a 64 y.o. male.    Chief Complaint: Executive Health      HPI  Patient is here for Executive physical with [employer].  Energy is low.  BP at home 118/76.  Walks 1.5 miles daily.  Had pneumonia a few months ago, seeing Pulmonary.  Has exertional SOB, exercise O2 dropped below 88 and Pulm prescribed O2 but pt never received.  Multiple joint pains, takes 600mg ibuprofen at bedtime.  No f/c/sw/cough.  No cp/palp.  SOB with exertion only.  BMs and urine normal.        Past Medical History:   Diagnosis Date    Abnormal ECG 10/04/2022    Coronary artery disease of native artery of native heart with stable angina pectoris 09/06/2023    Essential hypertension 02/24/2020    Hypertension     MVP (mitral valve prolapse) 10/26/2022    Nonrheumatic mitral valve regurgitation 10/26/2022    Palpitation 03/05/2020    Paroxysmal atrial fibrillation 03/05/2020    Paroxysmal atrial flutter 10/04/2022    Raynaud's syndrome     Vasovagal syncopes      Past Surgical History:   Procedure Laterality Date    ABLATION, ATRIAL FLUTTER, TYPICAL N/A 12/15/2022    Procedure: Ablation, Atrial Flutter, Typical;  Surgeon: Matty Hendricks MD;  Location: [facility] EP LAB;  Service: Cardiology;  Laterality: N/A;  AFL, RFA, NICOLASA, ARIELA, RADHA mazariegos, 3 Prep    CARPAL TUNNEL RELEASE Right 04/02/2024    [facility]    CHOLECYSTECTOMY      ECHOCARDIOGRAM,TRANSESOPHAGEAL N/A 12/15/2022    Procedure: Transesophageal echo (ARIELA) intra-procedure log documentation;  Surgeon: Nisreen Fenton MD;  Location: [facility] EP LAB;  Service: Cardiology;  Laterality: N/A;    EYE SURGERY      GALLBLADDER SURGERY      HERNIA REPAIR      LEFT HEART CATHETERIZATION Left 05/24/2023    Procedure: Left heart cath;  Surgeon: Kal Garzon MD;  Location: [facility] CATH LAB;  Service: Cardiology;  Laterality: Left;  may need to sign consent//    OPEN REDUCTION AND INTERNAL FIXATION (ORIF) OF FRACTURE OF METATARSAL BONE Left 11/06/2023     Procedure: ORIF, FRACTURE, METATARSAL BONE;  Surgeon: Aleksandr Cheek DPM;  Location: [facility] OR;  Service: Podiatry;  Laterality: Left;    THROAT SURGERY      TRANSESOPHAGEAL ECHOCARDIOGRAPHY N/A 02/27/2023    Procedure: ECHOCARDIOGRAM, TRANSESOPHAGEAL;  Surgeon: Samuel Del Valle MD;  Location: [facility] CATH LAB;  Service: Cardiology;  Laterality: N/A;    TREATMENT OF CARDIAC ARRHYTHMIA  12/15/2022    Procedure: Cardioversion or Defibrillation;  Surgeon: Matty Hendricks MD;  Location: [facility] EP LAB;  Service: Cardiology;;     MEDS:  albuterol (PROVENTIL/VENTOLIN HFA) 90 mcg/actuation inhaler 2 puff, Every 4 hours PRN       Summary:  Inhale 2 puffs into the lungs every 4 (four) hours as needed., Starting Wed 12/20/2023, Historical Med              []  apixaban (ELIQUIS) 5 mg Tab 5 mg, 2 times daily     Summary:  Take 1 tablet (5 mg total) by mouth 2 (two) times daily., Starting Tue 1/16/2024, Normal              []  atenoloL (TENORMIN) 25 MG tablet 25 mg, Daily     Summary:  Take 1 tablet (25 mg total) by mouth once daily., Starting Wed 9/27/2023, Normal              []  b complex vitamins tablet 1 tablet, Daily     Summary:  Take 1 tablet by mouth once daily., Historical Med              []  baclofen (LIORESAL) 10 MG tablet 10 mg, 3 times daily     Summary:  Take 10 mg by mouth 3 (three) times daily., Historical Med              []  co-enzyme Q-10 30 mg capsule 30 mg, Daily     Summary:  Take 30 mg by mouth once daily., Historical Med              []  ferrous sulfate 325 (65 FE) MG EC tablet 325 mg, Daily     Summary:  Take 325 mg by mouth once daily., Historical Med              []  fluticasone-umeclidin-vilanter (TRELEGY ELLIPTA) 200-62.5-25 mcg inhaler 1 puff, Daily     Summary:  Inhale 1 puff into the lungs once daily., Starting Wed 5/8/2024, Normal              []  furosemide (LASIX) 40 MG tablet 40 mg, Daily     Summary:  Take 1 tablet (40 mg total) by mouth once daily., Starting Wed 9/6/2023, Until Thu 9/5/2024,  Normal              []  inhalation spacing device (BREATHERITE MDI SPACER)      Summary:  Use as directed for inhalation., Normal              []  levothyroxine (SYNTHROID) 50 MCG tablet 50 mcg, Every Mon, Tues, Wed, Thurs, Fri     Summary:  Take 1 tablet (50 mcg total) by mouth every Mon, Tues, Wed, Thurs, Fri., Starting Fri 3/8/2024, Until Sat 3/8/2025, Normal              []  losartan (COZAAR) 50 MG tablet 50 mg, Daily     Summary:  Take 1 tablet (50 mg total) by mouth once daily., Starting Fri 4/5/2024, Until Sat 4/5/2025, Normal              []  magnesium 30 mg Tab 1 tablet, Daily     Summary:  Take 1 tablet by mouth once daily., Historical Med              []  multivitamin (THERAGRAN) per tablet 1 tablet, Daily     Summary:  Take 1 tablet by mouth once daily., Until Discontinued, Historical Med              []  rosuvastatin (CRESTOR) 10 MG tablet 10 mg, Nightly     Summary:  Take 1 tablet (10 mg total) by mouth every evening., Starting Wed 9/6/2023, Until Thu 9/5/2024, Normal              []  tamsulosin (FLOMAX) 0.4 mg Cap 0.4 mg, Daily     Summary:  Take 1 capsule (0.4 mg total) by mouth once daily., Starting Fri 4/12/2024, Until Sat 4/12/2025, Normal              []  vitamin D (VITAMIN D3) 1000 units Tab 1,000 Units, Daily     Summary:  Take 1,000 Units by mouth once daily., Historical Med        ALLERGIES:  Review of patient's allergies indicates:   Allergen Reactions    Iodinated contrast media     Iodine and iodide containing products Hives         HM:  2017 Tetanus, 4/23 Shingrix #2, 5/24 PSA, 4/23 CT Ca score 597, 6/23 Cologuard negative.     Review of Systems   Constitutional:  Negative for appetite change, chills, diaphoresis, fatigue and fever.   HENT:  Negative for congestion, ear pain, rhinorrhea and sinus pressure.    Respiratory:  Negative for cough and shortness of breath.    Cardiovascular:  Negative for chest pain and palpitations.   Gastrointestinal:  Negative for abdominal distention,  "abdominal pain, blood in stool, constipation, diarrhea, nausea and vomiting.   Genitourinary:  Negative for difficulty urinating, dysuria, frequency, hematuria and urgency.   Musculoskeletal:  Negative for arthralgias.   Skin:  Negative for rash.   Neurological:  Negative for dizziness and headaches.   Psychiatric/Behavioral:  The patient is not nervous/anxious.          Objective:   BP 96/61 (BP Location: Right arm, Patient Position: Sitting)   Pulse (!) 52   Temp 96.9 °F (36.1 °C) (Oral)   Ht 5' 10" (1.778 m)   Wt 89.8 kg (198 lb)   BMI 28.41 kg/m²     Physical Exam  Constitutional:       Appearance: He is well-developed.   HENT:      Right Ear: External ear normal. Tympanic membrane is not injected.      Left Ear: External ear normal. Tympanic membrane is not injected.   Eyes:      Conjunctiva/sclera: Conjunctivae normal.   Neck:      Thyroid: No thyromegaly.   Cardiovascular:      Rate and Rhythm: Normal rate and regular rhythm.      Heart sounds: Murmur (systolic) heard.      No friction rub. No gallop.   Pulmonary:      Effort: Pulmonary effort is normal.      Breath sounds: Normal breath sounds. No wheezing or rales.   Abdominal:      General: Bowel sounds are normal.      Palpations: Abdomen is soft. There is no mass.      Tenderness: There is no abdominal tenderness.   Musculoskeletal:      Cervical back: Normal range of motion and neck supple.   Lymphadenopathy:      Cervical: No cervical adenopathy.   Neurological:      Mental Status: He is alert and oriented to person, place, and time.             Assessment:       1. Encounter for preventive health examination    2. Essential hypertension    3. HFrEF (heart failure with reduced ejection fraction)    4. Coronary artery disease of native artery of native heart with stable angina pectoris    5. Paroxysmal atrial fibrillation    6. Stage 3a chronic kidney disease (CKD)    7. Acquired hypothyroidism    8. Unspecified inflammatory spondylopathy, cervical " region    9. Current moderate episode of major depressive disorder without prior episode    10. Mixed restrictive and obstructive lung disease    11. Exercise hypoxemia    12. Malaise and fatigue          Plan:     Encounter for preventive health examination- Report results when available.    Essential hypertension, Stage 3a chronic kidney disease (CKD)- monitor BPs more freq at home to make sure not running low.    HFrEF (heart failure with reduced ejection fraction), Coronary artery disease of native artery of native heart with stable angina pectoris    Paroxysmal atrial fibrillation, chronic anticoag.    Acquired hypothyroidism- await lab.    Unspecified inflammatory spondylopathy, cervical region  -     DULoxetine (CYMBALTA) 20 MG capsule; Take 1 capsule (20 mg total) by mouth once daily.  Dispense: 30 capsule; Refill: 6    Current moderate episode of major depressive disorder without prior episode  -     DULoxetine (CYMBALTA) 20 MG capsule; Take 1 capsule (20 mg total) by mouth once daily.  Dispense: 30 capsule; Refill: 6    Mixed restrictive and obstructive lung disease, Exercise hypoxemia- get O2 from Pulm.    Check testosterone due to malaise and fatigue.

## 2024-05-13 ENCOUNTER — PATIENT MESSAGE (OUTPATIENT)
Dept: INTERNAL MEDICINE | Facility: CLINIC | Age: 64
End: 2024-05-13
Payer: COMMERCIAL

## 2024-05-14 NOTE — TELEPHONE ENCOUNTER
Heather Serna  [id number]      TITLE OF PROCEDURE:  Lumbar Interlaminar Epidural Steroid Injection under Fluoroscopic Guidance    EPIDURAL LEVEL ENTERED: L4/L5     SIDE: Midline     PREOPERATIVE DIAGNOSES:  Lumbosacral Radiculopathy    POSTOPERATIVE DIAGNOSES:  Same    SURGEON: Carlo Kovacs DO    ASSISTANTS: None    ASSISTANTS TASKS: None    ANESTHESIA:  Moderate sedation with IV Versed 2 mg and fentanyl 50 mcg was administered with continuous monitoring of the patient by a trained caregiver under my direct supervision. Please refer to nursing documentation for doses of medications administered intravenously as well as the patient's status during the procedure. Total sedation (intra-service) time was 30 minutes.    DESCRIPTION OF OPERATIVE PROCEDURE: The patient was examined, and we discussed the risk, benefits and alternatives of the procedure. An informed consent was obtained. An IV was placed. The patient was brought to the procedure room and positioned prone on the table for the procedure. Time-out was conducted with all members of the care team present. Standard ASA monitors were placed. Standard prep (betadine) and drape were performed. Sterile technique was used throughout.    The L4/L5 level was identified under fluoroscopy in the AP view and a slight caudal tilt (3 degrees) and ipsilateral oblique (3 degrees left) was used to open up the space. Using a 27-gauge 1.25\" hypodermic needle, 3 ml of 1% lidocaine was injected in the subcutaneous tissue to provide superficial anesthesia. Using a left paramedian approach, a 20-gauge, 3.5\" Tuohy needle was guided anteriorly into the epidural space using A/P, contralateral oblique and lateral fluoroscopic images. As the needle approached the epidural space in the contralateral oblique view, correct needle placement was confirmed using a loss of resistance technique with sterile, preservative free normal saline. Following loss, 0.5 ml of Isovue-200 contrast was injected  Lov 1/10/24    and fluoroscopy was used to confirm good dye spread in both the AP and lateral projections, without evidence of intraneural, intrathecal, or intravascular injection. After negative aspiration, a swirled mixture of 1 ml 0.25% bupivacaine and 1 ml (10 mg) of depomedrol was injected into the epidural space (total volume 2 ml). Following this, the needle was removed from the epidural space and 1 ml of 1% lidocaine was injected as the needle was withdrawn.    Following the procedure, the surgical site preparation was washed off and a Band-Aid was applied. The patient was brought to the recovery area. The patient did very well, and the procedure results were discussed.     Preprocedural pain score was 2/10  Post procedural pain score was 0/10    COMPLICATIONS: None    EBL: Minimal    IMPLANTS: None    FLUIDS: None    DISPO: Following the procedure, the patient was re-evaluated and was stable for discharge. Standard discharge instructions were provided to the patient. The patient knows how to contact the clinic should they have any questions or concerns.    PLAN: Follow up in clinic in 3-4 weeks.       Carlo Kovacs, DO  Interventional Pain Management

## 2024-05-15 NOTE — TELEPHONE ENCOUNTER
I have not prescribed this nor has this been discussed. I only saw him for pneumonia. He had visit with Em in February, but I don't see this was discussed. What is the reason he is taking it and how often?

## 2024-05-16 RX ORDER — BACLOFEN 10 MG/1
10 TABLET ORAL 3 TIMES DAILY
OUTPATIENT
Start: 2024-05-16

## 2024-05-29 ENCOUNTER — CLINICAL SUPPORT (OUTPATIENT)
Dept: REHABILITATION | Facility: HOSPITAL | Age: 64
End: 2024-05-29
Payer: COMMERCIAL

## 2024-05-29 ENCOUNTER — HOSPITAL ENCOUNTER (OUTPATIENT)
Dept: CARDIOLOGY | Facility: HOSPITAL | Age: 64
Discharge: HOME | End: 2024-05-29
Payer: COMMERCIAL

## 2024-05-29 ENCOUNTER — OFFICE VISIT (OUTPATIENT)
Dept: INTERNAL MEDICINE | Facility: CLINIC | Age: 64
End: 2024-05-29
Payer: COMMERCIAL

## 2024-05-29 ENCOUNTER — CLINICAL SUPPORT (OUTPATIENT)
Dept: AUDIOLOGY | Facility: CLINIC | Age: 64
End: 2024-05-29
Payer: COMMERCIAL

## 2024-05-29 ENCOUNTER — CLINICAL SUPPORT (OUTPATIENT)
Dept: INTERNAL MEDICINE | Facility: CLINIC | Age: 64
End: 2024-05-29
Payer: COMMERCIAL

## 2024-05-29 VITALS
OXYGEN SATURATION: 98 % | RESPIRATION RATE: 18 BRPM | DIASTOLIC BLOOD PRESSURE: 70 MMHG | SYSTOLIC BLOOD PRESSURE: 100 MMHG | HEART RATE: 66 BPM | WEIGHT: 204.13 LBS | HEIGHT: 70 IN | BODY MASS INDEX: 29.22 KG/M2

## 2024-05-29 VITALS
BODY MASS INDEX: 28.35 KG/M2 | HEART RATE: 52 BPM | WEIGHT: 198 LBS | TEMPERATURE: 97 F | SYSTOLIC BLOOD PRESSURE: 96 MMHG | DIASTOLIC BLOOD PRESSURE: 61 MMHG | HEIGHT: 70 IN

## 2024-05-29 DIAGNOSIS — R53.81 MALAISE AND FATIGUE: ICD-10-CM

## 2024-05-29 DIAGNOSIS — Z01.12 HEARING CONSERVATION AND TREATMENT EXAM: Primary | ICD-10-CM

## 2024-05-29 DIAGNOSIS — F32.1 CURRENT MODERATE EPISODE OF MAJOR DEPRESSIVE DISORDER WITHOUT PRIOR EPISODE: ICD-10-CM

## 2024-05-29 DIAGNOSIS — R53.83 MALAISE AND FATIGUE: ICD-10-CM

## 2024-05-29 DIAGNOSIS — M46.92 UNSPECIFIED INFLAMMATORY SPONDYLOPATHY, CERVICAL REGION: ICD-10-CM

## 2024-05-29 DIAGNOSIS — I48.0 PAROXYSMAL ATRIAL FIBRILLATION: ICD-10-CM

## 2024-05-29 DIAGNOSIS — Z00.00 ENCOUNTER FOR PREVENTIVE HEALTH EXAMINATION: Primary | ICD-10-CM

## 2024-05-29 DIAGNOSIS — J43.9 MIXED RESTRICTIVE AND OBSTRUCTIVE LUNG DISEASE: ICD-10-CM

## 2024-05-29 DIAGNOSIS — Z00.00 ROUTINE GENERAL MEDICAL EXAMINATION AT A HEALTH CARE FACILITY: Primary | ICD-10-CM

## 2024-05-29 DIAGNOSIS — I25.118 CORONARY ARTERY DISEASE OF NATIVE ARTERY OF NATIVE HEART WITH STABLE ANGINA PECTORIS: ICD-10-CM

## 2024-05-29 DIAGNOSIS — R09.02 EXERCISE HYPOXEMIA: ICD-10-CM

## 2024-05-29 DIAGNOSIS — Z00.00 ROUTINE GENERAL MEDICAL EXAMINATION AT A HEALTH CARE FACILITY: ICD-10-CM

## 2024-05-29 DIAGNOSIS — I10 ESSENTIAL HYPERTENSION: ICD-10-CM

## 2024-05-29 DIAGNOSIS — R25.2 BILATERAL LEG CRAMPS: Primary | ICD-10-CM

## 2024-05-29 DIAGNOSIS — J98.4 MIXED RESTRICTIVE AND OBSTRUCTIVE LUNG DISEASE: ICD-10-CM

## 2024-05-29 DIAGNOSIS — N18.31 STAGE 3A CHRONIC KIDNEY DISEASE (CKD): ICD-10-CM

## 2024-05-29 DIAGNOSIS — E03.9 ACQUIRED HYPOTHYROIDISM: ICD-10-CM

## 2024-05-29 DIAGNOSIS — I50.20 HFREF (HEART FAILURE WITH REDUCED EJECTION FRACTION): ICD-10-CM

## 2024-05-29 LAB
ALBUMIN SERPL BCP-MCNC: 4.1 G/DL (ref 3.5–5.2)
ALP SERPL-CCNC: 61 U/L (ref 55–135)
ALT SERPL W/O P-5'-P-CCNC: 34 U/L (ref 10–44)
ANION GAP SERPL CALC-SCNC: 11 MMOL/L (ref 8–16)
AST SERPL-CCNC: 59 U/L (ref 10–40)
BILIRUB SERPL-MCNC: 0.8 MG/DL (ref 0.1–1)
BUN SERPL-MCNC: 20 MG/DL (ref 8–23)
CALCIUM SERPL-MCNC: 9.7 MG/DL (ref 8.7–10.5)
CHLORIDE SERPL-SCNC: 98 MMOL/L (ref 95–110)
CHOLEST SERPL-MCNC: 162 MG/DL (ref 120–199)
CHOLEST/HDLC SERPL: 2.5 {RATIO} (ref 2–5)
CO2 SERPL-SCNC: 31 MMOL/L (ref 23–29)
COMPLEXED PSA SERPL-MCNC: 4.2 NG/ML (ref 0–4)
CREAT SERPL-MCNC: 1.5 MG/DL (ref 0.5–1.4)
ERYTHROCYTE [DISTWIDTH] IN BLOOD BY AUTOMATED COUNT: 12.2 % (ref 11.5–14.5)
EST. GFR  (NO RACE VARIABLE): 52 ML/MIN/1.73 M^2
ESTIMATED AVG GLUCOSE: 117 MG/DL (ref 68–131)
GLUCOSE SERPL-MCNC: 116 MG/DL (ref 70–110)
HBA1C MFR BLD: 5.7 % (ref 4–5.6)
HCT VFR BLD AUTO: 42 % (ref 40–54)
HDLC SERPL-MCNC: 64 MG/DL (ref 40–75)
HDLC SERPL: 39.5 % (ref 20–50)
HGB BLD-MCNC: 14.3 G/DL (ref 14–18)
LDLC SERPL CALC-MCNC: 86.2 MG/DL (ref 63–159)
MCH RBC QN AUTO: 31.8 PG (ref 27–31)
MCHC RBC AUTO-ENTMCNC: 34 G/DL (ref 32–36)
MCV RBC AUTO: 93 FL (ref 82–98)
NONHDLC SERPL-MCNC: 98 MG/DL
OHS QRS DURATION: 102 MS
OHS QTC CALCULATION: 430 MS
PLATELET # BLD AUTO: 164 K/UL (ref 150–450)
PMV BLD AUTO: 10.7 FL (ref 9.2–12.9)
POTASSIUM SERPL-SCNC: 4 MMOL/L (ref 3.5–5.1)
PROT SERPL-MCNC: 6.9 G/DL (ref 6–8.4)
RBC # BLD AUTO: 4.5 M/UL (ref 4.6–6.2)
SODIUM SERPL-SCNC: 140 MMOL/L (ref 136–145)
TESTOST SERPL-MCNC: 613 NG/DL (ref 304–1227)
TRIGL SERPL-MCNC: 59 MG/DL (ref 30–150)
TSH SERPL DL<=0.005 MIU/L-ACNC: 3.69 UIU/ML (ref 0.4–4)
WBC # BLD AUTO: 4.38 K/UL (ref 3.9–12.7)

## 2024-05-29 PROCEDURE — 93005 ELECTROCARDIOGRAM TRACING: CPT

## 2024-05-29 PROCEDURE — 3044F HG A1C LEVEL LT 7.0%: CPT | Mod: CPTII,S$GLB,, | Performed by: INTERNAL MEDICINE

## 2024-05-29 PROCEDURE — 99999 PR PBB SHADOW E&M-EST. PATIENT-LVL I: CPT | Mod: PBBFAC,,, | Performed by: AUDIOLOGIST

## 2024-05-29 PROCEDURE — 3078F DIAST BP <80 MM HG: CPT | Mod: CPTII,S$GLB,, | Performed by: INTERNAL MEDICINE

## 2024-05-29 PROCEDURE — 92552 PURE TONE AUDIOMETRY AIR: CPT | Mod: S$GLB,,, | Performed by: AUDIOLOGIST

## 2024-05-29 PROCEDURE — 85027 COMPLETE CBC AUTOMATED: CPT | Performed by: INTERNAL MEDICINE

## 2024-05-29 PROCEDURE — 3008F BODY MASS INDEX DOCD: CPT | Mod: CPTII,S$GLB,, | Performed by: INTERNAL MEDICINE

## 2024-05-29 PROCEDURE — 99999 PR PBB SHADOW E&M-EST. PATIENT-LVL IV: CPT | Mod: PBBFAC,,, | Performed by: INTERNAL MEDICINE

## 2024-05-29 PROCEDURE — 84153 ASSAY OF PSA TOTAL: CPT | Performed by: INTERNAL MEDICINE

## 2024-05-29 PROCEDURE — 99396 PREV VISIT EST AGE 40-64: CPT | Mod: S$GLB,,, | Performed by: INTERNAL MEDICINE

## 2024-05-29 PROCEDURE — 3044F HG A1C LEVEL LT 7.0%: CPT | Mod: CPTII,S$GLB,, | Performed by: FAMILY MEDICINE

## 2024-05-29 PROCEDURE — 84403 ASSAY OF TOTAL TESTOSTERONE: CPT | Performed by: INTERNAL MEDICINE

## 2024-05-29 PROCEDURE — 80053 COMPREHEN METABOLIC PANEL: CPT | Performed by: INTERNAL MEDICINE

## 2024-05-29 PROCEDURE — 83036 HEMOGLOBIN GLYCOSYLATED A1C: CPT | Performed by: INTERNAL MEDICINE

## 2024-05-29 PROCEDURE — 80061 LIPID PANEL: CPT | Performed by: INTERNAL MEDICINE

## 2024-05-29 PROCEDURE — 3008F BODY MASS INDEX DOCD: CPT | Mod: CPTII,S$GLB,, | Performed by: FAMILY MEDICINE

## 2024-05-29 PROCEDURE — 1160F RVW MEDS BY RX/DR IN RCRD: CPT | Mod: CPTII,S$GLB,, | Performed by: FAMILY MEDICINE

## 2024-05-29 PROCEDURE — 99999 PR PBB SHADOW E&M-EST. PATIENT-LVL IV: CPT | Mod: PBBFAC,,, | Performed by: FAMILY MEDICINE

## 2024-05-29 PROCEDURE — 4010F ACE/ARB THERAPY RXD/TAKEN: CPT | Mod: CPTII,S$GLB,, | Performed by: FAMILY MEDICINE

## 2024-05-29 PROCEDURE — 97750 PHYSICAL PERFORMANCE TEST: CPT | Performed by: PHYSICAL THERAPIST

## 2024-05-29 PROCEDURE — 3074F SYST BP LT 130 MM HG: CPT | Mod: CPTII,S$GLB,, | Performed by: INTERNAL MEDICINE

## 2024-05-29 PROCEDURE — G2211 COMPLEX E/M VISIT ADD ON: HCPCS | Mod: S$GLB,,, | Performed by: FAMILY MEDICINE

## 2024-05-29 PROCEDURE — 99213 OFFICE O/P EST LOW 20 MIN: CPT | Mod: S$GLB,,, | Performed by: FAMILY MEDICINE

## 2024-05-29 PROCEDURE — 4010F ACE/ARB THERAPY RXD/TAKEN: CPT | Mod: CPTII,S$GLB,, | Performed by: INTERNAL MEDICINE

## 2024-05-29 PROCEDURE — 3074F SYST BP LT 130 MM HG: CPT | Mod: CPTII,S$GLB,, | Performed by: FAMILY MEDICINE

## 2024-05-29 PROCEDURE — 1159F MED LIST DOCD IN RCRD: CPT | Mod: CPTII,S$GLB,, | Performed by: FAMILY MEDICINE

## 2024-05-29 PROCEDURE — 3078F DIAST BP <80 MM HG: CPT | Mod: CPTII,S$GLB,, | Performed by: FAMILY MEDICINE

## 2024-05-29 PROCEDURE — 84443 ASSAY THYROID STIM HORMONE: CPT | Performed by: INTERNAL MEDICINE

## 2024-05-29 PROCEDURE — 93010 ELECTROCARDIOGRAM REPORT: CPT | Mod: ,,, | Performed by: INTERNAL MEDICINE

## 2024-05-29 RX ORDER — DULOXETIN HYDROCHLORIDE 20 MG/1
20 CAPSULE, DELAYED RELEASE ORAL DAILY
Qty: 30 CAPSULE | Refills: 6 | Status: SHIPPED | OUTPATIENT
Start: 2024-05-29 | End: 2025-05-29

## 2024-05-29 RX ORDER — BACLOFEN 10 MG/1
10 TABLET ORAL 2 TIMES DAILY
Qty: 60 TABLET | Refills: 2 | Status: SHIPPED | OUTPATIENT
Start: 2024-05-29

## 2024-05-29 NOTE — PROGRESS NOTES
Executive Health Screening    Cedric Santoro was seen 05/29/2024 for an executive health hearing screen.      Otoscopy was unremarkable in both ears. Results revealed normal hearing 250-8000 Hz for the right ear, and  mild hearing loss at 3000 Hz and a moderate hearing loss at 8000 Hz for the left ear.     Patient was counseled on the above findings.    Recommendations:   Complete audiological evaluation with ENT consult.  Consistent use of hearing protective devices when around loud noise.   Annual hearing screenings.

## 2024-05-29 NOTE — PROGRESS NOTES
Subjective:       Patient ID: Cedric Santoro is a 64 y.o. male.    Chief Complaint: Medication Refill    History of Present Illness    Patient presents today for a refill of baclofen. Patient reports severe leg cramps. Baclofen, prescribed by Dr. Asif, has been used for management. He reports she consulted with Neurology and was advised she could try one of several medications, so they settled on baclofen. The patient confirms good hydration and normal magnesium levels. He has not consulted with a neurologist or a rheumatologist. Patient had an episode of pneumonia in December and is currently contemplating the possibility of receiving the pneumonia vaccine. Patient is otherwise without concerns today.        Review of Systems   Constitutional:  Negative for chills, fatigue, fever and unexpected weight change.   Eyes:  Negative for visual disturbance.   Respiratory:  Negative for shortness of breath.    Cardiovascular:  Negative for chest pain.   Musculoskeletal:  Positive for myalgias.   Neurological:  Negative for headaches.         Objective:      Physical Exam  Vitals reviewed.   Constitutional:       General: He is not in acute distress.     Appearance: He is well-developed.   HENT:      Head: Normocephalic and atraumatic.   Eyes:      General: Lids are normal. No scleral icterus.     Extraocular Movements: Extraocular movements intact.      Conjunctiva/sclera: Conjunctivae normal.      Pupils: Pupils are equal, round, and reactive to light.   Pulmonary:      Effort: Pulmonary effort is normal.   Neurological:      Mental Status: He is alert and oriented to person, place, and time.      Cranial Nerves: No cranial nerve deficit.      Gait: Gait normal.   Psychiatric:         Mood and Affect: Mood and affect normal.         Assessment:       1. Bilateral leg cramps        Plan:   1. Bilateral leg cramps  -     Ambulatory referral/consult to Neurology; Future; Expected date: 06/05/2024  -     baclofen (LIORESAL)  10 MG tablet; Take 1 tablet (10 mg total) by mouth 2 (two) times daily.  Dispense: 60 tablet; Refill: 2     Educated the patient about the pneumonia vaccine, detailing its protective properties against a specific bacteria that can cause a severe form of pneumonia, but clarified its limitations in not protecting against all causes of pneumonia.   Prescribed baclofen to the patient for a duration of 3 months to manage his leg cramps, with instructions to take one dose in the morning and another at night before bed.   Expressed concern about long-term use of this medication without further specialist evaluation.   Referred the patient to a neurologist for comprehensive assessment and management of his leg cramps.    Patient expressed understanding and agreement with plan.    Visit today included increased complexity associated with the care of the episodic problem leg cramps, which was addressed while instituting co-management of the longitudinal care of the patient due to the serious and/or complex managed problem(s) .    I have evaluated and discussed management associated with medical care services that serve as the continuing focal point for all needed health care services and/or with medical care services that are part of ongoing care related to my patient's single, serious condition or a complex condition(s).    I am providing ongoing care and I am the primary care provider for this patient, and they are being managed, monitored, and/or observed for their chronic conditions over time.     I have addressed their ongoing health maintenance requirements and needs for all health care services and reviewed co-management plans provided by specialty providers when available.    Health Maintenance Due   Topic Date Due    RSV Vaccine (Age 60+ and Pregnant patients) (1 - 1-dose 60+ series) Never done     Health Maintenance reviewed/updated.    Follow up if symptoms worsen or fail to improve, for keep routine follow  up.    This note was generated with the assistance of ambient listening technology. Verbal consent was obtained by the patient and accompanying visitor(s) for the recording of patient appointment to facilitate this note. I attest to having reviewed and edited the generated note for accuracy, though some syntax or spelling errors may persist. Please contact the author of this note for any clarification.

## 2024-05-29 NOTE — LETTER
"May 29, 2024    Cedric Santoro  [address] [place] [address]             [address]  [address] [facility] [address]  Phone: [phone]  Fax: [phone] Dear Mr. Santoro:    It was a pleasure to see you and perform your Executive Health physical on Wednesday, May 29, 2024.  At the time of your visit you are 64 years old.  You report low energy level.  Your been walking about a mi and a half daily.  You had pneumonia a few months ago and feel like you are tired since that time.  You have had exertional low oxygen level and pulmonary is planning to start you on oxygen for when you exerts herself.You have had no fevers, chills, sweats, or cough.  You deny any exertional chest pain or palpitations.  You report normal bowel and urinary function.      Your past medical history is significant for coronary artery disease, hypertension, mitral valve prolapse, paroxysmal atrial fibrillation and flutter, Raynaud's syndrome, vasovagal syncope, atrial ablation, carpal tunnel release on the right, cholecystectomy, eye surgery, hernia repair, open reduction and internal fixation left metatarsal, throat surgery and cardioversion.  Your medications include albuterol p.r.n., Eliquis, atenolol, B complex, baclofen, Co Q10, Trelegy Ellipta inhaler, furosemide, levothyroxine, losartan, magnesium, multivitamin, rosuvastatin, tamsulosin, and vitamin-D.  Your medication allergies include iodine containing products and contrast media.    Your health maintenance includes 2017 tetanus vaccination, 04/2023 Shingrix 2., 05/2024 PSA, 04/2023 CT calcium score 597, 06/2023 Cologuard negative.     On physical exam height is 5'10" , your weight is 204 pounds,  body mass index is 29.29.Your blood pressure 100/70,  pulse 66,  Your general appearance is well-developed,  with no distress. Your head and neck exam is normal.  Your heart has a regular rate and rhythm with no abnormal sounds.  Your " lungs are clear throughout, with normal respiratory effort.  Your abdomen has normal bowel sounds with no masses or enlarged organs noted. Your extremities have strong distal pulses, with no swelling.     Your EKG shows nonspecific changes.  Your blood count is normal, platelets 164 normal.  Your kidney function is decreased on this present lab work and will need to be followed and repeated.  Your electrolytes are normal.  Your liver function looks normal but 1 liver enzyme is mildly elevated, which is not new for you.  Your TSH 3.689, normal.  PSA is elevated at 4.2.  This will need to be repeated and followed.  Total testosterone is normal at 6 1 3.  Your total cholesterol is 162, HDL 64, LDL 86.2, triglycerides 59.  I would recommend doubling your rosuvastatin to 20 mg daily and rechecking this in about 3 months.    In summary you appear to be in overall good general health on your present medications.  You are up-to-date on all vaccinations and preventive care needed at this time.  To optimize your LDL cholesterol to be below 70 which is recommended with your history, I would double your rosuvastatin to 20 mg and have this rechecked in 3 months.  Your PSA is elevated and needs to be rechecked in 3-6 months, or per your urologist.  Your kidney function needs to be rechecked also.  Please follow-up with your primary care doctor to recheck these things.    It was a pleasure to see you and perform year executive physical.  If I can be of any further assistance, or if you have any questions or concerns please do not hesitate to let me know.    Yours very truly,      Hortencia Elliott MD

## 2024-05-30 ENCOUNTER — PATIENT MESSAGE (OUTPATIENT)
Dept: UROLOGY | Facility: CLINIC | Age: 64
End: 2024-05-30
Payer: COMMERCIAL

## 2024-05-30 ENCOUNTER — PATIENT MESSAGE (OUTPATIENT)
Dept: PRIMARY CARE CLINIC | Facility: CLINIC | Age: 64
End: 2024-05-30
Payer: COMMERCIAL

## 2024-05-30 NOTE — PROGRESS NOTES
:  1960    DX: Z00.0  Orders:  Fitness Evaluation    Patient's 3rd [facility] Fitness Component evaluation was completed.  Results are as follows:    Height (in):    70                            Weight (lbs):    198                                    BMI:   28.5    Resting Energy Expenditure:         1700       kcal/24 hrs.                             Body Composition:          26.28  % body fat             Rating: Good     Waist to Hip Ratio:     0.99                    Risk:  Moderate Risk    Hip taken over clothing:      Yes          Muscular Strength and Endurance Assessment:               Strength (lbs):     Right: 55             Rating:  Below Average     Left:  98           Rating: Above Average    Push-ups:   0                 Rating:  Not Tested   Curl-ups :   0                Rating:  Unable due to cramping in leg after trial of 1.     Flexibility Testing:   Sit and Reach (cm):    16.1                       Rating:  Good     Assessment: Patient broke his foot in 2023 and had surgery in 2023.  He also had Flu/Pneumonia around 2023 and still has not recovered from that fully with another lung scan coming up soon.  He had Right hand surgery in 2024 for carpal tunnel and trigger finger.  He was exercising and walking some but has not been able to get more than ~1.5 miles 3-5 days per week at this time.     2024   70 70 70   198 202 194   28.47 29.04 27.89   26.28 25.43 23.64   102.4 100 97   103.2 104 102.7   0.99 0.96 0.94   96/61 114/69 164/90   52 51 64   55 88 116   98 94 99   0 1 10   0 24 30   16 19 32   1700 1724 2100   Final Chart input copied from results page on Excel    The patient completed the testing procedures without complications.

## 2024-06-12 ENCOUNTER — OFFICE VISIT (OUTPATIENT)
Dept: CARDIOLOGY | Facility: CLINIC | Age: 64
End: 2024-06-12
Payer: COMMERCIAL

## 2024-06-12 VITALS
HEART RATE: 62 BPM | DIASTOLIC BLOOD PRESSURE: 80 MMHG | HEIGHT: 70 IN | BODY MASS INDEX: 29.67 KG/M2 | RESPIRATION RATE: 16 BRPM | WEIGHT: 207.25 LBS | OXYGEN SATURATION: 96 % | SYSTOLIC BLOOD PRESSURE: 114 MMHG

## 2024-06-12 DIAGNOSIS — I48.92 PAROXYSMAL ATRIAL FLUTTER: ICD-10-CM

## 2024-06-12 DIAGNOSIS — I48.0 PAROXYSMAL ATRIAL FIBRILLATION: Primary | ICD-10-CM

## 2024-06-12 PROCEDURE — 1159F MED LIST DOCD IN RCRD: CPT | Mod: CPTII,S$GLB,, | Performed by: INTERNAL MEDICINE

## 2024-06-12 PROCEDURE — 99999 PR PBB SHADOW E&M-EST. PATIENT-LVL IV: CPT | Mod: PBBFAC,,, | Performed by: INTERNAL MEDICINE

## 2024-06-12 PROCEDURE — 3008F BODY MASS INDEX DOCD: CPT | Mod: CPTII,S$GLB,, | Performed by: INTERNAL MEDICINE

## 2024-06-12 PROCEDURE — 3074F SYST BP LT 130 MM HG: CPT | Mod: CPTII,S$GLB,, | Performed by: INTERNAL MEDICINE

## 2024-06-12 PROCEDURE — 4010F ACE/ARB THERAPY RXD/TAKEN: CPT | Mod: CPTII,S$GLB,, | Performed by: INTERNAL MEDICINE

## 2024-06-12 PROCEDURE — 99214 OFFICE O/P EST MOD 30 MIN: CPT | Mod: S$GLB,,, | Performed by: INTERNAL MEDICINE

## 2024-06-12 PROCEDURE — 3044F HG A1C LEVEL LT 7.0%: CPT | Mod: CPTII,S$GLB,, | Performed by: INTERNAL MEDICINE

## 2024-06-12 PROCEDURE — 3079F DIAST BP 80-89 MM HG: CPT | Mod: CPTII,S$GLB,, | Performed by: INTERNAL MEDICINE

## 2024-06-12 NOTE — PROGRESS NOTES
Subjective   Patient ID:  Cedric Santoro is a 64 y.o. male who presents for follow-up of Atrial Fibrillation      64 yoM referred for AFL management.     11/22: He developed AFL/RVR leading to ARIELA/CV attempt 10/12/22. He had KP thrombus on ARIELA leading to cancellation of the CV. He was placed on apixaban for CVA prophylaxis 10/4/22 when he as diagnosed. EF normal on ARIELA. He developed LEBLANC and fatigue in the months after his wife passed away. He remains in AFL today and has mild symptoms. He is here to discuss ablation. Of note, AF was placed on his chart 3/2020. I reviewed the monitor and do not feel he had AF. The event was non-sustained AT lasting less than 30s.     3/23: CTI ablation 12/15/22, no recurrence. Symptoms markedly improved.     9/23: No recurrence since his ablation.     3/24: Recurrence of AF, 7% rate, on event monitor. He complains of daily LEBLANC, worsened since his influenza pneumonia late Dec/early Jan. He is on OAC due to prior KP thrombus.     Interval history: No recurrence since January    Echo 1/24:  ·  Left Ventricle: The left ventricle is normal in size. Normal wall thickness. There is eccentric hypertrophy. Normal wall motion. There is normal systolic function with a visually estimated ejection fraction of 60 - 65%. There is normal diastolic function.  ·  Right Ventricle: Normal right ventricular cavity size. Wall thickness is normal. Right ventricle wall motion  is normal. Systolic function is normal.  ·  Left Atrium: Left atrium is mildly dilated.  ·  Mitral Valve: Mildly thickened anterior leaflet. There is moderate to severe regurgitation with a centrally directed jet.  ·  Pulmonary Artery: The estimated pulmonary artery systolic pressure is 33 mmHg.  ·  IVC/SVC: Normal venous pressure at 3 mmHg.    Event monitor 1/24:               Baseline rhythm is NSR with frequent runs of PAF (total burden reported as 7%)            Average diurnal HR is @ 75-80            Average nocturnal HR is @  65-70            Average post awakening HR is @ 85    Ablation 12/22:  ·  Emergent cardioversion for AFL/RVR with hypotensopn was successfully performed with restoration of normal sinus rhythm prior to vascular access  ·  Empiric CTI ablation.  ·  3D mapping performed with [employer].    Echo 1/23:  · The left ventricle is normal in size with concentric remodeling and normal systolic function.  · Moderate left atrial enlargement.  · The estimated ejection fraction is 60%.  · Normal left ventricular diastolic function.  · Normal right ventricular size with normal right ventricular systolic function.  · Moderate right atrial enlargement.  · Moderate-to-severe mitral regurgitation.  · Mild tricuspid regurgitation.  · There is bileaflet mitral prolapse.  · Normal central venous pressure (3 mmHg).  · The estimated PA systolic pressure is 28 mmHg.      ARIELA 10/12:  · The left ventricle is normal in size with normal systolic function.  · Normal right ventricular size with normal right ventricular systolic function.  · The estimated ejection fraction is 55%.  · Thrombus is present in the appendage.  · Mild left atrial enlargement.  · There is mild mitral prolapse of the posterior mitral leaflet.  · Mild-to-moderate mitral regurgitation.  · Grade 1 plaque present.  · Mild tricuspid regurgitation.  · No cardioversion performed due to left atrial appendage thrombus.          Review of Systems   Constitutional: Negative.   HENT: Negative.     Eyes: Negative.    Cardiovascular:  Negative for chest pain, dyspnea on exertion, leg swelling, near-syncope, palpitations and syncope.   Respiratory: Negative.  Negative for shortness of breath.    Endocrine: Negative.    Hematologic/Lymphatic: Negative.    Skin: Negative.    Musculoskeletal: Negative.    Gastrointestinal: Negative.    Genitourinary: Negative.    Neurological: Negative.  Negative for dizziness and light-headedness.   Psychiatric/Behavioral: Negative.     Allergic/Immunologic:  Negative.           Objective     Physical Exam  Vitals and nursing note reviewed.   Constitutional:       Appearance: He is well-developed.   HENT:      Head: Normocephalic.   Neck:      Thyroid: No thyromegaly.      Vascular: Normal carotid pulses. No carotid bruit or JVD.   Cardiovascular:      Rate and Rhythm: Normal rate and regular rhythm.      Pulses:           Radial pulses are 2+ on the right side and 2+ on the left side.      Heart sounds: S1 normal and S2 normal. Heart sounds not distant. No midsystolic click and no opening snap. Murmur heard.      High-pitched blowing holosystolic murmur is present with a grade of 1/6 at the apex.      No friction rub. No S3 or S4 sounds.   Pulmonary:      Effort: Pulmonary effort is normal.      Breath sounds: Normal breath sounds. No wheezing or rales.   Abdominal:      General: Bowel sounds are normal. There is no distension or abdominal bruit.      Palpations: Abdomen is soft.      Tenderness: There is no abdominal tenderness.   Musculoskeletal:      Cervical back: Neck supple.      Right lower leg: Edema present.      Left lower leg: Edema present.   Skin:     General: Skin is warm.   Neurological:      Mental Status: He is alert and oriented to person, place, and time.   Psychiatric:         Behavior: Behavior normal.            Assessment and Plan     1. Paroxysmal atrial fibrillation    2. Paroxysmal atrial flutter        Plan:  64 yoM here for AF/AFL management. No symptomatic recurrence. Continue current therapy. RTC 6m

## 2024-06-13 ENCOUNTER — OFFICE VISIT (OUTPATIENT)
Dept: CARDIOLOGY | Facility: CLINIC | Age: 64
End: 2024-06-13
Payer: COMMERCIAL

## 2024-06-13 VITALS
SYSTOLIC BLOOD PRESSURE: 112 MMHG | HEART RATE: 60 BPM | DIASTOLIC BLOOD PRESSURE: 70 MMHG | HEIGHT: 70 IN | OXYGEN SATURATION: 94 % | BODY MASS INDEX: 29.89 KG/M2 | WEIGHT: 208.75 LBS

## 2024-06-13 DIAGNOSIS — I10 ESSENTIAL HYPERTENSION: ICD-10-CM

## 2024-06-13 DIAGNOSIS — R09.02 EXERCISE HYPOXEMIA: ICD-10-CM

## 2024-06-13 DIAGNOSIS — R06.09 DOE (DYSPNEA ON EXERTION): ICD-10-CM

## 2024-06-13 DIAGNOSIS — R60.0 EDEMA OF BOTH LEGS: ICD-10-CM

## 2024-06-13 DIAGNOSIS — I50.32 CHRONIC DIASTOLIC HEART FAILURE: ICD-10-CM

## 2024-06-13 DIAGNOSIS — Z79.01 CHRONIC ANTICOAGULATION: ICD-10-CM

## 2024-06-13 DIAGNOSIS — I34.1 MVP (MITRAL VALVE PROLAPSE): ICD-10-CM

## 2024-06-13 DIAGNOSIS — I34.0 NONRHEUMATIC MITRAL VALVE REGURGITATION: ICD-10-CM

## 2024-06-13 DIAGNOSIS — I48.0 PAROXYSMAL ATRIAL FIBRILLATION: ICD-10-CM

## 2024-06-13 DIAGNOSIS — I49.1 PAC (PREMATURE ATRIAL CONTRACTION): ICD-10-CM

## 2024-06-13 DIAGNOSIS — R94.31 ABNORMAL ECG: Primary | ICD-10-CM

## 2024-06-13 DIAGNOSIS — R93.1 ELEVATED CORONARY ARTERY CALCIUM SCORE: ICD-10-CM

## 2024-06-13 DIAGNOSIS — I25.118 CORONARY ARTERY DISEASE OF NATIVE ARTERY OF NATIVE HEART WITH STABLE ANGINA PECTORIS: ICD-10-CM

## 2024-06-13 DIAGNOSIS — I48.92 PAROXYSMAL ATRIAL FLUTTER: ICD-10-CM

## 2024-06-13 DIAGNOSIS — I44.0 FIRST DEGREE AV BLOCK: ICD-10-CM

## 2024-06-13 PROCEDURE — 3078F DIAST BP <80 MM HG: CPT | Mod: CPTII,S$GLB,, | Performed by: INTERNAL MEDICINE

## 2024-06-13 PROCEDURE — 3044F HG A1C LEVEL LT 7.0%: CPT | Mod: CPTII,S$GLB,, | Performed by: INTERNAL MEDICINE

## 2024-06-13 PROCEDURE — 1160F RVW MEDS BY RX/DR IN RCRD: CPT | Mod: CPTII,S$GLB,, | Performed by: INTERNAL MEDICINE

## 2024-06-13 PROCEDURE — 3074F SYST BP LT 130 MM HG: CPT | Mod: CPTII,S$GLB,, | Performed by: INTERNAL MEDICINE

## 2024-06-13 PROCEDURE — 3008F BODY MASS INDEX DOCD: CPT | Mod: CPTII,S$GLB,, | Performed by: INTERNAL MEDICINE

## 2024-06-13 PROCEDURE — 4010F ACE/ARB THERAPY RXD/TAKEN: CPT | Mod: CPTII,S$GLB,, | Performed by: INTERNAL MEDICINE

## 2024-06-13 PROCEDURE — 1159F MED LIST DOCD IN RCRD: CPT | Mod: CPTII,S$GLB,, | Performed by: INTERNAL MEDICINE

## 2024-06-13 PROCEDURE — 99999 PR PBB SHADOW E&M-EST. PATIENT-LVL III: CPT | Mod: PBBFAC,,, | Performed by: INTERNAL MEDICINE

## 2024-06-13 PROCEDURE — 99214 OFFICE O/P EST MOD 30 MIN: CPT | Mod: S$GLB,,, | Performed by: INTERNAL MEDICINE

## 2024-06-13 RX ORDER — ROSUVASTATIN CALCIUM 20 MG/1
20 TABLET, COATED ORAL NIGHTLY
Qty: 90 TABLET | Refills: 3 | Status: SHIPPED | OUTPATIENT
Start: 2024-06-13 | End: 2025-06-13

## 2024-06-13 NOTE — PROGRESS NOTES
Subjective:    Patient ID:  Cedric Santoro is a 64 y.o. male who presents for evaluation of Atrial Fibrillation, Coronary Artery Disease, and Congestive Heart Failure        HPI Pt presents for eval.  His current med conditions include MVP, MR, HTN, 1 av block, diastolic CHF, palpitations, PAF, PAFL.  Past hx pertinent for following:  - stress test 2020.  Reportedly had brief PAF w stress test, resolving on its own.  Echo 2020 normal LV function, mild MR.  Pt had ecg done 10/3/22 twice:  a flutter w rate 99 - 116 bpm.  New dx.  Lost his wife to cancer 7/22 then had dyspnea few months.  S/p ARIELA Oct 2022 for cardioversion.  Left atrial appendage thrombus noted so no cardioversion was performed.  ARIELA: Left atrial appendage thrombus noted, LVEF 55%, mild posterior leaflet MV prolapse, mild-mod MR.  S/p a flutter ablation w ANTONI Rice, Dec 2022.  Had low BP w procedure, and required inotropes.  ARIELA showed EF 35%, decline thought to be due to a flutter.  Echo Feb 2023: normal LVEF, LAE, mod-severe MR with MVP, OJSE, mild TR, PAP wnl.  ARIELA was advised again.  S/p ARIELA Feb 2023 w Dr. Del Valle: normal EF, mild MVP, mild-mod MR, mild TR.  Coronary calcium score May 2023:  597  S/p LHC May 2023: nonobstructive CAD noted, normal EF.  OMT advised.  B LE arterial u/s April 2023 no stenosis, normal FARIDA B LE.  B LE venous u/s April 2023 no DVT.  Ecg 12/25/23 NSR, 1 av block, incomplete RBBB, possible old septal infarct.  He saw Dr. Hendricks, ANTONI, 9/23 and was taken off DOAC and Atenolol dose cut back.  Now here.  Pt last seen 6 months ago.  On Vital Holter Jan 2024 had recurrent PAF.  Eliquis restarted.  Has seen ANTONI Rice, few times since then and has been maintaining sinus.  Ecg 5/29/24 sinus bell 54 bpm, marked 1 av block 338 ms.  Echo Jan 2024 normal EF, LAE, mod-severe MR, PAP 33 mmHg.  Has chronic LEBLANC, stable overall.  No pnd/orthopnea.  No angina/CP sxs.  BP stable.  Stable leg edema.  Compliant w meds.  Seems good on  diet.  No palpitations.            Past Medical History:   Diagnosis Date    Abnormal ECG 10/04/2022    Chronic diastolic heart failure 06/13/2024    Coronary artery disease of native artery of native heart with stable angina pectoris 09/06/2023    Essential hypertension 02/24/2020    First degree AV block 06/13/2024    Hypertension     MVP (mitral valve prolapse) 10/26/2022    Nonrheumatic mitral valve regurgitation 10/26/2022    Palpitation 03/05/2020    Paroxysmal atrial fibrillation 03/05/2020    Paroxysmal atrial flutter 10/04/2022    Raynaud's syndrome     Vasovagal syncopes        Current Outpatient Medications:     albuterol (PROVENTIL/VENTOLIN HFA) 90 mcg/actuation inhaler, Inhale 2 puffs into the lungs every 4 (four) hours as needed., Disp: , Rfl:     apixaban (ELIQUIS) 5 mg Tab, Take 1 tablet (5 mg total) by mouth 2 (two) times daily., Disp: 60 tablet, Rfl: 6    atenoloL (TENORMIN) 25 MG tablet, Take 1 tablet (25 mg total) by mouth once daily., Disp: 90 tablet, Rfl: 3    b complex vitamins tablet, Take 1 tablet by mouth once daily., Disp: , Rfl:     baclofen (LIORESAL) 10 MG tablet, Take 1 tablet (10 mg total) by mouth 2 (two) times daily., Disp: 60 tablet, Rfl: 2    co-enzyme Q-10 30 mg capsule, Take 30 mg by mouth once daily., Disp: , Rfl:     DULoxetine (CYMBALTA) 20 MG capsule, Take 1 capsule (20 mg total) by mouth once daily., Disp: 30 capsule, Rfl: 6    fluticasone-umeclidin-vilanter (TRELEGY ELLIPTA) 200-62.5-25 mcg inhaler, Inhale 1 puff into the lungs once daily., Disp: 180 each, Rfl: 3    furosemide (LASIX) 40 MG tablet, Take 1 tablet (40 mg total) by mouth once daily., Disp: 90 tablet, Rfl: 5    inhalation spacing device (BREATHERITE MDI SPACER), Use as directed for inhalation., Disp: 1 each, Rfl: 0    levothyroxine (SYNTHROID) 50 MCG tablet, Take 1 tablet (50 mcg total) by mouth every Mon, Tues, Wed, Thurs, Fri., Disp: 66 tablet, Rfl: 1    losartan (COZAAR) 50 MG tablet, Take 1 tablet (50 mg  "total) by mouth once daily., Disp: 90 tablet, Rfl: 3    magnesium 30 mg Tab, Take 1 tablet by mouth once daily., Disp: , Rfl:     multivitamin (THERAGRAN) per tablet, Take 1 tablet by mouth once daily., Disp: , Rfl:     rosuvastatin (CRESTOR) 10 MG tablet, Take 1 tablet (10 mg total) by mouth every evening., Disp: 90 tablet, Rfl: 3    tamsulosin (FLOMAX) 0.4 mg Cap, Take 1 capsule (0.4 mg total) by mouth once daily., Disp: 90 capsule, Rfl: 3    vitamin D (VITAMIN D3) 1000 units Tab, Take 1,000 Units by mouth once daily., Disp: , Rfl:       Review of Systems   HENT: Negative.     Eyes: Negative.    Cardiovascular:  Positive for dyspnea on exertion and leg swelling.   Respiratory:  Positive for shortness of breath.    Endocrine: Negative.    Hematologic/Lymphatic: Negative.    Skin: Negative.    Musculoskeletal:  Positive for arthritis, back pain and neck pain.   Gastrointestinal: Negative.    Genitourinary: Negative.    Neurological: Negative.    Psychiatric/Behavioral: Negative.     Allergic/Immunologic: Negative.           /70 (BP Location: Right arm, Patient Position: Sitting, BP Method: Large (Manual))   Pulse 60   Ht 5' 10" (1.778 m)   Wt 94.7 kg (208 lb 12.4 oz)   SpO2 (!) 94%   BMI 29.96 kg/m²     Wt Readings from Last 3 Encounters:   06/13/24 94.7 kg (208 lb 12.4 oz)   06/12/24 94 kg (207 lb 3.7 oz)   05/29/24 92.6 kg (204 lb 2.3 oz)     Temp Readings from Last 3 Encounters:   05/29/24 96.9 °F (36.1 °C) (Oral)   04/12/24 97.5 °F (36.4 °C) (Oral)   02/12/24 96.8 °F (36 °C) (Tympanic)     BP Readings from Last 3 Encounters:   06/13/24 112/70   06/12/24 114/80   05/29/24 100/70     Pulse Readings from Last 3 Encounters:   06/13/24 60   06/12/24 62   05/29/24 66       Objective:    Physical Exam  Vitals and nursing note reviewed.   Constitutional:       Appearance: He is well-developed.   HENT:      Head: Normocephalic.   Neck:      Thyroid: No thyromegaly.      Vascular: Normal carotid pulses. No " carotid bruit or JVD.   Cardiovascular:      Rate and Rhythm: Normal rate and regular rhythm.      Pulses:           Radial pulses are 2+ on the right side and 2+ on the left side.      Heart sounds: S1 normal and S2 normal. Heart sounds not distant. No midsystolic click and no opening snap. Murmur heard.      High-pitched blowing holosystolic murmur is present with a grade of 2/6 at the apex.      No friction rub. No S3 or S4 sounds.   Pulmonary:      Effort: Pulmonary effort is normal.      Breath sounds: Normal breath sounds. No wheezing or rales.   Abdominal:      General: Bowel sounds are normal. There is no distension or abdominal bruit.      Palpations: Abdomen is soft.      Tenderness: There is no abdominal tenderness.   Musculoskeletal:      Cervical back: Neck supple.      Right lower leg: Edema present.      Left lower leg: Edema present.   Skin:     General: Skin is warm.   Neurological:      Mental Status: He is alert and oriented to person, place, and time.   Psychiatric:         Behavior: Behavior normal.       I have reviewed all pertinent labs and cardiac studies.      Chemistry        Component Value Date/Time     05/29/2024 0806    K 4.0 05/29/2024 0806    CL 98 05/29/2024 0806    CO2 31 (H) 05/29/2024 0806    BUN 20 05/29/2024 0806    CREATININE 1.5 (H) 05/29/2024 0806     (H) 05/29/2024 0806        Component Value Date/Time    CALCIUM 9.7 05/29/2024 0806    ALKPHOS 61 05/29/2024 0806    AST 59 (H) 05/29/2024 0806    ALT 34 05/29/2024 0806    BILITOT 0.8 05/29/2024 0806    ESTGFRAFRICA >60.0 04/12/2022 1010    EGFRNONAA >60.0 04/12/2022 1010        Lab Results   Component Value Date    WBC 4.38 05/29/2024    HGB 14.3 05/29/2024    HCT 42.0 05/29/2024    MCV 93 05/29/2024     05/29/2024       Lab Results   Component Value Date    HGBA1C 5.7 (H) 05/29/2024     Lab Results   Component Value Date    CHOL 162 05/29/2024    CHOL 157 02/05/2024    CHOL 145 11/13/2023     Lab Results    Component Value Date    HDL 64 05/29/2024    HDL 60 02/05/2024    HDL 58 11/13/2023     Lab Results   Component Value Date    LDLCALC 86.2 05/29/2024    LDLCALC 81.0 02/05/2024    LDLCALC 75.2 11/13/2023     Lab Results   Component Value Date    TRIG 59 05/29/2024    TRIG 80 02/05/2024    TRIG 59 11/13/2023     Lab Results   Component Value Date    CHOLHDL 39.5 05/29/2024    CHOLHDL 38.2 02/05/2024    CHOLHDL 40.0 11/13/2023       Results for orders placed during the hospital encounter of 02/09/23    Echo    Interpretation Summary  · The left ventricle is normal in size with concentric remodeling and normal systolic function.  · Moderate left atrial enlargement.  · The estimated ejection fraction is 60%.  · Normal left ventricular diastolic function.  · Normal right ventricular size with normal right ventricular systolic function.  · Moderate right atrial enlargement.  · Moderate-to-severe mitral regurgitation.  · Mild tricuspid regurgitation.  · There is bileaflet mitral prolapse.  · Normal central venous pressure (3 mmHg).  · The estimated PA systolic pressure is 28 mmHg.             Results for orders placed during the hospital encounter of 01/18/24    Echo    Interpretation Summary    Left Ventricle: The left ventricle is normal in size. Normal wall thickness. There is eccentric hypertrophy. Normal wall motion. There is normal systolic function with a visually estimated ejection fraction of 60 - 65%. There is normal diastolic function.    Right Ventricle: Normal right ventricular cavity size. Wall thickness is normal. Right ventricle wall motion  is normal. Systolic function is normal.    Left Atrium: Left atrium is mildly dilated.    Mitral Valve: Mildly thickened anterior leaflet. There is moderate to severe regurgitation with a centrally directed jet.    Pulmonary Artery: The estimated pulmonary artery systolic pressure is 33 mmHg.    IVC/SVC: Normal venous pressure at 3 mmHg.        Assessment:       1.  Abnormal ECG    2. Coronary artery disease of native artery of native heart with stable angina pectoris    3. Chronic anticoagulation    4. LEBLANC (dyspnea on exertion)    5. Edema of both legs    6. Elevated coronary artery calcium score    7. Essential hypertension    8. PAC (premature atrial contraction)    9. Nonrheumatic mitral valve regurgitation    10. MVP (mitral valve prolapse)    11. Exercise hypoxemia    12. Paroxysmal atrial flutter    13. Paroxysmal atrial fibrillation    14. Chronic diastolic heart failure    15. First degree AV block             Plan:             Stable CV status on current med tx.   Continue Lasix 40 mg qd.  CAD: S/p C May 2023: nonobstructive disease noted.  OMT advised.  MVP/MR:  Discussed w pt in detail.  Will need eventual valve intervention/surgery; in meantime will continue to monitor closely.  PAF/PAF: S/p ablation in 2022.  Continue Eliquis tx.   F/u w EP as directed.  Reviewed all tests and above medical conditions with patient in detail and formulated treatment plan.  Continue optimal medical treatment for cardiovascular conditions.  Cardiac low salt diet advised.  Daily exercise encouraged, with the goal 30 +  minutes aerobic exercise as tolerated.  Maintaining healthy weight and weight loss goals (if needed) were discussed in clinic.  HTN: Goal < 130/80.  Continue current HTN meds.  Lipids: increase Rosuvastatin to 20 mg qhs.  Abnl ecg: Monitor.  Leg edema: stable. Elevate legs, compression stockings, Lasix and low salt diet.  LFTs: f/u w PCP/monitor.  CRI: Monitor.        F/u in 4 months w echo.      I have reviewed all pertinent labs and cardiac studies independently.   Plans and recommendations have been formulated under my direct supervision. All questions answered and patient voiced understanding.

## 2024-06-17 ENCOUNTER — OFFICE VISIT (OUTPATIENT)
Dept: UROLOGY | Facility: CLINIC | Age: 64
End: 2024-06-17
Payer: COMMERCIAL

## 2024-06-17 ENCOUNTER — LAB VISIT (OUTPATIENT)
Dept: LAB | Facility: HOSPITAL | Age: 64
End: 2024-06-17
Attending: UROLOGY
Payer: COMMERCIAL

## 2024-06-17 VITALS
DIASTOLIC BLOOD PRESSURE: 85 MMHG | WEIGHT: 208.75 LBS | RESPIRATION RATE: 16 BRPM | HEART RATE: 57 BPM | BODY MASS INDEX: 29.96 KG/M2 | SYSTOLIC BLOOD PRESSURE: 130 MMHG

## 2024-06-17 DIAGNOSIS — R97.20 ELEVATED PSA: Primary | ICD-10-CM

## 2024-06-17 DIAGNOSIS — N40.1 BPH WITH URINARY OBSTRUCTION: ICD-10-CM

## 2024-06-17 DIAGNOSIS — R33.9 INCOMPLETE EMPTYING OF BLADDER: ICD-10-CM

## 2024-06-17 DIAGNOSIS — R97.20 ELEVATED PSA: ICD-10-CM

## 2024-06-17 DIAGNOSIS — N13.8 BPH WITH URINARY OBSTRUCTION: ICD-10-CM

## 2024-06-17 PROCEDURE — 86316 IMMUNOASSAY TUMOR OTHER: CPT | Performed by: UROLOGY

## 2024-06-17 PROCEDURE — 1159F MED LIST DOCD IN RCRD: CPT | Mod: CPTII,S$GLB,, | Performed by: UROLOGY

## 2024-06-17 PROCEDURE — 84153 ASSAY OF PSA TOTAL: CPT | Performed by: UROLOGY

## 2024-06-17 PROCEDURE — 36415 COLL VENOUS BLD VENIPUNCTURE: CPT | Performed by: UROLOGY

## 2024-06-17 PROCEDURE — 4010F ACE/ARB THERAPY RXD/TAKEN: CPT | Mod: CPTII,S$GLB,, | Performed by: UROLOGY

## 2024-06-17 PROCEDURE — 3079F DIAST BP 80-89 MM HG: CPT | Mod: CPTII,S$GLB,, | Performed by: UROLOGY

## 2024-06-17 PROCEDURE — 99214 OFFICE O/P EST MOD 30 MIN: CPT | Mod: S$GLB,,, | Performed by: UROLOGY

## 2024-06-17 PROCEDURE — 3008F BODY MASS INDEX DOCD: CPT | Mod: CPTII,S$GLB,, | Performed by: UROLOGY

## 2024-06-17 PROCEDURE — 99999 PR PBB SHADOW E&M-EST. PATIENT-LVL IV: CPT | Mod: PBBFAC,,, | Performed by: UROLOGY

## 2024-06-17 PROCEDURE — 3044F HG A1C LEVEL LT 7.0%: CPT | Mod: CPTII,S$GLB,, | Performed by: UROLOGY

## 2024-06-17 PROCEDURE — 3075F SYST BP GE 130 - 139MM HG: CPT | Mod: CPTII,S$GLB,, | Performed by: UROLOGY

## 2024-06-17 NOTE — PROGRESS NOTES
Chief Complaint:   Encounter Diagnoses   Name Primary?    Elevated PSA Yes    BPH with urinary obstruction     Incomplete emptying of bladder        HPI:  HPI Cedric Santoro bassam 64 y.o. male who presents with follow up from elevated PSA.  Patient was initially referred for evaluation of renal cysts.  During his evaluation he was noted to have BPH and incomplete emptying.  He was started on tamsulosin.  His exam revealed a greater than 60 g prostate.  He recently underwent a repeat PSA after having his last when checked back in April.  This was up.  He is here for evaluation.  He states his urinary symptoms are stable he has not having any difficulties.  He feels he is emptying his bladder well.  He is here with his daughter.    History:  Social History     Tobacco Use    Smoking status: Never    Smokeless tobacco: Never   Substance Use Topics    Alcohol use: No    Drug use: No     Past Medical History:   Diagnosis Date    Abnormal ECG 10/04/2022    Chronic diastolic heart failure 06/13/2024    Coronary artery disease of native artery of native heart with stable angina pectoris 09/06/2023    Essential hypertension 02/24/2020    First degree AV block 06/13/2024    Hypertension     MVP (mitral valve prolapse) 10/26/2022    Nonrheumatic mitral valve regurgitation 10/26/2022    Palpitation 03/05/2020    Paroxysmal atrial fibrillation 03/05/2020    Paroxysmal atrial flutter 10/04/2022    Raynaud's syndrome     Vasovagal syncopes      Past Surgical History:   Procedure Laterality Date    ABLATION, ATRIAL FLUTTER, TYPICAL N/A 12/15/2022    Procedure: Ablation, Atrial Flutter, Typical;  Surgeon: Matty Hendricks MD;  Location: [facility] EP LAB;  Service: Cardiology;  Laterality: N/A;  AFL, RFA, NICOLASA, ARIELA, RADHA mazariegos, 3 Prep    CARPAL TUNNEL RELEASE Right 04/02/2024    [facility]    CHOLECYSTECTOMY      ECHOCARDIOGRAM,TRANSESOPHAGEAL N/A 12/15/2022    Procedure: Transesophageal echo (ARIELA) intra-procedure log  documentation;  Surgeon: Nisreen Fenton MD;  Location: [facility] EP LAB;  Service: Cardiology;  Laterality: N/A;    EYE SURGERY      GALLBLADDER SURGERY      HERNIA REPAIR      LEFT HEART CATHETERIZATION Left 05/24/2023    Procedure: Left heart cath;  Surgeon: Kal Garzon MD;  Location: [facility] CATH LAB;  Service: Cardiology;  Laterality: Left;  may need to sign consent//    OPEN REDUCTION AND INTERNAL FIXATION (ORIF) OF FRACTURE OF METATARSAL BONE Left 11/06/2023    Procedure: ORIF, FRACTURE, METATARSAL BONE;  Surgeon: Aleksandr Cheek DPM;  Location: [facility] OR;  Service: Podiatry;  Laterality: Left;    THROAT SURGERY      TRANSESOPHAGEAL ECHOCARDIOGRAPHY N/A 02/27/2023    Procedure: ECHOCARDIOGRAM, TRANSESOPHAGEAL;  Surgeon: Samuel Del Valle MD;  Location: [facility] CATH LAB;  Service: Cardiology;  Laterality: N/A;    TREATMENT OF CARDIAC ARRHYTHMIA  12/15/2022    Procedure: Cardioversion or Defibrillation;  Surgeon: Matty Hendricks MD;  Location: [facility] EP LAB;  Service: Cardiology;;     Family History   Problem Relation Name Age of Onset    Cancer Mother          Multiple myeloma    Pacemaker/defibrilator Father Reyes     Macular degeneration Father Reyes     COPD Father Reyes     Hearing loss Father Reyes     Macular degeneration Brother         Current Outpatient Medications on File Prior to Visit   Medication Sig Dispense Refill    albuterol (PROVENTIL/VENTOLIN HFA) 90 mcg/actuation inhaler Inhale 2 puffs into the lungs every 4 (four) hours as needed.      apixaban (ELIQUIS) 5 mg Tab Take 1 tablet (5 mg total) by mouth 2 (two) times daily. 60 tablet 6    atenoloL (TENORMIN) 25 MG tablet Take 1 tablet (25 mg total) by mouth once daily. 90 tablet 3    b complex vitamins tablet Take 1 tablet by mouth once daily.      baclofen (LIORESAL) 10 MG tablet Take 1 tablet (10 mg total) by mouth 2 (two) times daily. 60 tablet 2    co-enzyme Q-10 30 mg capsule Take 30 mg by mouth once daily.      DULoxetine (CYMBALTA) 20 MG  capsule Take 1 capsule (20 mg total) by mouth once daily. 30 capsule 6    fluticasone-umeclidin-vilanter (TRELEGY ELLIPTA) 200-62.5-25 mcg inhaler Inhale 1 puff into the lungs once daily. 180 each 3    furosemide (LASIX) 40 MG tablet Take 1 tablet (40 mg total) by mouth once daily. 90 tablet 5    inhalation spacing device (BREATHERITE MDI SPACER) Use as directed for inhalation. 1 each 0    levothyroxine (SYNTHROID) 50 MCG tablet Take 1 tablet (50 mcg total) by mouth every Mon, Tues, Wed, Thurs, Fri. 66 tablet 1    losartan (COZAAR) 50 MG tablet Take 1 tablet (50 mg total) by mouth once daily. 90 tablet 3    magnesium 30 mg Tab Take 1 tablet by mouth once daily.      multivitamin (THERAGRAN) per tablet Take 1 tablet by mouth once daily.      rosuvastatin (CRESTOR) 20 MG tablet Take 1 tablet (20 mg total) by mouth every evening. 90 tablet 3    tamsulosin (FLOMAX) 0.4 mg Cap Take 1 capsule (0.4 mg total) by mouth once daily. 90 capsule 3    vitamin D (VITAMIN D3) 1000 units Tab Take 1,000 Units by mouth once daily.       No current facility-administered medications on file prior to visit.        Objective:     Vitals:    06/17/24 1007   BP: 130/85   Pulse: (!) 57   Resp: 16   Weight: 94.7 kg (208 lb 12.4 oz)      BMI Readings from Last 1 Encounters:   06/17/24 29.96 kg/m²          Physical Exam  No acute distress alert and oriented   Respirations even unlabored   Abdomen is soft nontender    Postvoid residual was 50 cc  Lab Results   Component Value Date    PSADIAG 3.7 04/12/2024    PSA 4.2 (H) 05/29/2024    PSA 3.1 04/19/2023    PSA 3.0 04/12/2022    PSA 2.4 02/05/2020        Lab Results   Component Value Date    CREATININE 1.5 (H) 05/29/2024      Assessment:       1. Elevated PSA    2. BPH with urinary obstruction    3. Incomplete emptying of bladder        Plan:     1. Elevated PSA    2. BPH with urinary obstruction    3. Incomplete emptying of bladder       Orders Placed This Encounter    PROSTATE HEALTH INDEX  (phi)    Prostate Specific Antigen, Diagnostic      BPH with incomplete emptying.  Patient was stable on tamsulosin.  PSA has slowly risen over the past year.  I discussed options.  We will check a prostate health index.  If this shows low risk we will proceed with a continued observation.  If the risk is higher consider MRI of his prostate.  We will go ahead and get this scheduled and notify him with the results and follow up.

## 2024-06-18 LAB
-2 PROPSA (PHI): 10.6 PG/ML
FREE PSA (PHI): 1.2 NG/ML
PROSTATE HEALTH INDEX VALUE (PHI): 18.1
PSA FREE MFR SERPL: 29 %
PSA SERPL-MCNC: 4.2 NG/ML

## 2024-06-19 ENCOUNTER — PATIENT MESSAGE (OUTPATIENT)
Dept: UROLOGY | Facility: CLINIC | Age: 64
End: 2024-06-19
Payer: COMMERCIAL

## 2024-06-24 ENCOUNTER — PATIENT MESSAGE (OUTPATIENT)
Dept: INTERNAL MEDICINE | Facility: CLINIC | Age: 64
End: 2024-06-24
Payer: COMMERCIAL

## 2024-06-24 DIAGNOSIS — E03.9 HYPOTHYROIDISM, UNSPECIFIED TYPE: ICD-10-CM

## 2024-06-24 RX ORDER — LEVOTHYROXINE SODIUM 50 UG/1
TABLET ORAL
Qty: 66 TABLET | Refills: 3 | Status: SHIPPED | OUTPATIENT
Start: 2024-06-24

## 2024-06-24 NOTE — TELEPHONE ENCOUNTER
Refill Decision Note   Cedric Maude  is requesting a refill authorization.  Brief Assessment and Rationale for Refill:  Approve     Medication Therapy Plan:         Comments:     Note composed:2:54 AM 06/24/2024

## 2024-06-24 NOTE — TELEPHONE ENCOUNTER
No care due was identified.  [facility] Embedded Care Due Messages. Reference number: [id number].   6/24/2024 12:19:09 AM CDT

## 2024-07-06 ENCOUNTER — PATIENT MESSAGE (OUTPATIENT)
Dept: INTERNAL MEDICINE | Facility: CLINIC | Age: 64
End: 2024-07-06
Payer: COMMERCIAL

## 2024-07-08 ENCOUNTER — TELEPHONE (OUTPATIENT)
Dept: PULMONOLOGY | Facility: CLINIC | Age: 64
End: 2024-07-08
Payer: COMMERCIAL

## 2024-07-08 ENCOUNTER — HOSPITAL ENCOUNTER (OUTPATIENT)
Dept: RADIOLOGY | Facility: HOSPITAL | Age: 64
Discharge: HOME | End: 2024-07-08
Attending: PHYSICIAN ASSISTANT
Payer: COMMERCIAL

## 2024-07-08 ENCOUNTER — OFFICE VISIT (OUTPATIENT)
Dept: PULMONOLOGY | Facility: CLINIC | Age: 64
End: 2024-07-08
Attending: PHYSICIAN ASSISTANT
Payer: COMMERCIAL

## 2024-07-08 VITALS
HEIGHT: 70 IN | DIASTOLIC BLOOD PRESSURE: 74 MMHG | OXYGEN SATURATION: 92 % | SYSTOLIC BLOOD PRESSURE: 120 MMHG | RESPIRATION RATE: 20 BRPM | HEART RATE: 92 BPM | BODY MASS INDEX: 29.7 KG/M2 | WEIGHT: 207.44 LBS

## 2024-07-08 DIAGNOSIS — J43.9 MIXED RESTRICTIVE AND OBSTRUCTIVE LUNG DISEASE: Primary | ICD-10-CM

## 2024-07-08 DIAGNOSIS — J98.4 MIXED RESTRICTIVE AND OBSTRUCTIVE LUNG DISEASE: Primary | ICD-10-CM

## 2024-07-08 DIAGNOSIS — G47.19 EXCESSIVE DAYTIME SLEEPINESS: ICD-10-CM

## 2024-07-08 DIAGNOSIS — I38 VALVULAR HEART DISEASE: ICD-10-CM

## 2024-07-08 DIAGNOSIS — R91.8 LUNG NODULES: ICD-10-CM

## 2024-07-08 DIAGNOSIS — R29.818 SUSPECTED SLEEP APNEA: ICD-10-CM

## 2024-07-08 DIAGNOSIS — R06.83 SNORING: ICD-10-CM

## 2024-07-08 DIAGNOSIS — R09.02 EXERCISE HYPOXEMIA: ICD-10-CM

## 2024-07-08 DIAGNOSIS — J43.9 MIXED RESTRICTIVE AND OBSTRUCTIVE LUNG DISEASE: ICD-10-CM

## 2024-07-08 DIAGNOSIS — J98.4 MIXED RESTRICTIVE AND OBSTRUCTIVE LUNG DISEASE: ICD-10-CM

## 2024-07-08 PROCEDURE — 3008F BODY MASS INDEX DOCD: CPT | Mod: CPTII,S$GLB,, | Performed by: INTERNAL MEDICINE

## 2024-07-08 PROCEDURE — 99999 PR PBB SHADOW E&M-EST. PATIENT-LVL IV: CPT | Mod: PBBFAC,,, | Performed by: INTERNAL MEDICINE

## 2024-07-08 PROCEDURE — 3044F HG A1C LEVEL LT 7.0%: CPT | Mod: CPTII,S$GLB,, | Performed by: INTERNAL MEDICINE

## 2024-07-08 PROCEDURE — 3074F SYST BP LT 130 MM HG: CPT | Mod: CPTII,S$GLB,, | Performed by: INTERNAL MEDICINE

## 2024-07-08 PROCEDURE — 1159F MED LIST DOCD IN RCRD: CPT | Mod: CPTII,S$GLB,, | Performed by: INTERNAL MEDICINE

## 2024-07-08 PROCEDURE — 4010F ACE/ARB THERAPY RXD/TAKEN: CPT | Mod: CPTII,S$GLB,, | Performed by: INTERNAL MEDICINE

## 2024-07-08 PROCEDURE — 71250 CT THORAX DX C-: CPT | Mod: 26,,, | Performed by: RADIOLOGY

## 2024-07-08 PROCEDURE — 99214 OFFICE O/P EST MOD 30 MIN: CPT | Mod: S$GLB,,, | Performed by: INTERNAL MEDICINE

## 2024-07-08 PROCEDURE — 71250 CT THORAX DX C-: CPT | Mod: TC

## 2024-07-08 PROCEDURE — 1160F RVW MEDS BY RX/DR IN RCRD: CPT | Mod: CPTII,S$GLB,, | Performed by: INTERNAL MEDICINE

## 2024-07-08 PROCEDURE — 3078F DIAST BP <80 MM HG: CPT | Mod: CPTII,S$GLB,, | Performed by: INTERNAL MEDICINE

## 2024-07-08 NOTE — PATIENT INSTRUCTIONS
No eating / drinking for 3 hours before going to bed.  Elevate head of bed 30 - 45 %     CPAP HABITUATION PROCEDURE     Félix Liz, Ph.D., [facility] and Mahendra Wasserman M.D.  [facility], [facility]     Some people have difficulty adjusting to CPAP/BiPAP/AutoCPAP.  This is not unusual or hard to understand: Breathing with CPAP is different from ordinary breathing, and this difference is aversive to some. The problem can be overcome, however, and the benefits CPAP confers are certainly worth the effort.  Below, you will find a simple and gradual way to get used to CPAP before you try to use it all night, every night.  The essence of this procedure is to relax and let breathing with CPAP become a habit.  It may take about 2 weeks, and involves the following:      CPAP while awake and comfortably seated, during the late evening.     CPAP in bed while attempting sleep at night.     If your discomfort is too great at any time, discontinue and attempt again later the same night, for the same amount of time.   You and your physician may alter the times and pressures if necessary.     If you find that it is very easy to get used to CPAP, you may start using it every night when you are comfortable enough to do so.  IMPORTANT REMINDER: If you have a cold or sinus congestion it is okay to miss a night or two of CPAP. Consider using antihistamines or decongestants to clear up your sinus congestion prior to sleeping.     DAYS  1-3   Start CPAP while awake and comfortably seated during the late evening, after having prepared for bed.  You may do this while watching television, listening to music or reading. Use for 1 hour, then take off CPAP and go directly to bed to sleep     DAYS  4-6     Start CPAP when you go to bed and use for 1 hour, or until you fall asleep.  If your discomfort is too great at any time, discontinue and attempt again later the same night, for the same designated  amount of time (1 hour).      DAYS  7-9     Increase time with CPAP to 2 hours a night.  If your discomfort is too great at any time, discontinue and attempt again later the same night, for the same designated amount of time (2 hours).      DAYS 10-12    Increase time with CPAP to 3 hours a night. If your discomfort is too great at any time, discontinue and attempt again later the same night, for the same designated amount of time (3 hours).      DAYS 13-15     Sleep the entire night with CPAP.      OPTIONAL: You may use Progressive Muscle Relaxation (PMR) to help put you at ease when using CPAP; do PMR twice each day, once in the morning or afternoon, and once in the evening just before using CPAP. You may do PMR prior to any attempt until you are comfortable with CPAP.        Continuous Positive Air Pressure (CPAP)  Continuous positive air pressure (CPAP) uses gentle air pressure to hold the airway open. CPAP is often the most effective treatment for sleep apnea and severe snoring. It works very well for many people. But keep in mind that it can take several adjustments before the setup is right for you.       How CPAP Works  CPAP is a small portable pump beside the bed. The pump sends air through a hose, which is held over your nose and/or mouth by a mask. Mild air pressure is gently pushed through your airway. The air pressure nudges sagging tissues aside. This widens the airway so you can breathe better. CPAP may be combined with other kinds of therapy for sleep apnea.       Types of Air Pressure Treatments  There are different types of CPAP. Your doctor or CPAP technician will help you decide which type is best for you:  Basic CPAP keeps the pressure constant all night long.  A bilevel device (BiPAP) provides more pressure when you breathe in and less when you breathe out. A BiPAP machine also may be set to provide automatic breaths to maintain breathing if you stop breathing while sleeping.  An autoCPAP  device automatically adjusts pressure throughout the night and in response to changes such as body position, sleep stage, and snoring.  © 0833-1706 [employer]. [address] [place] [address]. All rights reserved. This information is not intended as a substitute for professional medical care. Always follow your healthcare professional's instructions.        Snoring and Sleep Apnea: Notes for a Partner  Snoring and sleep apnea affect your life, as well as your partners. You can help in the treatment of the problem. Be supportive. Encourage your partner both to get treatment and to make the adjustments needed to follow through.       Adjusting to Changes  Your partners treatment may involve making changes to certain life habits. You can help your partner make and stick with these changes. For example:  Support and even join your partners exercise program.  Be supportive if your partner gets CPAP (continuous positive airway pressure). He or she may feel self-conscious at first. Remind your partner to expect adjustments to CPAP before it feels just right.  Consider joining a snoring and sleep apnea support group.  Go Along to See the Health Care Provider  You can give the health care provider the best account of your partners nighttime breathing and snoring patterns. Try to go along to health care providers appointments. If you cant go, write notes for your partner to give to the health care provider. Describe your partners snoring and sleep breathing patterns in detail.  Tips for Sleeping with a Snorer  Until treatment takes care of your partners snoring:  Try to go to bed first. It may help if youre already asleep when your partner starts to snore.  Wear earplugs to bed. A fan or other source of background noise may also help drown out snoring.   © 0671-8357 [employer]. [address] [place] [address]. All rights reserved. This information is not intended as a  substitute for professional medical care. Always follow your healthcare professional's instructions.        Continuous Positive Airway Pressure (CPAP)  Your health care provider has prescribed continuous positive airway pressure (CPAP) therapy for you. A CPAP device helps you breathe better at night. The device delivers air through your nose or mouth when you breathe in to keep your air passages open. CPAP is:  Used most often to treat sleep apnea and some other problems (Sleep apnea is a chronic condition with periods of sleep in which you briefly stop breathing.)  Safe and very effective, but it takes time to get used to the mask.   Your health care provider, nurse, or medical supplier will give you tips for wearing and caring for your CPAP device.  General guidelines  It's very important not to give up! It takes time to get used to wearing the mask at night.  Practice using your CPAP device during the day, especially whenever you take a nap.  Remember, there are several different types of masks. If you cant get used to your mask, ask your provider or medical supply company about trying another style.  If you have nasal stuffiness or dryness when using your CPAP device, talk with your provider or medical supply company. There are ways to lessen these problems. For example, your provider may recommend moistening nasal spray or the medical supply company may recommend a device with a humidifier.  The goal is to use your CPAP all night, every night, during all naps, and even when you travel.  Keep your mask clean. Wash it with soap and water. Be sure to rinse the mask and tubing well with water to remove any soap. Let them air-dry thoroughly before using.  Make yourself comfortable when sleeping with CPAP. Try using extra pillows.  Work with your medical supply company so that you know how to correctly use your CPAP. Their representative will be able to help you:  Use the CPAP correctly  Troubleshoot any problems  that come up  Learn to clean and maintain the device  Adjust to regular use of the CPAP  © 5952-6355 [employer]. [address] [place] [address], [place] [address]. All rights reserved. This information is not intended as a substitute for professional medical care. Always follow your healthcare professional's instructions.

## 2024-07-08 NOTE — PROGRESS NOTES
Pulmonary Outpatient Follow Up Visit     Subjective:    This patient is new to me   Patient ID: Cedric Santoro is a 64 y.o. male.    Chief Complaint: Pulmonary Nodules      HPI        64-year-old male patient presenting for follow-up.      Initially evaluated by nurse practitioner for restrictive lung disorder.  He has a history of moderate to severe mitral valve disease.  Diastolic heart failure.  Ejection fraction 55-60% January 20, 2024.          Worked in [employer] , not a smoker denies personal family history of autoimmune disorders.      Snoring reported.  [place] Sleepiness Scale score 20.  Takes naps of 1-2 hours a day.      STOP - BANG Questionnaire:     1. Snoring : Do you snore loudly ?    Yes    2. Tired : Do you often feel tired, fatigued, or sleepy during daytime? Yes    3. Observed: Has anyone observed you stop breathing during your sleep?   No     4. Blood pressure : Do you have or are you being treated for high blood pressure?   Yes    5. BMI :BMI more than 35 kg/m2?   No    6. Age : Age over 50 yr old?   Yes    7. Neck circumference:   For male, is your shirt collar 17 inches / 43cm or larger?  For female, is your shirt collar 16 inches / 41cm or larger?    No    8. Gender: Gender male?   Yes    STOP BANG SCORE 5    High risk of JIA: Yes 5 - 8  Intermediate risk of JIA: Yes 3 - 4  Low risk of JIA: Yes 0 - 2      References:   STOP Questionnaire   A Tool to Screen Patients for Obstructive Sleep Apnea: NISHANT HernandezR.C.P.C., MARU Boswell.B.B.S., Lamont Miguel M.D.,Fernanda Arce, Ph.D., MARU Villalobos.B.B.S.,_ MARU Goldberg.Sc.,_ Paula Vaughan M.D., Jamie Taylor, F.R.C.P.C.; Anesthesiology 2008; 108:812-21 Copyright © 2008, the American Society of Anesthesiologists, Inc. [employer]    Review of Systems   Constitutional:  Positive for fatigue.   Respiratory:  Positive for snoring, shortness of breath  and dyspnea on extertion.    Psychiatric/Behavioral:  Positive for sleep disturbance.        Outpatient Encounter Medications as of 7/8/2024   Medication Sig Dispense Refill    albuterol (PROVENTIL/VENTOLIN HFA) 90 mcg/actuation inhaler Inhale 2 puffs into the lungs every 4 (four) hours as needed.      apixaban (ELIQUIS) 5 mg Tab Take 1 tablet (5 mg total) by mouth 2 (two) times daily. 60 tablet 6    atenoloL (TENORMIN) 25 MG tablet Take 1 tablet (25 mg total) by mouth once daily. 90 tablet 3    b complex vitamins tablet Take 1 tablet by mouth once daily.      baclofen (LIORESAL) 10 MG tablet Take 1 tablet (10 mg total) by mouth 2 (two) times daily. 60 tablet 2    co-enzyme Q-10 30 mg capsule Take 30 mg by mouth once daily.      DULoxetine (CYMBALTA) 20 MG capsule Take 1 capsule (20 mg total) by mouth once daily. 30 capsule 6    fluticasone-umeclidin-vilanter (TRELEGY ELLIPTA) 200-62.5-25 mcg inhaler Inhale 1 puff into the lungs once daily. 180 each 3    furosemide (LASIX) 40 MG tablet Take 1 tablet (40 mg total) by mouth once daily. 90 tablet 5    inhalation spacing device (BREATHERITE MDI SPACER) Use as directed for inhalation. 1 each 0    levothyroxine (SYNTHROID) 50 MCG tablet TAKE 1 TABLET BY MOUTH ONCE A DAY ON MONDAY, TUESDAY, WEDNESDAY, THURSDAY AND FRIDAY 66 tablet 3    losartan (COZAAR) 50 MG tablet Take 1 tablet (50 mg total) by mouth once daily. 90 tablet 3    magnesium 30 mg Tab Take 1 tablet by mouth once daily.      multivitamin (THERAGRAN) per tablet Take 1 tablet by mouth once daily.      rosuvastatin (CRESTOR) 20 MG tablet Take 1 tablet (20 mg total) by mouth every evening. 90 tablet 3    tamsulosin (FLOMAX) 0.4 mg Cap Take 1 capsule (0.4 mg total) by mouth once daily. 90 capsule 3    vitamin D (VITAMIN D3) 1000 units Tab Take 1,000 Units by mouth once daily.      [DISCONTINUED] levothyroxine (SYNTHROID) 50 MCG tablet Take 1 tablet (50 mcg total) by mouth every Mon, Tues, Wed, Thurs, Fri. 66 tablet  "1    [DISCONTINUED] rosuvastatin (CRESTOR) 10 MG tablet Take 1 tablet (10 mg total) by mouth every evening. 90 tablet 3     No facility-administered encounter medications on file as of 7/8/2024.       Objective:     Vital Signs (Most Recent)  Vital Signs  Pulse: 92  Resp: 20  SpO2: (!) 92 %  BP: 120/74  Height and Weight  Height: 5' 10" (177.8 cm)  Weight: 94.1 kg (207 lb 7.3 oz)  BSA (Calculated - sq m): 2.16 sq meters  BMI (Calculated): 29.8  Weight in (lb) to have BMI = 25: 173.9]  Wt Readings from Last 2 Encounters:   07/08/24 94.1 kg (207 lb 7.3 oz)   06/17/24 94.7 kg (208 lb 12.4 oz)       Physical Exam   HENT:   Mouth/Throat: Mallampati Score: III.   Cardiovascular: Normal rate.   Pulmonary/Chest: Normal expansion and effort normal. He has decreased breath sounds.   Psychiatric: He has a normal mood and affect. His behavior is normal. Judgment and thought content normal.       Laboratory  Lab Results   Component Value Date    WBC 4.38 05/29/2024    RBC 4.50 (L) 05/29/2024    HGB 14.3 05/29/2024    HCT 42.0 05/29/2024    MCV 93 05/29/2024    MCH 31.8 (H) 05/29/2024    MCHC 34.0 05/29/2024    RDW 12.2 05/29/2024     05/29/2024    MPV 10.7 05/29/2024    GRAN 2.7 02/05/2024    GRAN 59.2 02/05/2024    LYMPH 1.1 02/05/2024    LYMPH 25.1 02/05/2024    MONO 0.5 02/05/2024    MONO 10.4 02/05/2024    EOS 0.2 02/05/2024    BASO 0.06 02/05/2024    EOSINOPHIL 3.8 02/05/2024    BASOPHIL 1.3 02/05/2024       BMP  Lab Results   Component Value Date     05/29/2024    K 4.0 05/29/2024    CL 98 05/29/2024    CO2 31 (H) 05/29/2024    BUN 20 05/29/2024    CREATININE 1.5 (H) 05/29/2024    CALCIUM 9.7 05/29/2024    ANIONGAP 11 05/29/2024    ESTGFRAFRICA >60.0 04/12/2022    EGFRNONAA >60.0 04/12/2022    AST 59 (H) 05/29/2024    ALT 34 05/29/2024    PROT 6.9 05/29/2024       Lab Results   Component Value Date    BNP 27 12/25/2023    BNP 17 11/13/2023    BNP 36 02/09/2023    BNP 95 10/04/2022       Lab Results   Component " "Value Date    TSH 3.689 05/29/2024       Lab Results   Component Value Date    SEDRATE 3 05/08/2024       Lab Results   Component Value Date    CRP 2.2 05/08/2024     No results found for: "IGE"     No results found for: "ASPERGILLUS"  No results found for: "AFUMIGATUSCL"     Lab Results   Component Value Date    ACE 39 05/08/2024        Diagnostic Results:  I have personally reviewed today the following studies:                  Assessment/Plan:   Mixed restrictive and obstructive lung disease  -     MERLINE; Future; Expected date: 07/08/2024  -     Rheumatoid Factor; Future; Expected date: 07/08/2024  -     Sedimentation rate; Future; Expected date: 07/08/2024  -     C-REACTIVE PROTEIN; Future; Expected date: 07/08/2024    Exercise hypoxemia  -     B-TYPE NATRIURETIC PEPTIDE; Future; Expected date: 07/08/2024    Lung nodules  -     MERLINE; Future; Expected date: 07/08/2024  -     Rheumatoid Factor; Future; Expected date: 07/08/2024  -     Sedimentation rate; Future; Expected date: 07/08/2024  -     C-REACTIVE PROTEIN; Future; Expected date: 07/08/2024    Valvular heart disease  -     B-TYPE NATRIURETIC PEPTIDE; Future; Expected date: 07/08/2024    Suspected sleep apnea  -     Home Sleep Study; Future    Snoring  -     Home Sleep Study; Future    Excessive daytime sleepiness  -     Home Sleep Study; Future      Check BNP.  Maximize cardiac therapy.  Follow up with Cardiology regarding valve repair/surgery.      Rule out JIA.  Home sleep study ordered.  CPAP recommendation to follow.    Restriction /decreased DLCO noted is likely related to pulmonary edema atelectasis and pulmonary hypertension due to valvular heart disease.  Basic rheumatology workup was negative.        Follow up in about 3 months (around 10/8/2024).    This note was prepared using voice recognition system and is likely to have sound alike errors that may have been overlooked even after proof reading.  Please call me with any questions    Discussed " diagnosis, its evaluation, treatment and usual course. All questions answered.      Chichi Allred MD

## 2024-07-08 NOTE — TELEPHONE ENCOUNTER
----- Message from Saima Hernandez sent at 7/8/2024  3:05 PM CDT -----  [facility]

## 2024-07-21 NOTE — PROGRESS NOTES
"Subjective:      Patient ID: Cedric Santoro is a 64 y.o. male.    Chief Complaint:  " Legs cramps ".     HPI 64 Years old C. Male with PMHx of PAF / Chronic HF and others Medical issues   Came with Sister in Law for the evaluation and recommendation of " Legs cramps ".      Started: 20 years at least.   Describes: cramps and Legs movements waking Him up at night.   Timing: intermittent - few minutes.   Frequency: daily.   Pain:  2 to 5/ 10  Location: Legs muscles.   Family: none with above issues.   Medications: Crestor / Cymbalta / Eliquis.   Worsen: dehydration / night.   Alleviated: Baclofen.   Associated symptoms: fatigue / painful cramps of Legs muscles.   Triggers: dehydration.   Prodrome symptoms: none.         Referral by PCP.       Crestor - has been on it for a year or so.       Review of Systems  Review of Systems   Neurological:         Cramps.    All other systems reviewed and are negative.    Objective:     Neurologic Exam     Mental Status   Oriented to person, place, and time.   Registration: recalls 3 of 3 objects. Recall at 5 minutes: recalls 3 of 3 objects. Follows 3 step commands.   Attention: normal. Concentration: normal.   Speech: speech is normal   Level of consciousness: alert  Knowledge: good. Able to perform simple calculations.   Able to name object. Able to read. Able to repeat. Able to write. Normal comprehension.     Cranial Nerves   Cranial nerves II through XII intact.     CN III, IV, VI   Pupils are equal, round, and reactive to light.    Motor Exam   Muscle bulk: normal  Overall muscle tone: normal    Strength   Strength 5/5 throughout.   Right neck flexion: 5/5  Left neck flexion: 5/5  Right neck extension: 5/5  Left neck extension: 5/5  Right deltoid: 5/5  Left deltoid: 5/5  Right biceps: 5/5  Left biceps: 5/5  Right triceps: 5/5  Left triceps: 5/5  Right wrist flexion: 5/5  Left wrist flexion: 5/5  Right wrist extension: 5/5  Left wrist extension: 5/5  Right interossei: " 5/5  Left interossei: 5/5  Right abdominals: 5/5  Left abdominals: 5/5  Right iliopsoas: 5/5  Left iliopsoas: 5/5  Right quadriceps: 5/5  Left quadriceps: 5/5  Right hamstrin/5  Left hamstrin/5  Right glutei: 5/5  Left glutei: 5/5  Right anterior tibial: 5/5  Left anterior tibial: 5/5  Right posterior tibial: 5/5  Left posterior tibial: 5/5  Right peroneal: 5/5  Left peroneal: 5/5  Right gastroc: 5/5  Left gastroc: 5/5    Sensory Exam   Light touch normal.   Vibration normal.   Proprioception normal.   Pinprick normal.   Graphesthesia: normal  Stereognosis: normal    Gait, Coordination, and Reflexes     Gait  Gait: normal    Coordination   Romberg: negative  Finger to nose coordination: normal  Heel to shin coordination: normal  Tandem walking coordination: normal    Tremor   Resting tremor: absent  Intention tremor: absent  Action tremor: absent    Reflexes   Right brachioradialis: 2+  Left brachioradialis: 2+  Right biceps: 2+  Left biceps: 2+  Right triceps: 2+  Left triceps: 2+  Right patellar: 2+  Left patellar: 2+  Right achilles: 2+  Left achilles: 2+  Right : 2+  Left : 2+  Right plantar: normal  Left plantar: normal  Right Leary: absent  Left Leary: absent  Right ankle clonus: absent  Left ankle clonus: absent  Right pendular knee jerk: absent  Left pendular knee jerk: absent      Physical Exam  Vitals and nursing note reviewed.   Constitutional:       Appearance: Normal appearance. He is normal weight.   HENT:      Head: Normocephalic and atraumatic.      Right Ear: Tympanic membrane normal.      Left Ear: Tympanic membrane normal.      Nose: Nose normal.      Mouth/Throat:      Mouth: Mucous membranes are moist.      Pharynx: Oropharynx is clear.   Eyes:      Extraocular Movements: Extraocular movements intact.      Conjunctiva/sclera: Conjunctivae normal.      Pupils: Pupils are equal, round, and reactive to light.   Cardiovascular:      Rate and Rhythm: Normal rate and regular  "rhythm.      Pulses: Normal pulses.   Pulmonary:      Effort: Pulmonary effort is normal.      Breath sounds: Normal breath sounds.   Abdominal:      General: Abdomen is flat. Bowel sounds are normal.      Palpations: Abdomen is soft.   Genitourinary:     Comments: Deferred.   Musculoskeletal:         General: Normal range of motion.      Cervical back: Normal range of motion and neck supple.   Skin:     General: Skin is warm and dry.      Capillary Refill: Capillary refill takes less than 2 seconds.   Neurological:      Mental Status: He is alert and oriented to person, place, and time. Mental status is at baseline.      Cranial Nerves: Cranial nerves 2-12 are intact.      Motor: Motor strength is normal.     Coordination: Finger-Nose-Finger Test, Heel to Shin Test and Romberg Test normal.      Gait: Gait is intact. Tandem walk normal.      Deep Tendon Reflexes:      Reflex Scores:       Tricep reflexes are 2+ on the right side and 2+ on the left side.       Bicep reflexes are 2+ on the right side and 2+ on the left side.       Brachioradialis reflexes are 2+ on the right side and 2+ on the left side.       Patellar reflexes are 2+ on the right side and 2+ on the left side.       Achilles reflexes are 2+ on the right side and 2+ on the left side.  Psychiatric:         Mood and Affect: Mood normal.         Speech: Speech normal.         Behavior: Behavior normal.         Thought Content: Thought content normal.         Judgment: Judgment normal.       Assessment:   64 Years old C. Male with PMHX as above came of the evaluation of " Legs cramps ".   - Legs cramps.   - PLM.   - Right Eye Ptosis.   - Hypothyroidism.   Plan:   Patient Neurological Assessment is non focal.   Hydration at all time.     Crestor ?    MG: Acetylcholine antibodies.     NCS/ EMG.     Refill Baclofen.    Home Sleep study - PLM ? - pending study.     Labs: ESR / CRP / Hgb A 1 C / TSH / CBC / CMP / Lipids panel - 2024 - non significant " abnormalities.     Please do not hesitate to contact me with any updates, questions or concerns.    Clyde Nichols MD.  General Neurologist.

## 2024-07-22 ENCOUNTER — PATIENT MESSAGE (OUTPATIENT)
Dept: INTERNAL MEDICINE | Facility: CLINIC | Age: 64
End: 2024-07-22
Payer: COMMERCIAL

## 2024-07-23 PROCEDURE — 95806 SLEEP STUDY UNATT&RESP EFFT: CPT | Performed by: INTERNAL MEDICINE

## 2024-07-24 ENCOUNTER — TELEPHONE (OUTPATIENT)
Dept: NEUROLOGY | Facility: CLINIC | Age: 64
End: 2024-07-24
Payer: COMMERCIAL

## 2024-07-24 ENCOUNTER — LAB VISIT (OUTPATIENT)
Dept: LAB | Facility: HOSPITAL | Age: 64
End: 2024-07-24
Attending: PSYCHIATRY & NEUROLOGY
Payer: COMMERCIAL

## 2024-07-24 ENCOUNTER — OFFICE VISIT (OUTPATIENT)
Dept: NEUROLOGY | Facility: CLINIC | Age: 64
End: 2024-07-24
Payer: COMMERCIAL

## 2024-07-24 VITALS
DIASTOLIC BLOOD PRESSURE: 77 MMHG | BODY MASS INDEX: 29.73 KG/M2 | WEIGHT: 207.69 LBS | HEIGHT: 70 IN | SYSTOLIC BLOOD PRESSURE: 127 MMHG | OXYGEN SATURATION: 99 % | HEART RATE: 55 BPM | RESPIRATION RATE: 16 BRPM

## 2024-07-24 DIAGNOSIS — H02.401 PTOSIS OF RIGHT EYELID: ICD-10-CM

## 2024-07-24 DIAGNOSIS — R25.2 BILATERAL LEG CRAMPS: Primary | ICD-10-CM

## 2024-07-24 PROCEDURE — 36415 COLL VENOUS BLD VENIPUNCTURE: CPT | Performed by: PSYCHIATRY & NEUROLOGY

## 2024-07-24 PROCEDURE — 83516 IMMUNOASSAY NONANTIBODY: CPT | Performed by: PSYCHIATRY & NEUROLOGY

## 2024-07-24 PROCEDURE — 4010F ACE/ARB THERAPY RXD/TAKEN: CPT | Mod: CPTII,S$GLB,, | Performed by: PSYCHIATRY & NEUROLOGY

## 2024-07-24 PROCEDURE — 1159F MED LIST DOCD IN RCRD: CPT | Mod: CPTII,S$GLB,, | Performed by: PSYCHIATRY & NEUROLOGY

## 2024-07-24 PROCEDURE — 1160F RVW MEDS BY RX/DR IN RCRD: CPT | Mod: CPTII,S$GLB,, | Performed by: PSYCHIATRY & NEUROLOGY

## 2024-07-24 PROCEDURE — 99204 OFFICE O/P NEW MOD 45 MIN: CPT | Mod: S$GLB,,, | Performed by: PSYCHIATRY & NEUROLOGY

## 2024-07-24 PROCEDURE — 83516 IMMUNOASSAY NONANTIBODY: CPT | Mod: 59 | Performed by: PSYCHIATRY & NEUROLOGY

## 2024-07-24 PROCEDURE — 95806 SLEEP STUDY UNATT&RESP EFFT: CPT

## 2024-07-24 PROCEDURE — 99999 PR PBB SHADOW E&M-EST. PATIENT-LVL V: CPT | Mod: PBBFAC,,, | Performed by: PSYCHIATRY & NEUROLOGY

## 2024-07-24 PROCEDURE — 86041 ACETYLCHOLN RCPTR BNDNG ANTB: CPT | Performed by: PSYCHIATRY & NEUROLOGY

## 2024-07-24 PROCEDURE — 3078F DIAST BP <80 MM HG: CPT | Mod: CPTII,S$GLB,, | Performed by: PSYCHIATRY & NEUROLOGY

## 2024-07-24 PROCEDURE — 3074F SYST BP LT 130 MM HG: CPT | Mod: CPTII,S$GLB,, | Performed by: PSYCHIATRY & NEUROLOGY

## 2024-07-24 PROCEDURE — 3008F BODY MASS INDEX DOCD: CPT | Mod: CPTII,S$GLB,, | Performed by: PSYCHIATRY & NEUROLOGY

## 2024-07-24 PROCEDURE — 3044F HG A1C LEVEL LT 7.0%: CPT | Mod: CPTII,S$GLB,, | Performed by: PSYCHIATRY & NEUROLOGY

## 2024-07-24 RX ORDER — BACLOFEN 10 MG/1
10 TABLET ORAL 2 TIMES DAILY
Qty: 180 TABLET | Refills: 1 | Status: SHIPPED | OUTPATIENT
Start: 2024-07-24

## 2024-07-24 NOTE — TELEPHONE ENCOUNTER
Yolanda pt and let him know he can still come in due our 9am patient being seen early. He said he is on the way and will be here by 9. I verbalized understanding.

## 2024-07-24 NOTE — TELEPHONE ENCOUNTER
----- Message from Bella Corbett sent at 7/24/2024  8:24 AM CDT -----  Contact: Patient, [phone]  Calling to reschedule his appointment, he went to [facility]. Please call him. Thanks.

## 2024-07-26 ENCOUNTER — HOSPITAL ENCOUNTER (OUTPATIENT)
Dept: SLEEP MEDICINE | Facility: HOSPITAL | Age: 64
Discharge: HOME | End: 2024-07-26
Attending: INTERNAL MEDICINE
Payer: COMMERCIAL

## 2024-07-26 DIAGNOSIS — R06.83 SNORING: ICD-10-CM

## 2024-07-26 DIAGNOSIS — R29.818 SUSPECTED SLEEP APNEA: ICD-10-CM

## 2024-07-26 DIAGNOSIS — G47.33 OSA (OBSTRUCTIVE SLEEP APNEA): Primary | ICD-10-CM

## 2024-07-26 DIAGNOSIS — G47.19 EXCESSIVE DAYTIME SLEEPINESS: ICD-10-CM

## 2024-07-26 LAB — ACHR BIND AB SER-SCNC: 0 NMOL/L

## 2024-07-26 PROCEDURE — 95806 SLEEP STUDY UNATT&RESP EFFT: CPT | Performed by: INTERNAL MEDICINE

## 2024-07-27 LAB — ACHR BLOCK AB/ACHR TOTAL SFR SER: 0 % (ref 0–26)

## 2024-07-28 LAB — ACHR MOD AB/ACHR TOTAL SFR SER: 0 %

## 2024-07-29 ENCOUNTER — TELEPHONE (OUTPATIENT)
Dept: PHYSICAL MEDICINE AND REHAB | Facility: CLINIC | Age: 64
End: 2024-07-29
Payer: COMMERCIAL

## 2024-07-29 NOTE — TELEPHONE ENCOUNTER
----- Message from Jeremy Ventura sent at 7/29/2024 12:19 PM CDT -----  .Type:  Needs Medical Advice    Who Called: pt    Would the patient rather a call back or a response via [facility]? Call back  Best Call Back Number: [phone]  Additional Information:     Pt stated he missed a call and would like a call back please

## 2024-07-30 PROBLEM — R29.818 SUSPECTED SLEEP APNEA: Status: ACTIVE | Noted: 2024-07-30

## 2024-07-30 PROCEDURE — 95800 SLP STDY UNATTENDED: CPT | Mod: 26,,, | Performed by: INTERNAL MEDICINE

## 2024-07-30 NOTE — PROCEDURES
PHYSICIAN INTERPRETATION AND COMMENTS: Findings are consistent with very severe, non-positional obstructive sleep  apnea(JIA).  CLINICAL HISTORY: 64 year old male presented with: 16.5 inch neck, BMI of 29.4, an Charlotte sleepiness score of 16, history  of hypertension, heart disease and symptoms of nocturnal snoring. Based on the clinical history, the patient has a high  pre-test probability of having Severe JIA.  SLEEP STUDY FINDINGS: Patient underwent a 2 night Home Sleep Test and by behavioral criteria, slept for approximately  13.02 hours , with a sleep efficiency of 99.5%. Very Severe sleep disordered breathing (AHI=52) is noted based on a 4%  hypopnea desaturation criteria. The patient slept supine 21.8% of the night based on valid recording time of 13.04 hours  and is 1.1 times as likely to have apneas/hypopneas when supine. The apneas/hypopneas are accompanied by moderate  oxygen desaturation (percent time below 90% SpO2: 10.5%, Min SpO2: 76.6%). The average desaturation across all sleep  disordered breathing events is 5.6%. Snoring occurs for 3% (30 dB) of the study, 1.1% is very loud. The mean pulse rate is  60.3 BPM, with frequent pulse rate variability (54 events with >= 6 BPM increase/decrease per hour).  TREATMENT CONSIDERATIONS: Consider an attended CPAP titration study, given the reported Heart disease. Consider nasal  continuous positive airway pressure for Very Severe obstructive sleep apnea. A mandibular advancement splint (MAS) or  referral to an ENT surgeon for modification to the airway should be considered to reduce the risk of mortality caused by  Very Severe JIA if the patient prefers an alternative therapy or the CPAP trial is unsuccessful.  DISEASE MANAGEMENT CONSIDERATIONS: None.    Dear Chichi Allred MD  [address] [place] [address] BRYAN BOYKIN [id number]/Tangela Perez MD         The sleep study that you ordered is complete.  You have ordered sleep LAB services to perform the  "sleep study for Cedric Santoro .      Please find Sleep Study result in  the "Media tab" of Chart Review menu.        You can look  for the report in the  Media by the document type "Sleep Study Documents". Alphabetizing  "Document type" column helps to find the SLEEP STUDY report  Faster.       As the ordering provider, you are responsible for reviewing the results and implementing a treatment plan with your patient.    If you need a Sleep Medicine provider to explain the sleep study findings and arrange treatment for the patient, please refer patient for consultation to our Sleep Clinic via [facility] with Ambulatory Consult Sleep.     To do that please place an order for an  "Ambulatory Consult Sleep" -  order , it will go to our clinic work queue for our staff  to contact the patient for an appointment.      For any questions, please contact our sleep lab  staff at [phone] to talk to clinical staff          Roc Castillo MD   "

## 2024-08-04 DIAGNOSIS — G47.33 SEVERE OBSTRUCTIVE SLEEP APNEA: Primary | ICD-10-CM

## 2024-08-07 ENCOUNTER — LAB VISIT (OUTPATIENT)
Dept: LAB | Facility: HOSPITAL | Age: 64
End: 2024-08-07
Attending: PHYSICIAN ASSISTANT
Payer: COMMERCIAL

## 2024-08-07 DIAGNOSIS — E03.1 CONGENITAL HYPOTHYROIDISM WITHOUT GOITER: ICD-10-CM

## 2024-08-07 DIAGNOSIS — I25.118 CORONARY ARTERY DISEASE OF NATIVE ARTERY OF NATIVE HEART WITH STABLE ANGINA PECTORIS: ICD-10-CM

## 2024-08-07 LAB
ALBUMIN SERPL BCP-MCNC: 4.1 G/DL (ref 3.5–5.2)
ALP SERPL-CCNC: 65 U/L (ref 55–135)
ALT SERPL W/O P-5'-P-CCNC: 39 U/L (ref 10–44)
ANION GAP SERPL CALC-SCNC: 12 MMOL/L (ref 8–16)
AST SERPL-CCNC: 56 U/L (ref 10–40)
BILIRUB SERPL-MCNC: 0.6 MG/DL (ref 0.1–1)
BUN SERPL-MCNC: 14 MG/DL (ref 8–23)
CALCIUM SERPL-MCNC: 9.5 MG/DL (ref 8.7–10.5)
CHLORIDE SERPL-SCNC: 103 MMOL/L (ref 95–110)
CHOLEST SERPL-MCNC: 158 MG/DL (ref 120–199)
CHOLEST/HDLC SERPL: 2.4 {RATIO} (ref 2–5)
CO2 SERPL-SCNC: 26 MMOL/L (ref 23–29)
CREAT SERPL-MCNC: 1.3 MG/DL (ref 0.5–1.4)
EST. GFR  (NO RACE VARIABLE): >60 ML/MIN/1.73 M^2
GLUCOSE SERPL-MCNC: 106 MG/DL (ref 70–110)
HDLC SERPL-MCNC: 66 MG/DL (ref 40–75)
HDLC SERPL: 41.8 % (ref 20–50)
LDLC SERPL CALC-MCNC: 79.6 MG/DL (ref 63–159)
NONHDLC SERPL-MCNC: 92 MG/DL
POTASSIUM SERPL-SCNC: 4 MMOL/L (ref 3.5–5.1)
PROT SERPL-MCNC: 6.8 G/DL (ref 6–8.4)
SODIUM SERPL-SCNC: 141 MMOL/L (ref 136–145)
T4 FREE SERPL-MCNC: 0.91 NG/DL (ref 0.71–1.51)
TRIGL SERPL-MCNC: 62 MG/DL (ref 30–150)
TSH SERPL DL<=0.005 MIU/L-ACNC: 3.13 UIU/ML (ref 0.4–4)

## 2024-08-07 PROCEDURE — 84439 ASSAY OF FREE THYROXINE: CPT | Performed by: PHYSICIAN ASSISTANT

## 2024-08-07 PROCEDURE — 80061 LIPID PANEL: CPT | Performed by: PHYSICIAN ASSISTANT

## 2024-08-07 PROCEDURE — 84443 ASSAY THYROID STIM HORMONE: CPT | Performed by: PHYSICIAN ASSISTANT

## 2024-08-07 PROCEDURE — 36415 COLL VENOUS BLD VENIPUNCTURE: CPT | Mod: PO | Performed by: PHYSICIAN ASSISTANT

## 2024-08-07 PROCEDURE — 80053 COMPREHEN METABOLIC PANEL: CPT | Performed by: PHYSICIAN ASSISTANT

## 2024-08-08 ENCOUNTER — PATIENT MESSAGE (OUTPATIENT)
Dept: INTERNAL MEDICINE | Facility: CLINIC | Age: 64
End: 2024-08-08
Payer: COMMERCIAL

## 2024-08-08 ENCOUNTER — PATIENT MESSAGE (OUTPATIENT)
Dept: PULMONOLOGY | Facility: CLINIC | Age: 64
End: 2024-08-08
Payer: COMMERCIAL

## 2024-08-12 ENCOUNTER — OFFICE VISIT (OUTPATIENT)
Dept: INTERNAL MEDICINE | Facility: CLINIC | Age: 64
End: 2024-08-12
Payer: COMMERCIAL

## 2024-08-12 VITALS
OXYGEN SATURATION: 96 % | HEIGHT: 70 IN | SYSTOLIC BLOOD PRESSURE: 110 MMHG | DIASTOLIC BLOOD PRESSURE: 82 MMHG | BODY MASS INDEX: 29.46 KG/M2 | HEART RATE: 64 BPM | WEIGHT: 205.81 LBS

## 2024-08-12 DIAGNOSIS — E03.9 ACQUIRED HYPOTHYROIDISM: ICD-10-CM

## 2024-08-12 DIAGNOSIS — M46.92 UNSPECIFIED INFLAMMATORY SPONDYLOPATHY, CERVICAL REGION: ICD-10-CM

## 2024-08-12 DIAGNOSIS — I10 ESSENTIAL HYPERTENSION: Primary | ICD-10-CM

## 2024-08-12 DIAGNOSIS — F32.1 CURRENT MODERATE EPISODE OF MAJOR DEPRESSIVE DISORDER WITHOUT PRIOR EPISODE: ICD-10-CM

## 2024-08-12 DIAGNOSIS — K76.0 STEATOSIS OF LIVER: ICD-10-CM

## 2024-08-12 PROBLEM — N18.31 STAGE 3A CHRONIC KIDNEY DISEASE (CKD): Status: RESOLVED | Noted: 2023-07-26 | Resolved: 2024-08-12

## 2024-08-12 PROCEDURE — 4010F ACE/ARB THERAPY RXD/TAKEN: CPT | Mod: CPTII,S$GLB,, | Performed by: FAMILY MEDICINE

## 2024-08-12 PROCEDURE — 1159F MED LIST DOCD IN RCRD: CPT | Mod: CPTII,S$GLB,, | Performed by: FAMILY MEDICINE

## 2024-08-12 PROCEDURE — 1160F RVW MEDS BY RX/DR IN RCRD: CPT | Mod: CPTII,S$GLB,, | Performed by: FAMILY MEDICINE

## 2024-08-12 PROCEDURE — 99999 PR PBB SHADOW E&M-EST. PATIENT-LVL V: CPT | Mod: PBBFAC,,, | Performed by: FAMILY MEDICINE

## 2024-08-12 PROCEDURE — 99214 OFFICE O/P EST MOD 30 MIN: CPT | Mod: S$GLB,,, | Performed by: FAMILY MEDICINE

## 2024-08-12 PROCEDURE — 3008F BODY MASS INDEX DOCD: CPT | Mod: CPTII,S$GLB,, | Performed by: FAMILY MEDICINE

## 2024-08-12 PROCEDURE — 3079F DIAST BP 80-89 MM HG: CPT | Mod: CPTII,S$GLB,, | Performed by: FAMILY MEDICINE

## 2024-08-12 PROCEDURE — 3044F HG A1C LEVEL LT 7.0%: CPT | Mod: CPTII,S$GLB,, | Performed by: FAMILY MEDICINE

## 2024-08-12 PROCEDURE — G2211 COMPLEX E/M VISIT ADD ON: HCPCS | Mod: S$GLB,,, | Performed by: FAMILY MEDICINE

## 2024-08-12 PROCEDURE — 3074F SYST BP LT 130 MM HG: CPT | Mod: CPTII,S$GLB,, | Performed by: FAMILY MEDICINE

## 2024-08-12 RX ORDER — DULOXETIN HYDROCHLORIDE 60 MG/1
60 CAPSULE, DELAYED RELEASE ORAL DAILY
Qty: 90 CAPSULE | Refills: 3 | Status: SHIPPED | OUTPATIENT
Start: 2024-08-12 | End: 2025-08-12

## 2024-08-12 NOTE — PATIENT INSTRUCTIONS
Pillar-[email or web address].pdf ([email or web address])   Whole Food, Plant-Based Nutrition  Physical Activity  Stress Management  Avoidance of Risky Substances  Restorative Sleep  Social Connection     Check with your pharmacist regarding RSV vaccine.

## 2024-08-12 NOTE — PROGRESS NOTES
Subjective:       Patient ID: Cedric Santoro is a 64 y.o. male.    Chief Complaint: Follow-up      History of Present Illness    Patient presents today for follow up. His EZEQUIEL is present with him today. He reports severe sleep apnea diagnosed following a sleep study. He is currently awaiting CPAP testing to be scheduled by the sleep lab. He is still not sleeping at night. The pulmonologist believes addressing the sleep apnea could potentially yield significant health benefits. He reports feeling stable with no worsening of symptoms. He experiences shortness of breath, which he attributes to a leaking heart valve. His pulmonologist believes this is contributing to his lung problems. He mentions fluid in his lungs, decreasing his lung capacity. He has an upcoming echocardiogram scheduled for October 16th, followed by an appointment with Dr. Garzon on October 23rd for cardiovascular follow-up. He reports following a Mediterranean diet as recommended by his hepatologist. His liver enzymes have improved and stabilized, though he acknowledges they are not optimal. He has a known mild fatty liver, which has been stable with dietary changes. His ALT is now normal and AST is close to normal. He has increased his consumption of fish, vegetables, and fruits as part of the Mediterranean diet. He consumes a glass of pinot noir wine nightly, which he enjoys and initially started for potential health benefits. He expresses satisfaction with his dietary changes and their potential positive impact on his health. He reports an increase in duloxetine dosage from 20mg to 60mg. Since being on the higher dose, he has been coping better. The medication adjustment was made through communication with Dr. Elliott via patient messaging. He is seeing Dr. Nichols, a neurologist, for evaluation of neurological issues. He is scheduled for a nerve conductivity test next week with Dr. Diaz to investigate worsening leg cramps at nighttime.  Baclofen, which he was previously taking for the leg cramps, is no longer effective. Dr. Nihcols suspects that sleep apnea might be contributing to the neurological symptoms and wants to review his neck MRI. He denies any recent onset of these symptoms, noting that the leg cramps have been ongoing for 20 years. Patient is otherwise without concerns today.    ROS:  General: -fever, -chills, -fatigue, -weight gain, -weight loss  Eyes: -eye pain, -vision change  ENMT: -hearing loss, -ear pain, -nasal congestion, -rhinorrhea, -dental problem, -trouble swallowing  Cardiovascular: -chest pain, -palpitations, -lower extremity edema  Respiratory: -cough, -trouble breathing, +SHORTNESS OF BREATH  Gastrointestinal: -abdominal pain, -abdominal swelling, -nausea, -vomiting, -diarrhea, -constipation, -blood in stool  Genitourinary: -difficulty urinating, -genital problem  Musculoskeletal: -joint pain, -muscle aches, +MUSCLE CRAMPS  Integumentary: -rash  Lymphatics: -swollen glands, -easy bruising  Neurologic: -headache, -dizziness, -numbness, -weakness  Psychiatric: -anxiety, -depression, -sleep difficulty       Objective:      Physical Exam  Vitals reviewed.   Constitutional:       General: He is not in acute distress.     Appearance: He is well-developed.   HENT:      Head: Normocephalic and atraumatic.   Eyes:      General: Lids are normal. No scleral icterus.     Extraocular Movements: Extraocular movements intact.      Conjunctiva/sclera: Conjunctivae normal.      Pupils: Pupils are equal, round, and reactive to light.   Pulmonary:      Effort: Pulmonary effort is normal.   Neurological:      Mental Status: He is alert and oriented to person, place, and time.      Cranial Nerves: No cranial nerve deficit.      Gait: Gait normal.   Psychiatric:         Mood and Affect: Mood and affect normal.         Assessment:       1. Essential hypertension    2. Acquired hypothyroidism    3. Unspecified inflammatory spondylopathy,  cervical region    4. Current moderate episode of major depressive disorder without prior episode    5. Steatosis of liver        Plan:   1. Essential hypertension  Assessment & Plan:  Controlled, continue atenolol and losartan    Orders:  -     Comprehensive Metabolic Panel; Future; Expected date: 02/12/2025  -     Lipid Panel; Future; Expected date: 02/12/2025  -     CBC Auto Differential; Future; Expected date: 02/12/2025    2. Acquired hypothyroidism  Assessment & Plan:  Controlled, continue levothyroxine    Lab Results   Component Value Date    TSH 3.127 08/07/2024    FREET4 0.91 08/07/2024         Orders:  -     TSH; Future; Expected date: 02/12/2025  -     T4, Free; Future; Expected date: 02/12/2025    3. Unspecified inflammatory spondylopathy, cervical region  -     DULoxetine (CYMBALTA) 60 MG capsule; Take 1 capsule (60 mg total) by mouth once daily.  Dispense: 90 capsule; Refill: 3    4. Current moderate episode of major depressive disorder without prior episode  Assessment & Plan:  Stable on cymbalta 60 mg daily    Orders:  -     DULoxetine (CYMBALTA) 60 MG capsule; Take 1 capsule (60 mg total) by mouth once daily.  Dispense: 90 capsule; Refill: 3    5. Steatosis of liver  Assessment & Plan:  Mild, stable, continue with lifestyle changes and monitor; will refer back to Hepatology in the future if needed      LIFESTYLE CHANGES:   Provided handout on 6 pillars of healthy lifestyle, including whole food plant-based nutrition, physical activity, stress management, avoidance of risky substances, restorative sleep, and social connection. Encouraged to utilize to aid in healthy lifestyle changes.   Patient to continue Mediterranean diet.      Patient expressed understanding and agreement with plan.    Visit today included increased complexity associated with the care of the episodic problem hypertension, which was addressed while instituting co-management of the longitudinal care of the patient due to the  serious and/or complex managed problem(s) .    I have evaluated and discussed management associated with medical care services that serve as the continuing focal point for all needed health care services and/or with medical care services that are part of ongoing care related to my patient's single, serious condition or a complex condition(s).    I am providing ongoing care and I am the primary care provider for this patient, and they are being managed, monitored, and/or observed for their chronic conditions over time.     I have addressed their ongoing health maintenance requirements and needs for all health care services and reviewed co-management plans provided by specialty providers when available.    Health Maintenance Due   Topic Date Due    RSV Vaccine (Age 60+ and Pregnant patients) (1 - 1-dose 60+ series) Never done     Health Maintenance reviewed/updated.  Advised RSV vaccine, available at pharmacy.    Follow up in about 6 months (around 2/12/2025), or if symptoms worsen or fail to improve, for EPP/annual with NADIYA, labs PTA.    This note was generated with the assistance of ambient listening technology. Verbal consent was obtained by the patient and accompanying visitor(s) for the recording of patient appointment to facilitate this note. I attest to having reviewed and edited the generated note for accuracy, though some syntax or spelling errors may persist. Please contact the author of this note for any clarification.

## 2024-08-13 NOTE — ASSESSMENT & PLAN NOTE
Controlled, continue levothyroxine    Lab Results   Component Value Date    TSH 3.127 08/07/2024    FREET4 0.91 08/07/2024

## 2024-08-13 NOTE — ASSESSMENT & PLAN NOTE
Mild, stable, continue with lifestyle changes and monitor; will refer back to Hepatology in the future if needed

## 2024-08-14 DIAGNOSIS — I48.0 PAROXYSMAL ATRIAL FIBRILLATION: ICD-10-CM

## 2024-08-14 RX ORDER — APIXABAN 5 MG/1
5 TABLET, FILM COATED ORAL 2 TIMES DAILY
Qty: 60 TABLET | Refills: 12 | Status: SHIPPED | OUTPATIENT
Start: 2024-08-14

## 2024-08-16 ENCOUNTER — HOSPITAL ENCOUNTER (OUTPATIENT)
Dept: SLEEP MEDICINE | Facility: HOSPITAL | Age: 64
Discharge: HOME | End: 2024-08-16
Attending: INTERNAL MEDICINE
Payer: COMMERCIAL

## 2024-08-16 DIAGNOSIS — Z72.821 INADEQUATE SLEEP HYGIENE: ICD-10-CM

## 2024-08-16 DIAGNOSIS — F51.04 PSYCHOPHYSIOLOGICAL INSOMNIA: ICD-10-CM

## 2024-08-16 DIAGNOSIS — G47.34 NOCTURNAL HYPOXEMIA: ICD-10-CM

## 2024-08-16 DIAGNOSIS — G47.61 PERIODIC LIMB MOVEMENT DISORDER (PLMD): ICD-10-CM

## 2024-08-16 DIAGNOSIS — G47.63 SLEEP-RELATED BRUXISM: ICD-10-CM

## 2024-08-16 DIAGNOSIS — G47.33 SEVERE OBSTRUCTIVE SLEEP APNEA: Primary | ICD-10-CM

## 2024-08-16 PROCEDURE — 95811 POLYSOM 6/>YRS CPAP 4/> PARM: CPT

## 2024-08-17 NOTE — PROGRESS NOTES
"Nutrition Assessment  Session Time:        Client name:  Cedric Santoro  :  1960  Age:  63 y.o.  Gender:  male    Client states:  Retired [employer] employee present for annual physical.  Last physical and nutrition consultation was completed in 2020.    Reports skipping last few years of physicals due to COVID and dealing with wife.    Currently following a low salt diet (2000 mg Na per day) and receiving albation in .  Reports sticking to restriction 95% of the time.     Also has a fluid restriction to follow.  Current restriction is 2 liters per day.  Current intake is approximately 2.5 liters per day.  Reports sweating a lot causing excess fluid intake.    Exercises 6x/week. Walks outside + uses total gym daily. If unable to walk outside due to the weather, patient will uses his total gym twice that day.     Drink choices consists of decaf coffee (1-2 cups per day) + water.     Reports grazing on fruits throughout the day.     Anthropometrics  Height:  70"     Weight:  203 lbs  BMI:  29.2  % Body Fat:  not available     Clinical Signs/Symptoms  N/V/D:  none   Appetite:  good       Past Medical History:   Diagnosis Date    Abnormal ECG 10/4/2022    Essential hypertension 2020    Hypertension     MVP (mitral valve prolapse) 10/26/2022    Nonrheumatic mitral valve regurgitation 10/26/2022    Palpitation 3/5/2020    Paroxysmal atrial fibrillation 3/5/2020    Paroxysmal atrial flutter 10/4/2022    Raynaud's syndrome     Vasovagal syncopes        Past Surgical History:   Procedure Laterality Date    ABLATION, ATRIAL FLUTTER, TYPICAL N/A 12/15/2022    Procedure: Ablation, Atrial Flutter, Typical;  Surgeon: Mtaty Hendricks MD;  Location: [facility] EP LAB;  Service: Cardiology;  Laterality: N/A;  AFL, RFA, NICOLASA, ARIELA, anes, MB, 3 Prep    ankle/foot surgery      ECHOCARDIOGRAM,TRANSESOPHAGEAL N/A 12/15/2022    Procedure: Transesophageal echo (ARIELA) intra-procedure log documentation;  Surgeon: " Nisreen Fenton MD;  Location: [facility] EP LAB;  Service: Cardiology;  Laterality: N/A;    EYE SURGERY      GALLBLADDER SURGERY      HERNIA REPAIR      THROAT SURGERY      TRANSESOPHAGEAL ECHOCARDIOGRAPHY N/A 2/27/2023    Procedure: ECHOCARDIOGRAM, TRANSESOPHAGEAL;  Surgeon: Samuel Del Valle MD;  Location: [facility] CATH LAB;  Service: Cardiology;  Laterality: N/A;    TREATMENT OF CARDIAC ARRHYTHMIA  12/15/2022    Procedure: Cardioversion or Defibrillation;  Surgeon: Matty Hendricks MD;  Location: [facility] EP LAB;  Service: Cardiology;;       Medications    has a current medication list which includes the following prescription(s): apixaban, atenolol, b complex vitamins, co-enzyme q-10, duloxetine, ferrous sulfate, furosemide, levothyroxine, losartan, magnesium, multivitamin, and vitamin d.    Vitamins, Minerals, and/or Supplements:  not discussed     Food/Medication Interactions:  Reviewed     Food Allergies or Intolerances:  NKFA     Social History    Marital status:  Other  Employment:  retired, previous [employer] employee    Social History     Tobacco Use    Smoking status: Never    Smokeless tobacco: Never   Substance Use Topics    Alcohol use: No        Lab Reports   Sodium   Date Value Ref Range Status   02/09/2023 140 136 - 145 mmol/L Final     Potassium   Date Value Ref Range Status   02/09/2023 4.3 3.5 - 5.1 mmol/L Final     Chloride   Date Value Ref Range Status   02/09/2023 101 95 - 110 mmol/L Final     CO2   Date Value Ref Range Status   02/09/2023 28 23 - 29 mmol/L Final     Glucose   Date Value Ref Range Status   02/09/2023 99 70 - 110 mg/dL Final     BUN   Date Value Ref Range Status   02/09/2023 26 (H) 8 - 23 mg/dL Final     Creatinine   Date Value Ref Range Status   02/09/2023 1.1 0.5 - 1.4 mg/dL Final     Calcium   Date Value Ref Range Status   02/09/2023 9.7 8.7 - 10.5 mg/dL Final     Total Protein   Date Value Ref Range Status   02/09/2023 6.6 6.0 - 8.4 g/dL Final     Albumin   Date Value Ref Range Status    02/09/2023 3.9 3.5 - 5.2 g/dL Final     Total Bilirubin   Date Value Ref Range Status   02/09/2023 0.6 0.1 - 1.0 mg/dL Final     Comment:     For infants and newborns, interpretation of results should be based  on gestational age, weight and in agreement with clinical  observations.    Premature Infant recommended reference ranges:  Up to 24 hours.............<8.0 mg/dL  Up to 48 hours............<12.0 mg/dL  3-5 days..................<15.0 mg/dL  6-29 days.................<15.0 mg/dL       Alkaline Phosphatase   Date Value Ref Range Status   02/09/2023 66 55 - 135 U/L Final     AST   Date Value Ref Range Status   02/09/2023 52 (H) 10 - 40 U/L Final     ALT   Date Value Ref Range Status   02/09/2023 35 10 - 44 U/L Final     Anion Gap   Date Value Ref Range Status   02/09/2023 11 8 - 16 mmol/L Final     eGFR if    Date Value Ref Range Status   04/12/2022 >60.0 >60 mL/min/1.73 m^2 Final     eGFR if non    Date Value Ref Range Status   04/12/2022 >60.0 >60 mL/min/1.73 m^2 Final     Comment:     Calculation used to obtain the estimated glomerular filtration  rate (eGFR) is the CKD-EPI equation.         Lab Results   Component Value Date    WBC 4.26 02/27/2023    HGB 14.3 02/27/2023    HCT 41.8 02/27/2023    MCV 92 02/27/2023     02/27/2023        Lab Results   Component Value Date    CHOL 197 04/12/2022     Lab Results   Component Value Date    HDL 68 04/12/2022     Lab Results   Component Value Date    LDLCALC 118.8 04/12/2022     Lab Results   Component Value Date    TRIG 51 04/12/2022     Lab Results   Component Value Date    CHOLHDL 34.5 04/12/2022     Lab Results   Component Value Date    HGBA1C 5.5 02/05/2020     BP Readings from Last 1 Encounters:   04/14/23 113/73       Food History  No recall    Exercise History:  listed above    Cultural/Spiritual/Personal Preferences:  None identified    Support System:   family/friends    State of Change:  Preparation    Barriers to  Change:  none    Diagnosis    No nutrition diagnosis at the moment    Intervention    RMR (Method:  Gregg St. Jeor):  1728 kcal  Activity Factor:  1.4    ARIELA:  2419 kcal    Goals:  1.  Continue with current dietary restrictions: 2 liter fluids, 2000 mg Na  2.  Consider hard candies, ice chips, or use cool midst humidifier at night to help decrease thirst.       Nutrition Education  The following education was provided to the patient:  Complimented patient on physical activity efforts.  Suggested dietary modifications based on current dietary behaviors and individual food preferences.    Patient verbalized understanding of nutrition education and recommendations received.    Handouts Provided  none    Monitoring/Evaluation    Monitor the following:  Weight  BMI  % Body Fat  Caloric intake  Labs:  lipid panel, HgbA1c, glucose    Follow Up Plan:  Communication with referring healthcare provider is unnecessary at this time as patient presented as part of annual wellness exam.  However, will follow up with patient in 1-2 years.   No

## 2024-08-19 ENCOUNTER — OFFICE VISIT (OUTPATIENT)
Dept: PHYSICAL MEDICINE AND REHAB | Facility: CLINIC | Age: 64
End: 2024-08-19
Payer: COMMERCIAL

## 2024-08-19 VITALS — HEIGHT: 70 IN | WEIGHT: 205.69 LBS | RESPIRATION RATE: 13 BRPM | BODY MASS INDEX: 29.45 KG/M2

## 2024-08-19 DIAGNOSIS — M54.12 CERVICAL RADICULOPATHY: ICD-10-CM

## 2024-08-19 DIAGNOSIS — G56.03 BILATERAL CARPAL TUNNEL SYNDROME: ICD-10-CM

## 2024-08-19 DIAGNOSIS — M54.16 LUMBAR RADICULOPATHY: Primary | ICD-10-CM

## 2024-08-19 PROBLEM — G47.33 SEVERE OBSTRUCTIVE SLEEP APNEA: Status: ACTIVE | Noted: 2024-08-19

## 2024-08-19 PROCEDURE — 95886 MUSC TEST DONE W/N TEST COMP: CPT | Mod: S$GLB,,, | Performed by: PHYSICAL MEDICINE & REHABILITATION

## 2024-08-19 PROCEDURE — 95913 NRV CNDJ TEST 13/> STUDIES: CPT | Mod: S$GLB,,, | Performed by: PHYSICAL MEDICINE & REHABILITATION

## 2024-08-19 PROCEDURE — 99999 PR PBB SHADOW E&M-EST. PATIENT-LVL III: CPT | Mod: PBBFAC,,, | Performed by: PHYSICAL MEDICINE & REHABILITATION

## 2024-08-19 PROCEDURE — 99499 UNLISTED E&M SERVICE: CPT | Mod: S$GLB,,, | Performed by: PHYSICAL MEDICINE & REHABILITATION

## 2024-08-19 PROCEDURE — 95885 MUSC TST DONE W/NERV TST LIM: CPT | Mod: 59,S$GLB,, | Performed by: PHYSICAL MEDICINE & REHABILITATION

## 2024-08-19 NOTE — PROGRESS NOTES
[facility]   [address]  [place] [address]  Phone: [phone]        Full Name: Cedric Santoro YOB: 1960  Patient ID: [id number]      Visit Date: 8/19/2024 13:18  Age: 64 Years 6 Months Old  Examining Physician: Breanna Diaz M.D.  Referring Physician:   Reason for Referral: upper and lower exrt    Chief Complaint   Patient presents with    Numbness    Cramping       HPI: This is a 64 y.o.  male being seen in clinic today for evaluation of chronic cramping in her calves/legs with occasional numbness. His symptoms can increase at night when trying to rest. Position change, stretching and ROM provides some relief.  He has a history of CTR on the right with occasional hand pain. He has known cervical and lumbar DJD/DDD with significant spondylolisthesis     History obtained from patient    Past family, medical, social, and surgical history reviewed in chart    Review of Systems:     General- denies lethargy, weight change, fever, chills  Head/neck- denies swallowing difficulties  ENT- denies hearing changes  Cardiovascular-denies chest pain  Pulmonary- denies shortness of breath  GI- denies constipation or bowel incontinence  - denies bladder incontinence  Skin- denies wounds or rashes  Musculoskeletal- denies weakness, denies pain  Neurologic- denies numbness and tingling  Psychiatric- denies depressive or psychotic features, denies anxiety  Lymphatic-denies swelling  Endocrine- denies hypoglycemic symptoms/DM history  All other pertinent systems negative     Physical Examination:  General: Well developed, well nourished male, NAD  HEENT:NCAT EOMI bilaterally   Pulmonary:Normal respirations    Spinal Examination: CERVICAL  Active ROM is within normal limits.  Inspection: No deformity of spinal alignment.    Spinal Examination: LUMBAR or THORACIC  Active ROM is within normal limits.  Inspection: No deformity of spinal alignment.    Slr neg    Musculoskeletal  Tests:  Phalen: neg  Elbow compression (ulnar): neg  Tinels at wrist: neg    Bilateral Upper and Lower Extremities:  Pulses are 2+ at radial bilaterally.  Shoulder/Elbow/Wrist/Hand ROM wnl  Hip/Knee/Ankle ROM wnl  Bilateral Extremities show normal capillary refill.  No signs of cyanosis, rubor, edema, skin changes, or dysvascular changes of appendages.  Nails appear intact.    Neurological Exam:  Cranial Nerves:  II-XII grossly intact    Manual Muscle Testing: (Motor 5=normal)  5/5 strength bilateral upper/lower extremities    No focal atrophy is noted of either upper or lower extremity.    Bilateral Reflexes:  Leary's response is absent bilaterally.  No clonus at knee or ankle.    Sensation: tested to light touch  - intact in all four limbs.    Gait: Narrow base and good arm swing.      Entire procedure explained to patient prior to proceeding.  Verbal consent obtained        SNC      Nerve / Sites Rec. Site Onset Lat Peak Lat Amp Segments Distance Velocity     ms ms µV  mm m/s   R Median - Digit II (Antidromic)      Wrist Dig II 4.0 5.1 4.6 Wrist - Dig  35   L Median - Digit II (Antidromic)      Wrist Dig II 4.3 5.7 8.3 Wrist - Dig  33   R Ulnar - Digit V (Antidromic)      Wrist Dig V 3.0 3.8 11.1 Wrist - Dig V 140 47   L Ulnar - Digit V (Antidromic)      Wrist Dig V 3.4 4.1 7.2 Wrist - Dig V 140 41   R Radial - Anatomical snuff box (Forearm)      Forearm Wrist 1.9 2.7 13.8 Forearm - Wrist 100 53   L Radial - Anatomical snuff box (Forearm)      Forearm Wrist 2.3 2.7 19.5 Forearm - Wrist 100 44   R Sural - Ankle (Calf)      Calf Ankle 3.1 4.0 10.1 Calf - Ankle 140 45   L Sural - Ankle (Calf)      Calf Ankle 3.0 4.1 7.1 Calf - Ankle 140 46   R Superficial peroneal - Ankle      Lat leg Ankle 2.6 3.4 14.0 Lat leg - Ankle 140 54   L Superficial peroneal - Ankle      Lat leg Ankle 2.2 3.0 8.9 Lat leg - Ankle 140 63       MNC      Nerve / Sites Muscle Latency Amplitude Duration Rel Amp Segments Distance Lat  Diff Velocity     ms mV ms %  mm ms m/s   R Median - APB      Wrist APB 5.4 1.4 5.7 100 Wrist - APB 80        Elbow APB 10.3 1.8 6.0 129 Elbow - Wrist 240 4.8 50   L Median - APB      Wrist APB 5.1 7.9 6.5 100 Wrist - APB 80        Elbow APB 10.3 7.4 6.9 93.6 Elbow - Wrist 260 5.2 50   R Ulnar - ADM      Wrist ADM 3.1 9.1 5.6 100 Wrist -         B. Elbow ADM 7.5 9.2 5.9 101 B. Elbow - Wrist 250 4.4 56      A. Elbow ADM 9.7 9.1 6.7 99.1 A. Elbow - B. Elbow 110 2.2 50   L Ulnar - ADM      Wrist ADM 3.2 10.6 5.6 100 Wrist -         B. Elbow ADM 7.7 10.0 5.9 94.2 B. Elbow - Wrist 280 4.5 63      A. Elbow ADM 10.2 9.6 6.2 96.3 A. Elbow - B. Elbow 140 2.6 55   R Peroneal - EDB      Ankle EDB 4.1 9.4 5.3 100 Ankle - EDB 80        Fibular head EDB 11.1 8.3 5.7 87.6 Fibular head - Ankle 320 7.0 46      Pop fossa EDB 12.9 8.7 5.5 106 Pop fossa - Fibular head 90 1.8 49   L Peroneal - EDB      Ankle EDB 4.2 6.8 5.3 100 Ankle - EDB 80        Fibular head EDB 10.2 6.9 6.0 102 Fibular head - Ankle 290 6.0 48      Pop fossa EDB 11.9 6.8 5.5 98.1 Pop fossa - Fibular head 80 1.7 47   R Tibial - AH      Ankle AH 4.5 2.5 6.2 100 Ankle - Ankle 80        Pop fossa AH 14.9 1.1  45.6 Pop fossa - Ankle 450 10.5 43   L Tibial - AH      Ankle AH 5.8 2.4 3.5 100 Ankle - Ankle 80        Pop fossa AH 15.8 0.2 2.5 9.46 Pop fossa - Ankle 430 10.1 43       EMG             EMG Summary Table     Spontaneous MUAP Recruitment   Muscle IA Fib PSW Fasc Other Dur. Dur Amp Dur Polys Pattern Effort   L. Rectus femoris Incr None None None . _NFT_ _NFT_ N Sl Incr 1+ sl red Max   L. Tibialis anterior N None None None . _NFT_ _NFT_ N N N N Max   L. Gastrocnemius (Medial head) Incr 3+ 4+ None . _NFT_ _NFT_ N N 1+ Reduced Max   L. Extensor digitorum brevis N None None None . _NFT_ _NFT_ N N 1+ sl red Max   R. Gastrocnemius (Medial head) Incr 1+ 2+ None . _NFT_ _NFT_ N N 1+ Reduced Max   R. Extensor digitorum brevis N None None None . _NFT_ _NFT_ N N  N N Max   R. Rectus femoris N None None None . _NFT_ _NFT_ N N 1+ sl red Max   R. Tibialis anterior N None None None . _NFT_ _NFT_ N N N N Max   L. First dorsal interosseous N None None None . _NFT_ _NFT_ N N 1+ sl red Max   L. Abductor pollicis brevis Incr 1+ None None . _NFT_ _NFT_ N N 1+ sl red Max   L. Pronator teres N None None None . _NFT_ _NFT_ N N 1+ Reduced Max   L. Triceps brachii N None None None . _NFT_ _NFT_ N Sl Incr 1+ sl red Max   L. Biceps brachii N None None None . _NFT_ _NFT_ N N 1+ Reduced Max   L. Deltoid N None None None . _NFT_ _NFT_ N N N N Max                                                            INTERPRETATION  -Bilateral median motor nerve conduction study showed prolonged latency, dec amplitude on the right, and normal conduction velocity  -Bilateral median sensory nerve conduction study showed prolonged peak latency and dec amplitude  -Bilateral ulnar motor nerve conduction study showed normal latency, amplitude, and conduction velocity  -Bilateral ulnar sensory nerve conduction study showed prolonged peak latency on the left and normal amplitude  -Bilateral radial sensory nerve conduction study showed normal peak latency and amplitude  -Bilateral superficial peroneal sensorynerve conduction study showed normal peak latency and amplitude  -Bilateral sural sensory nerve conduction study showed normal peak latency and amplitude  -Bilateral peroneal motor nerve conduction study showed normal latency, amplitude, and conduction velocity  -Bilateral tibial motor nerve conduction study showed normal latency, dec amplitude, and normal conduction velocity  -Needle EMG examination performed to above mentioned muscles       IMPRESSION  ABNORMAL study  2. There is electrodiagnostic evidence of a moderate-severe demyelinating and axonal median neuropathy (Carpal tunnel syndrome) across bilateral wrists.  There is a chronic radiculopathy of the C6-T1 nerve roots.  There is an acute on chronic  radiculopathy of the bilateral S1 nerve roots, a subacute on chronic radiculopathy of the left L2, 3 nerve roots, and a chronic radiculopathy of the bilateral L4,5 and right L2-3 nerve roots    PLAN  Discussed in detail for greater than 30 minutes about diagnosis and treatment plan    1. Follow up with referring provider: Dr. Clyde Nichols-Cam*  2. Handouts on CTS and radic provided  3. This study is good for one year. If symptoms worsen or do not improve, please re-consult.    Breanna Diaz M.D.  Physical Medicine and Rehab

## 2024-08-22 NOTE — PROCEDURES
"[facility]    Sleep Disorder Center    Post-HSAT CPAP TITRATION REPORT     Patient Name: Cedric Santoro                                Date of Report: 24     Study Date:  2024  [facility] No.: [id number]                                   : 1960      Time of Study:  09:38:45 PM - 04:25:27 AM   Sex:  Male   Age:  64 year   Weight:  205.0 lbs         Height:  5' 10"       Type of Study:  CPAP Titration / post-HSAT    REASONS FOR REFERRAL:  Mr. Santoro is a 64 year old male, referred for CPAP titration polysomnography by Dr. hCichi Allred,  based on his Home Sleep Apnea Test of 24 which revealed an Apnea + Hypopnea Index = 52.0 events / hr asleep, severe obstructive sleep apnea / hypopnea syndrome (OSAHS).  Note that this A + H I is only an estimated quantity, as sleep was not assessed in the HSAT, and an undetermined number of the scored respiratory events may not have occurred during sleep.  CPAP titration was recommended by the interpreting sleep specialist physician, Dr. Roc Castillo, and Dr. Allred the referring physician, ordered it.  Chely Dorsey PA-C, was the originating referring provider and Dr. Tangela Perez is the patient's primary care provider.    STUDY PARAMETERS: This study involved analysis of the patient's sleep pattern while breathing was assisted. The study was performed with a sleep technologist in attendance for the entire test period, with video monitoring throughout the study, and routine laboratory clinical parameters recorded:  NOTE:  This polysomnographic sleep study was reviewed epoch-by-epoch, interpreted and signed below by an American Academy of Sleep Medicine Board Certified Sleep Specialist    SUMMARY STATEMENTS  DIAGNOSTIC IMPRESSIONS  G47.33  /  327.23  Severe Obstructive Sleep Apnea, Adult (OSAHS)  G47.34  /  327.24  Nocturnal Hypoxemia  G47.61  /  327.51  Severe Periodic Limb Movement (PLM) Disorder   F51.04   /  " 307.42  Psychophysiological Insomnia (stress - related and conditioned; with depression)    G47.63  /  327.53  Sleep Related Bruxism   Z72.821 /  V69.4  Inadequate Sleep Hygiene  G47.01  /  327.01  r/o Insomnia due to Medical Condition (pain, discomfort)  F51.12   /  307.44  r/o Insufficient Sleep Syndrome    PRIMARY TREATMENT RECOMMENDATIONS  Treat, or refer to Sleep Disorders Center.  CPAP was initiated at 09:38:45 PM.  The CPAP titration polysomnography revealed that all even pressures from 6 cm to 16 cm H2O CPAP (C - Flex 2, humidity on) were adequately tested  but were not nearly effective (see the treatment titration outcomes in the table below).   2 of the 3 BiPAP (Bi - Flex 2, humidity on) pressures were adequately tested  but  only one was nearly effective, 18 cm IPAP / 14 cm EPAP,  as it reduced the rate of respiratory events 82 % to A + H Index = 9.2 events / hr asleep in 0.65 hrs sleep  with 0.22 hrs REM sleep. The next most effective pressure also was BiPAP,  16  cm / 12 cm, with an A + H I = 15.2  in 0.52 hrs sleep  but with no REM sleep.  Note that oxygen saturations were ? 88 % for 40.2 minutes of the time spent asleep, diagnosable nocturnal hypoxemia.  Snoring was mild and sparse throughout titration trials.           The overall A + H Index was 29.2 / hr asleep, with 7.5 respiratory event - related arousals / hr asleep (but no RERAs) for the titration trial.  The mean SpO2 value was 91.6 %  throughout the study, moderate, with a minimum oxygen saturation during sleep of 80.0 %  and a maximum waking baseline SpO2 of 98.0 %).   A medium  ResMed  AirFit  F20  full - face  CPAP mask was used and was well - tolerated.  Please see CPAP / BiPAP trial outcomes table, below.        CPAP / BiPAP Treatment Titration Outcomes    PAP  Device PAP  Level Time (min) Wake (min) NREM  (min) REM  (min) Sleep Eff % OA# CA# MA# Hyp# AHI RDI Min O Sat LM  Index AR  Index   - Off 0.5 0.5 0.0 0.0 0.0 % - - - - - - 89.0 -  -   CPAP 6 59.0 12.0 47.0 0.0 79.7 % 1 1 - 23 31.9 31.9 80.0 180.0 34.5   CPAP 8 88.0 4.0 79.0 5.0 95.5 % 1 - - 46 33.6 33.6 84.0 118.6 52.9   CPAP 10 27.5 0.0 3.5 24.0 100.0 % - 3 - 20 50.2 50.2 80.0 13.1 39.3   CPAP 12 53.5 0.5 53.0 0.0 99.1 % - 2 - 29 35.1 35.1 83.0 101.9 39.6   CPAP 14 45.0 0.0 41.5 3.5 100.0 % - - - 16 21.3 21.3 86.0 92.0 45.3   CPAP 16 47.5 7.0 26.0 14.5 85.3 % 1 3 2 17 34.1 34.1 83.0 - 31.1   Bi-Level 16/12/0 31.5 0.0 31.5 0.0 100.0 % - - - 8 15.2 15.2 85.0 - 21.0   Bi-Level 18/14/0 39.5 0.5 25.5 13.5 98.7 % - - - 6 9.2 9.2 90.0 - 15.4   Bi-Level 20/16/0 15.5 6.5 0.5 8.5 58.1 % - - - 4 26.7 26.7 89.0 - 6.7     A repeat BiPAP titration PSG may be recommended,  but  only  after  successful habituation to BiPAP  at home (gradually increased BiPAP pressures used for increasing amounts of time, initially while awake and occupied, and then while asleep) and after an unsuccessful home trial of BiPAP 18 cm / 14 cm.   Weight loss to the normal range is recommended as it can decrease respiratory events and snoring in overweight patients.  The following changes in sleep hygiene / sleep - related behavior are recommended after medical treatments are successful  Set time for sleep (now 6.5 hrs / night) to hours of sleep needed for adequate daytime functioning (7.5 to 9.0 hrs / night).  Avoid naps; none longer than 20 min or later than mid - afternoon.    SECONDARY TREATMENT RECOMMENDATIONS  Treat, or refer to SDC if problems are not satisfactorily resolved by the above.  A severe PLM disorder was observed (PLMS Index = 75.1 / hr sleep), and  treatment might be needed because the PLMS were disruptive of sleep (14.7 arousals / hr asleep); there were no signs of restless legs syndrome in the SDI, H & P or PSG, which often occurs with PLMS.  Consider treatment of PLM disorder if PLMS symptoms are sufficiently bothersome to the patient.  Note that the benzodiazepine medications sometimes used to treat PLM  disorder (e.g., clonazepam / Klonopin) may exacerbate some sleep - related respiratory disorders, and that dopaminergic medications such as Mirapex and Requip can be used in such instances.    Mr. Santoro is taking duloxetine / Cymbalta.  Most tricyclic, and many SSRI antidepressants (e.g., fluoxetine - Prozac, sertraline - Zoloft, venlafaxine - Effexor), are associated with increased PLMS in some studies and increased RLS in others.  If Cymbalta is one such medication, consider replacing it with a medication known not to exacerbate PLMS or RLS.  If  insomnia persists after treatments for medical sleep disorders have proven effective, recommend follow - up inquiry regarding frequency, duration and nature of reported sleep loss and poor sleep maintenance (stress - related and / or conditioned psychophysiological insomnia;  r/o  insufficient sleep syndrome) and referral for behavioral and cognitive - behavioral treatment of insomnia, as indicated.  Please see SDI.   Consider behavioral and cognitive - behavioral treatments for stress and depression to complement the medication and to increase the probability of long - term adaptive change.  Sleep - related bruxism  might be expected to improve.  Consider a referral for a dental examination and possible dental splint for sleep bruxism.    Review the basis for prescribing antidepressant medication. Sleep disorders of the type and magnitude Mr. Santoro has can produce many of the signs and symptoms of depression.  Follow - up inquiry regarding sleep disruption secondary to pain or other physical discomfort (please see SDI).     See below for a complete interpretation of data from the polysomnography and Sleep Disorders Inventory.     Thank you for referring this patient to the [facility].      Félix Liz, Ph.D., ABPP; Diplomate, American Board of Sleep Medicine

## 2024-08-23 DIAGNOSIS — G47.33 SEVERE OBSTRUCTIVE SLEEP APNEA: Primary | ICD-10-CM

## 2024-08-31 NOTE — PROGRESS NOTES
Subjective:       Patient ID: Cedric Santoro is a 64 y.o. male.    Chief Complaint: No chief complaint on file.    HPI    The patient presented on    for evaluation of   New issues: none.  Legs cramps: mildly improved with Baclofen / still waking Him up 5 to 7 times x night / milder  during the day.   No side effects.     The originating site (patient location) is: Home.    The distant site (neurologist location) is: Neurology Clinic at [facility].    The chief complaint leading to consultation is: Legs cramps.     Visit type: Virtual visit with synchronous audio and video.    Consent: The patient verbally consented to participating in the video visit and informed that may   decline to receive medical services by telemedicine and may withdraw from such care at any time.    I discussed with the patient the nature of our telemedicine visits, that:    I  would evaluate the patient and recommend diagnostics and treatments based on my assessment.    Our sessions are not being recorded and that personal health information is protected.    Our team would provide follow up care in person if/when the patient needs it.    Virtual (video/telemedicine) visits have significant limitations. A telemedicine exam is primarily focused on the history and what I can observe.   Several critical parts of the neurological exam cannot be performed.     Review of Systems        Current Outpatient Medications:     albuterol (PROVENTIL/VENTOLIN HFA) 90 mcg/actuation inhaler, Inhale 2 puffs into the lungs every 4 (four) hours as needed., Disp: , Rfl:     apixaban (ELIQUIS) 5 mg Tab, TAKE 1 TABLET BY MOUTH TWICE A DAY, Disp: 60 tablet, Rfl: 12    atenoloL (TENORMIN) 25 MG tablet, Take 1 tablet (25 mg total) by mouth once daily., Disp: 90 tablet, Rfl: 3    b complex vitamins tablet, Take 1 tablet by mouth once daily., Disp: , Rfl:     baclofen (LIORESAL) 10 MG tablet, Take 1 tablet (10 mg total) by mouth 2 (two) times daily., Disp: 180  tablet, Rfl: 1    co-enzyme Q-10 30 mg capsule, Take 30 mg by mouth once daily., Disp: , Rfl:     DULoxetine (CYMBALTA) 60 MG capsule, Take 1 capsule (60 mg total) by mouth once daily., Disp: 90 capsule, Rfl: 3    fluticasone-umeclidin-vilanter (TRELEGY ELLIPTA) 200-62.5-25 mcg inhaler, Inhale 1 puff into the lungs once daily., Disp: 180 each, Rfl: 3    furosemide (LASIX) 40 MG tablet, Take 1 tablet (40 mg total) by mouth once daily., Disp: 90 tablet, Rfl: 5    inhalation spacing device (BREATHERITE MDI SPACER), Use as directed for inhalation., Disp: 1 each, Rfl: 0    levothyroxine (SYNTHROID) 50 MCG tablet, TAKE 1 TABLET BY MOUTH ONCE A DAY ON MONDAY, TUESDAY, WEDNESDAY, THURSDAY AND FRIDAY, Disp: 66 tablet, Rfl: 3    losartan (COZAAR) 50 MG tablet, Take 1 tablet (50 mg total) by mouth once daily., Disp: 90 tablet, Rfl: 3    magnesium 30 mg Tab, Take 1 tablet by mouth once daily., Disp: , Rfl:     multivitamin (THERAGRAN) per tablet, Take 1 tablet by mouth once daily., Disp: , Rfl:     rosuvastatin (CRESTOR) 20 MG tablet, Take 1 tablet (20 mg total) by mouth every evening., Disp: 90 tablet, Rfl: 3    tamsulosin (FLOMAX) 0.4 mg Cap, Take 1 capsule (0.4 mg total) by mouth once daily., Disp: 90 capsule, Rfl: 3    vitamin D (VITAMIN D3) 1000 units Tab, Take 1,000 Units by mouth once daily., Disp: , Rfl:     Past Medical History:   Diagnosis Date    Abnormal ECG 10/04/2022    Chronic diastolic heart failure 06/13/2024    Coronary artery disease of native artery of native heart with stable angina pectoris 09/06/2023    Essential hypertension 02/24/2020    First degree AV block 06/13/2024    Hypertension     MVP (mitral valve prolapse) 10/26/2022    Nonrheumatic mitral valve regurgitation 10/26/2022    Palpitation 03/05/2020    Paroxysmal atrial fibrillation 03/05/2020    Paroxysmal atrial flutter 10/04/2022    Raynaud's syndrome     Vasovagal syncopes        Past Surgical History:   Procedure Laterality Date    ABLATION,  ATRIAL FLUTTER, TYPICAL N/A 12/15/2022    Procedure: Ablation, Atrial Flutter, Typical;  Surgeon: Matty Hendricks MD;  Location: [facility] EP LAB;  Service: Cardiology;  Laterality: N/A;  AFL, RFA, NICOLASA, ARIELA, anes, MB, 3 Prep    CARPAL TUNNEL RELEASE Right 04/02/2024    [facility]    CHOLECYSTECTOMY      ECHOCARDIOGRAM,TRANSESOPHAGEAL N/A 12/15/2022    Procedure: Transesophageal echo (ARIELA) intra-procedure log documentation;  Surgeon: Nisreen Fenton MD;  Location: [facility] EP LAB;  Service: Cardiology;  Laterality: N/A;    EYE SURGERY      GALLBLADDER SURGERY      HERNIA REPAIR      LEFT HEART CATHETERIZATION Left 05/24/2023    Procedure: Left heart cath;  Surgeon: Kal Garzon MD;  Location: [facility] CATH LAB;  Service: Cardiology;  Laterality: Left;  may need to sign consent//    OPEN REDUCTION AND INTERNAL FIXATION (ORIF) OF FRACTURE OF METATARSAL BONE Left 11/06/2023    Procedure: ORIF, FRACTURE, METATARSAL BONE;  Surgeon: Aleksandr Cheek DPM;  Location: [facility] OR;  Service: Podiatry;  Laterality: Left;    THROAT SURGERY      TRANSESOPHAGEAL ECHOCARDIOGRAPHY N/A 02/27/2023    Procedure: ECHOCARDIOGRAM, TRANSESOPHAGEAL;  Surgeon: Samuel Del Valle MD;  Location: [facility] CATH LAB;  Service: Cardiology;  Laterality: N/A;    TREATMENT OF CARDIAC ARRHYTHMIA  12/15/2022    Procedure: Cardioversion or Defibrillation;  Surgeon: Matty Hendricks MD;  Location: [facility] EP LAB;  Service: Cardiology;;       Social History     Socioeconomic History    Marital status: Other   Occupational History    Occupation: Retired    Tobacco Use    Smoking status: Never    Smokeless tobacco: Never   Substance and Sexual Activity    Alcohol use: No    Drug use: No    Sexual activity: Not Currently     Partners: Female     Birth control/protection: None     Social Determinants of Health     Financial Resource Strain: Low Risk  (2/11/2024)    Overall Financial Resource Strain (CARDIA)     Difficulty of Paying Living  Expenses: Not hard at all   Food Insecurity: No Food Insecurity (2/11/2024)    Hunger Vital Sign     Worried About Running Out of Food in the Last Year: Never true     Ran Out of Food in the Last Year: Never true   Transportation Needs: No Transportation Needs (2/11/2024)    PRAPARE - Transportation     Lack of Transportation (Medical): No     Lack of Transportation (Non-Medical): No   Physical Activity: Inactive (2/11/2024)    Exercise Vital Sign     Days of Exercise per Week: 0 days     Minutes of Exercise per Session: 30 min   Stress: Stress Concern Present (2/11/2024)    [employer] [place] [employer] - Occupational Stress Questionnaire     Feeling of Stress : To some extent   Housing Stability: Low Risk  (2/11/2024)    Housing Stability Vital Sign     Unable to Pay for Housing in the Last Year: No     Number of Places Lived in the Last Year: 1     Unstable Housing in the Last Year: No       Past/Current Medical/Surgical History, Past/Current Social History, Past/Current Family History and Past/Current Medications were reviewed in detail.    Objective:     GENERAL APPEARANCE:     The patient looks comfortable.    No signs of respiratory distress.    Normal breathing pattern.    No dysmorphic features    Normal eye contact.     GENERAL MEDICAL EXAM:    HEENT:  Head is atraumatic normocephalic.      Neck and Axillae: No JVD. No visible lesions.    Cardiopulmonary: No cyanosis. No tachypnea. Normal respiratory effort.    Gastrointestinal/Urogenital:  No jaundice. No stomas or lesions. No visible hernias. No catheters.     Skin, Hair and Nails: No pathognonomic skin rash. No neurofibromatosis. No visible lesions.No stigmata of autoimmune disease. No clubbing.    Limbs: No varicose veins. No visible swelling.    Muskoskeletal: No visible deformities.No visible lesions.    Neurologic Exam    Lab Results   Component Value Date    WBC 4.38 05/29/2024    HGB 14.3 05/29/2024    HCT 42.0 05/29/2024    MCV 93  05/29/2024     05/29/2024       Sodium   Date Value Ref Range Status   08/07/2024 141 136 - 145 mmol/L Final     Potassium   Date Value Ref Range Status   08/07/2024 4.0 3.5 - 5.1 mmol/L Final     Chloride   Date Value Ref Range Status   08/07/2024 103 95 - 110 mmol/L Final     CO2   Date Value Ref Range Status   08/07/2024 26 23 - 29 mmol/L Final     Glucose   Date Value Ref Range Status   08/07/2024 106 70 - 110 mg/dL Final     BUN   Date Value Ref Range Status   08/07/2024 14 8 - 23 mg/dL Final     Creatinine   Date Value Ref Range Status   08/07/2024 1.3 0.5 - 1.4 mg/dL Final     Calcium   Date Value Ref Range Status   08/07/2024 9.5 8.7 - 10.5 mg/dL Final     Total Protein   Date Value Ref Range Status   08/07/2024 6.8 6.0 - 8.4 g/dL Final     Albumin   Date Value Ref Range Status   08/07/2024 4.1 3.5 - 5.2 g/dL Final     Total Bilirubin   Date Value Ref Range Status   08/07/2024 0.6 0.1 - 1.0 mg/dL Final     Comment:     For infants and newborns, interpretation of results should be based  on gestational age, weight and in agreement with clinical  observations.    Premature Infant recommended reference ranges:  Up to 24 hours.............<8.0 mg/dL  Up to 48 hours............<12.0 mg/dL  3-5 days..................<15.0 mg/dL  6-29 days.................<15.0 mg/dL       Alkaline Phosphatase   Date Value Ref Range Status   08/07/2024 65 55 - 135 U/L Final     AST   Date Value Ref Range Status   08/07/2024 56 (H) 10 - 40 U/L Final     ALT   Date Value Ref Range Status   08/07/2024 39 10 - 44 U/L Final     Anion Gap   Date Value Ref Range Status   08/07/2024 12 8 - 16 mmol/L Final     eGFR if    Date Value Ref Range Status   04/12/2022 >60.0 >60 mL/min/1.73 m^2 Final     eGFR if non    Date Value Ref Range Status   04/12/2022 >60.0 >60 mL/min/1.73 m^2 Final     Comment:     Calculation used to obtain the estimated glomerular filtration  rate (eGFR) is the CKD-EPI equation.     "      No results found for: "JLJCJPJM64"    Lab Results   Component Value Date    TSH 3.127 08/07/2024    FREET4 0.91 08/07/2024     No results found in the last 24 hours.    LABORATORY EVALUATION    RADIOLOGY EVALUATION     NEUROPHYSIOLOGY EVALUATION     PATHOLOGY EVALUATION      NEUROCOGNITIVE AND NEUROPSYCHOLOGY EVALUATION     Reviewed the neuroimaging independently     Assessment:   64 Years old C. Male with PMHX as above came of the evaluation of " Legs cramps ".   - Legs cramps.   - PLM.   - Hypothyroidism.   Plan:   Mild improvements on Legs cramps - still moderate to severe at night / interfere with restful sleep.   Increase Baclofen to 20 mg P:O BID.     Hold Crestor for few weeks and monitor cramps.      MG: Acetylcholine antibodies - negative.   Check Musk Sbds.   Mild Eyes drop - still present.     NCS/ EMG - 08/ 2024 - CTS / Radiculopathy.   Patient indicated was Dx'ed with Lumbar Radiculopathy since 2008 - 09 - medical treatment / no Sx / no symptoms at this moment.      Home Sleep study - 08/ 2024 - severe PLM / Severe JIA.  CPaP Titration.     Labs: ESR / CRP / Hgb A 1 C / TSH / CBC / CMP / Lipids panel - 2024 - non significant abnormalities.     MEDICAL/SURGICAL COMORBIDITIES     All relevant medical comorbidities noted and managed by primary care physician and medical care team.      HEALTHY LIFESTYLE AND PREVENTATIVE CARE    The patient to adhere to the age-appropriate health maintenance guidelines including screening tests and vaccinations.   The patient to adhere to  healthy lifestyle, optimal weight, exercise, healthy diet,   good sleep hygiene and avoiding drugs including smoking, alcohol and recreational drugs.    RTC     I spent a total of 30 minutes on the day of the visit.This includes face to face time and non-face to face time preparing to see the patient (eg, review of tests),   obtaining and/or reviewing separately obtained history, documenting clinical information in the electronic " or other health record,   independently interpreting results and communicating results to the patient/family/caregiver, or care coordinator.    Please do not hesitate to contact me with any updates, questions or concerns.    Clyde Nichols MD.  General Neurologist.

## 2024-09-03 ENCOUNTER — OFFICE VISIT (OUTPATIENT)
Dept: NEUROLOGY | Facility: CLINIC | Age: 64
End: 2024-09-03
Payer: COMMERCIAL

## 2024-09-03 ENCOUNTER — PATIENT MESSAGE (OUTPATIENT)
Dept: PULMONOLOGY | Facility: CLINIC | Age: 64
End: 2024-09-03
Payer: COMMERCIAL

## 2024-09-03 DIAGNOSIS — M54.16 LUMBAR RADICULOPATHY: ICD-10-CM

## 2024-09-03 DIAGNOSIS — G47.61 PLMD (PERIODIC LIMB MOVEMENT DISORDER): ICD-10-CM

## 2024-09-03 DIAGNOSIS — H02.401 PTOSIS OF RIGHT EYELID: Primary | ICD-10-CM

## 2024-09-03 DIAGNOSIS — R25.2 CRAMPS OF LOWER EXTREMITY: ICD-10-CM

## 2024-09-03 PROCEDURE — 1160F RVW MEDS BY RX/DR IN RCRD: CPT | Mod: CPTII,95,, | Performed by: PSYCHIATRY & NEUROLOGY

## 2024-09-03 PROCEDURE — 4010F ACE/ARB THERAPY RXD/TAKEN: CPT | Mod: CPTII,95,, | Performed by: PSYCHIATRY & NEUROLOGY

## 2024-09-03 PROCEDURE — 1159F MED LIST DOCD IN RCRD: CPT | Mod: CPTII,95,, | Performed by: PSYCHIATRY & NEUROLOGY

## 2024-09-03 PROCEDURE — 99214 OFFICE O/P EST MOD 30 MIN: CPT | Mod: 95,,, | Performed by: PSYCHIATRY & NEUROLOGY

## 2024-09-03 PROCEDURE — 3044F HG A1C LEVEL LT 7.0%: CPT | Mod: CPTII,95,, | Performed by: PSYCHIATRY & NEUROLOGY

## 2024-09-04 ENCOUNTER — TELEPHONE (OUTPATIENT)
Dept: NEUROLOGY | Facility: CLINIC | Age: 64
End: 2024-09-04
Payer: COMMERCIAL

## 2024-09-04 NOTE — TELEPHONE ENCOUNTER
Called pt to advise that the provider placed blood work orders for him. I told him he can go to the lab when he is ready. He verbalized understanding.

## 2024-09-05 ENCOUNTER — LAB VISIT (OUTPATIENT)
Dept: LAB | Facility: HOSPITAL | Age: 64
End: 2024-09-05
Attending: PSYCHIATRY & NEUROLOGY
Payer: COMMERCIAL

## 2024-09-05 DIAGNOSIS — H02.401 PTOSIS OF RIGHT EYELID: ICD-10-CM

## 2024-09-05 PROCEDURE — 36415 COLL VENOUS BLD VENIPUNCTURE: CPT | Mod: PO | Performed by: PSYCHIATRY & NEUROLOGY

## 2024-09-05 PROCEDURE — 86366 MUSCLE-SPECIFIC KINASE ANTB: CPT | Performed by: PSYCHIATRY & NEUROLOGY

## 2024-09-05 PROCEDURE — 86041 ACETYLCHOLN RCPTR BNDNG ANTB: CPT | Performed by: PSYCHIATRY & NEUROLOGY

## 2024-09-07 ENCOUNTER — PATIENT MESSAGE (OUTPATIENT)
Dept: ADMINISTRATIVE | Facility: OTHER | Age: 64
End: 2024-09-07
Payer: COMMERCIAL

## 2024-09-10 LAB — MUSK ANTIBODY TEST: 0 NMOL/L (ref 0–0.02)

## 2024-09-11 LAB
ACHR BIND AB SER-SCNC: 0 NMOL/L
IMMUNOLOGIST REVIEW: NORMAL

## 2024-09-16 DIAGNOSIS — R60.0 EDEMA OF BOTH LEGS: ICD-10-CM

## 2024-09-16 RX ORDER — FUROSEMIDE 40 MG/1
40 TABLET ORAL
Qty: 90 TABLET | Refills: 5 | Status: SHIPPED | OUTPATIENT
Start: 2024-09-16

## 2024-09-19 ENCOUNTER — PATIENT MESSAGE (OUTPATIENT)
Dept: PULMONOLOGY | Facility: CLINIC | Age: 64
End: 2024-09-19
Payer: COMMERCIAL

## 2024-10-04 ENCOUNTER — OFFICE VISIT (OUTPATIENT)
Dept: PULMONOLOGY | Facility: CLINIC | Age: 64
End: 2024-10-04
Payer: COMMERCIAL

## 2024-10-04 VITALS
HEIGHT: 70 IN | DIASTOLIC BLOOD PRESSURE: 66 MMHG | SYSTOLIC BLOOD PRESSURE: 130 MMHG | WEIGHT: 209.44 LBS | BODY MASS INDEX: 29.98 KG/M2 | OXYGEN SATURATION: 92 % | HEART RATE: 72 BPM | RESPIRATION RATE: 18 BRPM

## 2024-10-04 DIAGNOSIS — R09.02 EXERCISE HYPOXEMIA: ICD-10-CM

## 2024-10-04 DIAGNOSIS — J43.9 MIXED RESTRICTIVE AND OBSTRUCTIVE LUNG DISEASE: Primary | ICD-10-CM

## 2024-10-04 DIAGNOSIS — J98.4 MIXED RESTRICTIVE AND OBSTRUCTIVE LUNG DISEASE: Primary | ICD-10-CM

## 2024-10-04 DIAGNOSIS — G47.33 SEVERE OBSTRUCTIVE SLEEP APNEA: ICD-10-CM

## 2024-10-04 DIAGNOSIS — I50.20 HFREF (HEART FAILURE WITH REDUCED EJECTION FRACTION): ICD-10-CM

## 2024-10-04 PROCEDURE — 99999 PR PBB SHADOW E&M-EST. PATIENT-LVL V: CPT | Mod: PBBFAC,,, | Performed by: INTERNAL MEDICINE

## 2024-10-04 NOTE — PROGRESS NOTES
Pulmonary Outpatient Follow Up Visit     Subjective:      Patient ID: Cedric Santoro is a 64 y.o. male.    Chief Complaint: Pulmonary Nodules      HPI        64-year-old male patient presenting for follow-up.        10/04/2024 severe obstructive sleep apnea with a suboptimal titration on split night polysomnography performed.      Does have an auto BiPAP machine set to spontaneous mode 25 /14 my order was on automatic mode with a minimum EPAP of 14 pressure support of 4 and a maximum IPAP of 25.      Patient complaining of apneic events during sleep.      [place] Sleepiness Scale score 15 down from 20 prior to starting BiPAP.          Initially evaluated by nurse practitioner for restrictive lung disorder.  He has a history of moderate to severe mitral valve disease.  Diastolic heart failure.  Ejection fraction 55-60% January 20, 2024.          Worked in [employer] , not a smoker denies personal family history of autoimmune disorders.      Snoring reported.  [place] Sleepiness Scale score 20.  Takes naps of 1-2 hours a day.        Review of Systems   Constitutional:  Positive for fatigue.   Respiratory:  Positive for snoring, shortness of breath and dyspnea on extertion.    Psychiatric/Behavioral:  Positive for sleep disturbance.        Outpatient Encounter Medications as of 10/4/2024   Medication Sig Dispense Refill    albuterol (PROVENTIL/VENTOLIN HFA) 90 mcg/actuation inhaler Inhale 2 puffs into the lungs every 4 (four) hours as needed.      apixaban (ELIQUIS) 5 mg Tab TAKE 1 TABLET BY MOUTH TWICE A DAY 60 tablet 12    atenoloL (TENORMIN) 25 MG tablet Take 1 tablet (25 mg total) by mouth once daily. 90 tablet 3    b complex vitamins tablet Take 1 tablet by mouth once daily.      baclofen (LIORESAL) 10 MG tablet Take 1 tablet (10 mg total) by mouth 2 (two) times daily. 180 tablet 1    co-enzyme Q-10 30 mg capsule Take 30 mg by mouth once daily.      DULoxetine (CYMBALTA)  "60 MG capsule Take 1 capsule (60 mg total) by mouth once daily. 90 capsule 3    fluticasone-umeclidin-vilanter (TRELEGY ELLIPTA) 200-62.5-25 mcg inhaler Inhale 1 puff into the lungs once daily. 180 each 3    furosemide (LASIX) 40 MG tablet TAKE 1 TABLET BY MOUTH EVERY DAY 90 tablet 5    inhalation spacing device (BREATHERITE MDI SPACER) Use as directed for inhalation. 1 each 0    levothyroxine (SYNTHROID) 50 MCG tablet TAKE 1 TABLET BY MOUTH ONCE A DAY ON MONDAY, TUESDAY, WEDNESDAY, THURSDAY AND FRIDAY 66 tablet 3    losartan (COZAAR) 50 MG tablet Take 1 tablet (50 mg total) by mouth once daily. 90 tablet 3    magnesium 30 mg Tab Take 1 tablet by mouth once daily.      multivitamin (THERAGRAN) per tablet Take 1 tablet by mouth once daily.      rosuvastatin (CRESTOR) 20 MG tablet Take 1 tablet (20 mg total) by mouth every evening. 90 tablet 3    tamsulosin (FLOMAX) 0.4 mg Cap Take 1 capsule (0.4 mg total) by mouth once daily. 90 capsule 3    vitamin D (VITAMIN D3) 1000 units Tab Take 1,000 Units by mouth once daily.      [DISCONTINUED] furosemide (LASIX) 40 MG tablet Take 1 tablet (40 mg total) by mouth once daily. 90 tablet 5     No facility-administered encounter medications on file as of 10/4/2024.       Objective:     Vital Signs (Most Recent)  Vital Signs  Pulse: 72  Resp: 18  SpO2: (!) 92 %  BP: 130/66  Height and Weight  Height: 5' 10" (177.8 cm)  Weight: 95 kg (209 lb 7 oz)  BSA (Calculated - sq m): 2.17 sq meters  BMI (Calculated): 30.1  Weight in (lb) to have BMI = 25: 173.9]  Wt Readings from Last 2 Encounters:   10/04/24 95 kg (209 lb 7 oz)   08/19/24 93.3 kg (205 lb 11 oz)       Physical Exam   Constitutional: He is oriented to person, place, and time. He appears well-developed and well-nourished.   HENT:   Mouth/Throat: Mallampati Score: III.   Cardiovascular: Normal rate and regular rhythm.   Pulmonary/Chest: Normal expansion and effort normal. He has decreased breath sounds.   Neurological: He is " "alert and oriented to person, place, and time.   Psychiatric: He has a normal mood and affect. His behavior is normal. Judgment and thought content normal.       Laboratory  Lab Results   Component Value Date    WBC 4.38 05/29/2024    RBC 4.50 (L) 05/29/2024    HGB 14.3 05/29/2024    HCT 42.0 05/29/2024    MCV 93 05/29/2024    MCH 31.8 (H) 05/29/2024    MCHC 34.0 05/29/2024    RDW 12.2 05/29/2024     05/29/2024    MPV 10.7 05/29/2024    GRAN 2.7 02/05/2024    GRAN 59.2 02/05/2024    LYMPH 1.1 02/05/2024    LYMPH 25.1 02/05/2024    MONO 0.5 02/05/2024    MONO 10.4 02/05/2024    EOS 0.2 02/05/2024    BASO 0.06 02/05/2024    EOSINOPHIL 3.8 02/05/2024    BASOPHIL 1.3 02/05/2024       BMP  Lab Results   Component Value Date     08/07/2024    K 4.0 08/07/2024     08/07/2024    CO2 26 08/07/2024    BUN 14 08/07/2024    CREATININE 1.3 08/07/2024    CALCIUM 9.5 08/07/2024    ANIONGAP 12 08/07/2024    ESTGFRAFRICA >60.0 04/12/2022    EGFRNONAA >60.0 04/12/2022    AST 56 (H) 08/07/2024    ALT 39 08/07/2024    PROT 6.8 08/07/2024       Lab Results   Component Value Date    BNP 17 07/08/2024    BNP 27 12/25/2023    BNP 17 11/13/2023    BNP 36 02/09/2023    BNP 95 10/04/2022       Lab Results   Component Value Date    TSH 3.127 08/07/2024       Lab Results   Component Value Date    SEDRATE 3 05/08/2024       Lab Results   Component Value Date    CRP 2.2 05/08/2024     No results found for: "IGE"     No results found for: "ASPERGILLUS"  No results found for: "AFUMIGATUSCL"     Lab Results   Component Value Date    ACE 39 05/08/2024        Diagnostic Results:  I have personally reviewed today the following studies:                  Assessment/Plan:   Mixed restrictive and obstructive lung disease  -     Stress test, pulmonary; Future  -     Complete PFT without bronchodilator; Future  -     MAXIMAL INSPIRATORY/EXPIRATORY PRESSURE; Future    Exercise hypoxemia    HFrEF (heart failure with reduced ejection " fraction)    Severe obstructive sleep apnea      Trim beard to improve mask seal.  Large leak noted on the patient more than review in the exam room today.  Has fullface mask.    Change to auto BiPAP and lower pressures patient will be on EPAP 10 cm pressure support of 4 and maximum IPAP 18 in order to eliminate treatment emergent central sleep apnea.      His residual AHI was 40 events per hour with 30 events being central.      Atelectatic changes on CT scan of the chest but do not explain the extent of his restriction on PFT.  Will repeat PFT with MIP MEP.  Re-evaluate O2 need.      Follow up in about 6 weeks (around 11/15/2024).    This note was prepared using voice recognition system and is likely to have sound alike errors that may have been overlooked even after proof reading.  Please call me with any questions    Discussed diagnosis, its evaluation, treatment and usual course. All questions answered.      Chichi Allred MD

## 2024-10-08 ENCOUNTER — PATIENT MESSAGE (OUTPATIENT)
Dept: NEUROLOGY | Facility: CLINIC | Age: 64
End: 2024-10-08
Payer: COMMERCIAL

## 2024-10-09 DIAGNOSIS — R25.2 BILATERAL LEG CRAMPS: ICD-10-CM

## 2024-10-09 RX ORDER — BACLOFEN 20 MG/1
20 TABLET ORAL 2 TIMES DAILY
Qty: 180 TABLET | Refills: 1 | Status: SHIPPED | OUTPATIENT
Start: 2024-10-09 | End: 2025-04-07

## 2024-10-16 ENCOUNTER — HOSPITAL ENCOUNTER (OUTPATIENT)
Dept: CARDIOLOGY | Facility: HOSPITAL | Age: 64
Discharge: HOME | End: 2024-10-16
Attending: INTERNAL MEDICINE
Payer: COMMERCIAL

## 2024-10-16 VITALS
DIASTOLIC BLOOD PRESSURE: 66 MMHG | BODY MASS INDEX: 29.92 KG/M2 | HEIGHT: 70 IN | WEIGHT: 209 LBS | SYSTOLIC BLOOD PRESSURE: 130 MMHG

## 2024-10-16 DIAGNOSIS — R94.31 ABNORMAL ECG: ICD-10-CM

## 2024-10-16 DIAGNOSIS — I49.1 PAC (PREMATURE ATRIAL CONTRACTION): ICD-10-CM

## 2024-10-16 DIAGNOSIS — I25.118 CORONARY ARTERY DISEASE OF NATIVE ARTERY OF NATIVE HEART WITH STABLE ANGINA PECTORIS: ICD-10-CM

## 2024-10-16 DIAGNOSIS — I34.1 MVP (MITRAL VALVE PROLAPSE): ICD-10-CM

## 2024-10-16 DIAGNOSIS — R06.09 DOE (DYSPNEA ON EXERTION): ICD-10-CM

## 2024-10-16 DIAGNOSIS — R60.0 EDEMA OF BOTH LEGS: ICD-10-CM

## 2024-10-16 DIAGNOSIS — I48.92 PAROXYSMAL ATRIAL FLUTTER: ICD-10-CM

## 2024-10-16 DIAGNOSIS — I50.32 CHRONIC DIASTOLIC HEART FAILURE: ICD-10-CM

## 2024-10-16 DIAGNOSIS — I10 ESSENTIAL HYPERTENSION: ICD-10-CM

## 2024-10-16 DIAGNOSIS — I34.0 NONRHEUMATIC MITRAL VALVE REGURGITATION: ICD-10-CM

## 2024-10-16 DIAGNOSIS — I48.0 PAROXYSMAL ATRIAL FIBRILLATION: ICD-10-CM

## 2024-10-16 DIAGNOSIS — R93.1 ELEVATED CORONARY ARTERY CALCIUM SCORE: ICD-10-CM

## 2024-10-16 DIAGNOSIS — Z79.01 CHRONIC ANTICOAGULATION: ICD-10-CM

## 2024-10-16 DIAGNOSIS — R09.02 EXERCISE HYPOXEMIA: ICD-10-CM

## 2024-10-16 DIAGNOSIS — I44.0 FIRST DEGREE AV BLOCK: ICD-10-CM

## 2024-10-16 LAB
AORTIC ROOT ANNULUS: 3.6 CM
ASCENDING AORTA: 3.3 CM
AV INDEX (PROSTH): 0.8
AV MEAN GRADIENT: 7 MMHG
AV PEAK GRADIENT: 13 MMHG
AV VALVE AREA BY VELOCITY RATIO: 4.1 CM²
AV VALVE AREA: 3.9 CM²
AV VELOCITY RATIO: 0.83
BSA FOR ECHO PROCEDURE: 2.16 M2
CV ECHO LV RWT: 0.41 CM
DOP CALC AO PEAK VEL: 1.8 M/S
DOP CALC AO VTI: 36.9 CM
DOP CALC LVOT AREA: 4.9 CM2
DOP CALC LVOT DIAMETER: 2.5 CM
DOP CALC LVOT PEAK VEL: 1.5 M/S
DOP CALC LVOT STROKE VOLUME: 144.2 CM3
DOP CALC RVOT PEAK VEL: 0.85 M/S
DOP CALC RVOT VTI: 18.8 CM
DOP CALCLVOT PEAK VEL VTI: 29.4 CM
E WAVE DECELERATION TIME: 165.11 MSEC
E/A RATIO: 1.2
E/E' RATIO: 7.3 M/S
ECHO LV POSTERIOR WALL: 1.1 CM (ref 0.6–1.1)
EJECTION FRACTION: 60 %
FRACTIONAL SHORTENING: 33.3 % (ref 28–44)
INTERVENTRICULAR SEPTUM: 1 CM (ref 0.6–1.1)
IVC DIAMETER: 1.43 CM
LA MAJOR: 5.85 CM
LA MINOR: 5.19 CM
LA WIDTH: 4.4 CM
LEFT ATRIUM AREA SYSTOLIC (APICAL 2 CHAMBER): 19.02 CM2
LEFT ATRIUM AREA SYSTOLIC (APICAL 4 CHAMBER): 21.41 CM2
LEFT ATRIUM SIZE: 4.86 CM
LEFT ATRIUM VOLUME INDEX MOD: 29.6 ML/M2
LEFT ATRIUM VOLUME INDEX: 46.9 ML/M2
LEFT ATRIUM VOLUME MOD: 62.95 ML
LEFT ATRIUM VOLUME: 99.98 CM3
LEFT INTERNAL DIMENSION IN SYSTOLE: 3.6 CM (ref 2.1–4)
LEFT VENTRICLE DIASTOLIC VOLUME INDEX: 66.1 ML/M2
LEFT VENTRICLE DIASTOLIC VOLUME: 140.79 ML
LEFT VENTRICLE END SYSTOLIC VOLUME APICAL 2 CHAMBER: 54.74 ML
LEFT VENTRICLE END SYSTOLIC VOLUME APICAL 4 CHAMBER: 63.45 ML
LEFT VENTRICLE MASS INDEX: 103.6 G/M2
LEFT VENTRICLE SYSTOLIC VOLUME INDEX: 26.2 ML/M2
LEFT VENTRICLE SYSTOLIC VOLUME: 55.72 ML
LEFT VENTRICULAR INTERNAL DIMENSION IN DIASTOLE: 5.4 CM (ref 3.5–6)
LEFT VENTRICULAR MASS: 220.6 G
LV LATERAL E/E' RATIO: 5.21 M/S
LV SEPTAL E/E' RATIO: 12.17 M/S
LVED V (TEICH): 140.79 ML
LVES V (TEICH): 55.72 ML
LVOT MG: 4.98 MMHG
LVOT MV: 1.06 CM/S
MV PEAK A VEL: 0.61 M/S
MV PEAK E VEL: 0.73 M/S
MV STENOSIS PRESSURE HALF TIME: 47.88 MS
MV VALVE AREA P 1/2 METHOD: 4.59 CM2
PISA TR MAX VEL: 2.75 M/S
PV MEAN GRADIENT: 2 MMHG
PV MV: 0.93 M/S
PV PEAK GRADIENT: 9 MMHG
PV PEAK VELOCITY: 1.51 M/S
RA MAJOR: 6.01 CM
RA PRESSURE ESTIMATED: 3 MMHG
RA WIDTH: 3.54 CM
RIGHT VENTRICULAR END-DIASTOLIC DIMENSION: 4.34 CM
RV TB RVSP: 6 MMHG
SINUS: 3.13 CM
STJ: 3.14 CM
TDI LATERAL: 0.14 M/S
TDI SEPTAL: 0.06 M/S
TDI: 0.1 M/S
TR MAX PG: 30 MMHG
TRICUSPID ANNULAR PLANE SYSTOLIC EXCURSION: 2.5 CM
TV REST PULMONARY ARTERY PRESSURE: 33 MMHG
Z-SCORE OF LEFT VENTRICULAR DIMENSION IN END DIASTOLE: -2.29
Z-SCORE OF LEFT VENTRICULAR DIMENSION IN END SYSTOLE: -1.11

## 2024-10-16 PROCEDURE — 93306 TTE W/DOPPLER COMPLETE: CPT | Mod: 26,,, | Performed by: STUDENT IN AN ORGANIZED HEALTH CARE EDUCATION/TRAINING PROGRAM

## 2024-10-16 PROCEDURE — 93306 TTE W/DOPPLER COMPLETE: CPT

## 2024-10-23 ENCOUNTER — OFFICE VISIT (OUTPATIENT)
Dept: CARDIOLOGY | Facility: CLINIC | Age: 64
End: 2024-10-23
Payer: COMMERCIAL

## 2024-10-23 VITALS
RESPIRATION RATE: 16 BRPM | OXYGEN SATURATION: 95 % | BODY MASS INDEX: 30.87 KG/M2 | WEIGHT: 215.63 LBS | SYSTOLIC BLOOD PRESSURE: 120 MMHG | HEART RATE: 61 BPM | DIASTOLIC BLOOD PRESSURE: 70 MMHG | HEIGHT: 70 IN

## 2024-10-23 DIAGNOSIS — I34.0 NONRHEUMATIC MITRAL VALVE REGURGITATION: ICD-10-CM

## 2024-10-23 DIAGNOSIS — R94.31 ABNORMAL ECG: ICD-10-CM

## 2024-10-23 DIAGNOSIS — I48.92 PAROXYSMAL ATRIAL FLUTTER: ICD-10-CM

## 2024-10-23 DIAGNOSIS — R06.09 DOE (DYSPNEA ON EXERTION): Primary | ICD-10-CM

## 2024-10-23 DIAGNOSIS — G47.33 SEVERE OBSTRUCTIVE SLEEP APNEA: ICD-10-CM

## 2024-10-23 DIAGNOSIS — N18.31 STAGE 3A CHRONIC KIDNEY DISEASE (CKD): ICD-10-CM

## 2024-10-23 DIAGNOSIS — I25.118 CORONARY ARTERY DISEASE OF NATIVE ARTERY OF NATIVE HEART WITH STABLE ANGINA PECTORIS: ICD-10-CM

## 2024-10-23 DIAGNOSIS — R93.1 ELEVATED CORONARY ARTERY CALCIUM SCORE: ICD-10-CM

## 2024-10-23 DIAGNOSIS — I50.32 CHRONIC DIASTOLIC HEART FAILURE: ICD-10-CM

## 2024-10-23 DIAGNOSIS — I49.1 PAC (PREMATURE ATRIAL CONTRACTION): ICD-10-CM

## 2024-10-23 DIAGNOSIS — R60.0 EDEMA OF BOTH LEGS: ICD-10-CM

## 2024-10-23 DIAGNOSIS — I48.0 PAROXYSMAL ATRIAL FIBRILLATION: ICD-10-CM

## 2024-10-23 DIAGNOSIS — I44.0 FIRST DEGREE AV BLOCK: ICD-10-CM

## 2024-10-23 DIAGNOSIS — I34.1 MVP (MITRAL VALVE PROLAPSE): ICD-10-CM

## 2024-10-23 DIAGNOSIS — Z79.01 CHRONIC ANTICOAGULATION: ICD-10-CM

## 2024-10-23 PROCEDURE — 3074F SYST BP LT 130 MM HG: CPT | Mod: CPTII,S$GLB,, | Performed by: INTERNAL MEDICINE

## 2024-10-23 PROCEDURE — 1159F MED LIST DOCD IN RCRD: CPT | Mod: CPTII,S$GLB,, | Performed by: INTERNAL MEDICINE

## 2024-10-23 PROCEDURE — 4010F ACE/ARB THERAPY RXD/TAKEN: CPT | Mod: CPTII,S$GLB,, | Performed by: INTERNAL MEDICINE

## 2024-10-23 PROCEDURE — 99215 OFFICE O/P EST HI 40 MIN: CPT | Mod: S$GLB,,, | Performed by: INTERNAL MEDICINE

## 2024-10-23 PROCEDURE — 1160F RVW MEDS BY RX/DR IN RCRD: CPT | Mod: CPTII,S$GLB,, | Performed by: INTERNAL MEDICINE

## 2024-10-23 PROCEDURE — 99999 PR PBB SHADOW E&M-EST. PATIENT-LVL III: CPT | Mod: PBBFAC,,, | Performed by: INTERNAL MEDICINE

## 2024-10-23 PROCEDURE — 3078F DIAST BP <80 MM HG: CPT | Mod: CPTII,S$GLB,, | Performed by: INTERNAL MEDICINE

## 2024-10-23 PROCEDURE — 3044F HG A1C LEVEL LT 7.0%: CPT | Mod: CPTII,S$GLB,, | Performed by: INTERNAL MEDICINE

## 2024-10-23 PROCEDURE — 3008F BODY MASS INDEX DOCD: CPT | Mod: CPTII,S$GLB,, | Performed by: INTERNAL MEDICINE

## 2024-10-23 NOTE — PROGRESS NOTES
Subjective:    Patient ID:  Cedric Santoro is a 64 y.o. male who presents for evaluation of Valvular Heart Disease, Coronary Artery Disease, and Congestive Heart Failure        HPI: Pt presents for eval.  His current med conditions include MVP, MR, HTN, JIA, LAE,1 av block, diastolic CHF, palpitations, PAF, PAFL.  Past hx pertinent for following:  - stress test 2020.  Reportedly had brief PAF w stress test, resolving on its own.  Echo 2020 normal LV function, mild MR.  Pt had ecg done 10/3/22 twice:  a flutter w rate 99 - 116 bpm.  New dx.  Lost his wife to cancer 7/22 then had dyspnea few months.  S/p ARIELA Oct 2022 for cardioversion.  Left atrial appendage thrombus noted so no cardioversion was performed.  ARIELA: Left atrial appendage thrombus noted, LVEF 55%, mild posterior leaflet MV prolapse, mild-mod MR.  S/p a flutter ablation w ANTONI Rice, Dec 2022.  Had low BP w procedure, and required inotropes.  ARIELA showed EF 35%, decline thought to be due to a flutter.  Echo Feb 2023: normal LVEF, LAE, mod-severe MR with MVP, JOSE, mild TR, PAP wnl.  ARIELA was advised again.  S/p ARIELA Feb 2023 w Dr. Del Valle: normal EF, mild MVP, mild-mod MR, mild TR.  Coronary calcium score May 2023:  597  S/p LHC May 2023: nonobstructive CAD noted, normal EF.  OMT advised.  B LE arterial u/s April 2023 no stenosis, normal FARIDA B LE.  B LE venous u/s April 2023 no DVT.  Ecg 12/25/23 NSR, 1 av block, incomplete RBBB, possible old septal infarct.  He saw ANTONI Rice, 9/23 and was taken off DOAC and Atenolol dose cut back.  On Vital Holter Jan 2024 had recurrent PAF.  Eliquis restarted.  Has seen ANTONI Rice, few times since then and has been maintaining sinus.  Ecg 5/29/24 sinus bell 54 bpm, marked 1 av block 338 ms.  Echo Jan 2024 normal EF, LAE, mod-severe MR, PAP 33 mmHg.  Now here.  Pt last seen earlier this year.  Dx w severe JIA, now on BIPAP.  No angina/CP sxs.  Has LEBLANC, chronic, and with little activity and is bothersome.  No  pnd/orthopnea.  Has chronic leg edema.  No syncope.  Echo Oct 2024 personally reviewed: Normal LV function, normal LV size, LAE, mod MR, mild TR, PAP 33 mmHg.  No palpitation sxs.  Weight loss needed.  Labs reviewed.  Lipids look good, on statin tx.  Compliant w meds.  F/u w Pulmonary.      Results for orders placed during the hospital encounter of 10/16/24    Echo    Interpretation Summary    Left Ventricle: The left ventricle is normal in size. Normal wall thickness. Normal wall motion. There is normal systolic function with a visually estimated ejection fraction of 60 - 65%. Ejection fraction is approximately 60%. There is normal diastolic function.    Right Ventricle: Normal right ventricular cavity size. Wall thickness is normal. Right ventricle wall motion  is normal. Systolic function is normal.    Left Atrium: Left atrium is moderately dilated.    Mitral Valve: There is moderate regurgitation.    Tricuspid Valve: There is mild regurgitation.    Pulmonary Artery: The estimated pulmonary artery systolic pressure is 33 mmHg.    IVC/SVC: Normal venous pressure at 3 mmHg.                Past Medical History:   Diagnosis Date    Abnormal ECG 10/04/2022    Chronic diastolic heart failure 06/13/2024    Coronary artery disease of native artery of native heart with stable angina pectoris 09/06/2023    Essential hypertension 02/24/2020    First degree AV block 06/13/2024    Hypertension     MVP (mitral valve prolapse) 10/26/2022    Nonrheumatic mitral valve regurgitation 10/26/2022    Palpitation 03/05/2020    Paroxysmal atrial fibrillation 03/05/2020    Paroxysmal atrial flutter 10/04/2022    Raynaud's syndrome     Vasovagal syncopes        Current Outpatient Medications:     albuterol (PROVENTIL/VENTOLIN HFA) 90 mcg/actuation inhaler, Inhale 2 puffs into the lungs every 4 (four) hours as needed., Disp: , Rfl:     apixaban (ELIQUIS) 5 mg Tab, TAKE 1 TABLET BY MOUTH TWICE A DAY, Disp: 60 tablet, Rfl: 12    atenoloL  (TENORMIN) 25 MG tablet, Take 1 tablet (25 mg total) by mouth once daily., Disp: 90 tablet, Rfl: 3    b complex vitamins tablet, Take 1 tablet by mouth once daily., Disp: , Rfl:     baclofen (LIORESAL) 20 MG tablet, Take 1 tablet (20 mg total) by mouth 2 (two) times daily., Disp: 180 tablet, Rfl: 1    co-enzyme Q-10 30 mg capsule, Take 30 mg by mouth once daily., Disp: , Rfl:     DULoxetine (CYMBALTA) 60 MG capsule, Take 1 capsule (60 mg total) by mouth once daily., Disp: 90 capsule, Rfl: 3    fluticasone-umeclidin-vilanter (TRELEGY ELLIPTA) 200-62.5-25 mcg inhaler, Inhale 1 puff into the lungs once daily., Disp: 180 each, Rfl: 3    furosemide (LASIX) 40 MG tablet, TAKE 1 TABLET BY MOUTH EVERY DAY, Disp: 90 tablet, Rfl: 5    inhalation spacing device (BREATHERITE MDI SPACER), Use as directed for inhalation., Disp: 1 each, Rfl: 0    levothyroxine (SYNTHROID) 50 MCG tablet, TAKE 1 TABLET BY MOUTH ONCE A DAY ON MONDAY, TUESDAY, WEDNESDAY, THURSDAY AND FRIDAY, Disp: 66 tablet, Rfl: 3    losartan (COZAAR) 50 MG tablet, Take 1 tablet (50 mg total) by mouth once daily., Disp: 90 tablet, Rfl: 3    magnesium 30 mg Tab, Take 1 tablet by mouth once daily., Disp: , Rfl:     multivitamin (THERAGRAN) per tablet, Take 1 tablet by mouth once daily., Disp: , Rfl:     rosuvastatin (CRESTOR) 20 MG tablet, Take 1 tablet (20 mg total) by mouth every evening., Disp: 90 tablet, Rfl: 3    tamsulosin (FLOMAX) 0.4 mg Cap, Take 1 capsule (0.4 mg total) by mouth once daily., Disp: 90 capsule, Rfl: 3    vitamin D (VITAMIN D3) 1000 units Tab, Take 1,000 Units by mouth once daily., Disp: , Rfl:       Review of Systems   Constitutional: Positive for weight gain.   HENT: Negative.     Eyes: Negative.    Cardiovascular:  Positive for dyspnea on exertion and leg swelling.   Respiratory:  Positive for shortness of breath.    Endocrine: Negative.    Hematologic/Lymphatic: Negative.    Skin: Negative.    Musculoskeletal:  Positive for arthritis, back  "pain and neck pain.   Gastrointestinal: Negative.    Genitourinary: Negative.    Neurological: Negative.    Psychiatric/Behavioral: Negative.     Allergic/Immunologic: Negative.           /70 (BP Location: Left arm, Patient Position: Sitting)   Pulse 61   Resp 16   Ht 5' 10" (1.778 m)   Wt 97.8 kg (215 lb 9.8 oz)   SpO2 95%   BMI 30.94 kg/m²     Wt Readings from Last 3 Encounters:   10/23/24 97.8 kg (215 lb 9.8 oz)   10/16/24 94.8 kg (209 lb)   10/04/24 95 kg (209 lb 7 oz)     Temp Readings from Last 3 Encounters:   05/29/24 96.9 °F (36.1 °C) (Oral)   04/12/24 97.5 °F (36.4 °C) (Oral)   02/12/24 96.8 °F (36 °C) (Tympanic)     BP Readings from Last 3 Encounters:   10/23/24 120/70   10/16/24 130/66   10/04/24 130/66     Pulse Readings from Last 3 Encounters:   10/23/24 61   10/04/24 72   08/12/24 64       Objective:    Physical Exam  Vitals and nursing note reviewed.   Constitutional:       Appearance: He is well-developed.   HENT:      Head: Normocephalic.   Neck:      Thyroid: No thyromegaly.      Vascular: Normal carotid pulses. No carotid bruit or JVD.   Cardiovascular:      Rate and Rhythm: Normal rate and regular rhythm.      Pulses:           Radial pulses are 2+ on the right side and 2+ on the left side.      Heart sounds: S1 normal and S2 normal. Heart sounds not distant. No midsystolic click and no opening snap. Murmur heard.      High-pitched blowing holosystolic murmur is present with a grade of 2/6 at the apex.      No friction rub. No S3 or S4 sounds.   Pulmonary:      Effort: Pulmonary effort is normal.      Breath sounds: Normal breath sounds. No wheezing or rales.   Abdominal:      General: Bowel sounds are normal. There is no distension or abdominal bruit.      Palpations: Abdomen is soft.      Tenderness: There is no abdominal tenderness.   Musculoskeletal:      Cervical back: Neck supple.      Right lower leg: Edema present.      Left lower leg: Edema present.   Skin:     General: Skin " is warm.   Neurological:      Mental Status: He is alert and oriented to person, place, and time.   Psychiatric:         Behavior: Behavior normal.       I have reviewed all pertinent labs and cardiac studies.    Component      Latest Ref Rng 7/8/2024   BNP      0 - 99 pg/mL 17            Chemistry        Component Value Date/Time     08/07/2024 0758    K 4.0 08/07/2024 0758     08/07/2024 0758    CO2 26 08/07/2024 0758    BUN 14 08/07/2024 0758    CREATININE 1.3 08/07/2024 0758     08/07/2024 0758        Component Value Date/Time    CALCIUM 9.5 08/07/2024 0758    ALKPHOS 65 08/07/2024 0758    AST 56 (H) 08/07/2024 0758    ALT 39 08/07/2024 0758    BILITOT 0.6 08/07/2024 0758    ESTGFRAFRICA >60.0 04/12/2022 1010    EGFRNONAA >60.0 04/12/2022 1010        Lab Results   Component Value Date    WBC 4.38 05/29/2024    HGB 14.3 05/29/2024    HCT 42.0 05/29/2024    MCV 93 05/29/2024     05/29/2024       Lab Results   Component Value Date    HGBA1C 5.7 (H) 05/29/2024     Lab Results   Component Value Date    CHOL 158 08/07/2024    CHOL 162 05/29/2024    CHOL 157 02/05/2024     Lab Results   Component Value Date    HDL 66 08/07/2024    HDL 64 05/29/2024    HDL 60 02/05/2024     Lab Results   Component Value Date    LDLCALC 79.6 08/07/2024    LDLCALC 86.2 05/29/2024    LDLCALC 81.0 02/05/2024     Lab Results   Component Value Date    TRIG 62 08/07/2024    TRIG 59 05/29/2024    TRIG 80 02/05/2024     Lab Results   Component Value Date    CHOLHDL 41.8 08/07/2024    CHOLHDL 39.5 05/29/2024    CHOLHDL 38.2 02/05/2024       Results for orders placed during the hospital encounter of 02/09/23    Echo    Interpretation Summary  · The left ventricle is normal in size with concentric remodeling and normal systolic function.  · Moderate left atrial enlargement.  · The estimated ejection fraction is 60%.  · Normal left ventricular diastolic function.  · Normal right ventricular size with normal right  ventricular systolic function.  · Moderate right atrial enlargement.  · Moderate-to-severe mitral regurgitation.  · Mild tricuspid regurgitation.  · There is bileaflet mitral prolapse.  · Normal central venous pressure (3 mmHg).  · The estimated PA systolic pressure is 28 mmHg.             Results for orders placed during the hospital encounter of 01/18/24    Echo    Interpretation Summary    Left Ventricle: The left ventricle is normal in size. Normal wall thickness. There is eccentric hypertrophy. Normal wall motion. There is normal systolic function with a visually estimated ejection fraction of 60 - 65%. There is normal diastolic function.    Right Ventricle: Normal right ventricular cavity size. Wall thickness is normal. Right ventricle wall motion  is normal. Systolic function is normal.    Left Atrium: Left atrium is mildly dilated.    Mitral Valve: Mildly thickened anterior leaflet. There is moderate to severe regurgitation with a centrally directed jet.    Pulmonary Artery: The estimated pulmonary artery systolic pressure is 33 mmHg.    IVC/SVC: Normal venous pressure at 3 mmHg.      Results for orders placed during the hospital encounter of 10/16/24    Echo    Interpretation Summary    Left Ventricle: The left ventricle is normal in size. Normal wall thickness. Normal wall motion. There is normal systolic function with a visually estimated ejection fraction of 60 - 65%. Ejection fraction is approximately 60%. There is normal diastolic function.    Right Ventricle: Normal right ventricular cavity size. Wall thickness is normal. Right ventricle wall motion  is normal. Systolic function is normal.    Left Atrium: Left atrium is moderately dilated.    Mitral Valve: There is moderate regurgitation.    Tricuspid Valve: There is mild regurgitation.    Pulmonary Artery: The estimated pulmonary artery systolic pressure is 33 mmHg.    IVC/SVC: Normal venous pressure at 3 mmHg.          Assessment:       1. LEBLANC  (dyspnea on exertion)    2. Abnormal ECG    3. Chronic diastolic heart failure    4. Chronic anticoagulation    5. Edema of both legs    6. Coronary artery disease of native artery of native heart with stable angina pectoris    7. First degree AV block    8. Elevated coronary artery calcium score    9. Paroxysmal atrial flutter    10. Paroxysmal atrial fibrillation    11. PAC (premature atrial contraction)    12. Nonrheumatic mitral valve regurgitation    13. MVP (mitral valve prolapse)    14. Severe obstructive sleep apnea    15. Stage 3a chronic kidney disease (CKD)             Plan:             LEBLANC with little activity/bothersome.  Compensated on exam and stable MR on echo.  Will schedule exercise stress test and assess exercise capacity and for ischemia.  He did 9 minutes on treadmill in 2020.  Also check CMP, BNP.  Consider LHC if needed.  Continue Lasix 40 mg qd.  CAD: S/p LHC May 2023: nonobstructive disease noted.  OMT advised.  MVP/MR:  Discussed w pt in detail.  Will need eventual valve intervention/surgery; in meantime will continue to monitor closely.  PAF/PAF: S/p ablation in 2022.  Continue Eliquis tx.   F/u w EP as directed.  Reviewed all tests and above medical conditions with patient in detail and formulated treatment plan.  Continue optimal medical treatment for cardiovascular conditions.  Cardiac low salt diet advised.  Daily exercise encouraged, with the goal 30 +  minutes aerobic exercise as tolerated.  Maintaining healthy weight and weight loss goals (if needed) were discussed in clinic.  HTN: Goal < 130/80.  Continue current HTN meds.  Lipids: continue Rosuvastatin 20 mg qhs.  Abnl ecg: Monitor.  Leg edema: stable. Elevate legs, compression stockings, Lasix and low salt diet.  LFTs: f/u w PCP/monitor.  CRI: Monitor.  JIA: Continue CPAP. F/u w Pulmonary as advised.    PHONE REVIEW.        F/u in 4 months.      I have reviewed all pertinent labs and cardiac studies independently.   Plans and  recommendations have been formulated under my direct supervision. All questions answered and patient voiced understanding.

## 2024-10-24 ENCOUNTER — LAB VISIT (OUTPATIENT)
Dept: LAB | Facility: HOSPITAL | Age: 64
End: 2024-10-24
Attending: INTERNAL MEDICINE
Payer: COMMERCIAL

## 2024-10-24 DIAGNOSIS — I50.32 CHRONIC DIASTOLIC HEART FAILURE: ICD-10-CM

## 2024-10-24 LAB
ALBUMIN SERPL BCP-MCNC: 4.3 G/DL (ref 3.5–5.2)
ALP SERPL-CCNC: 72 U/L (ref 40–150)
ALT SERPL W/O P-5'-P-CCNC: 37 U/L (ref 10–44)
ANION GAP SERPL CALC-SCNC: 8 MMOL/L (ref 8–16)
AST SERPL-CCNC: 54 U/L (ref 10–40)
BILIRUB SERPL-MCNC: 0.7 MG/DL (ref 0.1–1)
BNP SERPL-MCNC: 13 PG/ML (ref 0–99)
BUN SERPL-MCNC: 11 MG/DL (ref 8–23)
CALCIUM SERPL-MCNC: 9.7 MG/DL (ref 8.7–10.5)
CHLORIDE SERPL-SCNC: 101 MMOL/L (ref 95–110)
CO2 SERPL-SCNC: 31 MMOL/L (ref 23–29)
CREAT SERPL-MCNC: 1.4 MG/DL (ref 0.5–1.4)
EST. GFR  (NO RACE VARIABLE): 56.1 ML/MIN/1.73 M^2
GLUCOSE SERPL-MCNC: 128 MG/DL (ref 70–110)
POTASSIUM SERPL-SCNC: 4.1 MMOL/L (ref 3.5–5.1)
PROT SERPL-MCNC: 7.3 G/DL (ref 6–8.4)
SODIUM SERPL-SCNC: 140 MMOL/L (ref 136–145)

## 2024-10-24 PROCEDURE — 36415 COLL VENOUS BLD VENIPUNCTURE: CPT | Mod: PO | Performed by: INTERNAL MEDICINE

## 2024-10-24 PROCEDURE — 83880 ASSAY OF NATRIURETIC PEPTIDE: CPT | Performed by: INTERNAL MEDICINE

## 2024-10-24 PROCEDURE — 80053 COMPREHEN METABOLIC PANEL: CPT | Performed by: INTERNAL MEDICINE

## 2024-10-28 ENCOUNTER — HOSPITAL ENCOUNTER (OUTPATIENT)
Dept: CARDIOLOGY | Facility: HOSPITAL | Age: 64
Discharge: HOME | End: 2024-10-28
Attending: INTERNAL MEDICINE
Payer: COMMERCIAL

## 2024-10-28 VITALS
HEIGHT: 70 IN | DIASTOLIC BLOOD PRESSURE: 86 MMHG | WEIGHT: 205 LBS | SYSTOLIC BLOOD PRESSURE: 124 MMHG | BODY MASS INDEX: 29.35 KG/M2 | HEART RATE: 58 BPM

## 2024-10-28 DIAGNOSIS — R93.1 ELEVATED CORONARY ARTERY CALCIUM SCORE: ICD-10-CM

## 2024-10-28 DIAGNOSIS — I34.1 MVP (MITRAL VALVE PROLAPSE): ICD-10-CM

## 2024-10-28 DIAGNOSIS — I50.32 CHRONIC DIASTOLIC HEART FAILURE: ICD-10-CM

## 2024-10-28 DIAGNOSIS — R94.31 ABNORMAL ECG: ICD-10-CM

## 2024-10-28 DIAGNOSIS — I48.92 PAROXYSMAL ATRIAL FLUTTER: ICD-10-CM

## 2024-10-28 DIAGNOSIS — I25.118 CORONARY ARTERY DISEASE OF NATIVE ARTERY OF NATIVE HEART WITH STABLE ANGINA PECTORIS: ICD-10-CM

## 2024-10-28 DIAGNOSIS — G47.33 SEVERE OBSTRUCTIVE SLEEP APNEA: ICD-10-CM

## 2024-10-28 DIAGNOSIS — I34.0 NONRHEUMATIC MITRAL VALVE REGURGITATION: ICD-10-CM

## 2024-10-28 DIAGNOSIS — I49.1 PAC (PREMATURE ATRIAL CONTRACTION): ICD-10-CM

## 2024-10-28 DIAGNOSIS — I44.0 FIRST DEGREE AV BLOCK: ICD-10-CM

## 2024-10-28 DIAGNOSIS — Z79.01 CHRONIC ANTICOAGULATION: ICD-10-CM

## 2024-10-28 DIAGNOSIS — I48.0 PAROXYSMAL ATRIAL FIBRILLATION: ICD-10-CM

## 2024-10-28 LAB
CV STRESS BASE HR: 58 BPM
DIASTOLIC BLOOD PRESSURE: 86 MMHG
OHS CV CPX 1 MINUTE RECOVERY HEART RATE: 113 BPM
OHS CV CPX 85 PERCENT MAX PREDICTED HEART RATE MALE: 133
OHS CV CPX ESTIMATED METS: 10
OHS CV CPX MAX PREDICTED HEART RATE: 156
OHS CV CPX PATIENT IS FEMALE: 0
OHS CV CPX PATIENT IS MALE: 1
OHS CV CPX PEAK DIASTOLIC BLOOD PRESSURE: 83 MMHG
OHS CV CPX PEAK HEAR RATE: 131 BPM
OHS CV CPX PEAK RATE PRESSURE PRODUCT: NORMAL
OHS CV CPX PEAK SYSTOLIC BLOOD PRESSURE: 185 MMHG
OHS CV CPX PERCENT MAX PREDICTED HEART RATE ACHIEVED: 84
OHS CV CPX RATE PRESSURE PRODUCT PRESENTING: 7192
STRESS ECHO POST EXERCISE DUR MIN: 9 MINUTES
STRESS ECHO POST EXERCISE DUR SEC: 7 SECONDS
STRESS ST DEPRESSION: 1 MM
SYSTOLIC BLOOD PRESSURE: 124 MMHG

## 2024-10-28 PROCEDURE — 93017 CV STRESS TEST TRACING ONLY: CPT

## 2024-10-28 PROCEDURE — 93016 CV STRESS TEST SUPVJ ONLY: CPT | Mod: ,,, | Performed by: INTERNAL MEDICINE

## 2024-10-28 PROCEDURE — 93018 CV STRESS TEST I&R ONLY: CPT | Mod: ,,, | Performed by: INTERNAL MEDICINE

## 2024-10-29 ENCOUNTER — OFFICE VISIT (OUTPATIENT)
Dept: NEUROLOGY | Facility: CLINIC | Age: 64
End: 2024-10-29
Payer: COMMERCIAL

## 2024-10-29 DIAGNOSIS — H02.401 PTOSIS OF RIGHT EYELID: ICD-10-CM

## 2024-10-29 DIAGNOSIS — R25.2 BILATERAL LEG CRAMPS: Primary | ICD-10-CM

## 2024-10-29 PROCEDURE — 1160F RVW MEDS BY RX/DR IN RCRD: CPT | Mod: CPTII,95,, | Performed by: PSYCHIATRY & NEUROLOGY

## 2024-10-29 PROCEDURE — 99213 OFFICE O/P EST LOW 20 MIN: CPT | Mod: 95,,, | Performed by: PSYCHIATRY & NEUROLOGY

## 2024-10-29 PROCEDURE — 3044F HG A1C LEVEL LT 7.0%: CPT | Mod: CPTII,95,, | Performed by: PSYCHIATRY & NEUROLOGY

## 2024-10-29 PROCEDURE — 1159F MED LIST DOCD IN RCRD: CPT | Mod: CPTII,95,, | Performed by: PSYCHIATRY & NEUROLOGY

## 2024-10-29 PROCEDURE — 4010F ACE/ARB THERAPY RXD/TAKEN: CPT | Mod: CPTII,95,, | Performed by: PSYCHIATRY & NEUROLOGY

## 2024-11-06 ENCOUNTER — OFFICE VISIT (OUTPATIENT)
Dept: INTERNAL MEDICINE | Facility: CLINIC | Age: 64
End: 2024-11-06
Payer: COMMERCIAL

## 2024-11-06 VITALS
SYSTOLIC BLOOD PRESSURE: 110 MMHG | HEIGHT: 70 IN | HEART RATE: 72 BPM | BODY MASS INDEX: 30.06 KG/M2 | DIASTOLIC BLOOD PRESSURE: 60 MMHG | WEIGHT: 210 LBS | OXYGEN SATURATION: 96 %

## 2024-11-06 DIAGNOSIS — M62.08 DIASTASIS RECTI: Primary | ICD-10-CM

## 2024-11-06 PROCEDURE — 3008F BODY MASS INDEX DOCD: CPT | Mod: CPTII,S$GLB,, | Performed by: FAMILY MEDICINE

## 2024-11-06 PROCEDURE — 3078F DIAST BP <80 MM HG: CPT | Mod: CPTII,S$GLB,, | Performed by: FAMILY MEDICINE

## 2024-11-06 PROCEDURE — 99213 OFFICE O/P EST LOW 20 MIN: CPT | Mod: S$GLB,,, | Performed by: FAMILY MEDICINE

## 2024-11-06 PROCEDURE — 3074F SYST BP LT 130 MM HG: CPT | Mod: CPTII,S$GLB,, | Performed by: FAMILY MEDICINE

## 2024-11-06 PROCEDURE — 1160F RVW MEDS BY RX/DR IN RCRD: CPT | Mod: CPTII,S$GLB,, | Performed by: FAMILY MEDICINE

## 2024-11-06 PROCEDURE — 3044F HG A1C LEVEL LT 7.0%: CPT | Mod: CPTII,S$GLB,, | Performed by: FAMILY MEDICINE

## 2024-11-06 PROCEDURE — 99999 PR PBB SHADOW E&M-EST. PATIENT-LVL IV: CPT | Mod: PBBFAC,,, | Performed by: FAMILY MEDICINE

## 2024-11-06 PROCEDURE — G2211 COMPLEX E/M VISIT ADD ON: HCPCS | Mod: S$GLB,,, | Performed by: FAMILY MEDICINE

## 2024-11-06 PROCEDURE — 4010F ACE/ARB THERAPY RXD/TAKEN: CPT | Mod: CPTII,S$GLB,, | Performed by: FAMILY MEDICINE

## 2024-11-06 PROCEDURE — 1159F MED LIST DOCD IN RCRD: CPT | Mod: CPTII,S$GLB,, | Performed by: FAMILY MEDICINE

## 2024-11-06 NOTE — PROGRESS NOTES
Subjective:       Patient ID: Cedric Santoro is a 64 y.o. male.    CHIEF COMPLAINT:  - Patient presents with concern about a lump on his abdomen.    HPI:  - Patient has observed a lump on his abdomen for approximately 3 weeks. The lump becomes more prominent with increased abdominal pressure, causing protrusion of abdominal contents. No pain, redness, or hardness is associated with this lump. The condition has been identified as diastasis recti, indicating separation of the abdominal muscles.  - Patient mentions likely upcoming shoulder replacement surgery with Dr. Thomason at [facility].  Patient is otherwise without concerns today.       Review of Systems   Constitutional:  Positive for activity change.   HENT:  Negative for hearing loss and trouble swallowing.    Eyes:  Negative for discharge.   Respiratory:  Negative for chest tightness and wheezing.    Cardiovascular:  Negative for chest pain and palpitations.   Gastrointestinal:  Negative for constipation, diarrhea and vomiting.   Genitourinary:  Negative for difficulty urinating and hematuria.   Musculoskeletal:  Positive for neck pain.   Neurological:  Negative for headaches.   Psychiatric/Behavioral:  Negative for dysphoric mood.          Objective:      Physical Exam  Vitals reviewed.   Constitutional:       General: He is not in acute distress.     Appearance: He is well-developed.   HENT:      Head: Normocephalic and atraumatic.   Eyes:      General: Lids are normal. No scleral icterus.     Extraocular Movements: Extraocular movements intact.      Conjunctiva/sclera: Conjunctivae normal.      Pupils: Pupils are equal, round, and reactive to light.   Pulmonary:      Effort: Pulmonary effort is normal.   Abdominal:      General: Abdomen is protuberant. Bowel sounds are normal. There is no distension.      Palpations: Abdomen is soft.      Tenderness: There is no abdominal tenderness.      Comments: Diastasis recti noted   Neurological:      Mental Status: He is  alert and oriented to person, place, and time.      Cranial Nerves: No cranial nerve deficit.      Gait: Gait normal.   Psychiatric:         Mood and Affect: Mood and affect normal.         Assessment:       1. Diastasis recti        Plan:   1. Diastasis recti     Identified abdominal lump as diastasis recti.   Examined the patient and determined the condition is not concerning due to absence of pain or other worrying symptoms.   Explained diastasis recti as a condition where abdominal muscles separate, causing abdominal contents to protrude when pressure increases.   Educated the patient that pain, erythema, or induration associated with the abdominal protrusion would constitute a medical emergency.   Recommend wearing shapewear or a girdle for abdominal support.   Advised the patient to seek immediate medical attention if pain, erythema, or induration occurs.   Instructed the patient to follow up for preoperative exam and labs once cardiac clearance is obtained for shoulder replacement surgery.    Patient expressed understanding and agreement with plan.    Visit today included increased complexity associated with the care of the episodic problem abdominal symptom, which was addressed while instituting co-management of the longitudinal care of the patient due to the serious and/or complex managed problem(s) .    I have evaluated and discussed management associated with medical care services that serve as the continuing focal point for all needed health care services and/or with medical care services that are part of ongoing care related to my patient's single, serious condition or a complex condition(s).    I am providing ongoing care and I am the primary care provider for this patient, and they are being managed, monitored, and/or observed for their chronic conditions over time.     I have addressed their ongoing health maintenance requirements and needs for all health care services and reviewed co-management  plans provided by specialty providers when available.    Health Maintenance Due   Topic Date Due    COVID-19 Vaccine (1 - 2024-25 season) Never done     Health Maintenance reviewed/updated.    Follow up if symptoms worsen or fail to improve, for keep routine follow up.    This note was generated with the assistance of [employer] listening technology. I attest to having reviewed and edited the generated note for accuracy, though some syntax or spelling errors may persist. Please contact the author of this note for any clarification.

## 2024-11-08 DIAGNOSIS — I10 ESSENTIAL HYPERTENSION: ICD-10-CM

## 2024-11-08 DIAGNOSIS — R00.2 PALPITATION: ICD-10-CM

## 2024-11-08 DIAGNOSIS — I48.0 PAROXYSMAL ATRIAL FIBRILLATION: ICD-10-CM

## 2024-11-08 DIAGNOSIS — R94.39 ABNORMAL STRESS TEST: ICD-10-CM

## 2024-11-08 RX ORDER — ATENOLOL 25 MG/1
25 TABLET ORAL
Qty: 90 TABLET | Refills: 3 | Status: SHIPPED | OUTPATIENT
Start: 2024-11-08

## 2024-11-18 ENCOUNTER — OFFICE VISIT (OUTPATIENT)
Dept: PULMONOLOGY | Facility: CLINIC | Age: 64
End: 2024-11-18
Payer: COMMERCIAL

## 2024-11-18 VITALS
RESPIRATION RATE: 20 BRPM | HEIGHT: 70 IN | BODY MASS INDEX: 30.5 KG/M2 | DIASTOLIC BLOOD PRESSURE: 72 MMHG | SYSTOLIC BLOOD PRESSURE: 120 MMHG | WEIGHT: 213.06 LBS | OXYGEN SATURATION: 94 % | HEART RATE: 65 BPM

## 2024-11-18 DIAGNOSIS — Z71.89 ENCOUNTER FOR BIPAP USE COUNSELING: ICD-10-CM

## 2024-11-18 DIAGNOSIS — I38 VALVULAR HEART DISEASE: ICD-10-CM

## 2024-11-18 DIAGNOSIS — G47.33 SEVERE OBSTRUCTIVE SLEEP APNEA: Primary | ICD-10-CM

## 2024-11-18 DIAGNOSIS — G47.33 CONTROLLED NONFAMILIAL OBSTRUCTIVE SLEEP APNEA: ICD-10-CM

## 2024-11-18 PROCEDURE — 3008F BODY MASS INDEX DOCD: CPT | Mod: CPTII,S$GLB,, | Performed by: INTERNAL MEDICINE

## 2024-11-18 PROCEDURE — 3074F SYST BP LT 130 MM HG: CPT | Mod: CPTII,S$GLB,, | Performed by: INTERNAL MEDICINE

## 2024-11-18 PROCEDURE — 4010F ACE/ARB THERAPY RXD/TAKEN: CPT | Mod: CPTII,S$GLB,, | Performed by: INTERNAL MEDICINE

## 2024-11-18 PROCEDURE — 99999 PR PBB SHADOW E&M-EST. PATIENT-LVL V: CPT | Mod: PBBFAC,,, | Performed by: INTERNAL MEDICINE

## 2024-11-18 PROCEDURE — 3044F HG A1C LEVEL LT 7.0%: CPT | Mod: CPTII,S$GLB,, | Performed by: INTERNAL MEDICINE

## 2024-11-18 PROCEDURE — 3078F DIAST BP <80 MM HG: CPT | Mod: CPTII,S$GLB,, | Performed by: INTERNAL MEDICINE

## 2024-11-18 PROCEDURE — 99214 OFFICE O/P EST MOD 30 MIN: CPT | Mod: S$GLB,,, | Performed by: INTERNAL MEDICINE

## 2024-11-18 PROCEDURE — 1159F MED LIST DOCD IN RCRD: CPT | Mod: CPTII,S$GLB,, | Performed by: INTERNAL MEDICINE

## 2024-11-18 PROCEDURE — 1160F RVW MEDS BY RX/DR IN RCRD: CPT | Mod: CPTII,S$GLB,, | Performed by: INTERNAL MEDICINE

## 2024-11-18 NOTE — PROGRESS NOTES
Pulmonary Outpatient Follow Up Visit     Subjective:      Patient ID: Cedric Santoro is a 64 y.o. male.    Chief Complaint: Sleep Apnea and Pulmonary Nodules      HPI        64-year-old male patient presenting for follow-up.      11/18/2024 using and benefitting from auto BiPAP therapy.  Rupert Sleepiness Scale score 12.  On previous visit while reviewing his modem waveform data he did have significant residual AHI with majority being central.      I did decrease his max IPAP to 18, his minimum IPAP to 10 cm and he remained on a pressure support of 4 cm.  He was advised also to trim his beard to avoid large leak which he did and currently he has a better controlled JIA with residual AHI of 4 events per hour, acceptable leak less than 20 liter/minute.      Usage on average about 6-7 hours a night.        10/04/2024 severe obstructive sleep apnea with a suboptimal titration on split night polysomnography performed.      Does have an auto BiPAP machine set to spontaneous mode 25 /14 my order was on automatic mode with a minimum EPAP of 14 pressure support of 4 and a maximum IPAP of 25.      Patient complaining of apneic events during sleep.      Rupert Sleepiness Scale score 15 down from 20 prior to starting BiPAP.          Initially evaluated by nurse practitioner for restrictive lung disorder.  He has a history of moderate to severe mitral valve disease.  Diastolic heart failure.  Ejection fraction 55-60% January 20, 2024.          Worked in [employer] , not a smoker denies personal family history of autoimmune disorders.      Snoring reported.  Rupert Sleepiness Scale score 20.  Takes naps of 1-2 hours a day.        Review of Systems   Constitutional:  Positive for fatigue.   Respiratory:  Positive for snoring, shortness of breath and dyspnea on extertion.    Psychiatric/Behavioral:  Positive for sleep disturbance.        Outpatient Encounter Medications as of  11/18/2024   Medication Sig Dispense Refill    albuterol (PROVENTIL/VENTOLIN HFA) 90 mcg/actuation inhaler Inhale 2 puffs into the lungs every 4 (four) hours as needed.      apixaban (ELIQUIS) 5 mg Tab TAKE 1 TABLET BY MOUTH TWICE A DAY 60 tablet 12    atenoloL (TENORMIN) 25 MG tablet TAKE 1 TABLET BY MOUTH EVERY DAY 90 tablet 3    b complex vitamins tablet Take 1 tablet by mouth once daily.      baclofen (LIORESAL) 20 MG tablet Take 1 tablet (20 mg total) by mouth 2 (two) times daily. 180 tablet 1    co-enzyme Q-10 30 mg capsule Take 30 mg by mouth once daily.      DULoxetine (CYMBALTA) 60 MG capsule Take 1 capsule (60 mg total) by mouth once daily. 90 capsule 3    fluticasone-umeclidin-vilanter (TRELEGY ELLIPTA) 200-62.5-25 mcg inhaler Inhale 1 puff into the lungs once daily. 180 each 3    furosemide (LASIX) 40 MG tablet TAKE 1 TABLET BY MOUTH EVERY DAY 90 tablet 5    inhalation spacing device (BREATHERITE MDI SPACER) Use as directed for inhalation. 1 each 0    levothyroxine (SYNTHROID) 50 MCG tablet TAKE 1 TABLET BY MOUTH ONCE A DAY ON MONDAY, TUESDAY, WEDNESDAY, THURSDAY AND FRIDAY 66 tablet 3    losartan (COZAAR) 50 MG tablet Take 1 tablet (50 mg total) by mouth once daily. 90 tablet 3    magnesium 30 mg Tab Take 1 tablet by mouth once daily.      multivitamin (THERAGRAN) per tablet Take 1 tablet by mouth once daily.      rosuvastatin (CRESTOR) 20 MG tablet Take 1 tablet (20 mg total) by mouth every evening. 90 tablet 3    tamsulosin (FLOMAX) 0.4 mg Cap Take 1 capsule (0.4 mg total) by mouth once daily. 90 capsule 3    vitamin D (VITAMIN D3) 1000 units Tab Take 1,000 Units by mouth once daily.      [DISCONTINUED] atenoloL (TENORMIN) 25 MG tablet Take 1 tablet (25 mg total) by mouth once daily. 90 tablet 3     No facility-administered encounter medications on file as of 11/18/2024.       Objective:     Vital Signs (Most Recent)  Vital Signs  Pulse: 65  Resp: 20  SpO2: (!) 94 %  BP: 120/72  Patient Position:  "Sitting  Pain Score: 0-No pain  Height and Weight  Height: 5' 10" (177.8 cm)  Weight: 96.6 kg (213 lb 1.2 oz)  BSA (Calculated - sq m): 2.18 sq meters  BMI (Calculated): 30.6  Weight in (lb) to have BMI = 25: 173.9]  Wt Readings from Last 2 Encounters:   11/18/24 96.6 kg (213 lb 1.2 oz)   11/06/24 95.2 kg (209 lb 15.8 oz)       Physical Exam   Constitutional: He is oriented to person, place, and time. He appears well-developed and well-nourished.   HENT:   Mouth/Throat: Mallampati Score: III.   Cardiovascular: Normal rate and regular rhythm.   Pulmonary/Chest: Normal expansion and effort normal. He has decreased breath sounds.   Neurological: He is alert and oriented to person, place, and time.   Psychiatric: He has a normal mood and affect. His behavior is normal. Judgment and thought content normal.       Laboratory  Lab Results   Component Value Date    WBC 4.38 05/29/2024    RBC 4.50 (L) 05/29/2024    HGB 14.3 05/29/2024    HCT 42.0 05/29/2024    MCV 93 05/29/2024    MCH 31.8 (H) 05/29/2024    MCHC 34.0 05/29/2024    RDW 12.2 05/29/2024     05/29/2024    MPV 10.7 05/29/2024    GRAN 2.7 02/05/2024    GRAN 59.2 02/05/2024    LYMPH 1.1 02/05/2024    LYMPH 25.1 02/05/2024    MONO 0.5 02/05/2024    MONO 10.4 02/05/2024    EOS 0.2 02/05/2024    BASO 0.06 02/05/2024    EOSINOPHIL 3.8 02/05/2024    BASOPHIL 1.3 02/05/2024       BMP  Lab Results   Component Value Date     10/24/2024    K 4.1 10/24/2024     10/24/2024    CO2 31 (H) 10/24/2024    BUN 11 10/24/2024    CREATININE 1.4 10/24/2024    CALCIUM 9.7 10/24/2024    ANIONGAP 8 10/24/2024    ESTGFRAFRICA >60.0 04/12/2022    EGFRNONAA >60.0 04/12/2022    AST 54 (H) 10/24/2024    ALT 37 10/24/2024    PROT 7.3 10/24/2024       Lab Results   Component Value Date    BNP 13 10/24/2024    BNP 17 07/08/2024    BNP 27 12/25/2023    BNP 17 11/13/2023    BNP 36 02/09/2023    BNP 95 10/04/2022       Lab Results   Component Value Date    TSH 3.127 08/07/2024 " "      Lab Results   Component Value Date    SEDRATE 3 05/08/2024       Lab Results   Component Value Date    CRP 2.2 05/08/2024     No results found for: "IGE"     No results found for: "ASPERGILLUS"  No results found for: "AFUMIGATUSCL"   Echo 2024    Left Ventricle: The left ventricle is normal in size. Normal wall thickness. Normal wall motion. There is normal systolic function with a visually estimated ejection fraction of 60 - 65%. Ejection fraction is approximately 60%. There is normal diastolic function.    Right Ventricle: Normal right ventricular cavity size. Wall thickness is normal. Right ventricle wall motion  is normal. Systolic function is normal.    Left Atrium: Left atrium is moderately dilated.    Mitral Valve: There is moderate regurgitation.    Tricuspid Valve: There is mild regurgitation.    Pulmonary Artery: The estimated pulmonary artery systolic pressure is 33 mmHg.    IVC/SVC: Normal venous pressure at 3 mmHg.        Lab Results   Component Value Date    ACE 39 05/08/2024        Diagnostic Results:  I have personally reviewed today the following studies:                  Assessment/Plan:   Severe obstructive sleep apnea    Controlled nonfamilial obstructive sleep apnea    Encounter for BiPAP use counseling    Valvular heart disease          Continue auto BiPAP Min EPAP 10 max IPAP 18 and pressure support of 4 cm with fullface mask.      Trim be to control leak.      Encouraged and congratulated on current adherence.        Extend sleep time to 7 -9 hours.    Atelectatic changes on CT scan of the chest but do not explain the extent of his restriction on PFT.  Will repeat PFT with MIP MEP.  Re-evaluate O2 need.      Maximize cardiac therapy.    Follow up in about 3 months (around 2/18/2025).    This note was prepared using voice recognition system and is likely to have sound alike errors that may have been overlooked even after proof reading.  Please call me with any questions    Discussed " diagnosis, its evaluation, treatment and usual course. All questions answered.      Chichi Allred MD

## 2024-12-13 ENCOUNTER — LAB VISIT (OUTPATIENT)
Dept: LAB | Facility: HOSPITAL | Age: 64
End: 2024-12-13
Attending: UROLOGY
Payer: COMMERCIAL

## 2024-12-13 DIAGNOSIS — R97.20 ELEVATED PSA: ICD-10-CM

## 2024-12-13 LAB — COMPLEXED PSA SERPL-MCNC: 4.3 NG/ML (ref 0–4)

## 2024-12-13 PROCEDURE — 36415 COLL VENOUS BLD VENIPUNCTURE: CPT | Mod: PO | Performed by: UROLOGY

## 2024-12-13 PROCEDURE — 84153 ASSAY OF PSA TOTAL: CPT | Performed by: UROLOGY

## 2024-12-17 ENCOUNTER — OFFICE VISIT (OUTPATIENT)
Dept: UROLOGY | Facility: CLINIC | Age: 64
End: 2024-12-17
Payer: COMMERCIAL

## 2024-12-17 VITALS
HEART RATE: 54 BPM | WEIGHT: 213.31 LBS | SYSTOLIC BLOOD PRESSURE: 116 MMHG | DIASTOLIC BLOOD PRESSURE: 71 MMHG | HEIGHT: 70 IN | BODY MASS INDEX: 30.54 KG/M2

## 2024-12-17 DIAGNOSIS — R97.20 ELEVATED PSA: ICD-10-CM

## 2024-12-17 DIAGNOSIS — N13.8 BPH WITH URINARY OBSTRUCTION: Primary | ICD-10-CM

## 2024-12-17 DIAGNOSIS — N40.1 BPH WITH URINARY OBSTRUCTION: Primary | ICD-10-CM

## 2024-12-17 LAB
BILIRUB UR QL STRIP: NEGATIVE
GLUCOSE UR QL STRIP: NEGATIVE
KETONES UR QL STRIP: NEGATIVE
LEUKOCYTE ESTERASE UR QL STRIP: NEGATIVE
PH, POC UA: 7
POC BLOOD, URINE: NEGATIVE
POC NITRATES, URINE: NEGATIVE
PROT UR QL STRIP: NEGATIVE
SP GR UR STRIP: 1.02 (ref 1–1.03)
UROBILINOGEN UR STRIP-ACNC: 0.2 (ref 0.3–2.2)

## 2024-12-17 PROCEDURE — 81003 URINALYSIS AUTO W/O SCOPE: CPT | Mod: QW,S$GLB,, | Performed by: UROLOGY

## 2024-12-17 PROCEDURE — 4010F ACE/ARB THERAPY RXD/TAKEN: CPT | Mod: CPTII,S$GLB,, | Performed by: UROLOGY

## 2024-12-17 PROCEDURE — 1159F MED LIST DOCD IN RCRD: CPT | Mod: CPTII,S$GLB,, | Performed by: UROLOGY

## 2024-12-17 PROCEDURE — 3074F SYST BP LT 130 MM HG: CPT | Mod: CPTII,S$GLB,, | Performed by: UROLOGY

## 2024-12-17 PROCEDURE — 99999 PR PBB SHADOW E&M-EST. PATIENT-LVL III: CPT | Mod: PBBFAC,,, | Performed by: UROLOGY

## 2024-12-17 PROCEDURE — 3008F BODY MASS INDEX DOCD: CPT | Mod: CPTII,S$GLB,, | Performed by: UROLOGY

## 2024-12-17 PROCEDURE — 99214 OFFICE O/P EST MOD 30 MIN: CPT | Mod: S$GLB,,, | Performed by: UROLOGY

## 2024-12-17 PROCEDURE — 3078F DIAST BP <80 MM HG: CPT | Mod: CPTII,S$GLB,, | Performed by: UROLOGY

## 2024-12-17 PROCEDURE — 3044F HG A1C LEVEL LT 7.0%: CPT | Mod: CPTII,S$GLB,, | Performed by: UROLOGY

## 2024-12-17 NOTE — PROGRESS NOTES
Chief Complaint:   Encounter Diagnoses   Name Primary?    BPH with urinary obstruction Yes    Elevated PSA        HPI:  HPI Cedric Santoro bassam 64 y.o. male who presents with follow up of elevated PSA.  Last visit 6 months ago he was found to have a prostate health index with a PSA of 4.2 and a overall prostate cancer risk of about 10%.  Decision was made to continue observation.  He returns today with another PSA.  He is not having too much trouble urinating.  He does have a large prostate on exam.    History:  Social History     Tobacco Use    Smoking status: Never    Smokeless tobacco: Never   Substance Use Topics    Alcohol use: No    Drug use: No     Past Medical History:   Diagnosis Date    Abnormal ECG 10/04/2022    Chronic diastolic heart failure 06/13/2024    Coronary artery disease of native artery of native heart with stable angina pectoris 09/06/2023    Essential hypertension 02/24/2020    First degree AV block 06/13/2024    Hypertension     MVP (mitral valve prolapse) 10/26/2022    Nonrheumatic mitral valve regurgitation 10/26/2022    Palpitation 03/05/2020    Paroxysmal atrial fibrillation 03/05/2020    Paroxysmal atrial flutter 10/04/2022    Raynaud's syndrome     Vasovagal syncopes      Past Surgical History:   Procedure Laterality Date    ABLATION, ATRIAL FLUTTER, TYPICAL N/A 12/15/2022    Procedure: Ablation, Atrial Flutter, Typical;  Surgeon: Matty Hendricks MD;  Location: [facility] EP LAB;  Service: Cardiology;  Laterality: N/A;  AFL, RFA, NICOLASA, ARIELA, anes, MB, 3 Prep    CARPAL TUNNEL RELEASE Right 04/02/2024    [facility]    CHOLECYSTECTOMY      ECHOCARDIOGRAM,TRANSESOPHAGEAL N/A 12/15/2022    Procedure: Transesophageal echo (ARIELA) intra-procedure log documentation;  Surgeon: Nisreen Fenton MD;  Location: [facility] EP LAB;  Service: Cardiology;  Laterality: N/A;    EYE SURGERY      GALLBLADDER SURGERY      HERNIA REPAIR      LEFT HEART CATHETERIZATION Left 05/24/2023    Procedure: Left  heart cath;  Surgeon: Kal Garzon MD;  Location: [facility] CATH LAB;  Service: Cardiology;  Laterality: Left;  may need to sign consent//    OPEN REDUCTION AND INTERNAL FIXATION (ORIF) OF FRACTURE OF METATARSAL BONE Left 11/06/2023    Procedure: ORIF, FRACTURE, METATARSAL BONE;  Surgeon: Aleksandr Cheek DPM;  Location: [facility] OR;  Service: Podiatry;  Laterality: Left;    THROAT SURGERY      TRANSESOPHAGEAL ECHOCARDIOGRAPHY N/A 02/27/2023    Procedure: ECHOCARDIOGRAM, TRANSESOPHAGEAL;  Surgeon: Samuel Del Valle MD;  Location: [facility] CATH LAB;  Service: Cardiology;  Laterality: N/A;    TREATMENT OF CARDIAC ARRHYTHMIA  12/15/2022    Procedure: Cardioversion or Defibrillation;  Surgeon: Matty Hendricks MD;  Location: [facility] EP LAB;  Service: Cardiology;;     Family History   Problem Relation Name Age of Onset    Cancer Mother          Multiple myeloma    Pacemaker/defibrilator Father Reyes     Macular degeneration Father Reyes     COPD Father Reyes     Hearing loss Father Reyes     Macular degeneration Brother         Current Outpatient Medications on File Prior to Visit   Medication Sig Dispense Refill    albuterol (PROVENTIL/VENTOLIN HFA) 90 mcg/actuation inhaler Inhale 2 puffs into the lungs every 4 (four) hours as needed.      apixaban (ELIQUIS) 5 mg Tab TAKE 1 TABLET BY MOUTH TWICE A DAY 60 tablet 12    atenoloL (TENORMIN) 25 MG tablet TAKE 1 TABLET BY MOUTH EVERY DAY 90 tablet 3    b complex vitamins tablet Take 1 tablet by mouth once daily.      baclofen (LIORESAL) 20 MG tablet Take 1 tablet (20 mg total) by mouth 2 (two) times daily. 180 tablet 1    co-enzyme Q-10 30 mg capsule Take 30 mg by mouth once daily.      DULoxetine (CYMBALTA) 60 MG capsule Take 1 capsule (60 mg total) by mouth once daily. 90 capsule 3    fluticasone-umeclidin-vilanter (TRELEGY ELLIPTA) 200-62.5-25 mcg inhaler Inhale 1 puff into the lungs once daily. 180 each 3    furosemide (LASIX) 40 MG tablet TAKE 1 TABLET BY MOUTH EVERY DAY 90 tablet 5  "   inhalation spacing device (BREATHERITE MDI SPACER) Use as directed for inhalation. 1 each 0    levothyroxine (SYNTHROID) 50 MCG tablet TAKE 1 TABLET BY MOUTH ONCE A DAY ON MONDAY, TUESDAY, WEDNESDAY, THURSDAY AND FRIDAY 66 tablet 3    losartan (COZAAR) 50 MG tablet Take 1 tablet (50 mg total) by mouth once daily. 90 tablet 3    magnesium 30 mg Tab Take 1 tablet by mouth once daily.      multivitamin (THERAGRAN) per tablet Take 1 tablet by mouth once daily.      rosuvastatin (CRESTOR) 20 MG tablet Take 1 tablet (20 mg total) by mouth every evening. 90 tablet 3    tamsulosin (FLOMAX) 0.4 mg Cap Take 1 capsule (0.4 mg total) by mouth once daily. 90 capsule 3    vitamin D (VITAMIN D3) 1000 units Tab Take 1,000 Units by mouth once daily.       No current facility-administered medications on file prior to visit.        Objective:     Vitals:    12/17/24 0944   BP: 116/71   BP Location: Right arm   Patient Position: Sitting   Pulse: (!) 54   Weight: 96.7 kg (213 lb 4.7 oz)   Height: 5' 10" (1.778 m)      BMI Readings from Last 1 Encounters:   12/17/24 30.60 kg/m²          Physical Exam  No acute distress alert and oriented   Respirations even unlabored   Abdomen is soft nontender    Urinalysis clear    Lab Results   Component Value Date    PSADIAG 4.3 (H) 12/13/2024    PSADIAG 3.7 04/12/2024    PSA 4.2 (H) 05/29/2024    PSA 3.1 04/19/2023    PSA 3.0 04/12/2022    PSA 2.4 02/05/2020        Lab Results   Component Value Date    CREATININE 1.4 10/24/2024      Assessment:       1. BPH with urinary obstruction    2. Elevated PSA        Plan:     1. BPH with urinary obstruction    2. Elevated PSA       Orders Placed This Encounter    Prostate Specific Antigen, Diagnostic    POCT Urinalysis, Dipstick, Automated, W/O Scope      Elevated PSA with known history of BPH.  Low prostate health index risk.  Recommend continued observation.  Repeat JOSIE and PSA in 6 months.  "

## 2025-01-29 ENCOUNTER — OFFICE VISIT (OUTPATIENT)
Dept: CARDIOLOGY | Facility: CLINIC | Age: 65
End: 2025-01-29
Payer: MEDICARE

## 2025-01-29 VITALS
OXYGEN SATURATION: 98 % | HEIGHT: 70 IN | DIASTOLIC BLOOD PRESSURE: 80 MMHG | BODY MASS INDEX: 30.42 KG/M2 | SYSTOLIC BLOOD PRESSURE: 118 MMHG | HEART RATE: 80 BPM | WEIGHT: 212.5 LBS

## 2025-01-29 DIAGNOSIS — Z98.890 S/P ABLATION OF ATRIAL FLUTTER: ICD-10-CM

## 2025-01-29 DIAGNOSIS — I48.0 PAROXYSMAL ATRIAL FIBRILLATION: Primary | ICD-10-CM

## 2025-01-29 DIAGNOSIS — I34.0 NONRHEUMATIC MITRAL VALVE REGURGITATION: ICD-10-CM

## 2025-01-29 DIAGNOSIS — I34.1 MVP (MITRAL VALVE PROLAPSE): ICD-10-CM

## 2025-01-29 DIAGNOSIS — G47.33 SEVERE OBSTRUCTIVE SLEEP APNEA: ICD-10-CM

## 2025-01-29 DIAGNOSIS — Z86.79 S/P ABLATION OF ATRIAL FLUTTER: ICD-10-CM

## 2025-01-29 DIAGNOSIS — I49.1 PAC (PREMATURE ATRIAL CONTRACTION): ICD-10-CM

## 2025-01-29 DIAGNOSIS — I50.32 CHRONIC DIASTOLIC HEART FAILURE: ICD-10-CM

## 2025-01-29 PROCEDURE — 3074F SYST BP LT 130 MM HG: CPT | Mod: CPTII,S$GLB,, | Performed by: NURSE PRACTITIONER

## 2025-01-29 PROCEDURE — 99214 OFFICE O/P EST MOD 30 MIN: CPT | Mod: S$GLB,,, | Performed by: NURSE PRACTITIONER

## 2025-01-29 PROCEDURE — 3008F BODY MASS INDEX DOCD: CPT | Mod: CPTII,S$GLB,, | Performed by: NURSE PRACTITIONER

## 2025-01-29 PROCEDURE — 1101F PT FALLS ASSESS-DOCD LE1/YR: CPT | Mod: CPTII,S$GLB,, | Performed by: NURSE PRACTITIONER

## 2025-01-29 PROCEDURE — 3079F DIAST BP 80-89 MM HG: CPT | Mod: CPTII,S$GLB,, | Performed by: NURSE PRACTITIONER

## 2025-01-29 PROCEDURE — 99999 PR PBB SHADOW E&M-EST. PATIENT-LVL IV: CPT | Mod: PBBFAC,,, | Performed by: NURSE PRACTITIONER

## 2025-01-29 PROCEDURE — 3288F FALL RISK ASSESSMENT DOCD: CPT | Mod: CPTII,S$GLB,, | Performed by: NURSE PRACTITIONER

## 2025-01-29 PROCEDURE — 1159F MED LIST DOCD IN RCRD: CPT | Mod: CPTII,S$GLB,, | Performed by: NURSE PRACTITIONER

## 2025-01-29 NOTE — PROGRESS NOTES
Subjective:   Patient ID:  Cedric Santoro is a 65 y.o. male who presents for evaluation of Follow-up      HPI    Cedric Santoro is a 65 year old male who presents to Arrhythmia clinic for follow up. His current medical conditions include CHF, CAD, HTN, HLP, FDAVB, PAF/AFL, MVP, KP thrombus, s/p CTI ablation 12/2022. He returns today and states he is doing ok.     Denies any recurrence of AF. No further palpitations.     He does have issues with LEBLANC when he exerts himself.     Denies chest pain or anginal equivalents.     Denies orthopnea, PND or abdominal bloating.     Reports regular walking without any issues lately.     NO leg swelling or claudications. No recent falls, syncope or near syncopal events. Reports compliance with medications and dietary restrictions. NO CNS complaints to suggest TIA or CVA today. No signs of abnormal bleeding on eliquis.        Past Medical History:   Diagnosis Date    Abnormal ECG 10/04/2022    Chronic diastolic heart failure 06/13/2024    Coronary artery disease of native artery of native heart with stable angina pectoris 09/06/2023    Essential hypertension 02/24/2020    First degree AV block 06/13/2024    Hypertension     MVP (mitral valve prolapse) 10/26/2022    Nonrheumatic mitral valve regurgitation 10/26/2022    Palpitation 03/05/2020    Paroxysmal atrial fibrillation 03/05/2020    Paroxysmal atrial flutter 10/04/2022    Raynaud's syndrome     Vasovagal syncopes        Past Surgical History:   Procedure Laterality Date    ABLATION, ATRIAL FLUTTER, TYPICAL N/A 12/15/2022    Procedure: Ablation, Atrial Flutter, Typical;  Surgeon: Matty Hendricks MD;  Location: [facility] EP LAB;  Service: Cardiology;  Laterality: N/A;  AFL, RFA, NICOLASA, ARIELA, anes, MB, 3 Prep    CARPAL TUNNEL RELEASE Right 04/02/2024    [facility]    CHOLECYSTECTOMY      ECHOCARDIOGRAM,TRANSESOPHAGEAL N/A 12/15/2022    Procedure: Transesophageal echo (ARIELA) intra-procedure log documentation;  Surgeon:  Nisreen Fenton MD;  Location: [facility] EP LAB;  Service: Cardiology;  Laterality: N/A;    EYE SURGERY      GALLBLADDER SURGERY      HERNIA REPAIR      LEFT HEART CATHETERIZATION Left 05/24/2023    Procedure: Left heart cath;  Surgeon: Kal Garozn MD;  Location: [facility] CATH LAB;  Service: Cardiology;  Laterality: Left;  may need to sign consent//    OPEN REDUCTION AND INTERNAL FIXATION (ORIF) OF FRACTURE OF METATARSAL BONE Left 11/06/2023    Procedure: ORIF, FRACTURE, METATARSAL BONE;  Surgeon: Aleksandr Cheek DPM;  Location: [facility] OR;  Service: Podiatry;  Laterality: Left;    THROAT SURGERY      TRANSESOPHAGEAL ECHOCARDIOGRAPHY N/A 02/27/2023    Procedure: ECHOCARDIOGRAM, TRANSESOPHAGEAL;  Surgeon: Samuel Del Valle MD;  Location: [facility] CATH LAB;  Service: Cardiology;  Laterality: N/A;    TREATMENT OF CARDIAC ARRHYTHMIA  12/15/2022    Procedure: Cardioversion or Defibrillation;  Surgeon: Matty Hendricks MD;  Location: [facility] EP LAB;  Service: Cardiology;;       Social History     Tobacco Use    Smoking status: Never    Smokeless tobacco: Never   Substance Use Topics    Alcohol use: No    Drug use: No       Family History   Problem Relation Name Age of Onset    Cancer Mother          Multiple myeloma    Pacemaker/defibrilator Father Reyes     Macular degeneration Father Reyes     COPD Father Reyes     Hearing loss Father Reyes     Macular degeneration Brother         Wt Readings from Last 3 Encounters:   01/29/25 96.4 kg (212 lb 8.4 oz)   12/17/24 96.7 kg (213 lb 4.7 oz)   11/18/24 96.6 kg (213 lb 1.2 oz)     Temp Readings from Last 3 Encounters:   05/29/24 96.9 °F (36.1 °C) (Oral)   04/12/24 97.5 °F (36.4 °C) (Oral)   02/12/24 96.8 °F (36 °C) (Tympanic)     BP Readings from Last 3 Encounters:   01/29/25 118/80   12/17/24 116/71   11/18/24 120/72     Pulse Readings from Last 3 Encounters:   01/29/25 80   12/17/24 (!) 54   11/18/24 65       Current Outpatient Medications on File Prior to Visit   Medication Sig  Dispense Refill    albuterol (PROVENTIL/VENTOLIN HFA) 90 mcg/actuation inhaler Inhale 2 puffs into the lungs every 4 (four) hours as needed.      apixaban (ELIQUIS) 5 mg Tab TAKE 1 TABLET BY MOUTH TWICE A DAY 60 tablet 12    atenoloL (TENORMIN) 25 MG tablet TAKE 1 TABLET BY MOUTH EVERY DAY 90 tablet 3    b complex vitamins tablet Take 1 tablet by mouth once daily.      baclofen (LIORESAL) 20 MG tablet Take 1 tablet (20 mg total) by mouth 2 (two) times daily. 180 tablet 1    co-enzyme Q-10 30 mg capsule Take 30 mg by mouth once daily.      DULoxetine (CYMBALTA) 60 MG capsule Take 1 capsule (60 mg total) by mouth once daily. 90 capsule 3    fluticasone-umeclidin-vilanter (TRELEGY ELLIPTA) 200-62.5-25 mcg inhaler Inhale 1 puff into the lungs once daily. 180 each 3    furosemide (LASIX) 40 MG tablet TAKE 1 TABLET BY MOUTH EVERY DAY 90 tablet 5    inhalation spacing device (BREATHERITE MDI SPACER) Use as directed for inhalation. 1 each 0    levothyroxine (SYNTHROID) 50 MCG tablet TAKE 1 TABLET BY MOUTH ONCE A DAY ON MONDAY, TUESDAY, WEDNESDAY, THURSDAY AND FRIDAY 66 tablet 3    losartan (COZAAR) 50 MG tablet Take 1 tablet (50 mg total) by mouth once daily. 90 tablet 3    magnesium 30 mg Tab Take 1 tablet by mouth once daily.      multivitamin (THERAGRAN) per tablet Take 1 tablet by mouth once daily.      rosuvastatin (CRESTOR) 20 MG tablet Take 1 tablet (20 mg total) by mouth every evening. 90 tablet 3    tamsulosin (FLOMAX) 0.4 mg Cap Take 1 capsule (0.4 mg total) by mouth once daily. 90 capsule 3    vitamin D (VITAMIN D3) 1000 units Tab Take 1,000 Units by mouth once daily.       No current facility-administered medications on file prior to visit.       No cardiac monitor results found for the past 12 months    Results for orders placed during the hospital encounter of 10/16/24    Echo    Interpretation Summary    Left Ventricle: The left ventricle is normal in size. Normal wall thickness. Normal wall motion. There is  normal systolic function with a visually estimated ejection fraction of 60 - 65%. Ejection fraction is approximately 60%. There is normal diastolic function.    Right Ventricle: Normal right ventricular cavity size. Wall thickness is normal. Right ventricle wall motion  is normal. Systolic function is normal.    Left Atrium: Left atrium is moderately dilated.    Mitral Valve: There is moderate regurgitation.    Tricuspid Valve: There is mild regurgitation.    Pulmonary Artery: The estimated pulmonary artery systolic pressure is 33 mmHg.    IVC/SVC: Normal venous pressure at 3 mmHg.    Results for orders placed during the hospital encounter of 10/28/24    Exercise Stress - EKG    Interpretation Summary    The ECG portion of the study is positive for ischemia.    The patient reported no chest pain during the stress test.    The blood pressure response to stress was normal.    The patient exercised for 9 minutes 7 seconds on a Bobby protocol, corresponding to a functional capacity of 10METS, achieving a peak heart rate of 131 bpm, which is 84% of the age predicted maximum heart rate.        Results for orders placed or performed during the hospital encounter of 05/29/24   EKG 12-lead    Collection Time: 05/29/24  8:12 AM   Result Value Ref Range    QRS Duration 102 ms    [facility] QTC Calculation 430 ms    Narrative    Test Reason : Z00.00,    Vent. Rate : 054 BPM     Atrial Rate : 054 BPM     P-R Int : 338 ms          QRS Dur : 102 ms      QT Int : 454 ms       P-R-T Axes : 076 -29 -37 degrees     QTc Int : 430 ms    Sinus bradycardia marked first degree AV block    When compared with ECG of 16-JAN-2024 09:54,  NC is longer  Premature supraventricular complexes are no longer Present  T wave inversion now evident in Inferior leads  Nonspecific T wave abnormality, worse in Lateral leads  Confirmed by Evan Thomas MD ([id number]) on 5/29/2024 10:06:36 PM    Referred By: MARIUM NEGRON           Confirmed By:Evan Thomas MD  "        Review of Systems   Constitutional: Positive for malaise/fatigue.   HENT:  Negative for hearing loss and hoarse voice.    Eyes:  Negative for blurred vision and visual disturbance.   Cardiovascular:  Positive for leg swelling. Negative for chest pain, claudication, dyspnea on exertion, irregular heartbeat, near-syncope, orthopnea, palpitations, paroxysmal nocturnal dyspnea and syncope.   Respiratory:  Negative for cough, hemoptysis, shortness of breath, sleep disturbances due to breathing, snoring and wheezing.    Endocrine: Negative for cold intolerance and heat intolerance.   Hematologic/Lymphatic: Bruises/bleeds easily (on eliquis).   Skin:  Negative for color change, dry skin and nail changes.   Musculoskeletal:  Positive for arthritis and back pain. Negative for joint pain and myalgias.   Gastrointestinal:  Negative for bloating, abdominal pain, constipation, nausea and vomiting.   Genitourinary:  Negative for dysuria, flank pain, hematuria and hesitancy.   Neurological:  Negative for headaches, light-headedness, loss of balance, numbness, paresthesias and weakness.   Psychiatric/Behavioral:  Negative for altered mental status.    Allergic/Immunologic: Negative for environmental allergies.         Objective:/80 (BP Location: Left arm, Patient Position: Sitting)   Pulse 80   Ht 5' 10" (1.778 m)   Wt 96.4 kg (212 lb 8.4 oz)   SpO2 98%   BMI 30.49 kg/m²      Physical Exam  Vitals and nursing note reviewed.   Constitutional:       General: He is not in acute distress.     Appearance: Normal appearance. He is well-developed. He is not ill-appearing.   HENT:      Head: Normocephalic and atraumatic.      Nose: Nose normal.      Mouth/Throat:      Mouth: Mucous membranes are moist.   Eyes:      Pupils: Pupils are equal, round, and reactive to light.   Neck:      Thyroid: No thyromegaly.      Vascular: No JVD.      Trachea: No tracheal deviation.   Cardiovascular:      Rate and Rhythm: Normal rate and " regular rhythm.      Chest Wall: PMI is not displaced.      Pulses: Intact distal pulses.           Radial pulses are 2+ on the right side and 2+ on the left side.        Dorsalis pedis pulses are 2+ on the right side and 2+ on the left side.      Heart sounds: S1 normal and S2 normal. Heart sounds not distant. No murmur heard.  Pulmonary:      Effort: Pulmonary effort is normal. No respiratory distress.      Breath sounds: Normal breath sounds. No wheezing.   Abdominal:      General: Bowel sounds are normal. There is no distension.      Palpations: Abdomen is soft.      Tenderness: There is no abdominal tenderness.   Musculoskeletal:         General: No swelling. Normal range of motion.      Cervical back: Full passive range of motion without pain, normal range of motion and neck supple.      Right lower leg: No edema.      Left lower leg: No edema.      Right ankle: No swelling.      Left ankle: No swelling.   Skin:     General: Skin is warm and dry.      Capillary Refill: Capillary refill takes less than 2 seconds.      Nails: There is no clubbing.   Neurological:      General: No focal deficit present.      Mental Status: He is alert and oriented to person, place, and time.      Motor: No weakness.   Psychiatric:         Speech: Speech normal.         Behavior: Behavior normal.         Thought Content: Thought content normal.         Judgment: Judgment normal.         Lab Results   Component Value Date    CHOL 158 08/07/2024    CHOL 162 05/29/2024    CHOL 157 02/05/2024     Lab Results   Component Value Date    HDL 66 08/07/2024    HDL 64 05/29/2024    HDL 60 02/05/2024     Lab Results   Component Value Date    LDLCALC 79.6 08/07/2024    LDLCALC 86.2 05/29/2024    LDLCALC 81.0 02/05/2024     Lab Results   Component Value Date    TRIG 62 08/07/2024    TRIG 59 05/29/2024    TRIG 80 02/05/2024     Lab Results   Component Value Date    CHOLHDL 41.8 08/07/2024    CHOLHDL 39.5 05/29/2024    CHOLHDL 38.2 02/05/2024        Chemistry        Component Value Date/Time     10/24/2024 1108    K 4.1 10/24/2024 1108     10/24/2024 1108    CO2 31 (H) 10/24/2024 1108    BUN 11 10/24/2024 1108    CREATININE 1.4 10/24/2024 1108     (H) 10/24/2024 1108        Component Value Date/Time    CALCIUM 9.7 10/24/2024 1108    ALKPHOS 72 10/24/2024 1108    AST 54 (H) 10/24/2024 1108    ALT 37 10/24/2024 1108    BILITOT 0.7 10/24/2024 1108    ESTGFRAFRICA >60.0 04/12/2022 1010    EGFRNONAA >60.0 04/12/2022 1010          Lab Results   Component Value Date    TSH 3.127 08/07/2024     Lab Results   Component Value Date    INR 1.0 12/25/2023    INR 1.0 06/19/2023    INR 1.0 05/18/2023     Lab Results   Component Value Date    WBC 4.38 05/29/2024    HGB 14.3 05/29/2024    HCT 42.0 05/29/2024    MCV 93 05/29/2024     05/29/2024          Assessment:      1. Paroxysmal atrial fibrillation    2. S/P ablation of atrial flutter    3. Chronic diastolic heart failure    4. PAC (premature atrial contraction)    5. MVP (mitral valve prolapse)    6. Nonrheumatic mitral valve regurgitation    7. Severe obstructive sleep apnea        Plan:     Continue Eliquis for cardio-embolic protection  Continue BB  Started on bipap for severe JIA, follows with pulm  RTC in 1 y or sooner if needed    Nicole May, FNP-C [facility]

## 2025-02-17 ENCOUNTER — CLINICAL SUPPORT (OUTPATIENT)
Dept: PULMONOLOGY | Facility: CLINIC | Age: 65
End: 2025-02-17
Payer: MEDICARE

## 2025-02-17 ENCOUNTER — PATIENT MESSAGE (OUTPATIENT)
Dept: PULMONOLOGY | Facility: CLINIC | Age: 65
End: 2025-02-17

## 2025-02-17 ENCOUNTER — OFFICE VISIT (OUTPATIENT)
Dept: PULMONOLOGY | Facility: CLINIC | Age: 65
End: 2025-02-17
Payer: MEDICARE

## 2025-02-17 ENCOUNTER — PATIENT MESSAGE (OUTPATIENT)
Dept: PULMONOLOGY | Facility: CLINIC | Age: 65
End: 2025-02-17
Payer: MEDICARE

## 2025-02-17 VITALS
BODY MASS INDEX: 30.42 KG/M2 | HEIGHT: 70 IN | SYSTOLIC BLOOD PRESSURE: 145 MMHG | WEIGHT: 212.5 LBS | HEIGHT: 70 IN | BODY MASS INDEX: 30.35 KG/M2 | DIASTOLIC BLOOD PRESSURE: 70 MMHG | OXYGEN SATURATION: 100 % | WEIGHT: 212 LBS | RESPIRATION RATE: 20 BRPM | HEART RATE: 68 BPM

## 2025-02-17 DIAGNOSIS — J43.9 MIXED RESTRICTIVE AND OBSTRUCTIVE LUNG DISEASE: ICD-10-CM

## 2025-02-17 DIAGNOSIS — I38 VALVULAR HEART DISEASE: ICD-10-CM

## 2025-02-17 DIAGNOSIS — Z71.89 ENCOUNTER FOR BIPAP USE COUNSELING: ICD-10-CM

## 2025-02-17 DIAGNOSIS — J98.4 MIXED RESTRICTIVE AND OBSTRUCTIVE LUNG DISEASE: ICD-10-CM

## 2025-02-17 DIAGNOSIS — R09.02 EXERCISE HYPOXEMIA: Primary | ICD-10-CM

## 2025-02-17 DIAGNOSIS — G47.33 OSA TREATED WITH BIPAP: Primary | ICD-10-CM

## 2025-02-17 DIAGNOSIS — G47.33 CONTROLLED NONFAMILIAL OBSTRUCTIVE SLEEP APNEA: ICD-10-CM

## 2025-02-17 DIAGNOSIS — I27.20 PULMONARY HYPERTENSION: ICD-10-CM

## 2025-02-17 NOTE — PROCEDURES
"[facility] - Pulmonary Function  Six Minute Walk     SUMMARY     Ordering Provider: Brigida FORD   Interpreting Provider: Brigida FORD  Performing nurse/tech/RT: LS RRT  Diagnosis:  (Mixed restrictive and obstructive lung disease)  Height: 5' 10" (177.8 cm)  Weight: 96.4 kg (212 lb 8.4 oz)  BMI (Calculated): 30.5                Phase Oxygen Assessment Supplemental O2 Heart   Rate Blood Pressure Sujatha Dyspnea Scale Rating   Resting 98 % Room Air 59 bpm 136/77 2   Exercise        Minute        1 92 % Room Air 101 bpm     2 86 % Room Air 106 bpm     3 95 % 2 L/M 96 bpm     4 96 % 2 L/M 99 bpm     5 95 % 2 L/M 104 bpm     6  94 % 2 L/M 98 bpm 154/74 4   Recovery        Minute        1 98 % 2 L/M 88 bpm     2 100 % 2 L/M 68 bpm     3 100 % 2 L/M 70 bpm     4 100 % 2 L/M 68 bpm 145/70 2     Six Minute Walk Summary  6MWT Status: completed without stopping  Patient Reported: No complaints     Interpretation:  Did the patient stop or pause?: No                                         Total Time Walked (Calculated): 360 seconds  Final Partial Lap Distance (feet): 0 feet  Total Distance Meters (Calculated): 426.72 meters  Predicted Distance Meters (Calculated): 540.98 meters  Percentage of Predicted (Calculated): 78.88  Peak VO2 (Calculated): 16.78  Mets: 4.79  Has The Patient Had a Previous Six Minute Walk Test?: Yes       Previous 6MWT Results  Has The Patient Had a Previous Six Minute Walk Test?: Yes  Date of Previous Test: 05/08/24  Total Time Walked: 360 seconds  Total Distance (meters): 495  Predicted Distance (meters): 548 meters  Percentage of Predicted: 90  Percent Change (Calculated): 0.14    "

## 2025-02-17 NOTE — PROGRESS NOTES
Pulmonary Outpatient Follow Up Visit     Subjective:      Patient ID: Cedric Santoro is a 65 y.o. male.    Chief Complaint: Pulmonary Nodules      HPI        64-year-old male patient presenting for follow-up.      11/18/2024 using and benefitting from auto BiPAP therapy.  Talala Sleepiness Scale score 12.  On previous visit while reviewing his modem waveform data he did have significant residual AHI with majority being central.      I did decrease his max IPAP to 18, his minimum IPAP to 10 cm and he remained on a pressure support of 4 cm.  He was advised also to trim his beard to avoid large leak which he did and currently he has a better controlled JIA with residual AHI of 4 events per hour, acceptable leak less than 20 liter/minute.      Usage on average about 6-7 hours a night.        10/04/2024 severe obstructive sleep apnea with a suboptimal titration on split night polysomnography performed.      Does have an auto BiPAP machine set to spontaneous mode 25 /14 my order was on automatic mode with a minimum EPAP of 14 pressure support of 4 and a maximum IPAP of 25.      Patient complaining of apneic events during sleep.      Talala Sleepiness Scale score 15 down from 20 prior to starting BiPAP.          Initially evaluated by nurse practitioner for restrictive lung disorder.  He has a history of moderate to severe mitral valve disease.  Diastolic heart failure.  Ejection fraction 55-60% January 20, 2024.          Worked in [employer] , not a smoker denies personal family history of autoimmune disorders.      Snoring reported.  Talala Sleepiness Scale score 20.  Takes naps of 1-2 hours a day.        Review of Systems   Constitutional:  Positive for fatigue.   Respiratory:  Positive for snoring, shortness of breath and dyspnea on extertion.    Psychiatric/Behavioral:  Positive for sleep disturbance.        Outpatient Encounter Medications as of 2/17/2025   Medication  "Sig Dispense Refill    albuterol (PROVENTIL/VENTOLIN HFA) 90 mcg/actuation inhaler Inhale 2 puffs into the lungs every 4 (four) hours as needed.      apixaban (ELIQUIS) 5 mg Tab TAKE 1 TABLET BY MOUTH TWICE A DAY 60 tablet 12    atenoloL (TENORMIN) 25 MG tablet TAKE 1 TABLET BY MOUTH EVERY DAY 90 tablet 3    b complex vitamins tablet Take 1 tablet by mouth once daily.      baclofen (LIORESAL) 20 MG tablet Take 1 tablet (20 mg total) by mouth 2 (two) times daily. 180 tablet 1    co-enzyme Q-10 30 mg capsule Take 30 mg by mouth once daily.      DULoxetine (CYMBALTA) 60 MG capsule Take 1 capsule (60 mg total) by mouth once daily. 90 capsule 3    fluticasone-umeclidin-vilanter (TRELEGY ELLIPTA) 200-62.5-25 mcg inhaler Inhale 1 puff into the lungs once daily. 180 each 3    furosemide (LASIX) 40 MG tablet TAKE 1 TABLET BY MOUTH EVERY DAY 90 tablet 5    inhalation spacing device (BREATHERITE MDI SPACER) Use as directed for inhalation. 1 each 0    levothyroxine (SYNTHROID) 50 MCG tablet TAKE 1 TABLET BY MOUTH ONCE A DAY ON MONDAY, TUESDAY, WEDNESDAY, THURSDAY AND FRIDAY 66 tablet 3    losartan (COZAAR) 50 MG tablet Take 1 tablet (50 mg total) by mouth once daily. 90 tablet 3    magnesium 30 mg Tab Take 1 tablet by mouth once daily.      multivitamin (THERAGRAN) per tablet Take 1 tablet by mouth once daily.      rosuvastatin (CRESTOR) 20 MG tablet Take 1 tablet (20 mg total) by mouth every evening. 90 tablet 3    tamsulosin (FLOMAX) 0.4 mg Cap Take 1 capsule (0.4 mg total) by mouth once daily. 90 capsule 3    vitamin D (VITAMIN D3) 1000 units Tab Take 1,000 Units by mouth once daily.       No facility-administered encounter medications on file as of 2/17/2025.       Objective:     Vital Signs (Most Recent)  Vital Signs  Pulse: 68  Resp: 20  SpO2: 100 %  BP: (!) 145/70  Patient Position: Sitting  Pain Score:   2  Pain Loc: Generalized  Height and Weight  Height: 5' 10" (177.8 cm)  Weight: 96.2 kg (212 lb)  BSA (Calculated - " sq m): 2.18 sq meters  BMI (Calculated): 30.4  Weight in (lb) to have BMI = 25: 173.9]  Wt Readings from Last 2 Encounters:   02/17/25 96.2 kg (212 lb)   02/17/25 96.4 kg (212 lb 8.4 oz)       Physical Exam   Constitutional: He is oriented to person, place, and time. He appears well-developed and well-nourished.   HENT:   Mouth/Throat: Mallampati Score: III.   Cardiovascular: Normal rate and regular rhythm.   Pulmonary/Chest: Normal expansion and effort normal. He has decreased breath sounds.   Neurological: He is alert and oriented to person, place, and time.   Psychiatric: He has a normal mood and affect. His behavior is normal. Judgment and thought content normal.       Laboratory  Lab Results   Component Value Date    WBC 4.38 05/29/2024    RBC 4.50 (L) 05/29/2024    HGB 14.3 05/29/2024    HCT 42.0 05/29/2024    MCV 93 05/29/2024    MCH 31.8 (H) 05/29/2024    MCHC 34.0 05/29/2024    RDW 12.2 05/29/2024     05/29/2024    MPV 10.7 05/29/2024    GRAN 2.7 02/05/2024    GRAN 59.2 02/05/2024    LYMPH 1.1 02/05/2024    LYMPH 25.1 02/05/2024    MONO 0.5 02/05/2024    MONO 10.4 02/05/2024    EOS 0.2 02/05/2024    BASO 0.06 02/05/2024    EOSINOPHIL 3.8 02/05/2024    BASOPHIL 1.3 02/05/2024       BMP  Lab Results   Component Value Date     10/24/2024    K 4.1 10/24/2024     10/24/2024    CO2 31 (H) 10/24/2024    BUN 11 10/24/2024    CREATININE 1.4 10/24/2024    CALCIUM 9.7 10/24/2024    ANIONGAP 8 10/24/2024    ESTGFRAFRICA >60.0 04/12/2022    EGFRNONAA >60.0 04/12/2022    AST 54 (H) 10/24/2024    ALT 37 10/24/2024    PROT 7.3 10/24/2024       Lab Results   Component Value Date    BNP 13 10/24/2024    BNP 17 07/08/2024    BNP 27 12/25/2023    BNP 17 11/13/2023    BNP 36 02/09/2023    BNP 95 10/04/2022       Lab Results   Component Value Date    TSH 3.127 08/07/2024       Lab Results   Component Value Date    SEDRATE 3 05/08/2024       Lab Results   Component Value Date    CRP 2.2 05/08/2024     No  "results found for: "IGE"     No results found for: "ASPERGILLUS"  No results found for: "AFUMIGATUSCL"   Echo 2024    Left Ventricle: The left ventricle is normal in size. Normal wall thickness. Normal wall motion. There is normal systolic function with a visually estimated ejection fraction of 60 - 65%. Ejection fraction is approximately 60%. There is normal diastolic function.    Right Ventricle: Normal right ventricular cavity size. Wall thickness is normal. Right ventricle wall motion  is normal. Systolic function is normal.    Left Atrium: Left atrium is moderately dilated.    Mitral Valve: There is moderate regurgitation.    Tricuspid Valve: There is mild regurgitation.    Pulmonary Artery: The estimated pulmonary artery systolic pressure is 33 mmHg.    IVC/SVC: Normal venous pressure at 3 mmHg.        Lab Results   Component Value Date    ACE 39 05/08/2024        Diagnostic Results:  I have personally reviewed today the following studies:                          Assessment/Plan:   JIA treated with BiPAP  -     PULSE OXIMETRY OVERNIGHT; Future    Mixed restrictive and obstructive lung disease  -     Spirometry with/without bronchodilator & DLCO; Future; Expected date: 08/17/2025  -     CT Chest Without Contrast; Future; Expected date: 08/17/2025    Controlled nonfamilial obstructive sleep apnea    Encounter for BiPAP use counseling    Pulmonary hypertension    Valvular heart disease            Continue auto BiPAP Min EPAP 10 max IPAP 18 and pressure support of 4 cm with fullface mask.      Will check overnight oximetry on BiPAP.  Advised patient to perform the test without O2 in the adapter.  He obtained an adapter due to him requesting that from respiratory therapy to assure better sleep quality or consolidation of sleep.      Restrictive pattern on PFT and decreased DLCO out of proportion to his mild  CT scan interstitial findings.  Will check with Cardiology regarding proceeding with right heart " catheterization.      Trend spirometry and DLCO.    Follow up in about 6 months (around 8/17/2025).    This note was prepared using voice recognition system and is likely to have sound alike errors that may have been overlooked even after proof reading.  Please call me with any questions    Discussed diagnosis, its evaluation, treatment and usual course. All questions answered.      Chichi Allred MD

## 2025-02-18 ENCOUNTER — PATIENT MESSAGE (OUTPATIENT)
Dept: PULMONOLOGY | Facility: CLINIC | Age: 65
End: 2025-02-18
Payer: MEDICARE

## 2025-02-18 ENCOUNTER — LAB VISIT (OUTPATIENT)
Dept: LAB | Facility: HOSPITAL | Age: 65
End: 2025-02-18
Attending: FAMILY MEDICINE
Payer: MEDICARE

## 2025-02-18 ENCOUNTER — CLINICAL SUPPORT (OUTPATIENT)
Dept: PULMONOLOGY | Facility: CLINIC | Age: 65
End: 2025-02-18
Payer: MEDICARE

## 2025-02-18 DIAGNOSIS — I10 ESSENTIAL HYPERTENSION: ICD-10-CM

## 2025-02-18 DIAGNOSIS — E03.9 ACQUIRED HYPOTHYROIDISM: ICD-10-CM

## 2025-02-18 DIAGNOSIS — G47.33 OSA TREATED WITH BIPAP: ICD-10-CM

## 2025-02-18 LAB
ALBUMIN SERPL BCP-MCNC: 4.1 G/DL (ref 3.5–5.2)
ALP SERPL-CCNC: 65 U/L (ref 40–150)
ALT SERPL W/O P-5'-P-CCNC: 40 U/L (ref 10–44)
ANION GAP SERPL CALC-SCNC: 11 MMOL/L (ref 8–16)
AST SERPL-CCNC: 64 U/L (ref 10–40)
BASOPHILS # BLD AUTO: 0.04 K/UL (ref 0–0.2)
BASOPHILS NFR BLD: 0.8 % (ref 0–1.9)
BILIRUB SERPL-MCNC: 0.8 MG/DL (ref 0.1–1)
BRPFT: ABNORMAL
BUN SERPL-MCNC: 14 MG/DL (ref 8–23)
CALCIUM SERPL-MCNC: 9.8 MG/DL (ref 8.7–10.5)
CHLORIDE SERPL-SCNC: 103 MMOL/L (ref 95–110)
CHOLEST SERPL-MCNC: 148 MG/DL (ref 120–199)
CHOLEST/HDLC SERPL: 2.2 {RATIO} (ref 2–5)
CO2 SERPL-SCNC: 28 MMOL/L (ref 23–29)
CREAT SERPL-MCNC: 1.1 MG/DL (ref 0.5–1.4)
DIFFERENTIAL METHOD BLD: ABNORMAL
DLCO ADJ PRE: 15.54 ML/(MIN*MMHG) (ref 21.31–35.16)
DLCO SINGLE BREATH LLN: 21.31
DLCO SINGLE BREATH PRE REF: 55 %
DLCO SINGLE BREATH REF: 28.24
DLCOC SBVA LLN: 2.79
DLCOC SBVA PRE REF: 126.6 %
DLCOC SBVA REF: 3.95
DLCOC SINGLE BREATH LLN: 21.31
DLCOC SINGLE BREATH PRE REF: 55 %
DLCOC SINGLE BREATH REF: 28.24
DLCOVA LLN: 2.79
DLCOVA PRE REF: 126.6 %
DLCOVA PRE: 5.01 ML/(MIN*MMHG*L) (ref 2.79–5.12)
DLCOVA REF: 3.95
DLVAADJ PRE: 5.01 ML/(MIN*MMHG*L) (ref 2.79–5.12)
EOSINOPHIL # BLD AUTO: 0.1 K/UL (ref 0–0.5)
EOSINOPHIL NFR BLD: 2.8 % (ref 0–8)
ERV LLN: -16448.87
ERV PRE REF: 26.6 %
ERV REF: 1.13
ERYTHROCYTE [DISTWIDTH] IN BLOOD BY AUTOMATED COUNT: 12.6 % (ref 11.5–14.5)
EST. GFR  (NO RACE VARIABLE): >60 ML/MIN/1.73 M^2
FEF 25 75 LLN: 1.65
FEF 25 75 PRE REF: 67.7 %
FEF 25 75 REF: 3.36
FEV1 FVC LLN: 64
FEV1 FVC PRE REF: 105.2 %
FEV1 FVC REF: 77
FEV1 LLN: 2.52
FEV1 PRE REF: 59.7 %
FEV1 REF: 3.41
FRCPLETH LLN: 2.67
FRCPLETH PREREF: 72.6 %
FRCPLETH REF: 3.66
FVC LLN: 3.35
FVC PRE REF: 56.5 %
FVC REF: 4.45
GLUCOSE SERPL-MCNC: 100 MG/DL (ref 70–110)
HCT VFR BLD AUTO: 45.8 % (ref 40–54)
HDLC SERPL-MCNC: 68 MG/DL (ref 40–75)
HDLC SERPL: 45.9 % (ref 20–50)
HGB BLD-MCNC: 14.7 G/DL (ref 14–18)
IMM GRANULOCYTES # BLD AUTO: 0.01 K/UL (ref 0–0.04)
IMM GRANULOCYTES NFR BLD AUTO: 0.2 % (ref 0–0.5)
IVC PRE: 1.82 L (ref 3.35–5.56)
IVC SINGLE BREATH LLN: 3.35
IVC SINGLE BREATH PRE REF: 40.8 %
IVC SINGLE BREATH REF: 4.45
LDLC SERPL CALC-MCNC: 69.4 MG/DL (ref 63–159)
LYMPHOCYTES # BLD AUTO: 1 K/UL (ref 1–4.8)
LYMPHOCYTES NFR BLD: 20 % (ref 18–48)
MCH RBC QN AUTO: 31.2 PG (ref 27–31)
MCHC RBC AUTO-ENTMCNC: 32.1 G/DL (ref 32–36)
MCV RBC AUTO: 97 FL (ref 82–98)
MEP LLN: 83
MEP PRE REF: 110 %
MEP PRE: 110 CMH2O (ref 83.23–116.78)
MEP REF: 100
MIP LLN: 58
MIP PRE REF: 86.7 %
MIP PRE: 65 CMH2O (ref 58.23–91.78)
MIP REF: 75
MONOCYTES # BLD AUTO: 0.6 K/UL (ref 0.3–1)
MONOCYTES NFR BLD: 10.8 % (ref 4–15)
MVV LLN: 111
MVV PRE REF: 55.7 %
MVV REF: 131
NEUTROPHILS # BLD AUTO: 3.3 K/UL (ref 1.8–7.7)
NEUTROPHILS NFR BLD: 65.4 % (ref 38–73)
NONHDLC SERPL-MCNC: 80 MG/DL
NRBC BLD-RTO: 0 /100 WBC
PEF LLN: 6.52
PEF PRE REF: 81.2 %
PEF REF: 8.84
PLATELET # BLD AUTO: 169 K/UL (ref 150–450)
PMV BLD AUTO: 11.7 FL (ref 9.2–12.9)
POTASSIUM SERPL-SCNC: 4.2 MMOL/L (ref 3.5–5.1)
PRE DLCO: 15.54 ML/(MIN*MMHG) (ref 21.31–35.16)
PRE ERV: 0.3 L (ref -16448.87–16451.13)
PRE FEF 25 75: 2.27 L/S (ref 1.65–5.07)
PRE FET 100: 6.83 SEC
PRE FEV1 FVC: 80.71 % (ref 64.01–89.38)
PRE FEV1: 2.03 L (ref 2.52–4.3)
PRE FRC PL: 2.66 L
PRE FVC: 2.52 L (ref 3.35–5.56)
PRE MVV: 73 L/MIN (ref 111.49–150.83)
PRE PEF: 7.18 L/S (ref 6.52–11.16)
PRE RV: 2.22 L (ref 1.86–3.21)
PRE TLC: 4.82 L (ref 5.99–8.29)
PROT SERPL-MCNC: 7.1 G/DL (ref 6–8.4)
RAW LLN: 3.06
RAW PRE REF: 130.9 %
RAW PRE: 4 CMH2O*S/L (ref 3.06–3.06)
RAW REF: 3.06
RBC # BLD AUTO: 4.71 M/UL (ref 4.6–6.2)
RV LLN: 1.86
RV PRE REF: 87.6 %
RV REF: 2.53
RVTLC LLN: 30
RVTLC PRE REF: 117.1 %
RVTLC PRE: 46.03 % (ref 30.33–48.29)
RVTLC REF: 39
SODIUM SERPL-SCNC: 142 MMOL/L (ref 136–145)
T4 FREE SERPL-MCNC: 0.97 NG/DL (ref 0.71–1.51)
TLC LLN: 5.99
TLC PRE REF: 67.4 %
TLC REF: 7.14
TRIGL SERPL-MCNC: 53 MG/DL (ref 30–150)
TSH SERPL DL<=0.005 MIU/L-ACNC: 3 UIU/ML (ref 0.4–4)
VA PRE: 3.11 L (ref 6.99–6.99)
VA SINGLE BREATH LLN: 6.99
VA SINGLE BREATH PRE REF: 44.4 %
VA SINGLE BREATH REF: 6.99
VC LLN: 3.35
VC PRE REF: 58.3 %
VC PRE: 2.6 L (ref 3.35–5.56)
VC REF: 4.45
VTGRAWPRE: 2.39 L
WBC # BLD AUTO: 5.09 K/UL (ref 3.9–12.7)

## 2025-02-18 PROCEDURE — 36415 COLL VENOUS BLD VENIPUNCTURE: CPT | Mod: PO | Performed by: FAMILY MEDICINE

## 2025-02-18 PROCEDURE — 84443 ASSAY THYROID STIM HORMONE: CPT | Performed by: FAMILY MEDICINE

## 2025-02-18 PROCEDURE — 85025 COMPLETE CBC W/AUTO DIFF WBC: CPT | Performed by: FAMILY MEDICINE

## 2025-02-18 PROCEDURE — 80053 COMPREHEN METABOLIC PANEL: CPT | Performed by: FAMILY MEDICINE

## 2025-02-18 PROCEDURE — 84439 ASSAY OF FREE THYROXINE: CPT | Performed by: FAMILY MEDICINE

## 2025-02-18 PROCEDURE — 80061 LIPID PANEL: CPT | Performed by: FAMILY MEDICINE

## 2025-02-19 ENCOUNTER — RESULTS FOLLOW-UP (OUTPATIENT)
Dept: INTERNAL MEDICINE | Facility: CLINIC | Age: 65
End: 2025-02-19

## 2025-02-20 ENCOUNTER — PATIENT MESSAGE (OUTPATIENT)
Dept: PULMONOLOGY | Facility: CLINIC | Age: 65
End: 2025-02-20
Payer: MEDICARE

## 2025-02-20 NOTE — PROCEDURES
[facility]  [address] * [place] [address]  Telephone: [phone]  Test date: 25 Start: 25 21:27:41 Cedric Santoro  Doctor: Dr. Allred End: 25 04:32:57 [id number]  Oximetry: Summary Report  Comments: CPAP and RA  Recording time: 07:05:16 Highest pulse: 90 Highest SpO2: 98%  Excluded samplin:02:20 Lowest pulse: 46 Lowest SpO2: 80%  Total valid samplin:02:56 Mean pulse: 57 Mean SpO2: 90.9%  1 S.D.: 4.0 1 S.D.: 2.4  Time with SpO2<90: 1:06:28, 15.7%  Time with SpO2<80: 0:00:00, 0.0%  Time with SpO2<70: 0:00:00, 0.0%  Time with SpO2<60: 0:00:00, 0.0%  Time with SpO2<89: 0:45:44, 10.8%  Time with SpO2 =>90: 5:56:28, 84.3%  Time with SpO2=>80 & <90: 1:06:28, 15.7%  Time with SpO2=>70 & <80: 0:00:00, 0.0%  Time with SpO2=>60 & <70: 0:00:00, 0.0%  The longest continuous time with saturation <=88 was 00:08:36, which started at  25 23:00:49.  A desaturation event was defined as a decrease of saturation by 4 or more.  2 events were excluded due to artifact.  There were 13 desaturation events over 3 minutes duration.  There were 71 desaturation events of less than 3 minutes duration during which:  The mean high was 93.6%. The mean low was 87.8%.  The number of these events that were:  > 0 & <10 seconds: 0 > 0 seconds: 71  =>10 & <20 seconds: 4 =>10 seconds: 71  =>20 & <30 seconds: 27 =>20 seconds: 67  =>30 & <40 seconds: 6 =>30 seconds: 40  =>40 & <50 seconds: 7 =>40 seconds: 34  =>50 & <60 seconds: 2 =>50 seconds: 27  =>60 seconds: 25 =>60 seconds: 25  The mean length of desaturation events that were >=10 sec & <=3 mins was: 51.5 sec.  Desaturation event index (events >=10 sec per sampled hour): 10.1  Desaturation event index (events >= 0 sec per sampled hour): 10.1  ©

## 2025-02-21 DIAGNOSIS — R00.2 PALPITATION: ICD-10-CM

## 2025-02-21 DIAGNOSIS — I10 ESSENTIAL HYPERTENSION: ICD-10-CM

## 2025-02-21 DIAGNOSIS — I48.0 PAROXYSMAL ATRIAL FIBRILLATION: ICD-10-CM

## 2025-02-21 DIAGNOSIS — R94.39 ABNORMAL STRESS TEST: ICD-10-CM

## 2025-02-21 RX ORDER — LOSARTAN POTASSIUM 50 MG/1
50 TABLET ORAL
Qty: 90 TABLET | Refills: 0 | Status: SHIPPED | OUTPATIENT
Start: 2025-02-21

## 2025-02-25 ENCOUNTER — OFFICE VISIT (OUTPATIENT)
Dept: INTERNAL MEDICINE | Facility: CLINIC | Age: 65
End: 2025-02-25
Payer: MEDICARE

## 2025-02-25 VITALS
OXYGEN SATURATION: 97 % | SYSTOLIC BLOOD PRESSURE: 120 MMHG | WEIGHT: 210.19 LBS | HEART RATE: 60 BPM | DIASTOLIC BLOOD PRESSURE: 72 MMHG | HEIGHT: 70 IN | BODY MASS INDEX: 30.09 KG/M2

## 2025-02-25 DIAGNOSIS — F32.1 CURRENT MODERATE EPISODE OF MAJOR DEPRESSIVE DISORDER WITHOUT PRIOR EPISODE: ICD-10-CM

## 2025-02-25 DIAGNOSIS — I48.0 PAROXYSMAL ATRIAL FIBRILLATION: ICD-10-CM

## 2025-02-25 DIAGNOSIS — G47.33 SEVERE OBSTRUCTIVE SLEEP APNEA: ICD-10-CM

## 2025-02-25 DIAGNOSIS — N13.8 BPH WITH URINARY OBSTRUCTION: ICD-10-CM

## 2025-02-25 DIAGNOSIS — R94.39 ABNORMAL STRESS TEST: ICD-10-CM

## 2025-02-25 DIAGNOSIS — J43.9 MIXED RESTRICTIVE AND OBSTRUCTIVE LUNG DISEASE: ICD-10-CM

## 2025-02-25 DIAGNOSIS — J98.4 MIXED RESTRICTIVE AND OBSTRUCTIVE LUNG DISEASE: ICD-10-CM

## 2025-02-25 DIAGNOSIS — I10 ESSENTIAL HYPERTENSION: ICD-10-CM

## 2025-02-25 DIAGNOSIS — I50.32 CHRONIC DIASTOLIC HEART FAILURE: ICD-10-CM

## 2025-02-25 DIAGNOSIS — N40.1 BPH WITH URINARY OBSTRUCTION: ICD-10-CM

## 2025-02-25 DIAGNOSIS — E03.1 CONGENITAL HYPOTHYROIDISM WITHOUT GOITER: ICD-10-CM

## 2025-02-25 DIAGNOSIS — E03.9 HYPOTHYROIDISM, UNSPECIFIED TYPE: ICD-10-CM

## 2025-02-25 DIAGNOSIS — F51.01 PRIMARY INSOMNIA: ICD-10-CM

## 2025-02-25 DIAGNOSIS — R00.2 PALPITATION: ICD-10-CM

## 2025-02-25 DIAGNOSIS — Z00.00 ROUTINE GENERAL MEDICAL EXAMINATION AT A HEALTH CARE FACILITY: Primary | ICD-10-CM

## 2025-02-25 DIAGNOSIS — K76.0 STEATOSIS OF LIVER: ICD-10-CM

## 2025-02-25 DIAGNOSIS — Z79.01 CHRONIC ANTICOAGULATION: ICD-10-CM

## 2025-02-25 PROBLEM — R29.818 SUSPECTED SLEEP APNEA: Status: RESOLVED | Noted: 2024-07-30 | Resolved: 2025-02-25

## 2025-02-25 PROCEDURE — 99999 PR PBB SHADOW E&M-EST. PATIENT-LVL V: CPT | Mod: PBBFAC,,, | Performed by: PHYSICIAN ASSISTANT

## 2025-02-25 RX ORDER — LOSARTAN POTASSIUM 50 MG/1
50 TABLET ORAL DAILY
Qty: 90 TABLET | Refills: 3 | Status: SHIPPED | OUTPATIENT
Start: 2025-02-25

## 2025-02-25 RX ORDER — TRAZODONE HYDROCHLORIDE 50 MG/1
50 TABLET ORAL NIGHTLY
Qty: 30 TABLET | Refills: 11 | Status: SHIPPED | OUTPATIENT
Start: 2025-02-25 | End: 2026-02-25

## 2025-02-25 NOTE — PROGRESS NOTES
Subjective:       Patient ID: Cedric Santoro is a 65 y.o. male.    Chief Complaint: Annual Exam    CHIEF COMPLAINT:  - Cedric presents for an annual visit with ongoing concerns about lack of energy, shortness of breath, and sleep issues.    HPI:  - Cedric reports persistent lack of energy and shortness of breath. He has been evaluated by a pulmonologist, Dr. Allred, for these issues. A recent stress test yielded poor results, with patient requiring oxygen during the test. A pulmonary function test was also performed, though patient did not fully comprehend the results. Cedric mentions that his shortness of breath may be related to a leaking heart valve, which was discussed in a previous visit with Dr. Allred.  - Cedric is using a BiPAP machine for sleep but reports no improvement in his sleep quality or duration. He typically sleeps for only about 4 hours per night and has difficulty falling asleep. Cedric occasionally watches TV briefly when he becomes frustrated due to inability to sleep. Previous attempts to improve sleep, including a prescription for Lunesta from Dr. Asif in 2021, were ineffective.  - Cedric reports a lack of motivation to engage in activities during the day and mentions sleeping during the day due to poor nighttime sleep, though the frequency is unclear.  - 2 lung nodules were identified in a CT in January of last year, with a follow-up scan in May showing the nodules had decreased in size (to 5mm and 4mm respectively). A repeat CT has been ordered but not yet scheduled.  - Cedric has edema and wears compression socks to manage this condition. Cedric denies using his cell phone during wakeful periods.    MEDICATIONS:  - Albuterol inhaler, as needed for shortness of breath  - Trelegy Ellipta, for respiratory issues  - BiPAP, used nightly for sleep  - Discontinued Lunesta, prescribed by Dr. Asif in 2021, did not help with sleep issues    MEDICAL HISTORY:  - Heart valve  leaking    TEST RESULTS:  - Stress test: Recent, patient required oxygen  - Pulmonary function test: Recent, results not fully understood by patient    IMAGING:  - CT Chest: January (previous year), identified 2 nodules in lung  - CT Chest: May 8th (previous year), follow-up, showed nodules decreased in size (5 mm and 4 millimeter)  - Echocardiogram: Scheduled for March 6th (current year), bubble study to check for potential hole in heart    SOCIAL HISTORY:  - Occupation: Retired           Review of Systems   Constitutional:  Positive for fatigue. Negative for chills, fever and unexpected weight change.   HENT:  Negative for congestion, dental problem, ear pain, hearing loss, rhinorrhea and trouble swallowing.    Eyes:  Negative for pain and visual disturbance.   Respiratory:  Negative for cough and shortness of breath.    Cardiovascular:  Negative for chest pain, palpitations and leg swelling.   Gastrointestinal:  Negative for abdominal distention, abdominal pain, blood in stool, constipation, diarrhea, nausea and vomiting.   Genitourinary:  Negative for difficulty urinating.   Musculoskeletal:  Negative for arthralgias and myalgias.   Skin:  Negative for rash.   Neurological:  Negative for dizziness, weakness, numbness and headaches.   Hematological:  Negative for adenopathy. Does not bruise/bleed easily.   Psychiatric/Behavioral:  Positive for sleep disturbance. Negative for agitation and dysphoric mood.        Objective:   Laboratory Reviewed ({Yes)    Lab Results   Component Value Date     02/18/2025    K 4.2 02/18/2025     02/18/2025    CO2 28 02/18/2025     02/18/2025    BUN 14 02/18/2025    CREATININE 1.1 02/18/2025    CALCIUM 9.8 02/18/2025    PROT 7.1 02/18/2025    ALBUMIN 4.1 02/18/2025    BILITOT 0.8 02/18/2025    ALKPHOS 65 02/18/2025    AST 64 (H) 02/18/2025    ALT 40 02/18/2025    EGFRNORACEVR >60.0 02/18/2025    ANIONGAP 11 02/18/2025       Lab Results   Component Value Date     "CHOL 148 02/18/2025    TRIG 53 02/18/2025    HDL 68 02/18/2025    LDLCALC 69.4 02/18/2025       Lab Results   Component Value Date    WBC 5.09 02/18/2025    RBC 4.71 02/18/2025    HGB 14.7 02/18/2025    HCT 45.8 02/18/2025    MCV 97 02/18/2025     02/18/2025    GRAN 3.3 02/18/2025    GRAN 65.4 02/18/2025    LYMPH 1.0 02/18/2025    LYMPH 20.0 02/18/2025    MONO 0.6 02/18/2025    MONO 10.8 02/18/2025    EOSINOPHIL 2.8 02/18/2025    BASOPHIL 0.8 02/18/2025       Lab Results   Component Value Date    TSH 3.005 02/18/2025    FREET4 0.97 02/18/2025       Lab Results   Component Value Date    HGBA1C 5.7 (H) 05/29/2024       No results found for: "LABMICR", "CREATRANDUR", "MICALBCREAT"    Lab Results   Component Value Date    RERIMMZI76NP 44 02/05/2020         Physical Exam  Vitals reviewed.   Constitutional:       General: He is not in acute distress.     Appearance: Normal appearance. He is well-developed. He is obese. He is not ill-appearing or toxic-appearing.   HENT:      Head: Normocephalic and atraumatic.      Right Ear: Tympanic membrane, ear canal and external ear normal.      Left Ear: Tympanic membrane, ear canal and external ear normal.      Nose: Nose normal.      Mouth/Throat:      Mouth: Mucous membranes are dry.      Pharynx: Oropharynx is clear.   Eyes:      General: Lids are normal. No scleral icterus.     Extraocular Movements: Extraocular movements intact.      Conjunctiva/sclera: Conjunctivae normal.      Pupils: Pupils are equal, round, and reactive to light.   Cardiovascular:      Rate and Rhythm: Normal rate and regular rhythm.      Pulses: Normal pulses.      Heart sounds: Normal heart sounds. No murmur heard.     No friction rub. No gallop.   Pulmonary:      Effort: Pulmonary effort is normal. No respiratory distress.      Breath sounds: Normal breath sounds. No stridor. No decreased breath sounds, wheezing, rhonchi or rales.   Abdominal:      General: There is no distension.      " Palpations: There is no mass.      Tenderness: There is no abdominal tenderness. There is no guarding or rebound.      Hernia: No hernia is present.   Musculoskeletal:         General: Normal range of motion.      Cervical back: Normal range of motion. No tenderness.      Right lower leg: No edema.      Left lower leg: No edema.      Comments: Wearing BL compression socks.    Lymphadenopathy:      Cervical: No cervical adenopathy.   Skin:     General: Skin is warm and dry.   Neurological:      General: No focal deficit present.      Mental Status: He is alert and oriented to person, place, and time. Mental status is at baseline.      Cranial Nerves: No cranial nerve deficit.      Gait: Gait normal.   Psychiatric:         Mood and Affect: Mood and affect normal.         Behavior: Behavior normal.         Thought Content: Thought content normal.         Judgment: Judgment normal.         Assessment:       1. Routine general medical examination at a health care facility    2. Current moderate episode of major depressive disorder without prior episode    3. Mixed restrictive and obstructive lung disease    4. Chronic diastolic heart failure    5. Essential hypertension    6. Paroxysmal atrial fibrillation    7. BPH with urinary obstruction    8. Chronic anticoagulation    9. Congenital hypothyroidism without goiter    10. Steatosis of liver    11. Severe obstructive sleep apnea    12. Hypothyroidism, unspecified type    13. Palpitation    14. Abnormal stress test    15. Primary insomnia        Plan:   1. Routine general medical examination at a health care facility    2. Current moderate episode of major depressive disorder without prior episode    3. Mixed restrictive and obstructive lung disease    4. Chronic diastolic heart failure  -     Comprehensive Metabolic Panel; Future; Expected date: 08/25/2025  -     Lipid Panel; Future; Expected date: 08/25/2025    5. Essential hypertension  -     losartan (COZAAR) 50 MG  tablet; Take 1 tablet (50 mg total) by mouth once daily.  Dispense: 90 tablet; Refill: 3  -     Comprehensive Metabolic Panel; Future; Expected date: 08/25/2025    6. Paroxysmal atrial fibrillation  Overview:  Followed by Cardiology, continue current treatment plan     Orders:  -     losartan (COZAAR) 50 MG tablet; Take 1 tablet (50 mg total) by mouth once daily.  Dispense: 90 tablet; Refill: 3    7. BPH with urinary obstruction  Overview:  Followed by Urology, continue current treatment plan       8. Chronic anticoagulation    9. Congenital hypothyroidism without goiter  -     TSH; Future; Expected date: 08/25/2025  -     T4, Free; Future; Expected date: 08/25/2025    10. Steatosis of liver    11. Severe obstructive sleep apnea    12. Hypothyroidism, unspecified type    13. Palpitation  -     losartan (COZAAR) 50 MG tablet; Take 1 tablet (50 mg total) by mouth once daily.  Dispense: 90 tablet; Refill: 3    14. Abnormal stress test  -     losartan (COZAAR) 50 MG tablet; Take 1 tablet (50 mg total) by mouth once daily.  Dispense: 90 tablet; Refill: 3    15. Primary insomnia  -     traZODone (DESYREL) 50 MG tablet; Take 1 tablet (50 mg total) by mouth every evening.  Dispense: 30 tablet; Refill: 11          Assessment & Plan    IMPRESSION:  - Evaluated persistent shortness of breath and fatigue, potentially related to heart valve leakage or other cardiac/pulmonary issues  - Considered results of recent stress test and pulmonary function test  - Ordered bubble study echo to investigate potential patent foramen ovale (PFO)  - Addressed sleep issues, likely contributing to daytime fatigue and overall health concerns  - Coordinated care between pulmonology (Dr. Allred) and cardiology (Dr. Garzon) for comprehensive management  - Monitored previously identified lung nodules with follow-up CT    HEART VALVE ISSUE:   Monitored patient's shortness of breath, which may be related to heart valve leaking.    CARDIOVASCULAR  HEALTH:   Evaluated stress test results, which were poor, requiring oxygen use.   Planned to perform another echocardiogram, specifically a bubble study, to check for a potential atrial septal defect.   Scheduled follow-up visit with cardiologist Dr. Garzon on March 13th.   Explained the potential presence and implications of patent foramen ovale (PFO) in 20-30% of the population.   Discussed the possibility of an atrial septal defect and its potential effects, including increased risk of strokes and shortness of breath.   Scheduled bubble study echocardiogram for March 6th to evaluate for an PFO.   Arranged follow-up visit with cardiologist Dr. Garzon on March 13th.    ASTHMA:   Continued albuterol inhaler for asthma management.    RESPIRATORY ISSUES:   Continued Trelegy Ellipta for respiratory issues.   Monitored patient's ongoing shortness of breath.   Evaluated pulmonary function test results, which indicated some abnormalities.    SLEEP DISORDERS:   Discussed the importance of sleep for overall health and body repair.   Educated the patient on sleep hygiene practices, including avoiding screens before bedtime and establishing a consistent sleep routine.   Advised the patient to avoid daytime naps for 2-3 days to reset sleep cycle.   Recommend establishing a consistent bedtime routine (e.g., dinner, relaxing activity, shower, reading).   Instructed the patient to increase exposure to natural sunlight to help reset internal clock.   Advised limiting caffeine intake, especially in hours before bedtime.   Recommend avoiding phone use or watching TV if unable to sleep; suggested trying calming activities instead.   Prescribed Trazodone for sleep (30-day supply), starting with a low dose with room to increase if needed.   Instructed the patient to message through patient portal if Trazodone is not effective after 1 week of use.   Monitored patient's report of sleeping during the day due to inability to sleep at  night.   Advised against daytime sleeping to help reset sleep cycle.   Monitored patient's report of no improvement in sleep quality with BiPAP use.    SLEEP APNEA:   Continued BiPAP therapy for sleep apnea management.    SHORTNESS OF BREATH:   Monitored patient's ongoing shortness of breath.   Evaluated stress test results, which were poor, requiring oxygen use.   Noted pulmonologist's observation that patient requires oxygen when walking.   Discussed potential causes of shortness of breath, including heart valve issues and possible atrial septal defect.   Continued albuterol inhaler and Trelegy Ellipta for respiratory symptom management.    EDEMA:   Evaluated physical exam findings revealing presence of edema.   Recommend continued use of compression socks to manage edema.    LUNG NODULES:   Ordered CT of lungs to follow up on previously identified nodules.   Follow-up CT already scheduled by pulmonology for approximately May 8th (1 year from previous scan).   Reviewed previous CT results, which showed a decrease in size of two lung nodules.   Discussed the need for follow-up scan with the patient.    OXYGEN THERAPY:   Prescribed oxygen therapy for use during ambulation.           Health Maintenance reviewed/updated.      Check-out note:  Please follow up in 6 months for 6 month f/u In-person with Dr. Perez.  Labs: Fasting lab PTA  Additional orders: Print AVS, please schedule his CT chest in May (previously ordered)       This note was generated with the assistance of ambient listening technology. Verbal consent was obtained by the patient and accompanying visitor(s) for the recording of patient appointment to facilitate this note. I attest to having reviewed and edited the generated note for accuracy, though some syntax or spelling errors may persist. Please contact the author of this note for any clarification.

## 2025-03-03 ENCOUNTER — PATIENT MESSAGE (OUTPATIENT)
Dept: INTERNAL MEDICINE | Facility: CLINIC | Age: 65
End: 2025-03-03
Payer: MEDICARE

## 2025-03-04 ENCOUNTER — HOSPITAL ENCOUNTER (EMERGENCY)
Facility: HOSPITAL | Age: 65
Discharge: HOME | End: 2025-03-04
Attending: EMERGENCY MEDICINE
Payer: MEDICARE

## 2025-03-04 VITALS
RESPIRATION RATE: 18 BRPM | HEART RATE: 63 BPM | TEMPERATURE: 98 F | DIASTOLIC BLOOD PRESSURE: 73 MMHG | WEIGHT: 215.38 LBS | BODY MASS INDEX: 30.91 KG/M2 | SYSTOLIC BLOOD PRESSURE: 131 MMHG | OXYGEN SATURATION: 95 %

## 2025-03-04 DIAGNOSIS — M71.22 BAKER CYST, LEFT: Primary | ICD-10-CM

## 2025-03-04 DIAGNOSIS — M79.89 LEFT LEG SWELLING: ICD-10-CM

## 2025-03-04 PROCEDURE — 99284 EMERGENCY DEPT VISIT MOD MDM: CPT | Mod: 25

## 2025-03-04 NOTE — TELEPHONE ENCOUNTER
Spoke with the patient and he states his leg is discolored and painful and swollen  Advised pt to go to the ER per PCP to be evaluated  Pt verbalized understanding

## 2025-03-04 NOTE — DISCHARGE INSTRUCTIONS
Please follow up with ortho as discussed for further mangament or return to er for worsening symptoms.

## 2025-03-04 NOTE — ED PROVIDER NOTES
Encounter Date: 3/4/2025       History     Chief Complaint   Patient presents with    Leg Swelling     Pt with cardiac history and history of leg swelling c/o left lower leg swelling worse than usual.  Leg is also painful and tender; pt denies pain to left knee.     Patient presents to emergency department with previous medical history of CHF, CAD,  Afib, a flutter, Raynaud's, and  coronary artery disease. He reports noticing left calf pain and swelling that started 3 days ago on Sunday.  He reports increased pain with extension of left foot, and weight-bearing.  5/10 pain scale.  He denies known injury and is currently on blood thinners.  He is followed by cardiology in primary care.  He denies chest pain or shortness of breath, tingling or numbness, fevers, rash, or pain that radiates into hip or back.       Review of patient's allergies indicates:   Allergen Reactions    Iodinated contrast media     Iodine and iodide containing products Hives     Past Medical History:   Diagnosis Date    Abnormal ECG 10/04/2022    Chronic diastolic heart failure 06/13/2024    Coronary artery disease of native artery of native heart with stable angina pectoris 09/06/2023    Essential hypertension 02/24/2020    First degree AV block 06/13/2024    Hypertension     MVP (mitral valve prolapse) 10/26/2022    Nonrheumatic mitral valve regurgitation 10/26/2022    Palpitation 03/05/2020    Paroxysmal atrial fibrillation 03/05/2020    Paroxysmal atrial flutter 10/04/2022    Raynaud's syndrome     Vasovagal syncopes      Past Surgical History:   Procedure Laterality Date    ABLATION, ATRIAL FLUTTER, TYPICAL N/A 12/15/2022    Procedure: Ablation, Atrial Flutter, Typical;  Surgeon: Matty Hendricks MD;  Location: [facility] EP LAB;  Service: Cardiology;  Laterality: N/A;  AFL, RFA, NICOLASA, ARIELA, anes, MB, 3 Prep    CARPAL TUNNEL RELEASE Right 04/02/2024    [facility]    CHOLECYSTECTOMY      ECHOCARDIOGRAM,TRANSESOPHAGEAL N/A 12/15/2022     Procedure: Transesophageal echo (ARIELA) intra-procedure log documentation;  Surgeon: Nisreen Fenton MD;  Location: [facility] EP LAB;  Service: Cardiology;  Laterality: N/A;    EYE SURGERY      GALLBLADDER SURGERY      HERNIA REPAIR      LEFT HEART CATHETERIZATION Left 05/24/2023    Procedure: Left heart cath;  Surgeon: Kal Garzon MD;  Location: [facility] CATH LAB;  Service: Cardiology;  Laterality: Left;  may need to sign consent//    OPEN REDUCTION AND INTERNAL FIXATION (ORIF) OF FRACTURE OF METATARSAL BONE Left 11/06/2023    Procedure: ORIF, FRACTURE, METATARSAL BONE;  Surgeon: Aleksandr hCeek DPM;  Location: [facility] OR;  Service: Podiatry;  Laterality: Left;    THROAT SURGERY      TRANSESOPHAGEAL ECHOCARDIOGRAPHY N/A 02/27/2023    Procedure: ECHOCARDIOGRAM, TRANSESOPHAGEAL;  Surgeon: Samuel Del Valle MD;  Location: [facility] CATH LAB;  Service: Cardiology;  Laterality: N/A;    TREATMENT OF CARDIAC ARRHYTHMIA  12/15/2022    Procedure: Cardioversion or Defibrillation;  Surgeon: Matty Hendricks MD;  Location: [facility] EP LAB;  Service: Cardiology;;     Family History   Problem Relation Name Age of Onset    Cancer Mother          Multiple myeloma    Pacemaker/defibrilator Father Reyes     Macular degeneration Father Reyes     COPD Father Reyes     Hearing loss Father Reyes     Macular degeneration Brother       Social History[1]  Review of Systems   Constitutional:  Negative for fever.   HENT:  Negative for sore throat.    Respiratory:  Negative for cough and shortness of breath.    Cardiovascular:  Positive for leg swelling. Negative for chest pain and palpitations.   Gastrointestinal:  Negative for nausea.   Genitourinary:  Negative for dysuria.   Musculoskeletal:  Negative for back pain.   Skin:  Negative for color change, rash and wound.   Neurological:  Negative for dizziness, weakness and numbness.   Hematological:  Does not bruise/bleed easily.       Physical Exam     Initial Vitals [03/04/25 1259]   BP Pulse Resp  Temp SpO2   136/74 (!) 57 16 97.6 °F (36.4 °C) 95 %      MAP       --         Physical Exam    Constitutional: He appears well-developed and well-nourished. He is cooperative.   HENT:   Head: Normocephalic.   Eyes: Conjunctivae, EOM and lids are normal. Pupils are equal, round, and reactive to light.   Neck: Neck supple.   Normal range of motion.  Cardiovascular:  Intact distal pulses and normal pulses.   Bradycardia present.         Pulmonary/Chest: Effort normal and breath sounds normal. No accessory muscle usage. No tachypnea. No respiratory distress.   Abdominal: Abdomen is soft.   Musculoskeletal:         General: Normal range of motion.      Cervical back: Normal range of motion and neck supple.      Right upper leg: Normal.      Left upper leg: Normal.      Left knee: No swelling, effusion or erythema. Normal range of motion. No tenderness.      Left lower leg: Swelling and tenderness present. Edema present.      Comments: Positive Homans sign.   chronic venous stasis and bilateral leg swelling is noted.  With moderate swelling to left calf in comparison to right.      Neurological: He is alert and oriented to person, place, and time. He has normal strength.   Skin: Skin is warm and dry. Capillary refill takes less than 2 seconds.   Psychiatric: He has a normal mood and affect. Thought content normal.         ED Course   Procedures  Labs Reviewed - No data to display         Imaging Results              US Lower Extremity Veins Left (Final result)  Result time 03/04/25 14:22:11      Final result by Shahzad Cotto MD (03/04/25 14:22:11)                   Impression:      No sonographic evidence of deep venous thrombosis is identified.      Electronically signed by: Shahzad Cotto  Date:    03/04/2025  Time:    14:22               Narrative:    EXAMINATION:  US LOWER EXTREMITY VEINS LEFT    CLINICAL HISTORY:  Other specified soft tissue disorders    COMPARISON:  09/28/2016    FINDINGS:  Sonographic evaluation of  the common femoral, superficial femoral, popliteal, and posterior tibial veins of left lower extremity demonstrates no sonographic evidence of deep venous thrombosis.                                       Medications - No data to display  Medical Decision Making                                    Clinical Impression:  Final diagnoses:  [M79.89] Left leg swelling  [M71.22] Baker cyst, left (Primary)          ED Disposition Condition    Discharge Stable          ED Prescriptions    None       Follow-up Information       Follow up With Specialties Details Why Contact Info    Tangela Perez MD Family Medicine   [address] [address] [place] [address]  [phone]      [facility] Emergency Dept. Emergency Medicine  If symptoms worsen [address] [facility] [address] [phone]               Kary Lr NP  03/04/25 1510         [1]   Social History  Tobacco Use    Smoking status: Never    Smokeless tobacco: Never   Substance Use Topics    Alcohol use: No    Drug use: No        Kary Lr NP  03/04/25 8620

## 2025-03-06 ENCOUNTER — PATIENT MESSAGE (OUTPATIENT)
Dept: CARDIOLOGY | Facility: CLINIC | Age: 65
End: 2025-03-06
Payer: MEDICARE

## 2025-03-06 ENCOUNTER — HOSPITAL ENCOUNTER (OUTPATIENT)
Dept: CARDIOLOGY | Facility: HOSPITAL | Age: 65
Discharge: HOME | End: 2025-03-06
Attending: INTERNAL MEDICINE
Payer: MEDICARE

## 2025-03-06 ENCOUNTER — HOSPITAL ENCOUNTER (OUTPATIENT)
Dept: RADIOLOGY | Facility: HOSPITAL | Age: 65
Discharge: HOME | End: 2025-03-06
Attending: NURSE PRACTITIONER
Payer: MEDICARE

## 2025-03-06 ENCOUNTER — DOCUMENTATION ONLY (OUTPATIENT)
Dept: CARDIOLOGY | Facility: HOSPITAL | Age: 65
End: 2025-03-06
Payer: MEDICARE

## 2025-03-06 ENCOUNTER — OFFICE VISIT (OUTPATIENT)
Dept: INTERNAL MEDICINE | Facility: CLINIC | Age: 65
End: 2025-03-06
Payer: MEDICARE

## 2025-03-06 VITALS
HEIGHT: 70 IN | WEIGHT: 214.75 LBS | OXYGEN SATURATION: 97 % | HEART RATE: 66 BPM | TEMPERATURE: 98 F | BODY MASS INDEX: 30.74 KG/M2 | DIASTOLIC BLOOD PRESSURE: 72 MMHG | SYSTOLIC BLOOD PRESSURE: 120 MMHG

## 2025-03-06 VITALS
HEIGHT: 70 IN | DIASTOLIC BLOOD PRESSURE: 72 MMHG | WEIGHT: 215 LBS | SYSTOLIC BLOOD PRESSURE: 120 MMHG | BODY MASS INDEX: 30.78 KG/M2

## 2025-03-06 DIAGNOSIS — M79.89 PAIN AND SWELLING OF LEFT LOWER LEG: ICD-10-CM

## 2025-03-06 DIAGNOSIS — M79.662 PAIN AND SWELLING OF LEFT LOWER LEG: ICD-10-CM

## 2025-03-06 DIAGNOSIS — R09.02 EXERCISE HYPOXEMIA: ICD-10-CM

## 2025-03-06 DIAGNOSIS — I50.32 CHRONIC DIASTOLIC HEART FAILURE: Primary | ICD-10-CM

## 2025-03-06 DIAGNOSIS — I10 ESSENTIAL HYPERTENSION: ICD-10-CM

## 2025-03-06 DIAGNOSIS — E03.1 CONGENITAL HYPOTHYROIDISM WITHOUT GOITER: ICD-10-CM

## 2025-03-06 LAB
AORTIC ROOT ANNULUS: 3.26 CM
ASCENDING AORTA: 3.43 CM
AV INDEX (PROSTH): 0.94
AV MEAN GRADIENT: 5 MMHG
AV PEAK GRADIENT: 9 MMHG
AV VALVE AREA BY VELOCITY RATIO: 3.9 CM²
AV VALVE AREA: 3.9 CM²
AV VELOCITY RATIO: 0.93
BSA FOR ECHO PROCEDURE: 2.19 M2
CV ECHO LV RWT: 0.45 CM
DOP CALC AO PEAK VEL: 1.5 M/S
DOP CALC AO VTI: 31.9 CM
DOP CALC LVOT AREA: 4.2 CM2
DOP CALC LVOT DIAMETER: 2.3 CM
DOP CALC LVOT PEAK VEL: 1.4 M/S
DOP CALC LVOT STROKE VOLUME: 124.2 CM3
DOP CALC RVOT PEAK VEL: 0.79 M/S
DOP CALC RVOT VTI: 19.3 CM
DOP CALCLVOT PEAK VEL VTI: 29.9 CM
E WAVE DECELERATION TIME: 233 MSEC
E/A RATIO: 1.13
E/E' RATIO: 8 M/S
ECHO LV POSTERIOR WALL: 1.3 CM (ref 0.6–1.1)
FRACTIONAL SHORTENING: 39.7 % (ref 28–44)
INTERVENTRICULAR SEPTUM: 1.2 CM (ref 0.6–1.1)
IVRT: 108 MSEC
LA MAJOR: 6.2 CM
LA MINOR: 5.7 CM
LA WIDTH: 5 CM
LEFT ATRIUM AREA SYSTOLIC (APICAL 2 CHAMBER): 22.57 CM2
LEFT ATRIUM AREA SYSTOLIC (APICAL 4 CHAMBER): 25.14 CM2
LEFT ATRIUM SIZE: 5.3 CM
LEFT ATRIUM VOLUME INDEX MOD: 39 ML/M2
LEFT ATRIUM VOLUME INDEX: 62 ML/M2
LEFT ATRIUM VOLUME MOD: 84 ML
LEFT ATRIUM VOLUME: 134 CM3
LEFT INTERNAL DIMENSION IN SYSTOLE: 3.5 CM (ref 2.1–4)
LEFT VENTRICLE DIASTOLIC VOLUME INDEX: 76.28 ML/M2
LEFT VENTRICLE DIASTOLIC VOLUME: 164 ML
LEFT VENTRICLE END SYSTOLIC VOLUME APICAL 2 CHAMBER: 77.15 ML
LEFT VENTRICLE END SYSTOLIC VOLUME APICAL 4 CHAMBER: 87.6 ML
LEFT VENTRICLE MASS INDEX: 146 G/M2
LEFT VENTRICLE SYSTOLIC VOLUME INDEX: 23.3 ML/M2
LEFT VENTRICLE SYSTOLIC VOLUME: 50 ML
LEFT VENTRICULAR INTERNAL DIMENSION IN DIASTOLE: 5.8 CM (ref 3.5–6)
LEFT VENTRICULAR MASS: 314 G
LV LATERAL E/E' RATIO: 5.2 M/S
LV SEPTAL E/E' RATIO: 15.5 M/S
LVED V (TEICH): 164.06 ML
LVES V (TEICH): 49.96 ML
LVOT MG: 4.5 MMHG
LVOT MV: 0.99 CM/S
Lab: 5 MMHG
MV PEAK A VEL: 0.55 M/S
MV PEAK E VEL: 0.62 M/S
MV STENOSIS PRESSURE HALF TIME: 67.66 MS
MV VALVE AREA P 1/2 METHOD: 3.25 CM2
OHS CV RV/LV RATIO: 0.81 CM
PV MEAN GRADIENT: 1 MMHG
PV MV: 0.84 M/S
PV PEAK GRADIENT: 2 MMHG
PV PEAK VELOCITY: 1.33 M/S
RA MAJOR: 5.8 CM
RA PRESSURE ESTIMATED: 3 MMHG
RA WIDTH: 4.32 CM
RIGHT VENTRICLE DIASTOLIC BASEL DIMENSION: 4.7 CM
RIGHT VENTRICULAR END-DIASTOLIC DIMENSION: 4.69 CM
SINUS: 3.17 CM
STJ: 3.16 CM
TDI LATERAL: 0.12 M/S
TDI SEPTAL: 0.04 M/S
TDI: 0.08 M/S
TRICUSPID ANNULAR PLANE SYSTOLIC EXCURSION: 2.09 CM
Z-SCORE OF LEFT VENTRICULAR DIMENSION IN END DIASTOLE: -1.85
Z-SCORE OF LEFT VENTRICULAR DIMENSION IN END SYSTOLE: -1.54

## 2025-03-06 PROCEDURE — 93971 EXTREMITY STUDY: CPT | Mod: 26,LT,, | Performed by: RADIOLOGY

## 2025-03-06 PROCEDURE — 71046 X-RAY EXAM CHEST 2 VIEWS: CPT | Mod: 26,,, | Performed by: RADIOLOGY

## 2025-03-06 PROCEDURE — 93306 TTE W/DOPPLER COMPLETE: CPT | Mod: 26,,, | Performed by: INTERNAL MEDICINE

## 2025-03-06 PROCEDURE — 99215 OFFICE O/P EST HI 40 MIN: CPT | Mod: S$GLB,,, | Performed by: NURSE PRACTITIONER

## 2025-03-06 PROCEDURE — 93971 EXTREMITY STUDY: CPT | Mod: TC,LT

## 2025-03-06 PROCEDURE — 3078F DIAST BP <80 MM HG: CPT | Mod: CPTII,S$GLB,, | Performed by: NURSE PRACTITIONER

## 2025-03-06 PROCEDURE — 3074F SYST BP LT 130 MM HG: CPT | Mod: CPTII,S$GLB,, | Performed by: NURSE PRACTITIONER

## 2025-03-06 PROCEDURE — 4010F ACE/ARB THERAPY RXD/TAKEN: CPT | Mod: CPTII,S$GLB,, | Performed by: NURSE PRACTITIONER

## 2025-03-06 PROCEDURE — 3008F BODY MASS INDEX DOCD: CPT | Mod: CPTII,S$GLB,, | Performed by: NURSE PRACTITIONER

## 2025-03-06 PROCEDURE — 93306 TTE W/DOPPLER COMPLETE: CPT

## 2025-03-06 PROCEDURE — 71046 X-RAY EXAM CHEST 2 VIEWS: CPT | Mod: TC

## 2025-03-06 PROCEDURE — 99999 PR PBB SHADOW E&M-EST. PATIENT-LVL IV: CPT | Mod: PBBFAC,,, | Performed by: NURSE PRACTITIONER

## 2025-03-06 RX ORDER — TRAMADOL HYDROCHLORIDE 50 MG/1
TABLET ORAL
COMMUNITY
Start: 2025-03-06

## 2025-03-06 NOTE — PROGRESS NOTES
Pt presented for an echocardiogram with bubble study per Dr. DELROY Allred.  Procedure was explained to the patient, he verbalized understanding. Patient tolerated the procedure well.  IV discontinued, pressure dressing applied.

## 2025-03-06 NOTE — LETTER
March 6, 2025      The [address]  [address] [facility]  [place] [address]  Phone: [phone]  Fax: [phone]       Patient: Cedric Santoro   YOB: 1960  Date of Visit: 03/06/2025    To Whom It May Concern:    Bernice Santoro  was at [facility] on 03/06/2025. The patient may return to work/school on 3/10/25 with no restrictions. If you have any questions or concerns, or if I can be of further assistance, please do not hesitate to contact me.    Sincerely,  ClaudiaNP

## 2025-03-07 ENCOUNTER — HOSPITAL ENCOUNTER (EMERGENCY)
Facility: HOSPITAL | Age: 65
Discharge: HOME | End: 2025-03-07
Attending: FAMILY MEDICINE
Payer: MEDICARE

## 2025-03-07 ENCOUNTER — HOSPITAL ENCOUNTER (OUTPATIENT)
Dept: CARDIOLOGY | Facility: HOSPITAL | Age: 65
Discharge: HOME | End: 2025-03-07
Payer: MEDICARE

## 2025-03-07 ENCOUNTER — RESULTS FOLLOW-UP (OUTPATIENT)
Dept: PULMONOLOGY | Facility: CLINIC | Age: 65
End: 2025-03-07

## 2025-03-07 ENCOUNTER — OFFICE VISIT (OUTPATIENT)
Dept: CARDIOLOGY | Facility: CLINIC | Age: 65
End: 2025-03-07
Payer: MEDICARE

## 2025-03-07 VITALS
DIASTOLIC BLOOD PRESSURE: 60 MMHG | BODY MASS INDEX: 30.74 KG/M2 | SYSTOLIC BLOOD PRESSURE: 98 MMHG | WEIGHT: 214.75 LBS | HEART RATE: 53 BPM | OXYGEN SATURATION: 99 % | HEIGHT: 70 IN

## 2025-03-07 VITALS
BODY MASS INDEX: 30.62 KG/M2 | RESPIRATION RATE: 18 BRPM | SYSTOLIC BLOOD PRESSURE: 130 MMHG | WEIGHT: 213.38 LBS | HEART RATE: 60 BPM | OXYGEN SATURATION: 99 % | DIASTOLIC BLOOD PRESSURE: 72 MMHG | TEMPERATURE: 98 F

## 2025-03-07 DIAGNOSIS — I10 ESSENTIAL HYPERTENSION: ICD-10-CM

## 2025-03-07 DIAGNOSIS — I48.92 PAROXYSMAL ATRIAL FLUTTER: ICD-10-CM

## 2025-03-07 DIAGNOSIS — S83.522A RUPTURE OF POSTERIOR CRUCIATE LIGAMENT OF LEFT KNEE, INITIAL ENCOUNTER: ICD-10-CM

## 2025-03-07 DIAGNOSIS — I44.0 FIRST DEGREE AV BLOCK: ICD-10-CM

## 2025-03-07 DIAGNOSIS — Z86.79 S/P ABLATION OF ATRIAL FLUTTER: ICD-10-CM

## 2025-03-07 DIAGNOSIS — I50.32 CHRONIC DIASTOLIC HEART FAILURE: ICD-10-CM

## 2025-03-07 DIAGNOSIS — M79.89 LEG SWELLING: Primary | ICD-10-CM

## 2025-03-07 DIAGNOSIS — S83.512A RUPTURE OF ANTERIOR CRUCIATE LIGAMENT OF LEFT KNEE, INITIAL ENCOUNTER: ICD-10-CM

## 2025-03-07 DIAGNOSIS — G47.33 SEVERE OBSTRUCTIVE SLEEP APNEA: ICD-10-CM

## 2025-03-07 DIAGNOSIS — I49.1 PAC (PREMATURE ATRIAL CONTRACTION): ICD-10-CM

## 2025-03-07 DIAGNOSIS — Z98.890 S/P ABLATION OF ATRIAL FLUTTER: ICD-10-CM

## 2025-03-07 DIAGNOSIS — I25.118 CORONARY ARTERY DISEASE OF NATIVE ARTERY OF NATIVE HEART WITH STABLE ANGINA PECTORIS: ICD-10-CM

## 2025-03-07 DIAGNOSIS — Z79.01 CHRONIC ANTICOAGULATION: ICD-10-CM

## 2025-03-07 DIAGNOSIS — I34.1 MVP (MITRAL VALVE PROLAPSE): ICD-10-CM

## 2025-03-07 DIAGNOSIS — S83.412A SPRAIN OF MEDIAL COLLATERAL LIGAMENT OF LEFT KNEE, INITIAL ENCOUNTER: ICD-10-CM

## 2025-03-07 DIAGNOSIS — S86.112A RUPTURE OF LEFT GASTROCNEMIUS TENDON, INITIAL ENCOUNTER: ICD-10-CM

## 2025-03-07 DIAGNOSIS — I48.0 PAROXYSMAL ATRIAL FIBRILLATION: Primary | ICD-10-CM

## 2025-03-07 DIAGNOSIS — I34.0 NONRHEUMATIC MITRAL VALVE REGURGITATION: ICD-10-CM

## 2025-03-07 DIAGNOSIS — R00.2 PALPITATION: ICD-10-CM

## 2025-03-07 LAB
OHS QRS DURATION: 96 MS
OHS QTC CALCULATION: 441 MS

## 2025-03-07 PROCEDURE — 93010 ELECTROCARDIOGRAM REPORT: CPT | Mod: ,,, | Performed by: INTERNAL MEDICINE

## 2025-03-07 PROCEDURE — 99284 EMERGENCY DEPT VISIT MOD MDM: CPT | Mod: 25

## 2025-03-07 PROCEDURE — 99999 PR PBB SHADOW E&M-EST. PATIENT-LVL IV: CPT | Mod: PBBFAC,,, | Performed by: INTERNAL MEDICINE

## 2025-03-07 PROCEDURE — 93005 ELECTROCARDIOGRAM TRACING: CPT

## 2025-03-07 NOTE — ED PROVIDER NOTES
SCRIBE #1 NOTE: IRyan, am scribing for, and in the presence of, Esperanza Lentz MD. I have scribed the HPI, ROS, and PE.    SCRIBE #2 NOTE: I, Onesimo Arrieta, am scribing for, and in the presence of,  Ame Monson MD. I have scribed the remaining portions of the note not scribed by Scribe #1.      History     Chief Complaint   Patient presents with    Leg Swelling     Left leg swelling since Sunday, pt seen on Tuesday for similar symptoms and was told he didn't have a blood clot. Pt seen cardiologist today and was told to come to the ED for further eval.      Review of patient's allergies indicates:   Allergen Reactions    Iodinated contrast media     Iodine and iodide containing products Hives         History of Present Illness     HPI    3/7/2025, 11:47 AM  History obtained from the patient      History of Present Illness: Cedric Santoro is a 65 y.o. male patient with a PMHx of HTN, PAF, non rheumatic mitral valve regurgitation, essential HTN, CAD, and chronic diastolic HF who presents to the Emergency Department for evaluation of left leg swelling which onset gradually since last Sunday. Pt's cardiologist sent him to the ED for further evaluation. Pt takes 40 mg furosemide daily. Pt noticed left knee swelling initially, which now radiates down the leg and above the knee cap to proximal thigh. Pt was seen on Tuesday and told he does not have any indication to suggest a blood clot. Pt had left leg pain yesterday but none today. Denies any recent falls, injuries, or traumas to suggest cause for swelling. Symptoms are constant and moderate in severity. No mitigating or exacerbating factors reported. Patient denies any fever, HA, CP, SOB, and all other sxs at this time. No further complaints or concerns at this time.       Arrival mode: Personal vehicle     PCP: Tangela Perez MD        Past Medical History:  Past Medical History:   Diagnosis Date    Abnormal ECG 10/04/2022    Chronic diastolic heart  failure 06/13/2024    Coronary artery disease of native artery of native heart with stable angina pectoris 09/06/2023    Essential hypertension 02/24/2020    First degree AV block 06/13/2024    Hypertension     MVP (mitral valve prolapse) 10/26/2022    Nonrheumatic mitral valve regurgitation 10/26/2022    Palpitation 03/05/2020    Paroxysmal atrial fibrillation 03/05/2020    Paroxysmal atrial flutter 10/04/2022    Raynaud's syndrome     Vasovagal syncopes        Past Surgical History:  Past Surgical History:   Procedure Laterality Date    ABLATION, ATRIAL FLUTTER, TYPICAL N/A 12/15/2022    Procedure: Ablation, Atrial Flutter, Typical;  Surgeon: Matty Hendricks MD;  Location: [facility] EP LAB;  Service: Cardiology;  Laterality: N/A;  AFL, RFA, NICOLASA, ARIELA, anes, MB, 3 Prep    CARPAL TUNNEL RELEASE Right 04/02/2024    [facility]    CHOLECYSTECTOMY      ECHOCARDIOGRAM,TRANSESOPHAGEAL N/A 12/15/2022    Procedure: Transesophageal echo (ARIELA) intra-procedure log documentation;  Surgeon: Nisreen Fenton MD;  Location: [facility] EP LAB;  Service: Cardiology;  Laterality: N/A;    EYE SURGERY      GALLBLADDER SURGERY      HERNIA REPAIR      LEFT HEART CATHETERIZATION Left 05/24/2023    Procedure: Left heart cath;  Surgeon: Kal Garzon MD;  Location: [facility] CATH LAB;  Service: Cardiology;  Laterality: Left;  may need to sign consent//    OPEN REDUCTION AND INTERNAL FIXATION (ORIF) OF FRACTURE OF METATARSAL BONE Left 11/06/2023    Procedure: ORIF, FRACTURE, METATARSAL BONE;  Surgeon: Aleksandr Cheek DPM;  Location: [facility] OR;  Service: Podiatry;  Laterality: Left;    THROAT SURGERY      TRANSESOPHAGEAL ECHOCARDIOGRAPHY N/A 02/27/2023    Procedure: ECHOCARDIOGRAM, TRANSESOPHAGEAL;  Surgeon: Samuel Del Valle MD;  Location: [facility] CATH LAB;  Service: Cardiology;  Laterality: N/A;    TREATMENT OF CARDIAC ARRHYTHMIA  12/15/2022    Procedure: Cardioversion or Defibrillation;  Surgeon: Matty Hendricks MD;  Location: [facility] EP LAB;   Service: Cardiology;;         Family History:  Family History   Problem Relation Name Age of Onset    Cancer Mother          Multiple myeloma    Pacemaker/defibrilator Father Reyes     Macular degeneration Father Reyes     COPD Father Reyes     Hearing loss Father Reyes     Macular degeneration Brother         Social History:  Social History     Tobacco Use    Smoking status: Never    Smokeless tobacco: Never   Substance and Sexual Activity    Alcohol use: No    Drug use: No    Sexual activity: Not Currently     Partners: Female     Birth control/protection: None        Review of Systems     Review of Systems   Constitutional:  Negative for chills and fever.   HENT:  Negative for congestion and sore throat.    Respiratory:  Negative for cough and shortness of breath.    Cardiovascular:  Positive for leg swelling (left). Negative for chest pain.   Gastrointestinal:  Negative for abdominal pain, nausea and vomiting.   Genitourinary:  Negative for dysuria.   Musculoskeletal:  Negative for back pain.   Skin:  Negative for rash.   Neurological:  Negative for dizziness, weakness, light-headedness, numbness and headaches.   Hematological:  Does not bruise/bleed easily.   All other systems reviewed and are negative.       Physical Exam     Initial Vitals [03/07/25 1032]   BP Pulse Resp Temp SpO2   (!) 97/53 (!) 54 19 97.7 °F (36.5 °C) 95 %      MAP       --          Physical Exam  Nursing Notes and Vital Signs Reviewed.  Constitutional: Patient is in no acute distress. Well-developed and well-nourished.  Head: Atraumatic. Normocephalic.  Eyes: PERRL. EOM intact. Conjunctivae are not pale. No scleral icterus.  ENT: Mucous membranes are moist. Oropharynx is clear and symmetric.    Neck: Supple. Full ROM. No lymphadenopathy.  Cardiovascular: Regular rate. Regular rhythm. No murmurs, rubs, or gallops. Distal pulses are 2+ and symmetric.  Pulmonary/Chest: No respiratory distress. Clear to auscultation bilaterally. No  wheezing or rales.  Abdominal: Soft and non-distended.  There is no tenderness.  No rebound, guarding, or rigidity. Good bowel sounds.  Genitourinary: No CVA tenderness  Musculoskeletal: Moves all extremities.  Diffuse swelling to left lower leg with early blistering anteriorly. Leg is tender to palpation. Swelling extends above the knee to proximal thigh. Trace edema on RLE.  Skin: Warm and dry.  Neurological:  Alert, awake, and appropriate.  Normal speech.  No acute focal neurological deficits are appreciated.  Psychiatric: Normal affect. Good eye contact. Appropriate in content.     ED Course   Procedures  ED Vital Signs:  Vitals:    03/07/25 1215 03/07/25 1300 03/07/25 1345 03/07/25 1430   BP: 106/65 100/61 104/64 (!) 104/56   Pulse: (!) 53 (!) 56 (!) 56 60   Resp: 20 20 20 20   Temp:       TempSrc:       SpO2: 95% 95% 100% 98%   Weight:        03/07/25 1500 03/07/25 1530 03/07/25 1645 03/07/25 1700   BP: (!) 106/58 (!) 104/59 113/65 109/66   Pulse: 62 63 (!) 59 (!) 58   Resp: 18 18 18 18   Temp:       TempSrc:       SpO2: 98% 99% 99% 98%   Weight:        03/07/25 1745 03/07/25 1850 03/07/25 1920 03/07/25 1930   BP: 113/67 101/67  108/65   Pulse: 61 (!) 59 62    Resp: 20 18     Temp: 98.3 °F (36.8 °C)      TempSrc: Oral      SpO2: 99% 100%     Weight:        03/07/25 1945 03/07/25 2111 03/07/25 2330   BP:  136/74 130/72   Pulse: 60 60 60   Resp: 20 20 18   Temp:   98.3 °F (36.8 °C)   TempSrc:   Oral   SpO2: 99% 99% 99%   Weight:          Abnormal Lab Results:  Labs Reviewed - No data to display     All Lab Results:  None    Imaging Results:  Imaging Results              MRI Tibia Fibula WO Contrast LT (Final result)  Result time 03/07/25 21:11:53      Final result by Abner Rothman MD (03/07/25 21:11:53)                   Impression:     Grade 2 medial gastrocnemius muscle tear.    Finalized on: 3/7/2025 9:11 PM By:  EMETERIO Rothman MD, MD  [facility]# [id number]      2025-03-07 21:13:54.896     [facility]                Narrative:    EXAM: MRI TIBIA FIBULA WO CONTRAST LT    CLINICAL HISTORY: Left calf pain.    TECHNIQUE: Multiplanar, multisequence MR images performed through the left calf without intravenous contrast.    FINDINGS:    Large grade 2 tear of the medial gastrocnemius muscle with a large intramuscular hematoma measuring 11.2 x 10.1 x 4.3 cm.  Marked periarticular subcutaneous edema.  Large joint effusion.                                         MRI Knee Without Contrast Left (Final result)  Result time 03/07/25 20:50:25      Final result by Abner Rothman MD (03/07/25 20:50:25)                   Impression:        Partial-thickness ACL and PCL tears.    Grade 2 MCL and fibular collateral ligament sprains.    Large joint effusion and large popliteal cyst.    Finalized on: 3/7/2025 8:50 PM By:  EMETERIO Rothman MD, MD  [facility]# [id number]      2025-03-07 20:52:33.782     [facility]               Narrative:    EXAM: MRI KNEE WITHOUT CONTRAST LEFT    CLINICAL HISTORY: Acute left knee pain    COMPARISON:  No studies are available for comparison.    TECHNIQUE:  Multiplanar, multisequence MR imaging was performed through the left knee without intravenous or intra-articular gadolinium contrast.    FINDINGS:    Grade 2 MCL sprain.  Grade 2 fibulocollateral ligament sprain.  The root attachment the menisci are normal.    The medial and lateral menisci are intact.  Posterior horn meniscal myxoid degeneration.    Marked thickening and high intrasubstance signal throughout the ACL consistent with a partial thickness tear.  Mild thickening and high intrasubstance signal in the PCL consistent with a partial thickness tear.    No full-thickness chondral defects.    The quadriceps and patellar tendons are normal.   Large joint effusion.  Large popliteal cyst.  Marked.  Articular soft tissue and muscular edema.                                         The Emergency Provider reviewed the vital signs and test results, which are outlined above.     ED  Discussion     8:00 PM: Dr. Lentz transfers care of patient to Dr. Monson pending lab and imaging results.     11:15 PM: Reassessed pt at this time. Discussed with pt all pertinent ED information and results. Discussed pt dx and plan of tx. Gave pt all f/u and return to the ED instructions. All questions and concerns were addressed at this time. Pt expresses understanding of information and instructions, and is comfortable with plan to discharge. Pt is stable for discharge.    I discussed with patient and/or family/caretaker that evaluation in the ED does not suggest any emergent or life threatening medical conditions requiring immediate intervention beyond what was provided in the ED, and I believe patient is safe for discharge.  Regardless, an unremarkable evaluation in the ED does not preclude the development or presence of a serious of life threatening condition. As such, patient was instructed to return immediately for any worsening or change in current symptoms.         Medical Decision Making  64 yo male c/o left leg swelling for the past week. Has been seen in ED, by cardiology and orthopedics.  He has had 2 LE u/s which were negative for DVT.  He denies injury.  He says the leg was painful yesterday but less so today.  He has diffuse, tight swelling of left leg from distal thigh down to the foot.  He is developing small blisters to anterior leg.  Left knee is tender to palpation with ROM limited by pain and swelling.  NVT.  MRI left knee, tibia/fibula    Amount and/or Complexity of Data Reviewed  Independent Historian:      Details: Daughter at bedside  External Data Reviewed: radiology.  Radiology: ordered and independent interpretation performed. Decision-making details documented in ED Course.    Risk  OTC drugs.  Prescription drug management.                ED Medication(s):  Medications - No data to display    Discharge Medication List as of 3/7/2025 11:12 PM           Follow-up Information       Your  orthopedist In 3 days.               [facility] - Emergency Dept..    Specialty: Emergency Medicine  Why: As needed, If symptoms worsen  Contact information:  [id number] [facility] [address] [phone]                               Scribe Attestation:   Scribe #1: I performed the above scribed service and the documentation accurately describes the services I performed. I attest to the accuracy of the note.     Attending:   Physician Attestation Statement for Scribe #1: I, Esperanza Lentz MD, personally performed the services described in this documentation, as scribed by Ryan Macedo, in my presence, and it is both accurate and complete.       Scribe Attestation:   Scribe #2: I performed the above scribed service and the documentation accurately describes the services I performed. I attest to the accuracy of the note.    Attending Attestation:           Physician Attestation for Scribe:    Physician Attestation Statement for Scribe #2: I, Ame Monson MD, reviewed documentation, as scribed by Onesimo Arrieta in my presence, and it is both accurate and complete. I also acknowledge and confirm the content of the note done by Scribe #1.           Clinical Impression       ICD-10-CM ICD-9-CM   1. Leg swelling  M79.89 729.81   2. Rupture of left gastrocnemius tendon, initial encounter  S86.112A 844.8   3. Rupture of posterior cruciate ligament of left knee, initial encounter  S83.522A 844.2   4. Rupture of anterior cruciate ligament of left knee, initial encounter  S83.512A 844.2   5. Sprain of medial collateral ligament of left knee, initial encounter  S83.412A 844.1       Disposition:   Disposition: Discharged  Condition: Stable        Ame Monson MD  03/09/25 0135

## 2025-03-07 NOTE — PROGRESS NOTES
Subjective:       Patient ID: Cedric Santoro is a 65 y.o. male.    Chief Complaint: Leg Swelling (Started on sunday)    HPI    Neg US LLE tues in ER  Swelling worse now extending above knee.  Leg pain due to tightness  No abd pain/cp, chronic sob-baseline per pt  Has been elevating    Past Medical History:   Diagnosis Date    Abnormal ECG 10/04/2022    Chronic diastolic heart failure 06/13/2024    Coronary artery disease of native artery of native heart with stable angina pectoris 09/06/2023    Essential hypertension 02/24/2020    First degree AV block 06/13/2024    Hypertension     MVP (mitral valve prolapse) 10/26/2022    Nonrheumatic mitral valve regurgitation 10/26/2022    Palpitation 03/05/2020    Paroxysmal atrial fibrillation 03/05/2020    Paroxysmal atrial flutter 10/04/2022    Raynaud's syndrome     Vasovagal syncopes      Past Surgical History:   Procedure Laterality Date    ABLATION, ATRIAL FLUTTER, TYPICAL N/A 12/15/2022    Procedure: Ablation, Atrial Flutter, Typical;  Surgeon: Matty Hendricks MD;  Location: [facility] EP LAB;  Service: Cardiology;  Laterality: N/A;  AFL, RFA, NICOLASA, ARIELA, anes, MB, 3 Prep    CARPAL TUNNEL RELEASE Right 04/02/2024    [facility]    CHOLECYSTECTOMY      ECHOCARDIOGRAM,TRANSESOPHAGEAL N/A 12/15/2022    Procedure: Transesophageal echo (ARIELA) intra-procedure log documentation;  Surgeon: Nisreen Fenton MD;  Location: [facility] EP LAB;  Service: Cardiology;  Laterality: N/A;    EYE SURGERY      GALLBLADDER SURGERY      HERNIA REPAIR      LEFT HEART CATHETERIZATION Left 05/24/2023    Procedure: Left heart cath;  Surgeon: Kal Garzon MD;  Location: [facility] CATH LAB;  Service: Cardiology;  Laterality: Left;  may need to sign consent//    OPEN REDUCTION AND INTERNAL FIXATION (ORIF) OF FRACTURE OF METATARSAL BONE Left 11/06/2023    Procedure: ORIF, FRACTURE, METATARSAL BONE;  Surgeon: Aleksandr Cheek DPM;  Location: [facility] OR;  Service: Podiatry;  Laterality: Left;     THROAT SURGERY      TRANSESOPHAGEAL ECHOCARDIOGRAPHY N/A 02/27/2023    Procedure: ECHOCARDIOGRAM, TRANSESOPHAGEAL;  Surgeon: Samuel Del Valle MD;  Location: [facility] CATH LAB;  Service: Cardiology;  Laterality: N/A;    TREATMENT OF CARDIAC ARRHYTHMIA  12/15/2022    Procedure: Cardioversion or Defibrillation;  Surgeon: Matty Hendricks MD;  Location: [facility] EP LAB;  Service: Cardiology;;     Social History[1]  Review of patient's allergies indicates:   Allergen Reactions    Iodinated contrast media     Iodine and iodide containing products Hives     Current Outpatient Medications   Medication Sig    traMADoL (ULTRAM) 50 mg tablet Take by mouth.    albuterol (PROVENTIL/VENTOLIN HFA) 90 mcg/actuation inhaler Inhale 2 puffs into the lungs every 4 (four) hours as needed.    apixaban (ELIQUIS) 5 mg Tab TAKE 1 TABLET BY MOUTH TWICE A DAY    atenoloL (TENORMIN) 25 MG tablet TAKE 1 TABLET BY MOUTH EVERY DAY    b complex vitamins tablet Take 1 tablet by mouth once daily.    baclofen (LIORESAL) 20 MG tablet Take 1 tablet (20 mg total) by mouth 2 (two) times daily.    co-enzyme Q-10 30 mg capsule Take 30 mg by mouth once daily.    DULoxetine (CYMBALTA) 60 MG capsule Take 1 capsule (60 mg total) by mouth once daily.    fluticasone-umeclidin-vilanter (TRELEGY ELLIPTA) 200-62.5-25 mcg inhaler Inhale 1 puff into the lungs once daily.    furosemide (LASIX) 40 MG tablet TAKE 1 TABLET BY MOUTH EVERY DAY    inhalation spacing device (BREATHERITE MDI SPACER) Use as directed for inhalation.    levothyroxine (SYNTHROID) 50 MCG tablet TAKE 1 TABLET BY MOUTH ONCE A DAY ON MONDAY, TUESDAY, WEDNESDAY, THURSDAY AND FRIDAY    losartan (COZAAR) 50 MG tablet Take 1 tablet (50 mg total) by mouth once daily.    magnesium 30 mg Tab Take 1 tablet by mouth once daily.    multivitamin (THERAGRAN) per tablet Take 1 tablet by mouth once daily.    rosuvastatin (CRESTOR) 20 MG tablet Take 1 tablet (20 mg total) by mouth every evening.    tamsulosin (FLOMAX) 0.4 mg  Cap Take 1 capsule (0.4 mg total) by mouth once daily.    traZODone (DESYREL) 50 MG tablet Take 1 tablet (50 mg total) by mouth every evening.    vitamin D (VITAMIN D3) 1000 units Tab Take 1,000 Units by mouth once daily.     No current facility-administered medications for this visit.           Review of Systems   Constitutional:  Negative for activity change, appetite change, chills, diaphoresis, fatigue, fever and unexpected weight change.   HENT:  Negative for congestion, ear pain, postnasal drip, rhinorrhea, sinus pressure, sinus pain, sneezing, sore throat, tinnitus, trouble swallowing and voice change.    Eyes:  Negative for photophobia, pain and visual disturbance.   Respiratory:  Negative for cough, chest tightness, shortness of breath and wheezing.    Cardiovascular:  Positive for leg swelling. Negative for chest pain and palpitations.   Gastrointestinal:  Negative for abdominal distention, abdominal pain, constipation, diarrhea, nausea and vomiting.   Genitourinary:  Negative for decreased urine volume, difficulty urinating, dysuria, flank pain, frequency, hematuria and urgency.   Musculoskeletal:  Positive for myalgias. Negative for arthralgias, back pain, joint swelling, neck pain and neck stiffness.   Allergic/Immunologic: Negative for immunocompromised state.   Neurological:  Negative for dizziness, tremors, seizures, syncope, facial asymmetry, speech difficulty, weakness, light-headedness, numbness and headaches.   Hematological:  Negative for adenopathy. Does not bruise/bleed easily.   Psychiatric/Behavioral:  Negative for confusion and sleep disturbance.        Objective:      Physical Exam  Vitals reviewed.   Cardiovascular:      Rate and Rhythm: Normal rate and regular rhythm.      Heart sounds: Normal heart sounds.   Pulmonary:      Effort: Pulmonary effort is normal.      Breath sounds: Normal breath sounds.   Musculoskeletal:      Comments: BLE , but L substantially larger than R   Skin:      General: Skin is warm and dry.   Neurological:      Mental Status: He is alert and oriented to person, place, and time.         Assessment:     Vitals:    03/06/25 1525   BP: 120/72   Pulse: 66   Temp: 97.7 °F (36.5 °C)         1. Chronic diastolic heart failure    2. Pain and swelling of left lower leg    3. Essential hypertension    4. Congenital hypothyroidism without goiter        Plan:   Chronic diastolic heart failure  -     EKG 12-lead; Future; Expected date: 03/06/2025    Pain and swelling of left lower leg  -     BNP; Future; Expected date: 03/06/2025  -     US Lower Extremity Veins Left; Future; Expected date: 03/06/2025  -     X-Ray Chest PA And Lateral; Future; Expected date: 03/06/2025  -     D-Dimer, Quantitative; Future; Expected date: 03/06/2025  -     CBC Auto Differential; Future; Expected date: 03/06/2025  -     TSH; Future; Expected date: 03/06/2025    Essential hypertension  -     EKG 12-lead; Future; Expected date: 03/06/2025    Congenital hypothyroidism without goiter  -     TSH; Future; Expected date: 03/06/2025      Labs, EKG   Repeat US         [1]   Social History  Socioeconomic History    Marital status:    Occupational History    Occupation: Retired    Tobacco Use    Smoking status: Never    Smokeless tobacco: Never   Substance and Sexual Activity    Alcohol use: No    Drug use: No    Sexual activity: Not Currently     Partners: Female     Birth control/protection: None     Social Drivers of Health     Financial Resource Strain: Low Risk  (2/11/2024)    Overall Financial Resource Strain (CARDIA)     Difficulty of Paying Living Expenses: Not hard at all   Food Insecurity: No Food Insecurity (2/11/2024)    Hunger Vital Sign     Worried About Running Out of Food in the Last Year: Never true     Ran Out of Food in the Last Year: Never true   Transportation Needs: No Transportation Needs (2/11/2024)    PRAPARE - Transportation     Lack of Transportation (Medical): No      Lack of Transportation (Non-Medical): No   Physical Activity: Inactive (2/11/2024)    Exercise Vital Sign     Days of Exercise per Week: 0 days     Minutes of Exercise per Session: 30 min   Stress: Stress Concern Present (2/11/2024)    [employer] [place] [employer] - Occupational Stress Questionnaire     Feeling of Stress : To some extent   Housing Stability: Low Risk  (2/11/2024)    Housing Stability Vital Sign     Unable to Pay for Housing in the Last Year: No     Number of Places Lived in the Last Year: 1     Unstable Housing in the Last Year: No

## 2025-03-07 NOTE — PROGRESS NOTES
Subjective:   Patient ID:  Cedric Santoro is a 65 y.o. male who presents for cardiac consult of Shortness of Breath and Leg Swelling (Left leg swollen)      Referral by: No referring provider defined for this encounter.     Reason for consult:       HPI  The patient came in today for cardiac consult of Shortness of Breath and Leg Swelling (Left leg swollen)    3/7/25  Cedric Santoro is a 65 y.o. male pt with CAD,HFPEF, valvular heart disease, Afib/Flutter, obesity, edema presents for CV follow up.         Pt had recent ER eval for leg edema  - had baker's cyst diagnosed.   ECHO 3/2025 with normal bi V function, grade 2 DD, mild to mod MR, mild TR, neg bubble study.     BP low and HR stable. BMI 30 -   He went to Ortho for baker's cyst eval but told this is worse.   NEW ORDERS: MRI of the left knee and left tib-fib without contrast to rule out internal derangement versus occult fracture  -Prescription Drug Management: Ultram 50 mg every 6 as needed as needed for pain  -Occupational therapy 2-3x/week lymphedema treatment at the [facility]  -Referral: CVT for vascular workup     Will need to go to ER for further urgent tx       Results for orders placed during the hospital encounter of 03/06/25    Echo Saline Bubble? Yes    Interpretation Summary    Left Ventricle: The left ventricle is normal in size. Mildly increased wall thickness. There is mild concentric hypertrophy. There is normal systolic function with a visually estimated ejection fraction of 60 - 65%. Grade II diastolic dysfunction.    Right Ventricle: The right ventricle has mild enlargement. Wall thickness is normal. Systolic function is borderline low.    Left Atrium: severely dilated Agitated saline study of the atrial septum is negative after vasalva maneuver, suggesting absence of intracardiac shunt at the atrial level.    Right Atrium: Right atrium is moderately dilated.    Mitral Valve: Mildly thickened leaflets. Mildly  calcified leaflets. There is mild to moderate regurgitation.    Tricuspid Valve: There is mild regurgitation.    Pulmonic Valve: There is mild regurgitation.    IVC/SVC: Normal venous pressure at 3 mmHg.    There was agitated saline (bubble) used. Study is negative for shunt.      Results for orders placed during the hospital encounter of 10/28/24    Exercise Stress - EKG    Interpretation Summary    The ECG portion of the study is positive for ischemia.    The patient reported no chest pain during the stress test.    The blood pressure response to stress was normal.    The patient exercised for 9 minutes 7 seconds on a Bobby protocol, corresponding to a functional capacity of 10METS, achieving a peak heart rate of 131 bpm, which is 84% of the age predicted maximum heart rate.      Results for orders placed during the hospital encounter of 05/24/23    Cardiac catheterization    Conclusion    Nonobstructive CAD.    The ejection fraction was calculated to be 60%.    The procedure log was documented by Documenter: Cullen Taylor RN and verified by Kal Garzon MD.    Date: 5/24/2023  Time: 8:37 AM      No cardiac monitor results found for the past 12 months         Past Medical History:   Diagnosis Date    Abnormal ECG 10/04/2022    Chronic diastolic heart failure 06/13/2024    Coronary artery disease of native artery of native heart with stable angina pectoris 09/06/2023    Essential hypertension 02/24/2020    First degree AV block 06/13/2024    Hypertension     MVP (mitral valve prolapse) 10/26/2022    Nonrheumatic mitral valve regurgitation 10/26/2022    Palpitation 03/05/2020    Paroxysmal atrial fibrillation 03/05/2020    Paroxysmal atrial flutter 10/04/2022    Raynaud's syndrome     Vasovagal syncopes        Past Surgical History:   Procedure Laterality Date    ABLATION, ATRIAL FLUTTER, TYPICAL N/A 12/15/2022    Procedure: Ablation, Atrial Flutter, Typical;  Surgeon: Matty Hendricks MD;  Location: [facility] EP LAB;   Service: Cardiology;  Laterality: N/A;  AFL, RFA, NICOLASA, ARIELA, anes, MB, 3 Prep    CARPAL TUNNEL RELEASE Right 04/02/2024    [facility]    CHOLECYSTECTOMY      ECHOCARDIOGRAM,TRANSESOPHAGEAL N/A 12/15/2022    Procedure: Transesophageal echo (ARIELA) intra-procedure log documentation;  Surgeon: Nisreen Fenton MD;  Location: [facility] EP LAB;  Service: Cardiology;  Laterality: N/A;    EYE SURGERY      GALLBLADDER SURGERY      HERNIA REPAIR      LEFT HEART CATHETERIZATION Left 05/24/2023    Procedure: Left heart cath;  Surgeon: Kal Garzon MD;  Location: [facility] CATH LAB;  Service: Cardiology;  Laterality: Left;  may need to sign consent//    OPEN REDUCTION AND INTERNAL FIXATION (ORIF) OF FRACTURE OF METATARSAL BONE Left 11/06/2023    Procedure: ORIF, FRACTURE, METATARSAL BONE;  Surgeon: Aleksandr Cheek DPM;  Location: [facility] OR;  Service: Podiatry;  Laterality: Left;    THROAT SURGERY      TRANSESOPHAGEAL ECHOCARDIOGRAPHY N/A 02/27/2023    Procedure: ECHOCARDIOGRAM, TRANSESOPHAGEAL;  Surgeon: Samuel Del Valle MD;  Location: [facility] CATH LAB;  Service: Cardiology;  Laterality: N/A;    TREATMENT OF CARDIAC ARRHYTHMIA  12/15/2022    Procedure: Cardioversion or Defibrillation;  Surgeon: Matty Hendircks MD;  Location: [facility] EP LAB;  Service: Cardiology;;       Social History[1]    Family History   Problem Relation Name Age of Onset    Cancer Mother          Multiple myeloma    Pacemaker/defibrilator Father Reyes     Macular degeneration Father Reyes     COPD Father Reyes     Hearing loss Father Reyes     Macular degeneration Brother         Patient's Medications   New Prescriptions    No medications on file   Previous Medications    ALBUTEROL (PROVENTIL/VENTOLIN HFA) 90 MCG/ACTUATION INHALER    Inhale 2 puffs into the lungs every 4 (four) hours as needed.    APIXABAN (ELIQUIS) 5 MG TAB    TAKE 1 TABLET BY MOUTH TWICE A DAY    ATENOLOL (TENORMIN) 25 MG TABLET    TAKE 1 TABLET BY MOUTH EVERY DAY    B COMPLEX VITAMINS  TABLET    Take 1 tablet by mouth once daily.    BACLOFEN (LIORESAL) 20 MG TABLET    Take 1 tablet (20 mg total) by mouth 2 (two) times daily.    CO-ENZYME Q-10 30 MG CAPSULE    Take 30 mg by mouth once daily.    DULOXETINE (CYMBALTA) 60 MG CAPSULE    Take 1 capsule (60 mg total) by mouth once daily.    FLUTICASONE-UMECLIDIN-VILANTER (TRELEGY ELLIPTA) 200-62.5-25 MCG INHALER    Inhale 1 puff into the lungs once daily.    FUROSEMIDE (LASIX) 40 MG TABLET    TAKE 1 TABLET BY MOUTH EVERY DAY    INHALATION SPACING DEVICE (BREATHERITE MDI SPACER)    Use as directed for inhalation.    LEVOTHYROXINE (SYNTHROID) 50 MCG TABLET    TAKE 1 TABLET BY MOUTH ONCE A DAY ON MONDAY, TUESDAY, WEDNESDAY, THURSDAY AND FRIDAY    LOSARTAN (COZAAR) 50 MG TABLET    Take 1 tablet (50 mg total) by mouth once daily.    MAGNESIUM 30 MG TAB    Take 1 tablet by mouth once daily.    MULTIVITAMIN (THERAGRAN) PER TABLET    Take 1 tablet by mouth once daily.    ROSUVASTATIN (CRESTOR) 20 MG TABLET    Take 1 tablet (20 mg total) by mouth every evening.    TAMSULOSIN (FLOMAX) 0.4 MG CAP    Take 1 capsule (0.4 mg total) by mouth once daily.    TRAMADOL (ULTRAM) 50 MG TABLET    Take by mouth.    TRAZODONE (DESYREL) 50 MG TABLET    Take 1 tablet (50 mg total) by mouth every evening.    VITAMIN D (VITAMIN D3) 1000 UNITS TAB    Take 1,000 Units by mouth once daily.   Modified Medications    No medications on file   Discontinued Medications    No medications on file       Review of Systems   Constitutional:  Positive for malaise/fatigue.   HENT: Negative.     Eyes: Negative.    Respiratory:  Positive for shortness of breath.    Cardiovascular:  Positive for leg swelling.   Gastrointestinal: Negative.    Genitourinary: Negative.    Musculoskeletal:  Positive for back pain and joint pain.   Skin: Negative.    Neurological: Negative.    Endo/Heme/Allergies: Negative.    Psychiatric/Behavioral: Negative.     All 12 systems otherwise negative.      Wt Readings from  "Last 3 Encounters:   03/07/25 97.4 kg (214 lb 11.7 oz)   03/06/25 97.5 kg (215 lb)   03/06/25 97.4 kg (214 lb 11.7 oz)     Temp Readings from Last 3 Encounters:   03/06/25 97.7 °F (36.5 °C) (Tympanic)   05/29/24 96.9 °F (36.1 °C) (Oral)   04/12/24 97.5 °F (36.4 °C) (Oral)     BP Readings from Last 3 Encounters:   03/07/25 98/60   03/06/25 120/72   03/06/25 120/72     Pulse Readings from Last 3 Encounters:   03/07/25 (!) 53   03/06/25 66   02/25/25 60       BP 98/60 (BP Location: Left arm, Patient Position: Sitting)   Pulse (!) 53   Ht 5' 10" (1.778 m)   Wt 97.4 kg (214 lb 11.7 oz)   SpO2 99%   BMI 30.81 kg/m²     Objective:   Physical Exam  Vitals and nursing note reviewed.   Constitutional:       General: He is not in acute distress.     Appearance: He is well-developed. He is obese. He is not diaphoretic.   HENT:      Head: Normocephalic and atraumatic.      Nose: Nose normal.   Eyes:      General: No scleral icterus.     Conjunctiva/sclera: Conjunctivae normal.   Neck:      Thyroid: No thyromegaly.      Vascular: No JVD.   Cardiovascular:      Rate and Rhythm: Normal rate and regular rhythm.      Heart sounds: S1 normal and S2 normal. Murmur heard.      No friction rub. No gallop. No S3 or S4 sounds.   Pulmonary:      Effort: Pulmonary effort is normal. No respiratory distress.      Breath sounds: Normal breath sounds. No stridor. No wheezing or rales.   Chest:      Chest wall: No tenderness.   Abdominal:      General: Bowel sounds are normal. There is no distension.      Palpations: Abdomen is soft. There is no mass.      Tenderness: There is no abdominal tenderness. There is no rebound.   Genitourinary:     Comments: Deferred  Musculoskeletal:         General: No tenderness or deformity. Normal range of motion.      Cervical back: Normal range of motion and neck supple.      Right lower leg: Edema present.      Left lower leg: Edema present.   Lymphadenopathy:      Cervical: No cervical adenopathy. "   Skin:     General: Skin is warm and dry.      Coloration: Skin is not pale.      Findings: Erythema and rash present.   Neurological:      Mental Status: He is alert and oriented to person, place, and time.      Motor: No abnormal muscle tone.      Coordination: Coordination normal.   Psychiatric:         Behavior: Behavior normal.         Thought Content: Thought content normal.         Judgment: Judgment normal.         Lab Results   Component Value Date     02/18/2025    K 4.2 02/18/2025     02/18/2025    CO2 28 02/18/2025    BUN 14 02/18/2025    CREATININE 1.1 02/18/2025     02/18/2025    HGBA1C 5.7 (H) 05/29/2024    MG 2.0 12/25/2023    AST 64 (H) 02/18/2025    ALT 40 02/18/2025    ALBUMIN 4.1 02/18/2025    PROT 7.1 02/18/2025    BILITOT 0.8 02/18/2025    WBC 8.08 03/06/2025    HGB 15.5 03/06/2025    HCT 46.2 03/06/2025    MCV 95 03/06/2025     03/06/2025    INR 1.0 12/25/2023    TSH 3.005 02/18/2025    CHOL 148 02/18/2025    HDL 68 02/18/2025    LDLCALC 69.4 02/18/2025    TRIG 53 02/18/2025    BNP 13 10/24/2024         BNP (pg/mL)   Date Value   10/24/2024 13   07/08/2024 17   12/25/2023 27   11/13/2023 17   02/09/2023 36     INR (no units)   Date Value   12/25/2023 1.0   06/19/2023 1.0   05/18/2023 1.0   02/27/2023 1.0          Assessment:      1. Paroxysmal atrial fibrillation    2. S/P ablation of atrial flutter    3. Chronic diastolic heart failure    4. Essential hypertension    5. Nonrheumatic mitral valve regurgitation    6. PAC (premature atrial contraction)    7. Severe obstructive sleep apnea    8. MVP (mitral valve prolapse)    9. Coronary artery disease of native artery of native heart with stable angina pectoris    10. Chronic anticoagulation    11. First degree AV block    12. Palpitation    13. Paroxysmal atrial flutter        Plan:       HFPEF, acute on chronic  - has gained > 10 lbs  - will need to adjust diuretics     2. HTN  - BP low today    3. CAD  - cont  tx    4. Afib /flutter s/p ablation  - cont BB and Eliquis    5. HLD  - cont statin     6. Edema  - refer to ER again - see plan below     I am sending this pt to ED - has worsening LE edema - ? compartment syndrome vs complex cyst vs cellulitis - rec ortho/vasc if needed - he saw ortho yest ordered an MRI - we may need to get that today. would diuresise him too if BP improves and hold eliquis and start heparin drip.     Thank you for allowing me to participate in this patient's care. Please do not hesitate to contact me with any questions or concerns. Consult note has been forwarded to the referral physician.            [1]   Social History  Tobacco Use    Smoking status: Never    Smokeless tobacco: Never   Substance Use Topics    Alcohol use: No    Drug use: No

## 2025-03-11 DIAGNOSIS — I25.118 CORONARY ARTERY DISEASE OF NATIVE ARTERY OF NATIVE HEART WITH STABLE ANGINA PECTORIS: ICD-10-CM

## 2025-03-11 RX ORDER — ROSUVASTATIN CALCIUM 20 MG/1
20 TABLET, COATED ORAL NIGHTLY
Qty: 90 TABLET | Refills: 3 | Status: SHIPPED | OUTPATIENT
Start: 2025-03-11

## 2025-03-13 ENCOUNTER — OFFICE VISIT (OUTPATIENT)
Dept: CARDIOLOGY | Facility: CLINIC | Age: 65
End: 2025-03-13
Payer: MEDICARE

## 2025-03-13 VITALS
HEART RATE: 59 BPM | BODY MASS INDEX: 30.96 KG/M2 | OXYGEN SATURATION: 97 % | SYSTOLIC BLOOD PRESSURE: 126 MMHG | HEIGHT: 70 IN | DIASTOLIC BLOOD PRESSURE: 72 MMHG | WEIGHT: 216.25 LBS

## 2025-03-13 DIAGNOSIS — I10 ESSENTIAL HYPERTENSION: ICD-10-CM

## 2025-03-13 DIAGNOSIS — I34.0 NONRHEUMATIC MITRAL VALVE REGURGITATION: ICD-10-CM

## 2025-03-13 DIAGNOSIS — Z98.890 S/P ABLATION OF ATRIAL FLUTTER: ICD-10-CM

## 2025-03-13 DIAGNOSIS — I50.32 CHRONIC DIASTOLIC HEART FAILURE: Primary | ICD-10-CM

## 2025-03-13 DIAGNOSIS — I25.118 CORONARY ARTERY DISEASE OF NATIVE ARTERY OF NATIVE HEART WITH STABLE ANGINA PECTORIS: ICD-10-CM

## 2025-03-13 DIAGNOSIS — I34.1 MVP (MITRAL VALVE PROLAPSE): ICD-10-CM

## 2025-03-13 DIAGNOSIS — R94.31 ABNORMAL ECG: ICD-10-CM

## 2025-03-13 DIAGNOSIS — G47.33 SEVERE OBSTRUCTIVE SLEEP APNEA: ICD-10-CM

## 2025-03-13 DIAGNOSIS — Z79.01 CHRONIC ANTICOAGULATION: ICD-10-CM

## 2025-03-13 DIAGNOSIS — I44.0 FIRST DEGREE AV BLOCK: ICD-10-CM

## 2025-03-13 DIAGNOSIS — R93.1 ELEVATED CORONARY ARTERY CALCIUM SCORE: ICD-10-CM

## 2025-03-13 DIAGNOSIS — R00.2 PALPITATION: ICD-10-CM

## 2025-03-13 DIAGNOSIS — S80.12XA LEG HEMATOMA, LEFT, INITIAL ENCOUNTER: ICD-10-CM

## 2025-03-13 DIAGNOSIS — Z86.79 S/P ABLATION OF ATRIAL FLUTTER: ICD-10-CM

## 2025-03-13 DIAGNOSIS — I48.92 PAROXYSMAL ATRIAL FLUTTER: ICD-10-CM

## 2025-03-13 DIAGNOSIS — R06.09 DOE (DYSPNEA ON EXERTION): ICD-10-CM

## 2025-03-13 DIAGNOSIS — R60.0 EDEMA OF BOTH LEGS: ICD-10-CM

## 2025-03-13 DIAGNOSIS — I49.1 PAC (PREMATURE ATRIAL CONTRACTION): ICD-10-CM

## 2025-03-13 DIAGNOSIS — I48.0 PAROXYSMAL ATRIAL FIBRILLATION: ICD-10-CM

## 2025-03-13 PROCEDURE — 99215 OFFICE O/P EST HI 40 MIN: CPT | Mod: S$GLB,,, | Performed by: INTERNAL MEDICINE

## 2025-03-13 PROCEDURE — 3074F SYST BP LT 130 MM HG: CPT | Mod: CPTII,S$GLB,, | Performed by: INTERNAL MEDICINE

## 2025-03-13 PROCEDURE — 3008F BODY MASS INDEX DOCD: CPT | Mod: CPTII,S$GLB,, | Performed by: INTERNAL MEDICINE

## 2025-03-13 PROCEDURE — 3078F DIAST BP <80 MM HG: CPT | Mod: CPTII,S$GLB,, | Performed by: INTERNAL MEDICINE

## 2025-03-13 PROCEDURE — 4010F ACE/ARB THERAPY RXD/TAKEN: CPT | Mod: CPTII,S$GLB,, | Performed by: INTERNAL MEDICINE

## 2025-03-13 PROCEDURE — 99999 PR PBB SHADOW E&M-EST. PATIENT-LVL III: CPT | Mod: PBBFAC,,, | Performed by: INTERNAL MEDICINE

## 2025-03-13 NOTE — PROGRESS NOTES
Subjective:    Patient ID:  Cedric Santoro is a 65 y.o. male who presents for evaluation of Hypertension, Hyperlipidemia, Valvular Heart Disease, Coronary Artery Disease, and Congestive Heart Failure        HPI: Pt presents for eval.  His current med conditions include MVP, MR, HTN, JIA, LAE,1 av block, diastolic CHF, palpitations, PAF, PAFL.  Past hx pertinent for following:  - stress test 2020.  Reportedly had brief PAF w stress test, resolving on its own.  Echo 2020 normal LV function, mild MR.  Pt had ecg done 10/3/22 twice:  a flutter w rate 99 - 116 bpm.  New dx.  Lost his wife to cancer 7/22 then had dyspnea few months.  S/p ARIELA Oct 2022 for cardioversion.  Left atrial appendage thrombus noted so no cardioversion was performed.  ARIELA: Left atrial appendage thrombus noted, LVEF 55%, mild posterior leaflet MV prolapse, mild-mod MR.  S/p a flutter ablation w ANTONI Rice, Dec 2022.  Had low BP w procedure, and required inotropes.  ARIELA showed EF 35%, decline thought to be due to a flutter.  Echo Feb 2023: normal LVEF, LAE, mod-severe MR with MVP, JOSE, mild TR, PAP wnl.  ARIELA was advised again.  S/p ARIELA Feb 2023 w Dr. Del Valle: normal EF, mild MVP, mild-mod MR, mild TR.  Coronary calcium score May 2023:  597  S/p LHC May 2023: nonobstructive CAD noted, normal EF.  OMT advised.  B LE arterial u/s April 2023 no stenosis, normal FARIDA B LE.  B LE venous u/s April 2023 no DVT.  Ecg 12/25/23 NSR, 1 av block, incomplete RBBB, possible old septal infarct.  He saw ANTONI Rice, 9/23 and was taken off DOAC and Atenolol dose cut back.  On Vital Holter Jan 2024 had recurrent PAF.  Eliquis restarted.  Has seen ANTONI Rice, few times since then and has been maintaining sinus.  Ecg 5/29/24 sinus bell 54 bpm, marked 1 av block 338 ms.  Echo Jan 2024 normal EF, LAE, mod-severe MR, PAP 33 mmHg.  Echo Oct 2024 personally reviewed: Normal LV function, normal LV size, LAE, mod MR, mild TR, PAP 33 mmHg.  Now here.  Pt recently  developed severe left leg pain and leg edema.  Did not injure himself.  Seen in ER.  Dx with severe left gastrocnemius tear, partial ACL/PCL tear, bakers cyst, large joint effusion w hematoma.  Has seen Ortho for it -- conservative mgt advised.  Leg pain stable but bothersome.  Has significant leg edema up to thigh.  Walks now.  Has f/u appt w Dr. Obrien, Ortho, in 3 weeks.  Echo March 2025 normal EF, DD, LAE, negative bubble study, mild-mod MR, mild TR, mild DE.  Saw Dr. Allred, Pulmonary, who advised RHC due to following observation: Restrictive pattern on PFT and decreased DLCO out of proportion to his mild  CT scan interstitial findings.  Will check with Cardiology regarding proceeding with right heart catheterization.       Angina/CP controlled on current med tx.  Has LEBLANC with little activity, worsening since last visit.  No pnd/orthopnea.  No syncope.  No palpitations.  Compliant w meds.  BP has been low at times, held BP med and now better.  Labs reviewed.  BNP normal.  Lipids well controlled on statin tx.  Weight up.      Results for orders placed during the hospital encounter of 03/06/25    Echo Saline Bubble? Yes    Interpretation Summary    Left Ventricle: The left ventricle is normal in size. Mildly increased wall thickness. There is mild concentric hypertrophy. There is normal systolic function with a visually estimated ejection fraction of 60 - 65%. Grade II diastolic dysfunction.    Right Ventricle: The right ventricle has mild enlargement. Wall thickness is normal. Systolic function is borderline low.    Left Atrium: severely dilated Agitated saline study of the atrial septum is negative after vasalva maneuver, suggesting absence of intracardiac shunt at the atrial level.    Right Atrium: Right atrium is moderately dilated.    Mitral Valve: Mildly thickened leaflets. Mildly calcified leaflets. There is mild to moderate regurgitation.    Tricuspid Valve: There is mild regurgitation.    Pulmonic  Valve: There is mild regurgitation.    IVC/SVC: Normal venous pressure at 3 mmHg.    There was agitated saline (bubble) used. Study is negative for shunt.          Past Medical History:   Diagnosis Date    Abnormal ECG 10/04/2022    Chronic diastolic heart failure 06/13/2024    Coronary artery disease of native artery of native heart with stable angina pectoris 09/06/2023    Essential hypertension 02/24/2020    First degree AV block 06/13/2024    Hypertension     MVP (mitral valve prolapse) 10/26/2022    Nonrheumatic mitral valve regurgitation 10/26/2022    Palpitation 03/05/2020    Paroxysmal atrial fibrillation 03/05/2020    Paroxysmal atrial flutter 10/04/2022    Raynaud's syndrome     Vasovagal syncopes        Current Outpatient Medications:     albuterol (PROVENTIL/VENTOLIN HFA) 90 mcg/actuation inhaler, Inhale 2 puffs into the lungs every 4 (four) hours as needed., Disp: , Rfl:     apixaban (ELIQUIS) 5 mg Tab, TAKE 1 TABLET BY MOUTH TWICE A DAY, Disp: 60 tablet, Rfl: 12    atenoloL (TENORMIN) 25 MG tablet, TAKE 1 TABLET BY MOUTH EVERY DAY, Disp: 90 tablet, Rfl: 3    b complex vitamins tablet, Take 1 tablet by mouth once daily., Disp: , Rfl:     baclofen (LIORESAL) 20 MG tablet, Take 1 tablet (20 mg total) by mouth 2 (two) times daily., Disp: 180 tablet, Rfl: 1    co-enzyme Q-10 30 mg capsule, Take 30 mg by mouth once daily., Disp: , Rfl:     DULoxetine (CYMBALTA) 60 MG capsule, Take 1 capsule (60 mg total) by mouth once daily., Disp: 90 capsule, Rfl: 3    fluticasone-umeclidin-vilanter (TRELEGY ELLIPTA) 200-62.5-25 mcg inhaler, Inhale 1 puff into the lungs once daily., Disp: 180 each, Rfl: 3    furosemide (LASIX) 40 MG tablet, TAKE 1 TABLET BY MOUTH EVERY DAY, Disp: 90 tablet, Rfl: 5    inhalation spacing device (BREATHERITE MDI SPACER), Use as directed for inhalation., Disp: 1 each, Rfl: 0    levothyroxine (SYNTHROID) 50 MCG tablet, TAKE 1 TABLET BY MOUTH ONCE A DAY ON MONDAY, TUESDAY, WEDNESDAY, THURSDAY AND  "FRIDAY, Disp: 66 tablet, Rfl: 3    losartan (COZAAR) 50 MG tablet, Take 1 tablet (50 mg total) by mouth once daily., Disp: 90 tablet, Rfl: 3    magnesium 30 mg Tab, Take 1 tablet by mouth once daily., Disp: , Rfl:     multivitamin (THERAGRAN) per tablet, Take 1 tablet by mouth once daily., Disp: , Rfl:     rosuvastatin (CRESTOR) 20 MG tablet, TAKE 1 TABLET BY MOUTH EVERY DAY IN THE EVENING, Disp: 90 tablet, Rfl: 3    tamsulosin (FLOMAX) 0.4 mg Cap, Take 1 capsule (0.4 mg total) by mouth once daily., Disp: 90 capsule, Rfl: 3    traMADoL (ULTRAM) 50 mg tablet, Take by mouth., Disp: , Rfl:     traZODone (DESYREL) 50 MG tablet, Take 1 tablet (50 mg total) by mouth every evening., Disp: 30 tablet, Rfl: 11    vitamin D (VITAMIN D3) 1000 units Tab, Take 1,000 Units by mouth once daily., Disp: , Rfl:       Review of Systems   Constitutional: Positive for weight gain.   HENT: Negative.     Eyes: Negative.    Cardiovascular:  Positive for dyspnea on exertion and leg swelling.   Respiratory:  Positive for shortness of breath.    Endocrine: Negative.    Hematologic/Lymphatic: Negative.    Skin:  Positive for color change.   Musculoskeletal:  Positive for arthritis, back pain, joint pain, neck pain and stiffness.   Gastrointestinal: Negative.    Genitourinary: Negative.    Neurological: Negative.    Psychiatric/Behavioral: Negative.     Allergic/Immunologic: Negative.           /72 (Patient Position: Sitting)   Pulse (!) 59   Ht 5' 10" (1.778 m)   Wt 98.1 kg (216 lb 4.3 oz)   SpO2 97%   BMI 31.03 kg/m²     Wt Readings from Last 3 Encounters:   03/13/25 98.1 kg (216 lb 4.3 oz)   03/07/25 96.8 kg (213 lb 6.5 oz)   03/07/25 97.4 kg (214 lb 11.7 oz)     Temp Readings from Last 3 Encounters:   03/07/25 98.3 °F (36.8 °C) (Oral)   03/06/25 97.7 °F (36.5 °C) (Tympanic)   05/29/24 96.9 °F (36.1 °C) (Oral)     BP Readings from Last 3 Encounters:   03/13/25 126/72   03/07/25 130/72   03/07/25 98/60     Pulse Readings from Last 3 " Encounters:   03/13/25 (!) 59   03/07/25 60   03/07/25 (!) 53       Objective:    Physical Exam  Vitals and nursing note reviewed.   Constitutional:       Appearance: He is well-developed.   HENT:      Head: Normocephalic.   Neck:      Thyroid: No thyromegaly.      Vascular: Normal carotid pulses. No carotid bruit or JVD.   Cardiovascular:      Rate and Rhythm: Normal rate and regular rhythm.      Pulses:           Radial pulses are 2+ on the right side and 2+ on the left side.      Heart sounds: S1 normal and S2 normal. Heart sounds not distant. No midsystolic click and no opening snap. Murmur heard.      High-pitched blowing holosystolic murmur is present with a grade of 2/6 at the apex.      No friction rub. No S3 or S4 sounds.   Pulmonary:      Effort: Pulmonary effort is normal.      Breath sounds: Normal breath sounds. No wheezing or rales.   Abdominal:      General: Bowel sounds are normal. There is no distension or abdominal bruit.      Palpations: Abdomen is soft.      Tenderness: There is no abdominal tenderness.   Musculoskeletal:         General: Swelling present.      Cervical back: Neck supple.      Right lower leg: Edema present.      Left lower leg: Edema present.   Skin:     General: Skin is warm.      Findings: Erythema present.   Neurological:      Mental Status: He is alert and oriented to person, place, and time.   Psychiatric:         Behavior: Behavior normal.       I have reviewed all pertinent labs and cardiac studies.      Component      Latest Ref Rng 3/6/2025   BNP      0 - 99 pg/mL 11        Component      Latest Ref Rng 7/8/2024   BNP      0 - 99 pg/mL 17            Chemistry        Component Value Date/Time     02/18/2025 0830    K 4.2 02/18/2025 0830     02/18/2025 0830    CO2 28 02/18/2025 0830    BUN 14 02/18/2025 0830    CREATININE 1.1 02/18/2025 0830     02/18/2025 0830        Component Value Date/Time    CALCIUM 9.8 02/18/2025 0830    ALKPHOS 65 02/18/2025 0830     AST 64 (H) 02/18/2025 0830    ALT 40 02/18/2025 0830    BILITOT 0.8 02/18/2025 0830    ESTGFRAFRICA >60.0 04/12/2022 1010    EGFRNONAA >60.0 04/12/2022 1010        Lab Results   Component Value Date    WBC 8.08 03/06/2025    HGB 15.5 03/06/2025    HCT 46.2 03/06/2025    MCV 95 03/06/2025     03/06/2025       Lab Results   Component Value Date    HGBA1C 5.7 (H) 05/29/2024     Lab Results   Component Value Date    CHOL 148 02/18/2025    CHOL 158 08/07/2024    CHOL 162 05/29/2024     Lab Results   Component Value Date    HDL 68 02/18/2025    HDL 66 08/07/2024    HDL 64 05/29/2024     Lab Results   Component Value Date    LDLCALC 69.4 02/18/2025    LDLCALC 79.6 08/07/2024    LDLCALC 86.2 05/29/2024     Lab Results   Component Value Date    TRIG 53 02/18/2025    TRIG 62 08/07/2024    TRIG 59 05/29/2024     Lab Results   Component Value Date    CHOLHDL 45.9 02/18/2025    CHOLHDL 41.8 08/07/2024    CHOLHDL 39.5 05/29/2024       Results for orders placed during the hospital encounter of 02/09/23    Echo    Interpretation Summary  · The left ventricle is normal in size with concentric remodeling and normal systolic function.  · Moderate left atrial enlargement.  · The estimated ejection fraction is 60%.  · Normal left ventricular diastolic function.  · Normal right ventricular size with normal right ventricular systolic function.  · Moderate right atrial enlargement.  · Moderate-to-severe mitral regurgitation.  · Mild tricuspid regurgitation.  · There is bileaflet mitral prolapse.  · Normal central venous pressure (3 mmHg).  · The estimated PA systolic pressure is 28 mmHg.             Results for orders placed during the hospital encounter of 01/18/24    Echo    Interpretation Summary    Left Ventricle: The left ventricle is normal in size. Normal wall thickness. There is eccentric hypertrophy. Normal wall motion. There is normal systolic function with a visually estimated ejection fraction of 60 - 65%. There is  normal diastolic function.    Right Ventricle: Normal right ventricular cavity size. Wall thickness is normal. Right ventricle wall motion  is normal. Systolic function is normal.    Left Atrium: Left atrium is mildly dilated.    Mitral Valve: Mildly thickened anterior leaflet. There is moderate to severe regurgitation with a centrally directed jet.    Pulmonary Artery: The estimated pulmonary artery systolic pressure is 33 mmHg.    IVC/SVC: Normal venous pressure at 3 mmHg.      Results for orders placed during the hospital encounter of 10/16/24    Echo    Interpretation Summary    Left Ventricle: The left ventricle is normal in size. Normal wall thickness. Normal wall motion. There is normal systolic function with a visually estimated ejection fraction of 60 - 65%. Ejection fraction is approximately 60%. There is normal diastolic function.    Right Ventricle: Normal right ventricular cavity size. Wall thickness is normal. Right ventricle wall motion  is normal. Systolic function is normal.    Left Atrium: Left atrium is moderately dilated.    Mitral Valve: There is moderate regurgitation.    Tricuspid Valve: There is mild regurgitation.    Pulmonary Artery: The estimated pulmonary artery systolic pressure is 33 mmHg.    IVC/SVC: Normal venous pressure at 3 mmHg.          Results for orders placed during the hospital encounter of 03/06/25    Echo Saline Bubble? Yes    Interpretation Summary    Left Ventricle: The left ventricle is normal in size. Mildly increased wall thickness. There is mild concentric hypertrophy. There is normal systolic function with a visually estimated ejection fraction of 60 - 65%. Grade II diastolic dysfunction.    Right Ventricle: The right ventricle has mild enlargement. Wall thickness is normal. Systolic function is borderline low.    Left Atrium: severely dilated Agitated saline study of the atrial septum is negative after vasalva maneuver, suggesting absence of intracardiac shunt at  the atrial level.    Right Atrium: Right atrium is moderately dilated.    Mitral Valve: Mildly thickened leaflets. Mildly calcified leaflets. There is mild to moderate regurgitation.    Tricuspid Valve: There is mild regurgitation.    Pulmonic Valve: There is mild regurgitation.    IVC/SVC: Normal venous pressure at 3 mmHg.    There was agitated saline (bubble) used. Study is negative for shunt.        Assessment:       1. Chronic diastolic heart failure    2. Leg hematoma, left, initial encounter    3. Abnormal ECG    4. Coronary artery disease of native artery of native heart with stable angina pectoris    5. Chronic anticoagulation    6. Elevated coronary artery calcium score    7. LEBLANC (dyspnea on exertion)    8. Edema of both legs    9. First degree AV block    10. Essential hypertension    11. Paroxysmal atrial flutter    12. Paroxysmal atrial fibrillation    13. PAC (premature atrial contraction)    14. Nonrheumatic mitral valve regurgitation    15. MVP (mitral valve prolapse)    16. Severe obstructive sleep apnea    17. S/P ablation of atrial flutter    18. Palpitation             Plan:             Severe L LE edema/pain from torn left calf muscle/ACL with joint effusion with hematoma.  Pt counseled on his condition.  Needs close f/u with Orthopedics -- will take time for swelling to improve and ecchymosis.  Take extra Lasix 40 mg bid x 3 days.  Also will proceed with RHC as advised by Pulmonary and also do repeat LHC to assess his CAD/LVEDP.  Risks/benefits of LHC/RHC and possible PCI discussed.  Pt agreeable to proceeding.  Will need to hold Eliquis x 2 days for cath.  Will let his leg heal further and proceed when he is ready.  LEBLANC with little activity/bothersome.  Continue Lasix 40 mg qd.  CAD: S/p LHC May 2023: nonobstructive disease noted.  OMT advised.  MVP/MR:  Discussed w pt in detail.  Likely will need eventual valve intervention/surgery; in meantime will continue to monitor closely.  PAF/PAF: S/p  ablation in 2022.  Continue Eliquis tx.   F/u w EP as directed.  Reviewed all tests and above medical conditions with patient in detail and formulated treatment plan.  Continue optimal medical treatment for cardiovascular conditions.  Cardiac low salt diet advised.  Daily exercise encouraged, with the goal 30 +  minutes aerobic exercise as tolerated.  Maintaining healthy weight and weight loss goals (if needed) were discussed in clinic.  HTN: Goal < 130/80.  Continue current HTN meds.  Lipids: continue Rosuvastatin 20 mg qhs.  Abnl ecg: Monitor.  Leg edema: stable. Elevate legs, compression stockings, Lasix and low salt diet.  LFTs: f/u w PCP/monitor.  CRI: Monitor.  JIA: Continue CPAP. F/u w Pulmonary as advised.    PHONE REVIEW FOR TIMING OF CATH.      F/u in 6 weeks.      I have reviewed all pertinent labs and cardiac studies independently.   Plans and recommendations have been formulated under my direct supervision. All questions answered and patient voiced understanding.     High complexity clinic visit addressing leg edema/hematoma, CAD/CHF conditions and formulating tx/mgt plan that includes proceeding with invasive cardiac testing involving RHC/LHC, possible PCI.    > 45 minutes spent on total patient encounter, more than 1/2 in direct patient contact.

## 2025-03-17 ENCOUNTER — OFFICE VISIT (OUTPATIENT)
Dept: INTERNAL MEDICINE | Facility: CLINIC | Age: 65
End: 2025-03-17
Payer: MEDICARE

## 2025-03-17 VITALS
OXYGEN SATURATION: 97 % | WEIGHT: 210 LBS | SYSTOLIC BLOOD PRESSURE: 122 MMHG | HEIGHT: 70 IN | DIASTOLIC BLOOD PRESSURE: 65 MMHG | BODY MASS INDEX: 30.06 KG/M2 | HEART RATE: 63 BPM

## 2025-03-17 DIAGNOSIS — I10 ESSENTIAL HYPERTENSION: ICD-10-CM

## 2025-03-17 DIAGNOSIS — K76.0 STEATOSIS OF LIVER: ICD-10-CM

## 2025-03-17 DIAGNOSIS — S86.112S GASTROCNEMIUS TEAR, LEFT, SEQUELA: Primary | ICD-10-CM

## 2025-03-17 PROCEDURE — 99999 PR PBB SHADOW E&M-EST. PATIENT-LVL IV: CPT | Mod: PBBFAC,,, | Performed by: PHYSICIAN ASSISTANT

## 2025-03-17 NOTE — PROGRESS NOTES
Subjective:       Patient ID: Cedric Santoro is a 65 y.o. male.    CHIEF COMPLAINT:  - Cedric presents for follow-up regarding a torn gastroc muscle and swelling in the left leg, previously evaluated in the emergency department.    HPI:  - Cedric reports having a torn gastroc muscle in the left leg, initially evaluated in the emergency department. He underwent two ultrasounds and an MRI in the ER, and was evaluated by orthopedics and cardiology. The orthopedic specialist recommended allowing the injury to heal naturally without surgical intervention or physical therapy at this time.  - Cedric had significant pain (8/10 on the pain scale) 2 days after the initial orthopedic visit. By the following morning, the pain had subsided, but the leg was notably swollen. Currently, the leg remains swollen but is not painful. He was prescribed pain medication but has not needed to use it.  - Cedric mentions having a 20-centimeter Baker's cyst, which was initially the reason for consulting the orthopedic specialist. The orthopedist informed him that the Baker's cyst was not causing the leg swelling. He has a follow-up visit scheduled with Dr. Obrien at [facility] on the 7th.  - Cedric also reports having sprained a few ligaments in the knee and had a large bruise associated with the injury.  - He denies current pain in the affected leg.    MEDICATIONS:  - Pain medicine, as needed (patient has not taken it)    MEDICAL HISTORY:  - Torn gastroc muscle: Left leg  - Sprained ligaments: Knee  - Baker's cyst: 20 cm, left leg  - Elevated liver enzymes: Stable    TEST RESULTS:  - Liver enzymes: stable, being monitored    IMAGING:  - Ultrasound: March 4th (approximately), performed twice in the emergency room  - MRI: March 4th (approximately), performed in the emergency room         Review of Systems   Constitutional:  Positive for activity change. Negative for unexpected weight change.   HENT:  Negative for hearing loss,  rhinorrhea and trouble swallowing.    Eyes:  Negative for discharge and visual disturbance.   Respiratory:  Negative for chest tightness and wheezing.    Cardiovascular:  Negative for chest pain and palpitations.   Gastrointestinal:  Negative for blood in stool, constipation, diarrhea and vomiting.   Endocrine: Negative for polydipsia and polyuria.   Genitourinary:  Negative for difficulty urinating, hematuria and urgency.   Musculoskeletal:  Positive for arthralgias and joint swelling. Negative for neck pain.   Neurological:  Negative for weakness and headaches.   Psychiatric/Behavioral:  Negative for confusion and dysphoric mood.        Objective:      Physical Exam  Vitals reviewed.   Constitutional:       General: He is not in acute distress.     Appearance: Normal appearance. He is well-developed and normal weight. He is not ill-appearing or toxic-appearing.   HENT:      Head: Normocephalic and atraumatic.   Eyes:      General: Lids are normal. No scleral icterus.     Extraocular Movements: Extraocular movements intact.      Conjunctiva/sclera: Conjunctivae normal.      Pupils: Pupils are equal, round, and reactive to light.   Cardiovascular:      Rate and Rhythm: Normal rate.      Heart sounds: No murmur heard.     No friction rub. No gallop.   Pulmonary:      Effort: Pulmonary effort is normal. No respiratory distress.      Breath sounds: No decreased breath sounds, wheezing, rhonchi or rales.   Musculoskeletal:         General: Swelling (left leg mild swelling, patient wearing compression socks) present. No tenderness, deformity or signs of injury. Normal range of motion.   Neurological:      General: No focal deficit present.      Mental Status: He is alert and oriented to person, place, and time. Mental status is at baseline.      Cranial Nerves: No cranial nerve deficit.      Gait: Gait normal.   Psychiatric:         Mood and Affect: Mood and affect normal.         Behavior: Behavior normal.          Thought Content: Thought content normal.         Judgment: Judgment normal.         Assessment:       1. Gastrocnemius tear, left, sequela    2. Essential hypertension    3. Steatosis of liver        Plan:   1. Gastrocnemius tear, left, sequela    2. Essential hypertension    3. Steatosis of liver        Assessment & Plan    IMPRESSION:  - Reviewed recent ED visit and MRI results for left leg swelling.  - Noted torn gastroc muscle and sprained ligaments in knee as primary issues.  - Acknowledged orthopedics' recommendation for conservative management without surgery or physical therapy at this time.  - Assessed Baker's cyst (20 cm) but deferred management to orthopedics.  - Evaluated liver enzymes, noting they are stable and will continue to monitor.    TEAR OF GASTROCNEMIUS MUSCLE, LEFT LEG:   Confirmed swelling in the patient's left leg upon physical exam.   Noted that the patient previously experienced pain at level 8 on the pain scale, but currently reports no pain.   Verified that ultrasounds and MRI were performed at the emergency room.   Acknowledged the orthopedic treatment plan.   Explained and recommended continuing RICE method (Rest, Ice, Compression, Elevation) for managing leg swelling.   Noted that pain medication was prescribed but not taken by the patient.   Confirmed ongoing swelling in the patient's left leg upon physical exam.   Explained and recommended continuing RICE method (Rest, Ice, Compression, Elevation) for managing leg swelling.   Verified that the patient is following the RICE protocol for swelling management.    BAKER'S CYST, LEFT KNEE:   Noted the presence of a 20 cm Baker's cyst.   Confirmed that orthopedics determined the Baker's cyst is not causing the leg swelling.   Acknowledged limited expertise with Baker's cysts and recommended following up with orthopedics.   Instructed the patient to bring up Tomas's cyst concerns at the next orthopedics appointment.    ABNORMAL LIVER ENZYME  LEVELS:   Noted stable liver enzymes that are being monitored.   Scheduled a follow-up visit for additional labs.   Planned continued monitoring of liver enzymes.    FOLLOW-UP:   Scheduled a follow-up visit with Dr. Obrien at [facility] on the 7th.   None Follow up with Dr. Obrien at [facility] on the 7th.   Contact the office if there are any new concerns or changes in condition.               This note was generated with the assistance of ambient listening technology. Verbal consent was obtained by the patient and accompanying visitor(s) for the recording of patient appointment to facilitate this note. I attest to having reviewed and edited the generated note for accuracy, though some syntax or spelling errors may persist. Please contact the author of this note for any clarification.        Visit today included increased complexity associated with the care of the episodic problem CHF and Hypertension , which was addressed while instituting co-management of the longitudinal care of the patient due to the serious and/or complex managed problem(s) .    I have evaluated and discussed management associated with medical care services that serve as the continuing focal point for all needed health care services and/or with medical care services that are part of ongoing care related to my patient's single, serious condition or a complex condition(s).    I am providing ongoing care and I am the primary care provider for this patient, and they are being managed, monitored, and/or observed for their chronic conditions over time.     I have addressed their ongoing health maintenance requirements and needs for all health care services and reviewed co-management plans provided by specialty providers when available.    Health Maintenance Due   Topic Date Due    Pneumococcal Vaccines (Age 50+) (1 of 2 - PCV) Never done

## 2025-03-20 RX ORDER — BACLOFEN 20 MG/1
20 TABLET ORAL 2 TIMES DAILY
Qty: 180 TABLET | Refills: 1 | Status: SHIPPED | OUTPATIENT
Start: 2025-03-20 | End: 2025-09-16

## 2025-04-05 ENCOUNTER — HOSPITAL ENCOUNTER (EMERGENCY)
Facility: HOSPITAL | Age: 65
Discharge: HOME | End: 2025-04-06
Attending: FAMILY MEDICINE
Payer: MEDICARE

## 2025-04-05 DIAGNOSIS — R07.9 CHEST PAIN, UNSPECIFIED TYPE: Primary | ICD-10-CM

## 2025-04-05 DIAGNOSIS — R07.9 CHEST PAIN: ICD-10-CM

## 2025-04-05 LAB
ABSOLUTE EOSINOPHIL (OHS): 0.07 K/UL
ABSOLUTE MONOCYTE (OHS): 0.58 K/UL (ref 0.3–1)
ABSOLUTE NEUTROPHIL COUNT (OHS): 5.84 K/UL (ref 1.8–7.7)
ALBUMIN SERPL BCP-MCNC: 3.7 G/DL (ref 3.5–5.2)
ALP SERPL-CCNC: 75 UNIT/L (ref 40–150)
ALT SERPL W/O P-5'-P-CCNC: 36 UNIT/L (ref 10–44)
ANION GAP (OHS): 8 MMOL/L (ref 8–16)
APTT PPP: 25.1 SECONDS (ref 21–32)
AST SERPL-CCNC: 58 UNIT/L (ref 11–45)
BASOPHILS # BLD AUTO: 0.03 K/UL
BASOPHILS NFR BLD AUTO: 0.4 %
BILIRUB SERPL-MCNC: <0.5 MG/DL (ref 0.1–1)
BNP SERPL-MCNC: 27 PG/ML (ref 0–99)
BUN SERPL-MCNC: 21 MG/DL (ref 8–23)
CALCIUM SERPL-MCNC: 8.8 MG/DL (ref 8.7–10.5)
CHLORIDE SERPL-SCNC: 101 MMOL/L (ref 95–110)
CO2 SERPL-SCNC: 25 MMOL/L (ref 23–29)
CREAT SERPL-MCNC: 1.1 MG/DL (ref 0.5–1.4)
ERYTHROCYTE [DISTWIDTH] IN BLOOD BY AUTOMATED COUNT: 12.5 % (ref 11.5–14.5)
GFR SERPLBLD CREATININE-BSD FMLA CKD-EPI: >60 ML/MIN/1.73/M2
GLUCOSE SERPL-MCNC: 108 MG/DL (ref 70–110)
HCT VFR BLD AUTO: 37.8 % (ref 40–54)
HCV AB SERPL QL IA: NEGATIVE
HGB BLD-MCNC: 12.8 GM/DL (ref 14–18)
HIV 1+2 AB+HIV1 P24 AG SERPL QL IA: NEGATIVE
IMM GRANULOCYTES # BLD AUTO: 0.07 K/UL (ref 0–0.04)
IMM GRANULOCYTES NFR BLD AUTO: 0.9 % (ref 0–0.5)
INR PPP: 1 (ref 0.8–1.2)
LIPASE SERPL-CCNC: 38 U/L (ref 4–60)
LYMPHOCYTES # BLD AUTO: 1.26 K/UL (ref 1–4.8)
MAGNESIUM SERPL-MCNC: 1.9 MG/DL (ref 1.6–2.6)
MCH RBC QN AUTO: 31.5 PG (ref 27–31)
MCHC RBC AUTO-ENTMCNC: 33.9 G/DL (ref 32–36)
MCV RBC AUTO: 93 FL (ref 82–98)
NUCLEATED RBC (/100WBC) (OHS): 0 /100 WBC
PLATELET # BLD AUTO: 219 K/UL (ref 150–450)
PMV BLD AUTO: 10.5 FL (ref 9.2–12.9)
POTASSIUM SERPL-SCNC: 4.8 MMOL/L (ref 3.5–5.1)
PROT SERPL-MCNC: 7.4 GM/DL (ref 6–8.4)
PROTHROMBIN TIME: 11.4 SECONDS (ref 9–12.5)
RBC # BLD AUTO: 4.06 M/UL (ref 4.6–6.2)
RELATIVE EOSINOPHIL (OHS): 0.9 %
RELATIVE LYMPHOCYTE (OHS): 16.1 % (ref 18–48)
RELATIVE MONOCYTE (OHS): 7.4 % (ref 4–15)
RELATIVE NEUTROPHIL (OHS): 74.3 % (ref 38–73)
SODIUM SERPL-SCNC: 134 MMOL/L (ref 136–145)
TROPONIN I SERPL DL<=0.01 NG/ML-MCNC: 0.01 NG/ML
TROPONIN I SERPL DL<=0.01 NG/ML-MCNC: <0.006 NG/ML
WBC # BLD AUTO: 7.85 K/UL (ref 3.9–12.7)

## 2025-04-05 PROCEDURE — 85730 THROMBOPLASTIN TIME PARTIAL: CPT | Performed by: FAMILY MEDICINE

## 2025-04-05 PROCEDURE — 80053 COMPREHEN METABOLIC PANEL: CPT | Performed by: FAMILY MEDICINE

## 2025-04-05 PROCEDURE — 93005 ELECTROCARDIOGRAM TRACING: CPT

## 2025-04-05 PROCEDURE — 87389 HIV-1 AG W/HIV-1&-2 AB AG IA: CPT | Performed by: EMERGENCY MEDICINE

## 2025-04-05 PROCEDURE — 99285 EMERGENCY DEPT VISIT HI MDM: CPT | Mod: 25

## 2025-04-05 PROCEDURE — 63600175 PHARM REV CODE 636 W HCPCS: Performed by: FAMILY MEDICINE

## 2025-04-05 PROCEDURE — 85025 COMPLETE CBC W/AUTO DIFF WBC: CPT | Performed by: FAMILY MEDICINE

## 2025-04-05 PROCEDURE — 96374 THER/PROPH/DIAG INJ IV PUSH: CPT

## 2025-04-05 PROCEDURE — 96375 TX/PRO/DX INJ NEW DRUG ADDON: CPT

## 2025-04-05 PROCEDURE — 83735 ASSAY OF MAGNESIUM: CPT | Performed by: FAMILY MEDICINE

## 2025-04-05 PROCEDURE — 93010 ELECTROCARDIOGRAM REPORT: CPT | Mod: ,,, | Performed by: INTERNAL MEDICINE

## 2025-04-05 PROCEDURE — 85610 PROTHROMBIN TIME: CPT | Performed by: FAMILY MEDICINE

## 2025-04-05 PROCEDURE — 83880 ASSAY OF NATRIURETIC PEPTIDE: CPT | Performed by: FAMILY MEDICINE

## 2025-04-05 PROCEDURE — 86803 HEPATITIS C AB TEST: CPT | Performed by: EMERGENCY MEDICINE

## 2025-04-05 PROCEDURE — 84484 ASSAY OF TROPONIN QUANT: CPT | Performed by: FAMILY MEDICINE

## 2025-04-05 PROCEDURE — 25000003 PHARM REV CODE 250: Performed by: FAMILY MEDICINE

## 2025-04-05 PROCEDURE — 83690 ASSAY OF LIPASE: CPT | Performed by: FAMILY MEDICINE

## 2025-04-05 RX ORDER — ONDANSETRON HYDROCHLORIDE 2 MG/ML
4 INJECTION, SOLUTION INTRAVENOUS
Status: COMPLETED | OUTPATIENT
Start: 2025-04-05 | End: 2025-04-05

## 2025-04-05 RX ORDER — MORPHINE SULFATE 4 MG/ML
4 INJECTION, SOLUTION INTRAMUSCULAR; INTRAVENOUS
Refills: 0 | Status: COMPLETED | OUTPATIENT
Start: 2025-04-05 | End: 2025-04-05

## 2025-04-05 RX ORDER — PANTOPRAZOLE SODIUM 40 MG/10ML
40 INJECTION, POWDER, LYOPHILIZED, FOR SOLUTION INTRAVENOUS
Status: COMPLETED | OUTPATIENT
Start: 2025-04-05 | End: 2025-04-05

## 2025-04-05 RX ADMIN — MORPHINE SULFATE 4 MG: 4 INJECTION INTRAVENOUS at 07:04

## 2025-04-05 RX ADMIN — ONDANSETRON 4 MG: 2 INJECTION INTRAMUSCULAR; INTRAVENOUS at 06:04

## 2025-04-05 RX ADMIN — PANTOPRAZOLE SODIUM 40 MG: 40 INJECTION, POWDER, FOR SOLUTION INTRAVENOUS at 08:04

## 2025-04-05 RX ADMIN — NITROGLYCERIN 1 INCH: 20 OINTMENT TOPICAL at 07:04

## 2025-04-06 VITALS
TEMPERATURE: 98 F | OXYGEN SATURATION: 95 % | DIASTOLIC BLOOD PRESSURE: 64 MMHG | SYSTOLIC BLOOD PRESSURE: 109 MMHG | RESPIRATION RATE: 18 BRPM | HEART RATE: 58 BPM

## 2025-04-06 LAB
OHS QRS DURATION: 100 MS
OHS QRS DURATION: 92 MS
OHS QTC CALCULATION: 423 MS
OHS QTC CALCULATION: 432 MS

## 2025-04-06 NOTE — ED PROVIDER NOTES
"SCRIBE #1 NOTE: I, Christian Kassandra, am scribing for, and in the presence of, Matty Pelayo MD. I have scribed the entire note.       History     Chief Complaint   Patient presents with    Chest Pain     Mid sternal chest pain that started 45 minutes ago. Describes it as "elephant is sitting on my chest." +blood thinners. Hx mitral valve prolapse and afib     Review of patient's allergies indicates:   Allergen Reactions    Iodinated contrast media     Iodine and iodide containing products Hives         History of Present Illness     HPI    4/5/2025, 7:06 PM  History obtained from the patient and medical records      History of Present Illness: Cedric Santoro is a 65 y.o. male patient with a PMHx of Raynaud's syndrome, HTN, a-flutter, mitral valve regurgitation, a-fib, CAD, diastolic heart failure, and first degree AV block who presents to the Emergency Department for evaluation of mid sternal chest pain which began around 6PM. Pt states he was sitting down when the sxs onset. Pt rates the sxs as a 5/10 currently while in ED. Symptoms are constant and moderate in severity. No mitigating or exacerbating factors reported. Associated sxs include nausea, vomiting, and SOB. Patient denies any palpitations, numbness, or leg swelling. Prior Tx includes taking 2x Aspirin at 6:50 PM without relief. Pt states he is on Eliquis for a-fib/a-flutter. No further complaints or concerns at this time.       Arrival mode: Personal Transportation    PCP: Tangela Perez MD        Past Medical History:  Past Medical History:   Diagnosis Date    Abnormal ECG 10/04/2022    Chronic diastolic heart failure 06/13/2024    Coronary artery disease of native artery of native heart with stable angina pectoris 09/06/2023    Essential hypertension 02/24/2020    First degree AV block 06/13/2024    Hypertension     MVP (mitral valve prolapse) 10/26/2022    Nonrheumatic mitral valve regurgitation 10/26/2022    Palpitation 03/05/2020    " Assessment/Plan:       No problem-specific Assessment & Plan notes found for this encounter  Diagnoses and all orders for this visit:    Hypothyroidism due to medication  Comments:  Compensated  Continue levothyroxine  Orders:  -     levothyroxine 150 mcg tablet; Take 1 tablet (150 mcg total) by mouth daily    Paroxysmal atrial fibrillation (HCC)  Comments:  Controlled  Following with Cardiology    VT (ventricular tachycardia) ([facility] [address] )  Comments:  Asymptomatic  To follow with Cardiology    Mild intermittent asthma without complication  Comments:  Asthma is controlled, continue medication as directed  Orders:  -     albuterol (2 5 mg/3 mL) 0 083 % nebulizer solution; Take 3 mL (2 5 mg total) by nebulization every 6 (six) hours as needed for wheezing or shortness of breath    Abnormal liver function  Comments:  Stable, declined referral to GI  Orders:  -     Hepatic function panel; Future    Type 2 diabetes mellitus with other specified complication, without long-term current use of insulin (HCC)  Comments: To follow with 1800 calorie diet  Orders:  -     Hemoglobin A1C; Future  -     Glucose, fasting; Future  -     Microalbumin / creatinine urine ratio    Thrombocytopenia (HCC)  Comments: To follow with Oncology    Immunization due  -     influenza vaccine, high-dose, PF 0 5 mL    CAD S/P percutaneous coronary angioplasty  Comments:  Doing well  Following with Cardiology    Positive for microalbuminuria  Comments:  Avoid NSAID  Orders:  -     Microalbumin / creatinine urine ratio    Low HDL (under 40)  Comments:  Walk half an hour daily    Pure hypercholesterolemia  Comments: To follow with low-fat diet  Orders:  -     Lipid Panel with Direct LDL reflex; Future    Absent pedal pulses  Comments:  Check arterial Doppler of the lower extremities    Patient declined advised to call if he developed claudication or change in the color of his feet    Weight gain  Comments:  Advised patient to lose Paroxysmal atrial fibrillation 03/05/2020    Paroxysmal atrial flutter 10/04/2022    Raynaud's syndrome     Vasovagal syncopes        Past Surgical History:  Past Surgical History:   Procedure Laterality Date    ABLATION, ATRIAL FLUTTER, TYPICAL N/A 12/15/2022    Procedure: Ablation, Atrial Flutter, Typical;  Surgeon: Matty Hendricks MD;  Location: [facility] EP LAB;  Service: Cardiology;  Laterality: N/A;  AFL, RFA, NICOLASA, ARIELA, anes, MB, 3 Prep    CARPAL TUNNEL RELEASE Right 04/02/2024    [facility]    CHOLECYSTECTOMY      ECHOCARDIOGRAM,TRANSESOPHAGEAL N/A 12/15/2022    Procedure: Transesophageal echo (ARIELA) intra-procedure log documentation;  Surgeon: Nisreen Fenton MD;  Location: [facility];  Service: Cardiology;  Laterality: N/A;    EYE SURGERY      GALLBLADDER SURGERY      HERNIA REPAIR      LEFT HEART CATHETERIZATION Left 05/24/2023    Procedure: Left heart cath;  Surgeon: Kal Garzon MD;  Location: [facility] CATH LAB;  Service: Cardiology;  Laterality: Left;  may need to sign consent//    OPEN REDUCTION AND INTERNAL FIXATION (ORIF) OF FRACTURE OF METATARSAL BONE Left 11/06/2023    Procedure: ORIF, FRACTURE, METATARSAL BONE;  Surgeon: Aleksandr Cheek DPM;  Location: [facility] OR;  Service: Podiatry;  Laterality: Left;    THROAT SURGERY      TRANSESOPHAGEAL ECHOCARDIOGRAPHY N/A 02/27/2023    Procedure: ECHOCARDIOGRAM, TRANSESOPHAGEAL;  Surgeon: Samuel Del Valle MD;  Location: [facility] CATH LAB;  Service: Cardiology;  Laterality: N/A;    TREATMENT OF CARDIAC ARRHYTHMIA  12/15/2022    Procedure: Cardioversion or Defibrillation;  Surgeon: Matty Hendricks MD;  Location: [facility];  Service: Cardiology;;         Family History:  Family History   Problem Relation Name Age of Onset    Cancer Mother          Multiple myeloma    Pacemaker/defibrilator Father Reyes     Macular degeneration Father Reyes     COPD Father Reyes     Hearing loss Father Reyes     Macular degeneration Brother         Social  weight    Immunization counseling  Comments:  Recommend COVID vaccine booster, 6 months after the 2nd booster        Patient Instructions   To follow up with test results      Orders Placed This Encounter   Procedures    influenza vaccine, high-dose, PF 0 5 mL    Lipid Panel with Direct LDL reflex     This is a patient instruction: This test requires patient fasting for 10-12 hours or longer  Drinking of black coffee or black tea is acceptable  Standing Status:   Future     Standing Expiration Date:   9/24/2022    Hepatic function panel     This is a patient instruction: This test is non-fasting  Please drink two glasses of water morning of bloodwork  Standing Status:   Future     Standing Expiration Date:   9/24/2022    Hemoglobin A1C     Standing Status:   Future     Standing Expiration Date:   9/24/2022    Glucose, fasting     This is a patient instruction: This test requires patient fasting for 8 hours or longer  Standing Status:   Future     Standing Expiration Date:   9/24/2022    Microalbumin / creatinine urine ratio         Subjective:     Patient ID: Timur Croft is a 79 y o  male      HPI  Follow-up chronic medical problems  Diabetes  Admit to regular diet  Does not check blood sugar at home  He did have an eye exam May this year  DR Rice  Denied sign of symptom hypoglycemia  Thrombocytopenia  Following with Hematology  Denied bruises or bleeding  Saw Hematology August 19/21  Coronary artery disease  Saw Cardiology may 04/2021, denied chest  TV, denied palpitation, dizziness or syncope  Afib, denied palpitation  Congestive heart failure, denied cough, shortness of breath or edema  Asthma  Well controlled does not need to use his Proventil inhaler more than once every once a while  Hypothyroid  Admit to weight gain  Denied fatigue or cold intolerance  Hyperlipidemia admit to regular fat intake denied side effect with the med  Had COVID vaccine    [employer] x 2 History:  Social History     Tobacco Use    Smoking status: Never    Smokeless tobacco: Never   Substance and Sexual Activity    Alcohol use: No    Drug use: No    Sexual activity: Not Currently     Partners: Female     Birth control/protection: None        Review of Systems     Review of Systems   Constitutional:  Negative for fever.   HENT:  Negative for sore throat.    Respiratory:  Positive for shortness of breath.    Cardiovascular:  Positive for chest pain. Negative for palpitations and leg swelling.   Gastrointestinal:  Positive for nausea and vomiting.   Genitourinary:  Negative for dysuria.   Musculoskeletal:  Negative for back pain.   Skin:  Negative for rash.   Neurological:  Negative for weakness.   Hematological:  Does not bruise/bleed easily.   All other systems reviewed and are negative.     Physical Exam     Initial Vitals [04/05/25 1849]   BP Pulse Resp Temp SpO2   (!) 190/92 62 20 97.9 °F (36.6 °C) 99 %      MAP       --          Physical Exam  Nursing Notes and Vital Signs Reviewed.  Constitutional: Patient is in no acute distress. Well-developed and well-nourished.  Head: Atraumatic. Normocephalic.  Eyes: PERRL. EOM intact. Conjunctivae are not pale. No scleral icterus.  ENT: Mucous membranes are moist. Oropharynx is clear and symmetric.    Neck: Supple. Full ROM. No lymphadenopathy.  Cardiovascular: Regular rate. Regular rhythm. No murmurs, rubs, or gallops. Distal pulses are 2+ and symmetric.  Pulmonary/Chest: No respiratory distress. Clear to auscultation bilaterally. No wheezing or rales.  Abdominal: Soft and non-distended.  There is no tenderness.  No rebound, guarding, or rigidity. Good bowel sounds.  Genitourinary: No CVA tenderness  Musculoskeletal: Moves all extremities. No obvious deformities. LLE edema. No calf tenderness.  Skin: Warm and dry. Vasoconstriction of skin.   Neurological:  Alert, awake, and appropriate.  Normal speech.  No acute focal neurological deficits are  times  Test results  Lab done July 2021  The result with patient  Review of Systems   Constitutional: Negative for activity change, appetite change, chills, fatigue, fever and unexpected weight change  HENT: Negative for congestion, ear discharge, ear pain, hearing loss, nosebleeds, rhinorrhea, sinus pressure, sore throat, tinnitus, trouble swallowing and voice change  Eyes: Negative for photophobia, pain and visual disturbance  Respiratory: Negative for cough, chest tightness, shortness of breath and wheezing  Cardiovascular: Negative for chest pain, palpitations and leg swelling  Gastrointestinal: Negative for abdominal pain, anal bleeding, blood in stool, constipation, diarrhea, nausea and vomiting  Endocrine: Negative for cold intolerance, heat intolerance, polydipsia and polyuria  Genitourinary: Negative for dysuria, frequency, hematuria and urgency  Musculoskeletal: Negative for arthralgias, back pain, gait problem, joint swelling, myalgias and neck pain  Skin: Negative for rash  Neurological: Negative for dizziness, tremors, seizures, syncope, weakness, light-headedness and headaches  Hematological: Negative for adenopathy  Does not bruise/bleed easily  Psychiatric/Behavioral: Negative for agitation, behavioral problems, confusion, dysphoric mood, hallucinations and sleep disturbance  The patient is not nervous/anxious  Objective:     Physical Exam  Constitutional:       General: He is not in acute distress  Appearance: Normal appearance  He is well-developed  HENT:      Head: Normocephalic  Eyes:      General: No scleral icterus  Right eye: No discharge  Left eye: No discharge  Pupils: Pupils are equal, round, and reactive to light  Neck:      Thyroid: No thyromegaly  Vascular: No carotid bruit or JVD  Cardiovascular:      Rate and Rhythm: Normal rate and regular rhythm  Pulses: Pulses are weak             Carotid pulses are 3+ appreciated.  Psychiatric: Normal affect. Good eye contact. Appropriate in content.     ED Course   Procedures  ED Vital Signs:  Vitals:    04/05/25 1849 04/05/25 1859 04/05/25 1922 04/05/25 2002   BP: (!) 190/92 (!) 167/80  123/70   Pulse: 62 63  62   Resp: 20 (!) 23 17    Temp: 97.9 °F (36.6 °C)      TempSrc: Oral      SpO2: 99% 97%  95%    04/05/25 2034 04/05/25 2104 04/05/25 2131 04/05/25 2204   BP: (S) 113/62 106/67 97/61 117/63   Pulse: 63 63 63 65   Resp:       Temp:       TempSrc:       SpO2: (!) 94% 96%  95%    04/05/25 2231 04/05/25 2302 04/05/25 2304 04/05/25 2329   BP: 114/66 105/64  96/63   Pulse: 64 61  (!) 59   Resp:       Temp:       TempSrc:       SpO2: 96%  (!) 94% (!) 94%    04/06/25 0004   BP: 109/64   Pulse: (!) 58   Resp: 18   Temp: 97.6 °F (36.4 °C)   TempSrc: Oral   SpO2: 95%       Abnormal Lab Results:  Labs Reviewed   COMPREHENSIVE METABOLIC PANEL - Abnormal       Result Value    Sodium 134 (*)     Potassium 4.8      Chloride 101      CO2 25      Glucose 108      BUN 21      Creatinine 1.1      Calcium 8.8      Protein Total 7.4      Albumin 3.7      Bilirubin Total <0.5      ALP 75      AST 58 (*)     ALT 36      Anion Gap 8      eGFR >60     CBC WITH DIFFERENTIAL - Abnormal    WBC 7.85      RBC 4.06 (*)     HGB 12.8 (*)     HCT 37.8 (*)     MCV 93      MCH 31.5 (*)     MCHC 33.9      RDW 12.5      Platelet Count 219      MPV 10.5      Nucleated RBC 0      Neut % 74.3 (*)     Lymph % 16.1 (*)     Mono % 7.4      Eos % 0.9      Basophil % 0.4      Imm Grans % 0.9 (*)     Neut # 5.84      Lymph # 1.26      Mono # 0.58      Eos # 0.07      Baso # 0.03      Imm Grans # 0.07 (*)    HEPATITIS C ANTIBODY - Normal    Hep C Ab Interp Negative     HIV 1 / 2 ANTIBODY - Normal    HIV 1/2 Ag/Ab Negative     MAGNESIUM - Normal    Magnesium  1.9     B-TYPE NATRIURETIC PEPTIDE - Normal    BNP 27     TROPONIN I - Normal    Troponin-I <0.006     APTT - Normal    PTT 25.1     PROTIME-INR - Normal    PT 11.4   on the right side and 3+ on the left side  Dorsalis pedis pulses are 1+ on the right side and 2+ on the left side  Posterior tibial pulses are 0 on the right side and 0 on the left side  Heart sounds: Normal heart sounds  No murmur heard  No gallop  Pulmonary:      Effort: Pulmonary effort is normal       Breath sounds: Normal breath sounds  Abdominal:      General: Bowel sounds are normal  There is no distension  Palpations: Abdomen is soft  There is no mass  Tenderness: There is no abdominal tenderness  There is no guarding or rebound  Musculoskeletal:         General: No swelling or tenderness  Normal range of motion  Cervical back: Neck supple  Right lower leg: No edema  Left lower leg: No edema  Feet:      Right foot:      Skin integrity: No ulcer, skin breakdown, erythema, warmth, callus or dry skin  Lymphadenopathy:      Cervical: No cervical adenopathy  Skin:     Findings: No rash  Neurological:      General: No focal deficit present  Mental Status: He is alert and oriented to person, place, and time  Cranial Nerves: No cranial nerve deficit  Motor: No abnormal muscle tone  Coordination: Coordination normal       Gait: Gait normal    Psychiatric:         Mood and Affect: Mood normal          Behavior: Behavior normal          Thought Content: Thought content normal      Diabetic Foot Exam    Right Foot/Ankle   Right Foot Inspection  Skin Exam: skin normal and skin intact no dry skin, no warmth, no callus, no erythema, no maceration, no abnormal color, no pre-ulcer, no ulcer and no callus                          Toe Exam: no swelling, no tenderness, erythema and  no right toe deformity  Sensory       Monofilament testing: intact  Vascular    The right DP pulse is 1+  The right PT pulse is 0       Left Foot/Ankle  Left Foot Inspection                           Toe Exam: no swelling, no tenderness, no erythema and no left toe     INR 1.0     TROPONIN I - Normal    Troponin-I 0.010     LIPASE - Normal    Lipase Level 38     CBC W/ AUTO DIFFERENTIAL    Narrative:     The following orders were created for panel order CBC auto differential.  Procedure                               Abnormality         Status                     ---------                               -----------         ------                     CBC with Differential[[id number]]       Abnormal            Final result                 Please view results for these tests on the individual orders.   HEP C VIRUS HOLD SPECIMEN        All Lab Results:  Results for orders placed or performed during the hospital encounter of 04/05/25   Hepatitis C Antibody    Collection Time: 04/05/25  7:28 PM   Result Value Ref Range    Hep C Ab Interp Negative Negative   HIV 1/2 Ag/Ab (4th Gen)    Collection Time: 04/05/25  7:28 PM   Result Value Ref Range    HIV 1/2 Ag/Ab Negative Negative   Magnesium    Collection Time: 04/05/25  7:28 PM   Result Value Ref Range    Magnesium  1.9 1.6 - 2.6 mg/dL   Brain natriuretic peptide    Collection Time: 04/05/25  7:28 PM   Result Value Ref Range    BNP 27 0 - 99 pg/mL   Comprehensive metabolic panel    Collection Time: 04/05/25  7:28 PM   Result Value Ref Range    Sodium 134 (L) 136 - 145 mmol/L    Potassium 4.8 3.5 - 5.1 mmol/L    Chloride 101 95 - 110 mmol/L    CO2 25 23 - 29 mmol/L    Glucose 108 70 - 110 mg/dL    BUN 21 8 - 23 mg/dL    Creatinine 1.1 0.5 - 1.4 mg/dL    Calcium 8.8 8.7 - 10.5 mg/dL    Protein Total 7.4 6.0 - 8.4 gm/dL    Albumin 3.7 3.5 - 5.2 g/dL    Bilirubin Total <0.5 0.1 - 1.0 mg/dL    ALP 75 40 - 150 unit/L    AST 58 (H) 11 - 45 unit/L    ALT 36 10 - 44 unit/L    Anion Gap 8 8 - 16 mmol/L    eGFR >60 >60 mL/min/1.73/m2   Troponin I    Collection Time: 04/05/25  7:28 PM   Result Value Ref Range    Troponin-I <0.006 <=0.026 ng/mL   CBC with Differential    Collection Time: 04/05/25  7:28 PM   Result Value Ref Range    WBC 7.85 3.90 -  deformity                   Sensory       Monofilament: intact  Vascular    The left DP pulse is 2+  The left PT pulse is 0  Assign Risk Category:  No deformity present;  No loss of protective sensation; Weak pulses       Risk: 0 12.70 K/uL    RBC 4.06 (L) 4.60 - 6.20 M/uL    HGB 12.8 (L) 14.0 - 18.0 gm/dL    HCT 37.8 (L) 40.0 - 54.0 %    MCV 93 82 - 98 fL    MCH 31.5 (H) 27.0 - 31.0 pg    MCHC 33.9 32.0 - 36.0 g/dL    RDW 12.5 11.5 - 14.5 %    Platelet Count 219 150 - 450 K/uL    MPV 10.5 9.2 - 12.9 fL    Nucleated RBC 0 <=0 /100 WBC    Neut % 74.3 (H) 38 - 73 %    Lymph % 16.1 (L) 18 - 48 %    Mono % 7.4 4 - 15 %    Eos % 0.9 <=8 %    Basophil % 0.4 <=1.9 %    Imm Grans % 0.9 (H) 0.0 - 0.5 %    Neut # 5.84 1.8 - 7.7 K/uL    Lymph # 1.26 1 - 4.8 K/uL    Mono # 0.58 0.3 - 1 K/uL    Eos # 0.07 <=0.5 K/uL    Baso # 0.03 <=0.2 K/uL    Imm Grans # 0.07 (H) 0.00 - 0.04 K/uL   APTT    Collection Time: 04/05/25  7:28 PM   Result Value Ref Range    PTT 25.1 21.0 - 32.0 seconds   Protime-INR    Collection Time: 04/05/25  7:28 PM   Result Value Ref Range    PT 11.4 9.0 - 12.5 seconds    INR 1.0 0.8 - 1.2   Lipase    Collection Time: 04/05/25  7:28 PM   Result Value Ref Range    Lipase Level 38 4 - 60 U/L   Troponin I    Collection Time: 04/05/25 11:19 PM   Result Value Ref Range    Troponin-I 0.010 <=0.026 ng/mL       Imaging Results:  Imaging Results              X-Ray Chest 1 View (Final result)  Result time 04/05/25 19:13:56      Final result by Edinson Lewis DO (04/05/25 19:13:56)                   Impression:    No acute cardiopulmonary disease.    Finalized on: 4/5/2025 7:13 PM By:  Edinson Lewis  [facility]# [id number]      2025-04-05 19:16:[id number]     [facility]               Narrative:    Exam: XR CHEST 1 VIEW    Comparison: 03/06/2025    Clinical Indication:  Chest pain    Findings: Heart size and pulmonary vasculature are unremarkable.  No focal consolidation, pleural effusions or pneumothorax.    No acute angulated or displaced fractures.                                         The EKG was ordered, reviewed, and independently interpreted by the ED provider.  Interpretation time: 18:41  Rate: 76 BPM  Rhythm: Sinus rhythm with 1st degree A-V block  with Premature atrial complexes  Interpretation: Septal infarct, age undetermined. No STEMI.    The EKG was ordered, reviewed, and independently interpreted by the ED provider.  Interpretation time: 22:45  Rate: 63 BPM  Rhythm: Sinus rhythm with 1st degree A-V block  Interpretation: Incomplete right bundle branch block. Minimal voltage criteria for LVH, may be normal variant (R in aVL). Septal infarct, age undetermined. No STEMI.           The Emergency Provider reviewed the vital signs and test results, which are outlined above.     ED Discussion     12:13 AM: Discussed pt's case with Neftaly Tomas MD (Cardiology) who agrees with plan of care. States pt can be discharged and f/u in clinic.     12:13 AM: Reassessed pt at this time. Discussed with patient and/or family/caretaker all pertinent ED information and results. Discussed pt dx and plan of tx. Gave the patient all f/u and return to the ED instructions. All questions and concerns were addressed at this time. Patient and/or family/caretaker expresses understanding of information and instructions, and is comfortable with plan to discharge. Pt is stable for discharge.     I discussed with patient and/or family/caretaker that evaluation in the ED does not suggest any emergent or life threatening medical conditions requiring immediate intervention beyond what was provided in the ED, and I believe patient is safe for discharge.  Regardless, an unremarkable evaluation in the ED does not preclude the development or presence of a serious of life threatening condition. As such, I instructed that the patient is to return immediately for any worsening or change in current symptoms.         Medical Decision Making  65-year-old white male with a history of ASCVD, CAD, atrial fibrillation currently on Eliquis, hypertension presents with substernal chest pressure and heaviness.  Chest pressure is rated at a 7 or 8/10.  Symptoms began approximately 1 hour prior to presentation.   Patient has a known history nonocclusive coronary artery disease.  He sees Dr. Garzon.    Last heart catheterization from 04/2023 was reviewed.  No significant lesions noted at that time.  EF is 60 %.    Patient has known history of chronic diastolic heart failure.  Complete impression of [facility]:    Diagnostic    Dominance: Left  Left Main  The vessel is angiographically normal.    Left Anterior Descending  LAD has moderate calcification in proximal segment with mild nonobstructive disease noted. Rest of LAD has a few spots of luminal irregularities and otherwise appears free of disease angiographically.    First Diagonal Branch  The vessel is angiographically normal.    Ramus Intermedius  Exhibits minimal luminal irregularities.    Left Circumflex  The vessel is angiographically normal. Large LCX, dominant vessel.    First Obtuse Marginal Branch  The vessel is angiographically normal.    Second Obtuse Marginal Branch  The vessel is angiographically normal.    Right Coronary Artery  The vessel is angiographically normal. Nondominant RCA.      Patient had episode of emesis with this chest pain.  His skin was vasoconstricted common mild diaphoresis.  Stat EKG revealed atrial fibrillation with no acute changes.  Controlled rate.  Heart rate was in the 60s at the time of my evaluation.    He is treated with nitrates and morphine.  Patient is currently on Eliquis.  Heart rate in the 60s.  Beta-blockers were withheld.    1945:  Chest pain is improved.  Pain is a 2/10.  Rhythm is atrial fibrillation.  Controlled rate.  Rate in the 60s.    Initial troponin was normal.  Second troponin likewise was normal.    Patient has been comfortable.  He is now back in normal sinus rhythm.  Most recent EKG reveals NSR with no acute changes.    I feel comfortable discharging this patient however I would like the opinion of Cardiology.  Apparently Dr. Garzon wanted to repeat his [facility] this year.    0015:  Discussed case with Cardiology on-call.   We both feel that the patient is pain-free in his can safely be discharged and follow up with cardiologist as outpatient.    Amount and/or Complexity of Data Reviewed  Labs: ordered. Decision-making details documented in ED Course.  Radiology: ordered. Decision-making details documented in ED Course.  ECG/medicine tests: ordered and independent interpretation performed. Decision-making details documented in ED Course.    Risk  Prescription drug management.                ED Medication(s):  Medications   ondansetron injection 4 mg (4 mg Intravenous Given 4/5/25 1855)   nitroGLYCERIN 2% TD oint ointment 1 inch (1 inch Topical (Top) Given 4/5/25 1921)   morphine injection 4 mg (4 mg Intravenous Given 4/5/25 1922)   pantoprazole injection 40 mg (40 mg Intravenous Given 4/5/25 2045)       Discharge Medication List as of 4/6/2025 12:14 AM           Follow-up Information       Tangela Perez MD.    Specialty: Family Medicine  Contact information:  [address] [place] [address] [address] [place] [id number]  [phone]               Kal Garzon MD.    Specialties: Interventional Cardiology, Cardiology  Contact information:  [address] [place] [address] [address] [id number]  [phone]                                 Scribe Attestation:   Scribe #1: I performed the above scribed service and the documentation accurately describes the services I performed. I attest to the accuracy of the note.     Attending:   Physician Attestation Statement for Scribe #1: I, Matty Pelayo MD, personally performed the services described in this documentation, as scribed by Christian Cannon, in my presence, and it is both accurate and complete.           Clinical Impression       ICD-10-CM ICD-9-CM   1. Chest pain, unspecified type  R07.9 786.50   2. Chest pain  R07.9 786.50       Disposition:   Disposition: Discharged  Condition: Stable       Matty Pelayo MD  04/06/25 0146

## 2025-05-12 DIAGNOSIS — J44.9 MIXED RESTRICTIVE AND OBSTRUCTIVE LUNG DISEASE: ICD-10-CM

## 2025-05-12 DIAGNOSIS — J98.4 MIXED RESTRICTIVE AND OBSTRUCTIVE LUNG DISEASE: ICD-10-CM

## 2025-05-13 RX ORDER — FLUTICASONE FUROATE, UMECLIDINIUM BROMIDE AND VILANTEROL TRIFENATATE 200; 62.5; 25 UG/1; UG/1; UG/1
1 POWDER RESPIRATORY (INHALATION) DAILY
Qty: 180 EACH | Refills: 3 | Status: SHIPPED | OUTPATIENT
Start: 2025-05-13

## 2025-06-04 ENCOUNTER — TELEPHONE (OUTPATIENT)
Dept: CARDIOLOGY | Facility: CLINIC | Age: 65
End: 2025-06-04

## 2025-06-04 ENCOUNTER — OFFICE VISIT (OUTPATIENT)
Dept: CARDIOLOGY | Facility: CLINIC | Age: 65
End: 2025-06-04
Payer: MEDICARE

## 2025-06-04 VITALS
DIASTOLIC BLOOD PRESSURE: 84 MMHG | SYSTOLIC BLOOD PRESSURE: 140 MMHG | WEIGHT: 211 LBS | BODY MASS INDEX: 30.27 KG/M2 | HEART RATE: 68 BPM | OXYGEN SATURATION: 95 %

## 2025-06-04 DIAGNOSIS — I34.1 MVP (MITRAL VALVE PROLAPSE): ICD-10-CM

## 2025-06-04 DIAGNOSIS — R93.1 ELEVATED CORONARY ARTERY CALCIUM SCORE: ICD-10-CM

## 2025-06-04 DIAGNOSIS — I34.0 NONRHEUMATIC MITRAL VALVE REGURGITATION: ICD-10-CM

## 2025-06-04 DIAGNOSIS — R94.31 ABNORMAL ECG: ICD-10-CM

## 2025-06-04 DIAGNOSIS — I48.0 PAROXYSMAL ATRIAL FIBRILLATION: ICD-10-CM

## 2025-06-04 DIAGNOSIS — I10 ESSENTIAL HYPERTENSION: ICD-10-CM

## 2025-06-04 DIAGNOSIS — I50.32 CHRONIC DIASTOLIC HEART FAILURE: ICD-10-CM

## 2025-06-04 DIAGNOSIS — I10 ESSENTIAL HYPERTENSION: Primary | ICD-10-CM

## 2025-06-04 DIAGNOSIS — Z86.79 S/P ABLATION OF ATRIAL FLUTTER: ICD-10-CM

## 2025-06-04 DIAGNOSIS — Z79.01 CHRONIC ANTICOAGULATION: ICD-10-CM

## 2025-06-04 DIAGNOSIS — R06.09 DOE (DYSPNEA ON EXERTION): ICD-10-CM

## 2025-06-04 DIAGNOSIS — I25.118 CORONARY ARTERY DISEASE OF NATIVE ARTERY OF NATIVE HEART WITH STABLE ANGINA PECTORIS: ICD-10-CM

## 2025-06-04 DIAGNOSIS — I50.20 HFREF (HEART FAILURE WITH REDUCED EJECTION FRACTION): ICD-10-CM

## 2025-06-04 DIAGNOSIS — I25.118 CORONARY ARTERY DISEASE OF NATIVE ARTERY OF NATIVE HEART WITH STABLE ANGINA PECTORIS: Primary | ICD-10-CM

## 2025-06-04 DIAGNOSIS — R09.02 EXERCISE HYPOXEMIA: ICD-10-CM

## 2025-06-04 DIAGNOSIS — I49.1 PAC (PREMATURE ATRIAL CONTRACTION): ICD-10-CM

## 2025-06-04 DIAGNOSIS — I48.92 PAROXYSMAL ATRIAL FLUTTER: ICD-10-CM

## 2025-06-04 DIAGNOSIS — G47.33 SEVERE OBSTRUCTIVE SLEEP APNEA: ICD-10-CM

## 2025-06-04 DIAGNOSIS — Z98.890 S/P ABLATION OF ATRIAL FLUTTER: ICD-10-CM

## 2025-06-04 DIAGNOSIS — R60.0 EDEMA OF BOTH LEGS: ICD-10-CM

## 2025-06-04 PROCEDURE — 99999 PR PBB SHADOW E&M-EST. PATIENT-LVL II: CPT | Mod: PBBFAC,,, | Performed by: INTERNAL MEDICINE

## 2025-06-04 PROCEDURE — 1160F RVW MEDS BY RX/DR IN RCRD: CPT | Mod: CPTII,S$GLB,, | Performed by: INTERNAL MEDICINE

## 2025-06-04 PROCEDURE — 3077F SYST BP >= 140 MM HG: CPT | Mod: CPTII,S$GLB,, | Performed by: INTERNAL MEDICINE

## 2025-06-04 PROCEDURE — 3288F FALL RISK ASSESSMENT DOCD: CPT | Mod: CPTII,S$GLB,, | Performed by: INTERNAL MEDICINE

## 2025-06-04 PROCEDURE — 1101F PT FALLS ASSESS-DOCD LE1/YR: CPT | Mod: CPTII,S$GLB,, | Performed by: INTERNAL MEDICINE

## 2025-06-04 PROCEDURE — 99215 OFFICE O/P EST HI 40 MIN: CPT | Mod: S$GLB,,, | Performed by: INTERNAL MEDICINE

## 2025-06-04 PROCEDURE — 3079F DIAST BP 80-89 MM HG: CPT | Mod: CPTII,S$GLB,, | Performed by: INTERNAL MEDICINE

## 2025-06-04 PROCEDURE — 1159F MED LIST DOCD IN RCRD: CPT | Mod: CPTII,S$GLB,, | Performed by: INTERNAL MEDICINE

## 2025-06-04 PROCEDURE — 4010F ACE/ARB THERAPY RXD/TAKEN: CPT | Mod: CPTII,S$GLB,, | Performed by: INTERNAL MEDICINE

## 2025-06-04 PROCEDURE — 3008F BODY MASS INDEX DOCD: CPT | Mod: CPTII,S$GLB,, | Performed by: INTERNAL MEDICINE

## 2025-06-04 RX ORDER — PREDNISONE 50 MG/1
50 TABLET ORAL
COMMUNITY
End: 2025-06-04 | Stop reason: SDUPTHER

## 2025-06-04 RX ORDER — FAMOTIDINE 20 MG/1
20 TABLET, FILM COATED ORAL
COMMUNITY
End: 2025-06-04 | Stop reason: SDUPTHER

## 2025-06-04 RX ORDER — DIPHENHYDRAMINE HCL 50 MG
50 CAPSULE ORAL
COMMUNITY
End: 2025-06-04 | Stop reason: SDUPTHER

## 2025-06-04 NOTE — H&P (VIEW-ONLY)
Subjective:    Patient ID:  Cedric Santoro is a 65 y.o. male who presents for evaluation of Atrial Fibrillation, Hyperlipidemia, Hypertension, Coronary Artery Disease, Shortness of Breath, and Congestive Heart Failure        HPI: Pt presents for eval.  His current med conditions include MVP, MR, HTN, JIA, LAE,1 av block, diastolic CHF, palpitations, PAF, PAFL.  Past hx pertinent for following:  - stress test 2020.  Reportedly had brief PAF w stress test, resolving on its own.  Echo 2020 normal LV function, mild MR.  Pt had ecg done 10/3/22 twice:  a flutter w rate 99 - 116 bpm.  New dx.  Lost his wife to cancer 7/22 then had dyspnea few months.  S/p ARIELA Oct 2022 for cardioversion.  Left atrial appendage thrombus noted so no cardioversion was performed.  ARIELA: Left atrial appendage thrombus noted, LVEF 55%, mild posterior leaflet MV prolapse, mild-mod MR.  S/p a flutter ablation w ANTONI Rice, Dec 2022.  Had low BP w procedure, and required inotropes.  ARIELA showed EF 35%, decline thought to be due to a flutter.  Echo Feb 2023: normal LVEF, LAE, mod-severe MR with MVP, JOSE, mild TR, PAP wnl.  ARIELA was advised again.  S/p ARIELA Feb 2023 w Dr. Del Valle: normal EF, mild MVP, mild-mod MR, mild TR.  Coronary calcium score May 2023:  597  S/p LHC May 2023: nonobstructive CAD noted, normal EF.  OMT advised.  B LE arterial u/s April 2023 no stenosis, normal FARIDA B LE.  B LE venous u/s April 2023 no DVT.  Ecg 12/25/23 NSR, 1 av block, incomplete RBBB, possible old septal infarct.  He saw ANTONI Rice, 9/23 and was taken off DOAC and Atenolol dose cut back.  On Vital Holter Jan 2024 had recurrent PAF.  Eliquis restarted.  Has seen ANTONI Rice, few times since then and has been maintaining sinus.  Ecg 5/29/24 sinus bell 54 bpm, marked 1 av block 338 ms.  Echo Jan 2024 normal EF, LAE, mod-severe MR, PAP 33 mmHg.  Echo Oct 2024 personally reviewed: Normal LV function, normal LV size, LAE, mod MR, mild TR, PAP 33 mmHg.  March  2025:  Pt developed severe left leg pain and leg edema.  Did not injure himself.  Seen in ER.  Dx with severe left gastrocnemius tear, partial ACL/PCL tear, bakers cyst, large joint effusion w hematoma.  Has seen Ortho for it -- conservative mgt advised.  Leg pain stable but bothersome.  Had significant leg edema up to thigh.  Has f/u appt w Dr. Obrien, Ortho.  Echo March 2025 normal EF, DD, LAE, negative bubble study, mild-mod MR, mild TR, mild MI.  Saw Dr. Allred, Pulmonary, who advised RHC due to following observation: Restrictive pattern on PFT and decreased DLCO out of proportion to his mild  CT scan interstitial findings.  Will check with Cardiology regarding proceeding with right heart catheterization.    Now here.  Pt seen in [facility] ER April 2025 for acute CP.  Chart reviewed.  Negative BNP/troponin labs.  Ecg personally reviewed --- sinus, 1 av block, nonspecific st-t abnl.  Pt d/c home.  Dx w esophageal spasm per family.  States chest pain has resolved.  No exertional angina.  His leg hematoma is now resolved.  Pain seems resolved.  Has LEBLANC with little activity, worsening.  No pnd/orthopnea.  No syncope.  No palpitations.  Compliant w meds.  Labs reviewed.  Lipids well controlled on statin tx.  Weight loss needed.       Results for orders placed during the hospital encounter of 03/06/25    Echo Saline Bubble? Yes    Interpretation Summary    Left Ventricle: The left ventricle is normal in size. Mildly increased wall thickness. There is mild concentric hypertrophy. There is normal systolic function with a visually estimated ejection fraction of 60 - 65%. Grade II diastolic dysfunction.    Right Ventricle: The right ventricle has mild enlargement. Wall thickness is normal. Systolic function is borderline low.    Left Atrium: severely dilated Agitated saline study of the atrial septum is negative after vasalva maneuver, suggesting absence of intracardiac shunt at the atrial level.    Right Atrium: Right  atrium is moderately dilated.    Mitral Valve: Mildly thickened leaflets. Mildly calcified leaflets. There is mild to moderate regurgitation.    Tricuspid Valve: There is mild regurgitation.    Pulmonic Valve: There is mild regurgitation.    IVC/SVC: Normal venous pressure at 3 mmHg.    There was agitated saline (bubble) used. Study is negative for shunt.          Past Medical History:   Diagnosis Date    Abnormal ECG 10/04/2022    Chronic diastolic heart failure 06/13/2024    Coronary artery disease of native artery of native heart with stable angina pectoris 09/06/2023    Essential hypertension 02/24/2020    First degree AV block 06/13/2024    Hypertension     MVP (mitral valve prolapse) 10/26/2022    Nonrheumatic mitral valve regurgitation 10/26/2022    Palpitation 03/05/2020    Paroxysmal atrial fibrillation 03/05/2020    Paroxysmal atrial flutter 10/04/2022    Raynaud's syndrome     Vasovagal syncopes        Current Outpatient Medications:     albuterol (PROVENTIL/VENTOLIN HFA) 90 mcg/actuation inhaler, Inhale 2 puffs into the lungs every 4 (four) hours as needed., Disp: , Rfl:     apixaban (ELIQUIS) 5 mg Tab, TAKE 1 TABLET BY MOUTH TWICE A DAY, Disp: 60 tablet, Rfl: 12    atenoloL (TENORMIN) 25 MG tablet, TAKE 1 TABLET BY MOUTH EVERY DAY, Disp: 90 tablet, Rfl: 3    b complex vitamins tablet, Take 1 tablet by mouth once daily., Disp: , Rfl:     baclofen (LIORESAL) 20 MG tablet, Take 1 tablet (20 mg total) by mouth 2 (two) times daily., Disp: 180 tablet, Rfl: 1    co-enzyme Q-10 30 mg capsule, Take 30 mg by mouth once daily., Disp: , Rfl:     DULoxetine (CYMBALTA) 60 MG capsule, Take 1 capsule (60 mg total) by mouth once daily., Disp: 90 capsule, Rfl: 3    fluticasone-umeclidin-vilanter (TRELEGY ELLIPTA) 200-62.5-25 mcg inhaler, Inhale 1 puff into the lungs once daily., Disp: 180 each, Rfl: 3    furosemide (LASIX) 40 MG tablet, TAKE 1 TABLET BY MOUTH EVERY DAY, Disp: 90 tablet, Rfl: 5    inhalation spacing  device (BREATHERITE MDI SPACER), Use as directed for inhalation., Disp: 1 each, Rfl: 0    levothyroxine (SYNTHROID) 50 MCG tablet, TAKE 1 TABLET BY MOUTH ONCE A DAY ON MONDAY, TUESDAY, WEDNESDAY, THURSDAY AND FRIDAY, Disp: 66 tablet, Rfl: 3    losartan (COZAAR) 50 MG tablet, Take 1 tablet (50 mg total) by mouth once daily., Disp: 90 tablet, Rfl: 3    magnesium 30 mg Tab, Take 1 tablet by mouth once daily., Disp: , Rfl:     multivitamin (THERAGRAN) per tablet, Take 1 tablet by mouth once daily., Disp: , Rfl:     rosuvastatin (CRESTOR) 20 MG tablet, TAKE 1 TABLET BY MOUTH EVERY DAY IN THE EVENING, Disp: 90 tablet, Rfl: 3    tamsulosin (FLOMAX) 0.4 mg Cap, Take 1 capsule (0.4 mg total) by mouth once daily., Disp: 90 capsule, Rfl: 3    traMADoL (ULTRAM) 50 mg tablet, Take by mouth., Disp: , Rfl:     traZODone (DESYREL) 50 MG tablet, Take 1 tablet (50 mg total) by mouth every evening., Disp: 30 tablet, Rfl: 11    vitamin D (VITAMIN D3) 1000 units Tab, Take 1,000 Units by mouth once daily., Disp: , Rfl:       Review of Systems   Constitutional: Negative.   HENT: Negative.     Eyes: Negative.    Cardiovascular:  Positive for chest pain, dyspnea on exertion and leg swelling.   Respiratory:  Positive for shortness of breath.    Endocrine: Negative.    Hematologic/Lymphatic: Negative.    Skin: Negative.    Musculoskeletal:  Positive for arthritis, back pain, joint pain, neck pain and stiffness.   Gastrointestinal: Negative.    Genitourinary: Negative.    Neurological: Negative.    Psychiatric/Behavioral: Negative.     Allergic/Immunologic: Negative.           BP (!) 140/84 (BP Location: Left arm, Patient Position: Sitting)   Pulse 68   Wt 95.7 kg (210 lb 15.7 oz)   SpO2 95%   BMI 30.27 kg/m²     Wt Readings from Last 3 Encounters:   06/04/25 95.7 kg (210 lb 15.7 oz)   03/17/25 95.2 kg (209 lb 15.8 oz)   03/13/25 98.1 kg (216 lb 4.3 oz)     Temp Readings from Last 3 Encounters:   04/06/25 97.6 °F (36.4 °C) (Oral)    03/07/25 98.3 °F (36.8 °C) (Oral)   03/06/25 97.7 °F (36.5 °C) (Tympanic)     BP Readings from Last 3 Encounters:   06/04/25 (!) 140/84   04/06/25 109/64   03/17/25 122/65     Pulse Readings from Last 3 Encounters:   06/04/25 68   04/06/25 (!) 58   03/17/25 63       Objective:    Physical Exam  Vitals and nursing note reviewed.   Constitutional:       Appearance: He is well-developed.   HENT:      Head: Normocephalic.   Neck:      Thyroid: No thyromegaly.      Vascular: Normal carotid pulses. No carotid bruit or JVD.   Cardiovascular:      Rate and Rhythm: Normal rate and regular rhythm.      Pulses:           Radial pulses are 2+ on the right side and 2+ on the left side.      Heart sounds: S1 normal and S2 normal. Heart sounds not distant. No midsystolic click and no opening snap. Murmur heard.      High-pitched blowing holosystolic murmur is present with a grade of 2/6 at the apex.      No friction rub. No S3 or S4 sounds.   Pulmonary:      Effort: Pulmonary effort is normal.      Breath sounds: Normal breath sounds. No wheezing or rales.   Abdominal:      General: Bowel sounds are normal. There is no distension or abdominal bruit.      Palpations: Abdomen is soft.      Tenderness: There is no abdominal tenderness.   Musculoskeletal:         General: Swelling present.      Cervical back: Neck supple.      Right lower leg: Edema present.      Left lower leg: Edema present.   Skin:     General: Skin is warm.   Neurological:      Mental Status: He is alert and oriented to person, place, and time.   Psychiatric:         Behavior: Behavior normal.       I have reviewed all pertinent labs and cardiac studies.      Component      Latest Ref Rng 3/6/2025   BNP      0 - 99 pg/mL 11        Component      Latest Ref Rng 7/8/2024   BNP      0 - 99 pg/mL 17            Chemistry        Component Value Date/Time     (L) 04/05/2025 1928     02/18/2025 0830    K 4.8 04/05/2025 1928    K 4.2 02/18/2025 0830      04/05/2025 1928     02/18/2025 0830    CO2 25 04/05/2025 1928    CO2 28 02/18/2025 0830    BUN 21 04/05/2025 1928    CREATININE 1.1 04/05/2025 1928     04/05/2025 1928     02/18/2025 0830        Component Value Date/Time    CALCIUM 8.8 04/05/2025 1928    CALCIUM 9.8 02/18/2025 0830    ALKPHOS 75 04/05/2025 1928    ALKPHOS 65 02/18/2025 0830    AST 58 (H) 04/05/2025 1928    AST 64 (H) 02/18/2025 0830    ALT 36 04/05/2025 1928    ALT 40 02/18/2025 0830    BILITOT <0.5 04/05/2025 1928    BILITOT 0.8 02/18/2025 0830    ESTGFRAFRICA >60.0 04/12/2022 1010    EGFRNONAA >60.0 04/12/2022 1010        Lab Results   Component Value Date    WBC 7.85 04/05/2025    HGB 12.8 (L) 04/05/2025    HCT 37.8 (L) 04/05/2025    MCV 93 04/05/2025     04/05/2025       Lab Results   Component Value Date    HGBA1C 5.7 (H) 05/29/2024     Lab Results   Component Value Date    CHOL 148 02/18/2025    CHOL 158 08/07/2024    CHOL 162 05/29/2024     Lab Results   Component Value Date    HDL 68 02/18/2025    HDL 66 08/07/2024    HDL 64 05/29/2024     Lab Results   Component Value Date    LDLCALC 69.4 02/18/2025    LDLCALC 79.6 08/07/2024    LDLCALC 86.2 05/29/2024     Lab Results   Component Value Date    TRIG 53 02/18/2025    TRIG 62 08/07/2024    TRIG 59 05/29/2024     Lab Results   Component Value Date    CHOLHDL 45.9 02/18/2025    CHOLHDL 41.8 08/07/2024    CHOLHDL 39.5 05/29/2024       Results for orders placed during the hospital encounter of 02/09/23    Echo    Interpretation Summary  · The left ventricle is normal in size with concentric remodeling and normal systolic function.  · Moderate left atrial enlargement.  · The estimated ejection fraction is 60%.  · Normal left ventricular diastolic function.  · Normal right ventricular size with normal right ventricular systolic function.  · Moderate right atrial enlargement.  · Moderate-to-severe mitral regurgitation.  · Mild tricuspid regurgitation.  · There is bileaflet  mitral prolapse.  · Normal central venous pressure (3 mmHg).  · The estimated PA systolic pressure is 28 mmHg.             Results for orders placed during the hospital encounter of 01/18/24    Echo    Interpretation Summary    Left Ventricle: The left ventricle is normal in size. Normal wall thickness. There is eccentric hypertrophy. Normal wall motion. There is normal systolic function with a visually estimated ejection fraction of 60 - 65%. There is normal diastolic function.    Right Ventricle: Normal right ventricular cavity size. Wall thickness is normal. Right ventricle wall motion  is normal. Systolic function is normal.    Left Atrium: Left atrium is mildly dilated.    Mitral Valve: Mildly thickened anterior leaflet. There is moderate to severe regurgitation with a centrally directed jet.    Pulmonary Artery: The estimated pulmonary artery systolic pressure is 33 mmHg.    IVC/SVC: Normal venous pressure at 3 mmHg.      Results for orders placed during the hospital encounter of 10/16/24    Echo    Interpretation Summary    Left Ventricle: The left ventricle is normal in size. Normal wall thickness. Normal wall motion. There is normal systolic function with a visually estimated ejection fraction of 60 - 65%. Ejection fraction is approximately 60%. There is normal diastolic function.    Right Ventricle: Normal right ventricular cavity size. Wall thickness is normal. Right ventricle wall motion  is normal. Systolic function is normal.    Left Atrium: Left atrium is moderately dilated.    Mitral Valve: There is moderate regurgitation.    Tricuspid Valve: There is mild regurgitation.    Pulmonary Artery: The estimated pulmonary artery systolic pressure is 33 mmHg.    IVC/SVC: Normal venous pressure at 3 mmHg.          Results for orders placed during the hospital encounter of 03/06/25    Echo Saline Bubble? Yes    Interpretation Summary    Left Ventricle: The left ventricle is normal in size. Mildly increased  wall thickness. There is mild concentric hypertrophy. There is normal systolic function with a visually estimated ejection fraction of 60 - 65%. Grade II diastolic dysfunction.    Right Ventricle: The right ventricle has mild enlargement. Wall thickness is normal. Systolic function is borderline low.    Left Atrium: severely dilated Agitated saline study of the atrial septum is negative after vasalva maneuver, suggesting absence of intracardiac shunt at the atrial level.    Right Atrium: Right atrium is moderately dilated.    Mitral Valve: Mildly thickened leaflets. Mildly calcified leaflets. There is mild to moderate regurgitation.    Tricuspid Valve: There is mild regurgitation.    Pulmonic Valve: There is mild regurgitation.    IVC/SVC: Normal venous pressure at 3 mmHg.    There was agitated saline (bubble) used. Study is negative for shunt.        Assessment:       1. Coronary artery disease of native artery of native heart with stable angina pectoris    2. Abnormal ECG    3. LEBLANC (dyspnea on exertion)    4. Edema of both legs    5. Elevated coronary artery calcium score    6. Exercise hypoxemia    7. Essential hypertension    8. Chronic diastolic heart failure    9. Chronic anticoagulation    10. Nonrheumatic mitral valve regurgitation    11. MVP (mitral valve prolapse)    12. PAC (premature atrial contraction)    13. Paroxysmal atrial fibrillation    14. Paroxysmal atrial flutter    15. S/P ablation of atrial flutter    16. Severe obstructive sleep apnea               Plan:             S/p severe L LE edema/pain from torn left calf muscle/ACL with joint effusion with hematoma in March 2025.  Will proceed with RHC as advised by Pulmonary and also do repeat LHC to assess his CAD/LVEDP.  Risks/benefits of LHC/RHC and possible PCI discussed.  Pt agreeable to proceeding.  Will need to hold Eliquis x 2 days for cath.  LEBLANC with little activity/bothersome.  Continue Lasix 40 mg qd.  CAD: S/p LHC May 2023:  nonobstructive disease noted.  OMT advised.  MVP/MR:  Discussed w pt in detail.  May eventually need valve intervention/surgery; in meantime will continue to monitor closely.  PAF/PAF: S/p ablation in 2022.  Continue Eliquis tx.   F/u w EP as directed.  Reviewed all tests and above medical conditions with patient in detail and formulated treatment plan.  Continue optimal medical treatment for cardiovascular conditions.  Cardiac low salt diet advised.  Daily exercise encouraged, with the goal 30 +  minutes aerobic exercise as tolerated.  Maintaining healthy weight and weight loss goals (if needed) were discussed in clinic.  HTN: Goal < 130/80.  Continue current HTN meds.  Lipids: continue Rosuvastatin 20 mg qhs.  Abnl ecg: Monitor.  Leg edema: stable. Elevate legs, compression stockings, Lasix and low salt diet.  LFTs: f/u w PCP/monitor.  CRI: Monitor.  JIA: Continue CPAP. F/u w Pulmonary as advised.      LHC/RHC Wed 6/18/25 0830.      I have reviewed all pertinent labs and cardiac studies independently.   Plans and recommendations have been formulated under my direct supervision. All questions answered and patient voiced understanding.   High complexity clinic visit addressing need for coronary ischemia/CHF evaluation with invasive coronary angiography, possible PCI and all attendant risks/benefits.

## 2025-06-05 DIAGNOSIS — M46.92 UNSPECIFIED INFLAMMATORY SPONDYLOPATHY, CERVICAL REGION: ICD-10-CM

## 2025-06-05 DIAGNOSIS — F32.1 CURRENT MODERATE EPISODE OF MAJOR DEPRESSIVE DISORDER WITHOUT PRIOR EPISODE: ICD-10-CM

## 2025-06-05 RX ORDER — PREDNISONE 50 MG/1
TABLET ORAL
Qty: 3 TABLET | Refills: 0 | Status: SHIPPED | OUTPATIENT
Start: 2025-06-05

## 2025-06-05 RX ORDER — DIPHENHYDRAMINE HCL 50 MG
CAPSULE ORAL
Qty: 3 CAPSULE | Refills: 0 | Status: SHIPPED | OUTPATIENT
Start: 2025-06-05

## 2025-06-05 RX ORDER — DULOXETIN HYDROCHLORIDE 60 MG/1
60 CAPSULE, DELAYED RELEASE ORAL
Qty: 90 CAPSULE | Refills: 3 | Status: SHIPPED | OUTPATIENT
Start: 2025-06-05

## 2025-06-05 RX ORDER — FAMOTIDINE 20 MG/1
TABLET, FILM COATED ORAL
Qty: 3 TABLET | Refills: 0 | Status: SHIPPED | OUTPATIENT
Start: 2025-06-05

## 2025-06-07 DIAGNOSIS — R33.9 INCOMPLETE EMPTYING OF BLADDER: ICD-10-CM

## 2025-06-07 DIAGNOSIS — E03.9 HYPOTHYROIDISM, UNSPECIFIED TYPE: ICD-10-CM

## 2025-06-07 DIAGNOSIS — N40.0 BPH WITHOUT OBSTRUCTION/LOWER URINARY TRACT SYMPTOMS: ICD-10-CM

## 2025-06-07 RX ORDER — DULOXETIN HYDROCHLORIDE 60 MG/1
1 CAPSULE, DELAYED RELEASE ORAL EVERY MORNING
COMMUNITY
Start: 2024-08-12 | End: 2025-08-12

## 2025-06-07 RX ORDER — TRAZODONE HYDROCHLORIDE 50 MG/1
1 TABLET ORAL NIGHTLY
COMMUNITY
Start: 2025-02-25 | End: 2026-02-25

## 2025-06-07 RX ORDER — FUROSEMIDE 40 MG/1
1 TABLET ORAL DAILY
COMMUNITY
Start: 2024-09-16

## 2025-06-07 RX ORDER — FLUTICASONE FUROATE, UMECLIDINIUM BROMIDE AND VILANTEROL TRIFENATATE 200; 62.5; 25 UG/1; UG/1; UG/1
1 POWDER RESPIRATORY (INHALATION) DAILY
COMMUNITY
Start: 2025-02-06

## 2025-06-07 RX ORDER — UBIDECARENONE 30 MG
1 CAPSULE ORAL EVERY MORNING
COMMUNITY

## 2025-06-07 RX ORDER — ROSUVASTATIN CALCIUM 20 MG/1
1 TABLET, COATED ORAL NIGHTLY
COMMUNITY
Start: 2024-06-13 | End: 2025-06-13

## 2025-06-07 RX ORDER — ATENOLOL 25 MG/1
1 TABLET ORAL DAILY
COMMUNITY
Start: 2024-11-08

## 2025-06-07 RX ORDER — LOSARTAN POTASSIUM 50 MG/1
1 TABLET ORAL EVERY MORNING
COMMUNITY
Start: 2025-02-25

## 2025-06-07 RX ORDER — CHOLECALCIFEROL (VITAMIN D3) 25 MCG
1 TABLET ORAL EVERY MORNING
COMMUNITY

## 2025-06-07 NOTE — TELEPHONE ENCOUNTER
No care due was identified.  [facility] Embedded Care Due Messages. Reference number: [id number].   6/07/2025 7:09:15 AM CDT

## 2025-06-08 RX ORDER — LEVOTHYROXINE SODIUM 50 UG/1
50 TABLET ORAL
Qty: 66 TABLET | Refills: 1 | Status: SHIPPED | OUTPATIENT
Start: 2025-06-08

## 2025-06-08 NOTE — TELEPHONE ENCOUNTER
Refill Decision Note   Cedric Maude  is requesting a refill authorization.  Brief Assessment and Rationale for Refill:  Approve     Medication Therapy Plan:         Comments:     Note composed:7:57 AM 06/08/2025

## 2025-06-09 ENCOUNTER — PATIENT MESSAGE (OUTPATIENT)
Dept: ADMINISTRATIVE | Facility: OTHER | Age: 65
End: 2025-06-09
Payer: MEDICARE

## 2025-06-10 RX ORDER — TAMSULOSIN HYDROCHLORIDE 0.4 MG/1
1 CAPSULE ORAL
Qty: 90 CAPSULE | Refills: 0 | Status: SHIPPED | OUTPATIENT
Start: 2025-06-10

## 2025-06-10 NOTE — TELEPHONE ENCOUNTER
MA received medication refill for  tamsulosin (FLOMAX) 0.4 mg Cap. Last office visit was 12/17/24 with instructions to Take 1 capsule (0.4 mg total) by mouth once daily. Follow up demetrio scheduled for 4/14/25 which was canceled and rescheduled to 6/20/25.Last prescription for tamsulosin (FLOMAX) 0.4 mg Cap was sent to the pharmacy on 4/12/24 with 3 refills. Prescription routed to provider.    Please see the attached refill request.

## 2025-06-11 ENCOUNTER — LAB VISIT (OUTPATIENT)
Dept: LAB | Facility: HOSPITAL | Age: 65
End: 2025-06-11
Attending: UROLOGY
Payer: MEDICARE

## 2025-06-11 DIAGNOSIS — R06.09 DOE (DYSPNEA ON EXERTION): ICD-10-CM

## 2025-06-11 DIAGNOSIS — I50.20 HFREF (HEART FAILURE WITH REDUCED EJECTION FRACTION): ICD-10-CM

## 2025-06-11 DIAGNOSIS — R94.31 ABNORMAL ECG: ICD-10-CM

## 2025-06-11 DIAGNOSIS — N40.1 BPH WITH URINARY OBSTRUCTION: ICD-10-CM

## 2025-06-11 DIAGNOSIS — I34.0 NONRHEUMATIC MITRAL VALVE REGURGITATION: ICD-10-CM

## 2025-06-11 DIAGNOSIS — N13.8 BPH WITH URINARY OBSTRUCTION: ICD-10-CM

## 2025-06-11 DIAGNOSIS — I10 ESSENTIAL HYPERTENSION: ICD-10-CM

## 2025-06-11 DIAGNOSIS — I25.118 CORONARY ARTERY DISEASE OF NATIVE ARTERY OF NATIVE HEART WITH STABLE ANGINA PECTORIS: ICD-10-CM

## 2025-06-11 DIAGNOSIS — I49.1 PAC (PREMATURE ATRIAL CONTRACTION): ICD-10-CM

## 2025-06-11 DIAGNOSIS — Z98.890 S/P ABLATION OF ATRIAL FLUTTER: ICD-10-CM

## 2025-06-11 DIAGNOSIS — R93.1 ELEVATED CORONARY ARTERY CALCIUM SCORE: ICD-10-CM

## 2025-06-11 DIAGNOSIS — I48.0 PAROXYSMAL ATRIAL FIBRILLATION: ICD-10-CM

## 2025-06-11 DIAGNOSIS — Z86.79 S/P ABLATION OF ATRIAL FLUTTER: ICD-10-CM

## 2025-06-11 DIAGNOSIS — I34.1 MVP (MITRAL VALVE PROLAPSE): ICD-10-CM

## 2025-06-11 LAB
ANION GAP (OHS): 12 MMOL/L (ref 8–16)
APTT PPP: 34.5 SECONDS (ref 21–32)
BUN SERPL-MCNC: 15 MG/DL (ref 8–23)
CALCIUM SERPL-MCNC: 9.2 MG/DL (ref 8.7–10.5)
CHLORIDE SERPL-SCNC: 97 MMOL/L (ref 95–110)
CO2 SERPL-SCNC: 30 MMOL/L (ref 23–29)
CREAT SERPL-MCNC: 1.3 MG/DL (ref 0.5–1.4)
ERYTHROCYTE [DISTWIDTH] IN BLOOD BY AUTOMATED COUNT: 13 % (ref 11.5–14.5)
GFR SERPLBLD CREATININE-BSD FMLA CKD-EPI: >60 ML/MIN/1.73/M2
GLUCOSE SERPL-MCNC: 77 MG/DL (ref 70–110)
HCT VFR BLD AUTO: 44.4 % (ref 40–54)
HGB BLD-MCNC: 14.3 GM/DL (ref 14–18)
INR PPP: 1.1 (ref 0.8–1.2)
MCH RBC QN AUTO: 30.4 PG (ref 27–31)
MCHC RBC AUTO-ENTMCNC: 32.2 G/DL (ref 32–36)
MCV RBC AUTO: 94 FL (ref 82–98)
PLATELET # BLD AUTO: 175 K/UL (ref 150–450)
PMV BLD AUTO: 11.6 FL (ref 9.2–12.9)
POTASSIUM SERPL-SCNC: 4.4 MMOL/L (ref 3.5–5.1)
PROTHROMBIN TIME: 11.5 SECONDS (ref 9–12.5)
PSA SERPL-MCNC: 4.42 NG/ML
RBC # BLD AUTO: 4.71 M/UL (ref 4.6–6.2)
SODIUM SERPL-SCNC: 139 MMOL/L (ref 136–145)
WBC # BLD AUTO: 6.16 K/UL (ref 3.9–12.7)

## 2025-06-11 PROCEDURE — 85730 THROMBOPLASTIN TIME PARTIAL: CPT

## 2025-06-11 PROCEDURE — 84153 ASSAY OF PSA TOTAL: CPT

## 2025-06-11 PROCEDURE — 85027 COMPLETE CBC AUTOMATED: CPT

## 2025-06-11 PROCEDURE — 36415 COLL VENOUS BLD VENIPUNCTURE: CPT | Mod: PO

## 2025-06-11 PROCEDURE — 80048 BASIC METABOLIC PNL TOTAL CA: CPT

## 2025-06-11 PROCEDURE — 85610 PROTHROMBIN TIME: CPT

## 2025-06-18 ENCOUNTER — TELEPHONE (OUTPATIENT)
Dept: CARDIOLOGY | Facility: CLINIC | Age: 65
End: 2025-06-18
Payer: MEDICARE

## 2025-06-18 ENCOUNTER — HOSPITAL ENCOUNTER (OUTPATIENT)
Facility: HOSPITAL | Age: 65
Discharge: HOME | End: 2025-06-18
Attending: INTERNAL MEDICINE | Admitting: INTERNAL MEDICINE
Payer: MEDICARE

## 2025-06-18 VITALS
BODY MASS INDEX: 30.06 KG/M2 | SYSTOLIC BLOOD PRESSURE: 111 MMHG | DIASTOLIC BLOOD PRESSURE: 66 MMHG | HEART RATE: 66 BPM | WEIGHT: 210 LBS | TEMPERATURE: 98 F | HEIGHT: 70 IN | OXYGEN SATURATION: 94 % | RESPIRATION RATE: 26 BRPM

## 2025-06-18 DIAGNOSIS — I44.0 FIRST DEGREE AV BLOCK: ICD-10-CM

## 2025-06-18 DIAGNOSIS — I50.32 CHRONIC DIASTOLIC HEART FAILURE: ICD-10-CM

## 2025-06-18 DIAGNOSIS — I25.118 CORONARY ARTERY DISEASE OF NATIVE ARTERY OF NATIVE HEART WITH STABLE ANGINA PECTORIS: ICD-10-CM

## 2025-06-18 DIAGNOSIS — I34.0 NONRHEUMATIC MITRAL VALVE REGURGITATION: ICD-10-CM

## 2025-06-18 DIAGNOSIS — Z98.890 S/P ABLATION OF ATRIAL FLUTTER: ICD-10-CM

## 2025-06-18 DIAGNOSIS — I48.0 PAROXYSMAL ATRIAL FIBRILLATION: Primary | ICD-10-CM

## 2025-06-18 DIAGNOSIS — I50.32 CHRONIC DIASTOLIC (CONGESTIVE) HEART FAILURE: ICD-10-CM

## 2025-06-18 DIAGNOSIS — Z86.79 S/P ABLATION OF ATRIAL FLUTTER: ICD-10-CM

## 2025-06-18 DIAGNOSIS — R06.09 DOE (DYSPNEA ON EXERTION): ICD-10-CM

## 2025-06-18 LAB — CATH EF QUANTITATIVE: 55 %

## 2025-06-18 PROCEDURE — 93460 R&L HRT ART/VENTRICLE ANGIO: CPT | Performed by: INTERNAL MEDICINE

## 2025-06-18 PROCEDURE — C1751 CATH, INF, PER/CENT/MIDLINE: HCPCS | Performed by: INTERNAL MEDICINE

## 2025-06-18 PROCEDURE — 63600175 PHARM REV CODE 636 W HCPCS: Performed by: INTERNAL MEDICINE

## 2025-06-18 PROCEDURE — C1894 INTRO/SHEATH, NON-LASER: HCPCS | Performed by: INTERNAL MEDICINE

## 2025-06-18 PROCEDURE — 93460 R&L HRT ART/VENTRICLE ANGIO: CPT | Mod: 26,,, | Performed by: INTERNAL MEDICINE

## 2025-06-18 PROCEDURE — C1769 GUIDE WIRE: HCPCS | Performed by: INTERNAL MEDICINE

## 2025-06-18 PROCEDURE — 99153 MOD SED SAME PHYS/QHP EA: CPT | Performed by: INTERNAL MEDICINE

## 2025-06-18 PROCEDURE — 25500020 PHARM REV CODE 255: Performed by: INTERNAL MEDICINE

## 2025-06-18 PROCEDURE — 99152 MOD SED SAME PHYS/QHP 5/>YRS: CPT | Mod: ,,, | Performed by: INTERNAL MEDICINE

## 2025-06-18 PROCEDURE — 25000003 PHARM REV CODE 250: Performed by: INTERNAL MEDICINE

## 2025-06-18 PROCEDURE — 99152 MOD SED SAME PHYS/QHP 5/>YRS: CPT | Performed by: INTERNAL MEDICINE

## 2025-06-18 RX ORDER — FENTANYL CITRATE 50 UG/ML
INJECTION, SOLUTION INTRAMUSCULAR; INTRAVENOUS
Status: DISCONTINUED | OUTPATIENT
Start: 2025-06-18 | End: 2025-06-18 | Stop reason: HOSPADM

## 2025-06-18 RX ORDER — LIDOCAINE HYDROCHLORIDE 20 MG/ML
INJECTION, SOLUTION INFILTRATION; PERINEURAL
Status: DISCONTINUED | OUTPATIENT
Start: 2025-06-18 | End: 2025-06-18 | Stop reason: HOSPADM

## 2025-06-18 RX ORDER — DIPHENHYDRAMINE HCL 50 MG
50 CAPSULE ORAL ONCE
Status: COMPLETED | OUTPATIENT
Start: 2025-06-18 | End: 2025-06-18

## 2025-06-18 RX ORDER — ONDANSETRON 8 MG/1
8 TABLET, ORALLY DISINTEGRATING ORAL EVERY 8 HOURS PRN
Status: DISCONTINUED | OUTPATIENT
Start: 2025-06-18 | End: 2025-06-18 | Stop reason: HOSPADM

## 2025-06-18 RX ORDER — VERAPAMIL HYDROCHLORIDE 2.5 MG/ML
INJECTION INTRAVENOUS
Status: DISCONTINUED | OUTPATIENT
Start: 2025-06-18 | End: 2025-06-18 | Stop reason: HOSPADM

## 2025-06-18 RX ORDER — HEPARIN SODIUM 1000 [USP'U]/ML
INJECTION, SOLUTION INTRAVENOUS; SUBCUTANEOUS
Status: DISCONTINUED | OUTPATIENT
Start: 2025-06-18 | End: 2025-06-18 | Stop reason: HOSPADM

## 2025-06-18 RX ORDER — SODIUM CHLORIDE 0.9 % (FLUSH) 0.9 %
10 SYRINGE (ML) INJECTION
Status: DISCONTINUED | OUTPATIENT
Start: 2025-06-18 | End: 2025-06-18 | Stop reason: HOSPADM

## 2025-06-18 RX ORDER — SODIUM CHLORIDE 9 MG/ML
INJECTION, SOLUTION INTRAVENOUS CONTINUOUS
Status: DISCONTINUED | OUTPATIENT
Start: 2025-06-18 | End: 2025-06-18 | Stop reason: HOSPADM

## 2025-06-18 RX ORDER — MIDAZOLAM HYDROCHLORIDE 1 MG/ML
INJECTION, SOLUTION INTRAMUSCULAR; INTRAVENOUS
Status: DISCONTINUED | OUTPATIENT
Start: 2025-06-18 | End: 2025-06-18 | Stop reason: HOSPADM

## 2025-06-18 RX ORDER — SODIUM CHLORIDE 9 MG/ML
INJECTION, SOLUTION INTRAVENOUS CONTINUOUS
Status: ACTIVE | OUTPATIENT
Start: 2025-06-18 | End: 2025-06-18

## 2025-06-18 RX ORDER — ACETAMINOPHEN 325 MG/1
650 TABLET ORAL EVERY 4 HOURS PRN
Status: DISCONTINUED | OUTPATIENT
Start: 2025-06-18 | End: 2025-06-18 | Stop reason: HOSPADM

## 2025-06-18 RX ORDER — DIAZEPAM 5 MG/1
5 TABLET ORAL
Status: COMPLETED | OUTPATIENT
Start: 2025-06-18 | End: 2025-06-18

## 2025-06-18 RX ORDER — ASPIRIN 325 MG
325 TABLET ORAL ONCE
Status: COMPLETED | OUTPATIENT
Start: 2025-06-18 | End: 2025-06-18

## 2025-06-18 RX ADMIN — SODIUM CHLORIDE: 9 INJECTION, SOLUTION INTRAVENOUS at 08:06

## 2025-06-18 RX ADMIN — ASPIRIN 325 MG: 325 TABLET ORAL at 08:06

## 2025-06-18 RX ADMIN — DIAZEPAM 5 MG: 5 TABLET ORAL at 08:06

## 2025-06-18 RX ADMIN — DIPHENHYDRAMINE HYDROCHLORIDE 50 MG: 50 CAPSULE ORAL at 08:06

## 2025-06-18 NOTE — Clinical Note
The PA catheter was repositioned to the right ventricle. Hemodynamics were performed. [>50% of Time Spent on Counseling for ____] : Greater than 50% of the encounter time was spent on counseling for [unfilled] [Time Spent: ___ minutes] : I have spent [unfilled] minutes of face to face time with the patient

## 2025-06-18 NOTE — PLAN OF CARE
Discharge instructions rev'd w/ pt and dtr, BVU. VSS. No complaints. L radial arterial site WDL, dressing CDI, no hematoma or bleeding, L wrist immobilizer secure. IV removed. Pt wheeled to car,t

## 2025-06-18 NOTE — OP NOTE
[facility] - Cath Lab ([facility])  Cardiac Catheterization  Procedure and Discharge Note    SUMMARY     Cedric Santoro  [id number]  Tangela Perez MD    Date of Procedure: 6/18/2025    Procedure: 1. RHC 2. LHC 3. CORONARY ANGIOGRAM 4. LEFT VENTRICULOGRAM    Provider: Kal Garzon MD    Indications: He was referred for cardiac catheterization to further evaluate diastolic CHF, PHTN, CAD.    Pre-Procedure Diagnosis:  diastolic CHF, PHTN, CAD.    Post-Procedure Diagnosis: Nonobstructive CAD, mild PHTN.    Anesthesia: RN IV Sedation    Description of the Findings of the Procedure:     The risks, benefits, complications, treatment options, and expected outcomes were discussed with the patient. The patient and/or family concurred with the proposed plan, giving informed consent. Patient was brought to the cath lab after IV hydration was begun and oral premedication was given.       Findings:  Nonobstructive CAD.  EF 55%  LVEDP 22    RHC:  RA 12  RV 40/11 (15)  PA 43/18 (29)  PCWP 19  CO 7.5 l/m  TPG 10 mmHg   D/S, 1.3 woods units.    Left radial TR band.  Left brachial vein manual sheath removal.  See report for full details.        Complications: None; patient tolerated the procedure well.    Estimated Blood Loss (EBL): Minimal           Implants: none    Specimens: none    Condition: stable    Disposition: PACU-hemodynamicallystable    Attestation: I was present and scrubbed for the entire procedure.    Recommendations:    Usual post cath care.  Continue OMT for CAD/diastolic CHF.  D/c pt home.c  Cardiac diet.  Continue current meds.  F/u Cards clinic 1 week.

## 2025-06-18 NOTE — DISCHARGE INSTRUCTIONS
"  Post-op Heart Catheterization    1. DIET: It is advisable for you to follow a diet that limits the intake of salt, sugar, saturated fats and cholesterol.     2. DRIVING: Due to sedation you received during your procedure, DO NOT drive or operate machinery for 24 hours. Avoid making critical decisions or signing legal documents until tomorrow.    3. ACTIVITY: AVOID activities that require bending of the affected arm/wrist for 2 days. You may shower tomorrow. REMOVE the dressing tomorrow.  Apply a bandaid to site after shower.  WEAR wrist immobilizer until tomorrow night.    You may RESUME your normal activities or prescribed exercise program as instructed by your physician after 3 days.                                                                                                                 4. WOUND CARE: It is not unusual to have a small amount of bruising to appear at or near the puncture site. It is also common to have a tender "knot" develop beneath the skin at the puncture site of the wrist/arm. This is usually scar tissue and is not a cause for concern or alarm. This tender knot may take several weeks to fully resolve. The bruise will usually spread over several days. However, if the lump gets bigger, call your doctor immediately.    5. DISCOMFORT: For general discomfort at the puncture site, you may take 1 or 2 Acetaminophen (Tylenol) tablets every 4 - 6 hours as needed. (Do not take more than 4000 mg a day)    6. SIGNS AND SYMPTOMS TO REPORT:  Call your physician immediately if any of the following occur:                                            1. Loss of feeling, warmth or color to the affected arm/wrist                                                                                                          2. Mild beeding from the site                 3. Pain that is sudden, sharp or persistent in the affected arm/wrist                 4. Swelling or a change in "lump" size, increased redness or " drainage at the puncture site                                                                               5. High fever (101 degrees or higher)    7. GO TO  THE EMERGENCY ROOM OR CALL 911 IF YOU HAVE: Chest pains or discomforts not relieved with 3 nitroglycerin doses (sublingual tablets or spray), numbness or severe pain of limb, if your limb becomes cold or discolored or if you develop uncontrolled bleeding from the puncture site (quickly apply firm, direct pressure above the site).

## 2025-06-18 NOTE — TELEPHONE ENCOUNTER
Got pt scheduled for hosp f/u with [facility] since [facility] did not have anything sooner.         Copied from CRM #[id number]. Topic: Appointments - Hospital Follow Up  >> Jun 18, 2025 11:10 WILIAM Kingsley wrote:  Justyna is calling from [facility] to get a f/u pt is being discharged on 06/18.please call pt back at [phone]

## 2025-06-20 ENCOUNTER — OFFICE VISIT (OUTPATIENT)
Dept: UROLOGY | Facility: CLINIC | Age: 65
End: 2025-06-20
Payer: MEDICARE

## 2025-06-20 VITALS
DIASTOLIC BLOOD PRESSURE: 70 MMHG | HEIGHT: 70 IN | HEART RATE: 55 BPM | SYSTOLIC BLOOD PRESSURE: 118 MMHG | BODY MASS INDEX: 30.08 KG/M2 | WEIGHT: 210.13 LBS

## 2025-06-20 DIAGNOSIS — R97.20 ELEVATED PSA: ICD-10-CM

## 2025-06-20 DIAGNOSIS — N40.0 BPH WITHOUT OBSTRUCTION/LOWER URINARY TRACT SYMPTOMS: ICD-10-CM

## 2025-06-20 DIAGNOSIS — R33.9 INCOMPLETE EMPTYING OF BLADDER: ICD-10-CM

## 2025-06-20 DIAGNOSIS — N40.1 BPH WITH URINARY OBSTRUCTION: Primary | ICD-10-CM

## 2025-06-20 DIAGNOSIS — N13.8 BPH WITH URINARY OBSTRUCTION: Primary | ICD-10-CM

## 2025-06-20 PROCEDURE — 99999 PR PBB SHADOW E&M-EST. PATIENT-LVL V: CPT | Mod: PBBFAC,,, | Performed by: UROLOGY

## 2025-06-20 RX ORDER — TAMSULOSIN HYDROCHLORIDE 0.4 MG/1
0.4 CAPSULE ORAL DAILY
Qty: 90 CAPSULE | Refills: 1 | Status: SHIPPED | OUTPATIENT
Start: 2025-06-20

## 2025-06-20 NOTE — PROGRESS NOTES
Chief Complaint:   Encounter Diagnoses   Name Primary?    BPH with urinary obstruction Yes    Elevated PSA     BPH without obstruction/lower urinary tract symptoms     Incomplete emptying of bladder        HPI:  HPI Cedric Santoro bassam 65 y.o. male who presents with Follow up from elevated PSA.  He had a prostate health index suggesting a 10% risk.  His PSA has been on close observation with twice a year PSAs.  PSA is slowly rising.  He does have enlarged prostate in his taking tamsulosin with no trouble voiding.  He recently underwent a cardiac catheterization showing no significant obstructive coronary disease.    History:  Social History[1]  Past Medical History:   Diagnosis Date    Abnormal ECG 10/04/2022    Chronic diastolic heart failure 06/13/2024    Coronary artery disease of native artery of native heart with stable angina pectoris 09/06/2023    Essential hypertension 02/24/2020    First degree AV block 06/13/2024    Hypertension     MVP (mitral valve prolapse) 10/26/2022    Nonrheumatic mitral valve regurgitation 10/26/2022    Palpitation 03/05/2020    Paroxysmal atrial fibrillation 03/05/2020    Paroxysmal atrial flutter 10/04/2022    Raynaud's syndrome     Vasovagal syncopes      Past Surgical History:   Procedure Laterality Date    ABLATION, ATRIAL FLUTTER, TYPICAL N/A 12/15/2022    Procedure: Ablation, Atrial Flutter, Typical;  Surgeon: Matty Hendricks MD;  Location: [facility] EP LAB;  Service: Cardiology;  Laterality: N/A;  AFL, RFA, NICOLASA, ARIELA, anes, MB, 3 Prep    CARPAL TUNNEL RELEASE Right 04/02/2024    [facility]    CATHETERIZATION OF BOTH LEFT AND RIGHT HEART N/A 6/18/2025    Procedure: CATHETERIZATION, HEART, BOTH LEFT AND RIGHT;  Surgeon: Kal Garzon MD;  Location: [facility] CATH LAB;  Service: Cardiology;  Laterality: N/A;    CHOLECYSTECTOMY      ECHOCARDIOGRAM,TRANSESOPHAGEAL N/A 12/15/2022    Procedure: Transesophageal echo (ARIELA) intra-procedure log documentation;  Surgeon: Nisreen  "GAVIN Fenton MD;  Location: [facility] EP LAB;  Service: Cardiology;  Laterality: N/A;    EYE SURGERY      GALLBLADDER SURGERY      HERNIA REPAIR      LEFT HEART CATHETERIZATION Left 05/24/2023    Procedure: Left heart cath;  Surgeon: Kal Garzon MD;  Location: [facility] CATH LAB;  Service: Cardiology;  Laterality: Left;  may need to sign consent//    OPEN REDUCTION AND INTERNAL FIXATION (ORIF) OF FRACTURE OF METATARSAL BONE Left 11/06/2023    Procedure: ORIF, FRACTURE, METATARSAL BONE;  Surgeon: Aleksandr Cheek DPM;  Location: [facility] OR;  Service: Podiatry;  Laterality: Left;    THROAT SURGERY      TRANSESOPHAGEAL ECHOCARDIOGRAPHY N/A 02/27/2023    Procedure: ECHOCARDIOGRAM, TRANSESOPHAGEAL;  Surgeon: Samuel Del Valle MD;  Location: [facility] CATH LAB;  Service: Cardiology;  Laterality: N/A;    TREATMENT OF CARDIAC ARRHYTHMIA  12/15/2022    Procedure: Cardioversion or Defibrillation;  Surgeon: Matty Hendricks MD;  Location: [facility] EP LAB;  Service: Cardiology;;     Family History   Problem Relation Name Age of Onset    Cancer Mother          Multiple myeloma    Pacemaker/defibrilator Father Reyes     Macular degeneration Father Reyes     COPD Father Reyes     Hearing loss Father Reyes     Macular degeneration Brother         Medications Ordered Prior to Encounter[2]     Objective:     Vitals:    06/20/25 0957   BP: 118/70   BP Location: Right arm   Patient Position: Sitting   Pulse: (!) 55   Weight: 95.3 kg (210 lb 1.6 oz)   Height: 5' 10" (1.778 m)      BMI Readings from Last 1 Encounters:   06/20/25 30.15 kg/m²          Physical Exam  No acute distress alert and oriented   Respirations even unlabored   Abdomen is soft nontender     Postvoid residual 2 cc     Urinalysis clear  Lab Results   Component Value Date    PSADIAG 4.3 (H) 12/13/2024    PSADIAG 3.7 04/12/2024    PSA 4.42 (H) 06/11/2025    PSA 4.2 (H) 05/29/2024    PSA 3.1 04/19/2023    PSA 3.0 04/12/2022    PSA 2.4 02/05/2020        Lab Results   Component Value Date "    CREATININE 1.3 06/11/2025      Assessment:       1. BPH with urinary obstruction    2. Elevated PSA    3. BPH without obstruction/lower urinary tract symptoms    4. Incomplete emptying of bladder        Plan:     1. BPH with urinary obstruction    2. Elevated PSA    3. BPH without obstruction/lower urinary tract symptoms    4. Incomplete emptying of bladder       Orders Placed This Encounter    Prostate Specific Antigen, Diagnostic    tamsulosin (FLOMAX) 0.4 mg Cap      BPH on tamsulosin.  Patient would like to continue.  Elevated PSA is very stable.  Slow rise suggests BPH.  I discussed role of MRI if PSA continues to rise.  Plan repeat evaluation 6 months.       [1]   Social History  Tobacco Use    Smoking status: Never    Smokeless tobacco: Never   Substance Use Topics    Alcohol use: No    Drug use: No   [2]   Current Outpatient Medications on File Prior to Visit   Medication Sig Dispense Refill    albuterol (PROVENTIL/VENTOLIN HFA) 90 mcg/actuation inhaler Inhale 2 puffs into the lungs every 4 (four) hours as needed.      apixaban (ELIQUIS) 5 mg Tab TAKE 1 TABLET BY MOUTH TWICE A DAY 60 tablet 12    atenoloL (TENORMIN) 25 MG tablet TAKE 1 TABLET BY MOUTH EVERY DAY 90 tablet 3    atenoloL (TENORMIN) 25 MG tablet Take 1 tablet by mouth once daily.      b complex vitamins tablet Take 1 tablet by mouth once daily.      baclofen (LIORESAL) 20 MG tablet Take 1 tablet (20 mg total) by mouth 2 (two) times daily. 180 tablet 1    co-enzyme Q-10 30 mg capsule Take 30 mg by mouth once daily.      co-enzyme Q-10 30 mg capsule Take 1 capsule by mouth every morning.      DULoxetine (CYMBALTA) 60 MG capsule TAKE 1 CAPSULE BY MOUTH EVERY DAY 90 capsule 3    DULoxetine (CYMBALTA) 60 MG capsule Take 1 capsule by mouth every morning.      fluticasone-umeclidin-vilanter (TRELEGY ELLIPTA) 200-62.5-25 mcg inhaler Inhale 1 puff into the lungs once daily. 180 each 3    fluticasone-umeclidin-vilanter (TRELEGY ELLIPTA) 200-62.5-25 mcg  inhaler Inhale 1 puff into the lungs once daily.      furosemide (LASIX) 40 MG tablet TAKE 1 TABLET BY MOUTH EVERY DAY 90 tablet 5    furosemide (LASIX) 40 MG tablet Take 1 tablet by mouth once daily.      inhalation spacing device (BREATHERITE MDI SPACER) Use as directed for inhalation. 1 each 0    levothyroxine (SYNTHROID) 50 MCG tablet Take 1 tablet (50 mcg total) by mouth before breakfast. Take on Monday, Tuesday, Wednesday, Thursday, Friday only. 66 tablet 1    losartan (COZAAR) 50 MG tablet Take 1 tablet (50 mg total) by mouth once daily. 90 tablet 3    losartan (COZAAR) 50 MG tablet Take 1 tablet by mouth every morning.      magnesium 30 mg Tab Take 1 tablet by mouth once daily.      multivitamin (THERAGRAN) per tablet Take 1 tablet by mouth once daily.      rosuvastatin (CRESTOR) 20 MG tablet TAKE 1 TABLET BY MOUTH EVERY DAY IN THE EVENING 90 tablet 3    traMADoL (ULTRAM) 50 mg tablet Take by mouth.      traZODone (DESYREL) 50 MG tablet Take 1 tablet (50 mg total) by mouth every evening. 30 tablet 11    traZODone (DESYREL) 50 MG tablet Take 1 tablet by mouth every evening.      VITAMIN B COMPLEX ORAL Take 1 tablet by mouth every morning.      vitamin D (VITAMIN D3) 1000 units Tab Take 1,000 Units by mouth once daily.      vitamin D (VITAMIN D3) 1000 units Tab Take 1 tablet by mouth every morning.      [DISCONTINUED] tamsulosin (FLOMAX) 0.4 mg Cap TAKE 1 CAPSULE BY MOUTH EVERY DAY 90 capsule 0     No current facility-administered medications on file prior to visit.

## 2025-06-23 ENCOUNTER — OFFICE VISIT (OUTPATIENT)
Dept: CARDIOLOGY | Facility: CLINIC | Age: 65
End: 2025-06-23
Payer: MEDICARE

## 2025-06-23 VITALS
BODY MASS INDEX: 29.98 KG/M2 | OXYGEN SATURATION: 94 % | DIASTOLIC BLOOD PRESSURE: 66 MMHG | SYSTOLIC BLOOD PRESSURE: 110 MMHG | HEIGHT: 70 IN | WEIGHT: 209.44 LBS | HEART RATE: 78 BPM

## 2025-06-23 DIAGNOSIS — I48.92 PAROXYSMAL ATRIAL FLUTTER: ICD-10-CM

## 2025-06-23 DIAGNOSIS — I49.1 PAC (PREMATURE ATRIAL CONTRACTION): ICD-10-CM

## 2025-06-23 DIAGNOSIS — I34.0 NONRHEUMATIC MITRAL VALVE REGURGITATION: ICD-10-CM

## 2025-06-23 DIAGNOSIS — K76.0 STEATOSIS OF LIVER: ICD-10-CM

## 2025-06-23 DIAGNOSIS — R06.09 DOE (DYSPNEA ON EXERTION): ICD-10-CM

## 2025-06-23 DIAGNOSIS — I25.118 CORONARY ARTERY DISEASE OF NATIVE ARTERY OF NATIVE HEART WITH STABLE ANGINA PECTORIS: Primary | ICD-10-CM

## 2025-06-23 DIAGNOSIS — R93.1 ELEVATED CORONARY ARTERY CALCIUM SCORE: ICD-10-CM

## 2025-06-23 DIAGNOSIS — I73.00 RAYNAUD'S PHENOMENON WITHOUT GANGRENE: ICD-10-CM

## 2025-06-23 DIAGNOSIS — G47.33 SEVERE OBSTRUCTIVE SLEEP APNEA: ICD-10-CM

## 2025-06-23 DIAGNOSIS — Z98.890 S/P ABLATION OF ATRIAL FLUTTER: ICD-10-CM

## 2025-06-23 DIAGNOSIS — I48.0 PAROXYSMAL ATRIAL FIBRILLATION: ICD-10-CM

## 2025-06-23 DIAGNOSIS — I44.0 FIRST DEGREE AV BLOCK: ICD-10-CM

## 2025-06-23 DIAGNOSIS — I10 ESSENTIAL HYPERTENSION: ICD-10-CM

## 2025-06-23 DIAGNOSIS — R94.31 ABNORMAL ECG: ICD-10-CM

## 2025-06-23 DIAGNOSIS — I50.20 HFREF (HEART FAILURE WITH REDUCED EJECTION FRACTION): ICD-10-CM

## 2025-06-23 DIAGNOSIS — Z86.79 S/P ABLATION OF ATRIAL FLUTTER: ICD-10-CM

## 2025-06-23 PROCEDURE — 3074F SYST BP LT 130 MM HG: CPT | Mod: CPTII,S$GLB,, | Performed by: PHYSICIAN ASSISTANT

## 2025-06-23 PROCEDURE — 99214 OFFICE O/P EST MOD 30 MIN: CPT | Mod: S$GLB,,, | Performed by: PHYSICIAN ASSISTANT

## 2025-06-23 PROCEDURE — 3008F BODY MASS INDEX DOCD: CPT | Mod: CPTII,S$GLB,, | Performed by: PHYSICIAN ASSISTANT

## 2025-06-23 PROCEDURE — 1159F MED LIST DOCD IN RCRD: CPT | Mod: CPTII,S$GLB,, | Performed by: PHYSICIAN ASSISTANT

## 2025-06-23 PROCEDURE — 4010F ACE/ARB THERAPY RXD/TAKEN: CPT | Mod: CPTII,S$GLB,, | Performed by: PHYSICIAN ASSISTANT

## 2025-06-23 PROCEDURE — 3078F DIAST BP <80 MM HG: CPT | Mod: CPTII,S$GLB,, | Performed by: PHYSICIAN ASSISTANT

## 2025-06-23 PROCEDURE — 99999 PR PBB SHADOW E&M-EST. PATIENT-LVL III: CPT | Mod: PBBFAC,,, | Performed by: PHYSICIAN ASSISTANT

## 2025-06-23 NOTE — PROGRESS NOTES
Subjective   Patient ID:  Cedric Santoro is a 65 y.o. male who presents for follow-up of hospital f/u post R/LHC    HPI  Mr. Dumont is a 65 year old male patient whose current medical conditions include MVP, MR, HTN, PAF/PAFL s/p aflutter ablation 12/22, CMPY (afib induced), HTN, JIA, and 1st degree AV block who presents today for hospital f/u. Patient recently underwent R/LHC by Dr. Garzon on 6/18/2025 due to SOB/elevated LVEDP which showed non-obstructive CAD/mild PHTN and elevation of right-sided pressures due to diastolic CHF. He returns today and states he is doing well. Chronic LEBLANC and BLE edema, unchanged. No complaints of CP. No palpitations, near syncope, or syncope. BP stable. Compliant with meds. No access site complaints.      Review of Systems   Constitutional: Negative for chills, decreased appetite, fever and malaise/fatigue.   HENT:  Negative for congestion, hoarse voice and sore throat.    Eyes:  Negative for blurred vision and discharge.   Cardiovascular:  Positive for dyspnea on exertion and leg swelling. Negative for chest pain, claudication, cyanosis, irregular heartbeat, near-syncope, orthopnea, palpitations and paroxysmal nocturnal dyspnea.   Respiratory:  Negative for cough, hemoptysis, shortness of breath, snoring, sputum production and wheezing.    Endocrine: Negative for cold intolerance and heat intolerance.   Hematologic/Lymphatic: Negative for bleeding problem. Does not bruise/bleed easily.   Skin:  Negative for rash.   Musculoskeletal:  Positive for arthritis. Negative for back pain, joint pain, joint swelling, muscle cramps, muscle weakness and myalgias.   Gastrointestinal:  Negative for abdominal pain, constipation, diarrhea, heartburn, melena and nausea.   Genitourinary:  Negative for hematuria.   Neurological:  Negative for dizziness, focal weakness, headaches, light-headedness, loss of balance, numbness, paresthesias, seizures and weakness.   Psychiatric/Behavioral:  Negative for  "memory loss. The patient does not have insomnia.    Allergic/Immunologic: Negative for hives.     /66 (BP Location: Right arm, Patient Position: Sitting)   Pulse 78   Ht 5' 10" (1.778 m)   Wt 95 kg (209 lb 7 oz)   SpO2 (!) 94%   BMI 30.05 kg/m²          Objective     Physical Exam  Vitals and nursing note reviewed.   Constitutional:       General: He is not in acute distress.     Appearance: Normal appearance. He is well-developed. He is not diaphoretic.   HENT:      Head: Normocephalic and atraumatic.   Eyes:      General:         Right eye: No discharge.         Left eye: No discharge.      Pupils: Pupils are equal, round, and reactive to light.   Cardiovascular:      Rate and Rhythm: Normal rate and regular rhythm.      Heart sounds: S1 normal and S2 normal. Murmur heard.   Pulmonary:      Effort: Pulmonary effort is normal. No respiratory distress.      Breath sounds: Normal breath sounds. No wheezing or rales.   Abdominal:      General: There is no distension.   Musculoskeletal:      Right lower leg: No edema.      Left lower leg: No edema.   Skin:     General: Skin is warm and dry.      Findings: No erythema.      Comments: LUE radial access site C/D/I; no hematoma, intact radial pulse  L brachial access site C/D/I; no hematoma, intact radial pulse   Neurological:      Mental Status: He is alert and oriented to person, place, and time.   Psychiatric:         Mood and Affect: Mood normal.         Behavior: Behavior normal.         Thought Content: Thought content normal.     R/LHC 6/18/2025  Summary         Nonobstructive CAD.    Normal LVEF.    Mild PHTN and elevation of right sided pressures due to diastolic CHF.    TTE Results 3/6/2025  Summary  Show Result Comparison     Left Ventricle: The left ventricle is normal in size. Mildly increased wall thickness. There is mild concentric hypertrophy. There is normal systolic function with a visually estimated ejection fraction of 60 - 65%. Grade II " diastolic dysfunction.    Right Ventricle: The right ventricle has mild enlargement. Wall thickness is normal. Systolic function is borderline low.    Left Atrium: severely dilated Agitated saline study of the atrial septum is negative after vasalva maneuver, suggesting absence of intracardiac shunt at the atrial level.    Right Atrium: Right atrium is moderately dilated.    Mitral Valve: Mildly thickened leaflets. Mildly calcified leaflets. There is mild to moderate regurgitation.    Tricuspid Valve: There is mild regurgitation.    Pulmonic Valve: There is mild regurgitation.    IVC/SVC: Normal venous pressure at 3 mmHg.    There was agitated saline (bubble) used. Study is negative for shunt.        Assessment and Plan     1. Coronary artery disease of native artery of native heart with stable angina pectoris    2. Elevated coronary artery calcium score    3. Abnormal ECG    4. First degree AV block    5. LEBLANC (dyspnea on exertion)    6. Essential hypertension    7. HFrEF (heart failure with reduced ejection fraction)    8. Nonrheumatic mitral valve regurgitation    9. PAC (premature atrial contraction)    10. Paroxysmal atrial flutter    11. S/P ablation of atrial flutter    12. Paroxysmal atrial fibrillation    13. Raynaud's phenomenon without gangrene    14. Steatosis of liver    15. Severe obstructive sleep apnea      Presents for f/u. Doing clinically well. Stable CV wise. Reviewed cath findings. Continue OMT.   Plan:  -Continue current medical management and risk factor modification  -Cardiac, low salt diet  -Increase activity level, weight loss  -F/u with Dr. Garzon in 4 months

## 2025-07-01 ENCOUNTER — PATIENT MESSAGE (OUTPATIENT)
Dept: PULMONOLOGY | Facility: CLINIC | Age: 65
End: 2025-07-01
Payer: MEDICARE

## 2025-07-15 ENCOUNTER — TELEPHONE (OUTPATIENT)
Dept: PHARMACY | Facility: CLINIC | Age: 65
End: 2025-07-15
Payer: MEDICARE

## 2025-07-15 NOTE — TELEPHONE ENCOUNTER
[facility]/Population Health Chart Review & Patient Outreach Details For Medication Adherence Project    Reason for Outreach Encounter: 3rd Party payor non-compliance report ([employer], [employer], [employer], etc)  2.  Patient Outreach Method: Reviewed patient chart   3.   Medication in question:    Hypertension Medications              atenoloL (TENORMIN) 25 MG tablet TAKE 1 TABLET BY MOUTH EVERY DAY    furosemide (LASIX) 40 MG tablet TAKE 1 TABLET BY MOUTH EVERY DAY    losartan (COZAAR) 50 MG tablet Take 1 tablet (50 mg total) by mouth once daily.                 LF 90 ds 5/15/25    4.  Reviewed and or Updates Made To: Patient Chart  5. Outreach Outcomes and/or actions taken: Patient filled medication and is on track to be adherent  Additional Notes:

## 2025-07-17 ENCOUNTER — OFFICE VISIT (OUTPATIENT)
Dept: PULMONOLOGY | Facility: CLINIC | Age: 65
End: 2025-07-17
Payer: MEDICARE

## 2025-07-17 ENCOUNTER — HOSPITAL ENCOUNTER (OUTPATIENT)
Dept: RADIOLOGY | Facility: HOSPITAL | Age: 65
Discharge: HOME | End: 2025-07-17
Attending: INTERNAL MEDICINE
Payer: MEDICARE

## 2025-07-17 DIAGNOSIS — J98.4 RESTRICTIVE LUNG DISEASE: ICD-10-CM

## 2025-07-17 DIAGNOSIS — J98.4 MIXED RESTRICTIVE AND OBSTRUCTIVE LUNG DISEASE: ICD-10-CM

## 2025-07-17 DIAGNOSIS — J84.89 NSIP (NONSPECIFIC INTERSTITIAL PNEUMONIA): ICD-10-CM

## 2025-07-17 DIAGNOSIS — R06.09 DOE (DYSPNEA ON EXERTION): Primary | ICD-10-CM

## 2025-07-17 DIAGNOSIS — J44.9 MIXED RESTRICTIVE AND OBSTRUCTIVE LUNG DISEASE: ICD-10-CM

## 2025-07-17 PROCEDURE — 71250 CT THORAX DX C-: CPT | Mod: TC

## 2025-07-17 PROCEDURE — 71250 CT THORAX DX C-: CPT | Mod: 26,,, | Performed by: RADIOLOGY

## 2025-07-17 PROCEDURE — 4010F ACE/ARB THERAPY RXD/TAKEN: CPT | Mod: CPTII,95,, | Performed by: STUDENT IN AN ORGANIZED HEALTH CARE EDUCATION/TRAINING PROGRAM

## 2025-07-17 PROCEDURE — 98004 SYNCH AUDIO-VIDEO EST SF 10: CPT | Mod: 95,,, | Performed by: STUDENT IN AN ORGANIZED HEALTH CARE EDUCATION/TRAINING PROGRAM

## 2025-07-17 NOTE — PROGRESS NOTES
Chief complaint:  No chief complaint on file.     The patient location is: [place]  The chief complaint leading to consultation is:  Preoperative clearance    Visit type: audiovisual    Face to Face time with patient: 10  15 minutes of total time spent on the encounter, which includes face to face time and non-face to face time preparing to see the patient (eg, review of tests), Obtaining and/or reviewing separately obtained history, Documenting clinical information in the electronic or other health record, Independently interpreting results (not separately reported) and communicating results to the patient/family/caregiver, or Care coordination (not separately reported).     Each patient to whom he or she provides medical services by telemedicine is:  (1) informed of the relationship between the physician and patient and the respective role of any other health care provider with respect to management of the patient; and (2) notified that he or she may decline to receive medical services by telemedicine and may withdraw from such care at any time.    History of present illness -  HPI   Established clinic patient with Dr. Allred for JIA and ILD mediated restrictive lung disease.    Interval history   02/17/2025   Saw Dr. Allred, who tweaked his BiPAP settings at that time review of his compliance data showed excellent compliance with device usage, he was also referred to Cardiology due to decreased DLCO    07/17/2025   Comes back for follow-up.  Had repeat CT scan today.  Wanted clearance for shoulder surgery  He currently is at baseline from respiratory standpoint, does have dyspnea on exertion that has been present for the last 2-3 years after an episode of community acquired pneumonia.  Still somewhat fatigued today, and sleepy throughout the day    Physical examination -   There were no vitals filed for this visit.   Review of Systems   Constitutional:  Positive for malaise/fatigue.   HENT: Negative.      Eyes: Negative.    Respiratory:  Positive for shortness of breath.    Cardiovascular: Negative.    Gastrointestinal: Negative.    Musculoskeletal: Negative.    Skin: Negative.    Neurological: Negative.        Relevant data (Independently reviewed by me) -    Prior notes -   Pulmonary    Labs -   Noncontributory    Spirometry -  02/18/2025 basic physiology was consistent with restrictive lung disease with an FVC of 2.5 and an FEV1 of 2.0 L for 56 and 59% of predicted respectively    Imaging -   07/17/2025 CT scan of the chest consistent with interstitial lung disease with evidence of reticulation but no honeycombing or overt traction bronchiectasis.    Assessment & Plan    LEBLANC (dyspnea on exertion)    Restrictive lung disease    NSIP (nonspecific interstitial pneumonia)    Comes back for follow-up today feeling well no respiratory symptoms no recent chest infections.      Interstitial lung disease seems to be stable from a radiographic standpoint at this moment, we will need to trend his clinical status, breathing test and continue scans of the chest to ensure that the patient does not develop progressive pulmonary fibrosis and the need for antifibrotic therapy initiation.  Currently has no limitations to activities of daily living    For his residual EDS, his should follow up with Dr. Allred to discuss what else can be done for this.    Patient's currently at baseline from a respiratory standpoint and I see no factors that would increase his risk of perioperative respiratory complications.  He should proceed to surgery at the discretion of the     Guanako Hylton   07/17/2025

## 2025-07-21 ENCOUNTER — OFFICE VISIT (OUTPATIENT)
Dept: NEUROLOGY | Facility: CLINIC | Age: 65
End: 2025-07-21
Payer: MEDICARE

## 2025-07-21 DIAGNOSIS — H02.401 PTOSIS OF RIGHT EYELID: ICD-10-CM

## 2025-07-21 DIAGNOSIS — R25.2 BILATERAL LEG CRAMPS: Primary | ICD-10-CM

## 2025-07-21 PROCEDURE — 4010F ACE/ARB THERAPY RXD/TAKEN: CPT | Mod: CPTII,95,, | Performed by: PSYCHIATRY & NEUROLOGY

## 2025-07-21 PROCEDURE — 98005 SYNCH AUDIO-VIDEO EST LOW 20: CPT | Mod: 95,,, | Performed by: PSYCHIATRY & NEUROLOGY

## 2025-07-21 PROCEDURE — 1160F RVW MEDS BY RX/DR IN RCRD: CPT | Mod: CPTII,95,, | Performed by: PSYCHIATRY & NEUROLOGY

## 2025-07-21 PROCEDURE — 1159F MED LIST DOCD IN RCRD: CPT | Mod: CPTII,95,, | Performed by: PSYCHIATRY & NEUROLOGY

## 2025-07-21 RX ORDER — BACLOFEN 20 MG/1
20 TABLET ORAL 2 TIMES DAILY
Qty: 180 TABLET | Refills: 1 | Status: SHIPPED | OUTPATIENT
Start: 2025-07-21 | End: 2026-01-17

## 2025-07-21 NOTE — PROGRESS NOTES
The patient location is: [place]  The chief complaint leading to consultation is: Ptosis / Legs cramps.     Visit type: audiovisual    Face to Face time with patient: Yes.   20 minutes of total time spent on the encounter, which includes face to face time and non-face to face time preparing to see the patient   (eg, review of tests), Obtaining and/or reviewing separately obtained history,   Documenting clinical information in the electronic or other health record,   Independently interpreting results (not separately reported) and communicating results to the patient/family/caregiver,   or Care coordination (not separately reported).     Each patient to whom he or she provides medical services by telemedicine is:    Subjective:       Patient ID: Cedric Santoro is a 65 y.o. male.    Chief Complaint: Ptosis / Leg cramps.     History of Present Illness  The patient presented on 10/ 2024 for evaluation of Ptosis.   New issues: none  Legs cramps: stable / sporadic occurrence.   No side effects.     Mr. Santoro presents today for follow up.    OCULAR:  He reports recent eye doctor visit indicating worsening eye condition with significant visual impairment.   He experiences approximately 50% of the day with complete vision loss in one eye due to eyelid closure.   He was referred to a specialist following the recent eye exam and verbalizes concern about the progressive nature   of his eye condition and its impact on daily visual function.    CARDIOVASCULAR AND RESPIRATORY:  He reports ongoing management of heart conditions and lung conditions but states that aside from these specific health issues, he is otherwise doing well.    MUSCULOSKELETAL:  He reports bilateral shoulder pain and occasional nocturnal muscle cramps, which are mostly resolved.   He describes having few cramps at night and is largely asymptomatic at this time.    LABS / TEST RESULTS:  EMG performed by Dr. Pérez revealed findings related to spinal  degeneration.    MEDICATIONS:  He is currently taking Baclofen, Crestor, Celexa, Cymbalta, and Eliquis.   He previously trialed discontinuing Crestor for longer than three months,   which was unsuccessful and did not result in any significant changes.        The originating site (patient location) is: Home.    The distant site (neurologist location) is: [facility] at [facility].    The chief complaint leading to consultation is: Ptosis.    Visit type: Virtual visit with synchronous audio and video.    Consent: The patient verbally consented to participating in the video visit and informed that may   decline to receive medical services by telemedicine and may withdraw from such care at any time.    I discussed with the patient the nature of our telemedicine visits, that:    I  would evaluate the patient and recommend diagnostics and treatments based on my assessment.    Our sessions are not being recorded and that personal health information is protected.    Our team would provide follow up care in person if/when the patient needs it.    Virtual (video/telemedicine) visits have significant limitations.   A telemedicine exam is primarily focused on the history and what I can observe.   Several critical parts of the neurological exam cannot be performed.     Review of Systems      Ptosis worsening.  Legs Cramps - stable.    Current Medications[1]    Past Medical History:   Diagnosis Date    Abnormal ECG 10/04/2022    Chronic diastolic heart failure 06/13/2024    Coronary artery disease of native artery of native heart with stable angina pectoris 09/06/2023    Essential hypertension 02/24/2020    First degree AV block 06/13/2024    Hypertension     MVP (mitral valve prolapse) 10/26/2022    Nonrheumatic mitral valve regurgitation 10/26/2022    Palpitation 03/05/2020    Paroxysmal atrial fibrillation 03/05/2020    Paroxysmal atrial flutter 10/04/2022    Raynaud's syndrome     Vasovagal syncopes      Past  Surgical History:   Procedure Laterality Date    ABLATION, ATRIAL FLUTTER, TYPICAL N/A 12/15/2022    Procedure: Ablation, Atrial Flutter, Typical;  Surgeon: Matty Hendricks MD;  Location: [facility] EP LAB;  Service: Cardiology;  Laterality: N/A;  AFL, RFA, NICOLASA, ARIELA, anes, MB, 3 Prep    CARPAL TUNNEL RELEASE Right 04/02/2024    [facility]    CATHETERIZATION OF BOTH LEFT AND RIGHT HEART N/A 6/18/2025    Procedure: CATHETERIZATION, HEART, BOTH LEFT AND RIGHT;  Surgeon: Kal Garzon MD;  Location: [facility] CATH LAB;  Service: Cardiology;  Laterality: N/A;    CHOLECYSTECTOMY      ECHOCARDIOGRAM,TRANSESOPHAGEAL N/A 12/15/2022    Procedure: Transesophageal echo (ARIELA) intra-procedure log documentation;  Surgeon: Nisreen Fenton MD;  Location: [facility] EP LAB;  Service: Cardiology;  Laterality: N/A;    EYE SURGERY      GALLBLADDER SURGERY      HERNIA REPAIR      LEFT HEART CATHETERIZATION Left 05/24/2023    Procedure: Left heart cath;  Surgeon: Kal Garzon MD;  Location: [facility] CATH LAB;  Service: Cardiology;  Laterality: Left;  may need to sign consent//    OPEN REDUCTION AND INTERNAL FIXATION (ORIF) OF FRACTURE OF METATARSAL BONE Left 11/06/2023    Procedure: ORIF, FRACTURE, METATARSAL BONE;  Surgeon: Aleksandr Cheek DPM;  Location: [facility] OR;  Service: Podiatry;  Laterality: Left;    THROAT SURGERY      TRANSESOPHAGEAL ECHOCARDIOGRAPHY N/A 02/27/2023    Procedure: ECHOCARDIOGRAM, TRANSESOPHAGEAL;  Surgeon: Samuel Del Valle MD;  Location: [facility] CATH LAB;  Service: Cardiology;  Laterality: N/A;    TREATMENT OF CARDIAC ARRHYTHMIA  12/15/2022    Procedure: Cardioversion or Defibrillation;  Surgeon: Matty Hendricks MD;  Location: [facility] EP LAB;  Service: Cardiology;;     Social History[2]    Past/Current Medical/Surgical History, Past/Current Social History,   Past/Current Family History and Past/Current Medications were reviewed in detail.    Objective:     GENERAL APPEARANCE:     The patient looks comfortable.    No  signs of respiratory distress.    Normal breathing pattern.    No dysmorphic features    Normal eye contact.     GENERAL MEDICAL EXAM:    HEENT:  Head is atraumatic normocephalic.      Neck and Axillae: No JVD. No visible lesions.    Cardiopulmonary: No cyanosis. No tachypnea. Normal respiratory effort.    Gastrointestinal/Urogenital:  No jaundice. No stomas or lesions. No visible hernias. No catheters.     Skin, Hair and Nails: No pathognonomic skin rash. No neurofibromatosis.   No visible lesions.No stigmata of autoimmune disease. No clubbing.    Limbs: No varicose veins. No visible swelling.    Muskoskeletal: No visible deformities.No visible lesions.    Neurological Exam    Eyes ptosis R > L.    Lab Results   Component Value Date    WBC 6.16 06/11/2025    HGB 14.3 06/11/2025    HCT 44.4 06/11/2025    MCV 94 06/11/2025     06/11/2025     Sodium   Date Value Ref Range Status   06/11/2025 139 136 - 145 mmol/L Final   02/18/2025 142 136 - 145 mmol/L Final     Potassium   Date Value Ref Range Status   06/11/2025 4.4 3.5 - 5.1 mmol/L Final   02/18/2025 4.2 3.5 - 5.1 mmol/L Final     Chloride   Date Value Ref Range Status   06/11/2025 97 95 - 110 mmol/L Final   02/18/2025 103 95 - 110 mmol/L Final     CO2   Date Value Ref Range Status   06/11/2025 30 (H) 23 - 29 mmol/L Final   02/18/2025 28 23 - 29 mmol/L Final     Glucose   Date Value Ref Range Status   06/11/2025 77 70 - 110 mg/dL Final   02/18/2025 100 70 - 110 mg/dL Final     BUN   Date Value Ref Range Status   06/11/2025 15 8 - 23 mg/dL Final     Creatinine   Date Value Ref Range Status   06/11/2025 1.3 0.5 - 1.4 mg/dL Final     Calcium   Date Value Ref Range Status   06/11/2025 9.2 8.7 - 10.5 mg/dL Final   02/18/2025 9.8 8.7 - 10.5 mg/dL Final     Protein Total   Date Value Ref Range Status   04/05/2025 7.4 6.0 - 8.4 gm/dL Final     Total Protein   Date Value Ref Range Status   02/18/2025 7.1 6.0 - 8.4 g/dL Final     Albumin   Date Value Ref Range Status  "  04/05/2025 3.7 3.5 - 5.2 g/dL Final   02/18/2025 4.1 3.5 - 5.2 g/dL Final     Total Bilirubin   Date Value Ref Range Status   02/18/2025 0.8 0.1 - 1.0 mg/dL Final     Comment:     For infants and newborns, interpretation of results should be based  on gestational age, weight and in agreement with clinical  observations.    Premature Infant recommended reference ranges:  Up to 24 hours.............<8.0 mg/dL  Up to 48 hours............<12.0 mg/dL  3-5 days..................<15.0 mg/dL  6-29 days.................<15.0 mg/dL       Bilirubin Total   Date Value Ref Range Status   04/05/2025 <0.5 0.1 - 1.0 mg/dL Final     Comment:     For infants and newborns, interpretation of results should be based   on gestational age, weight and in agreement with clinical   observations.    Premature Infant recommended reference ranges:   0-24 hours:  <8.0 mg/dL   24-48 hours: <12.0 mg/dL   3-5 days:    <15.0 mg/dL   6-29 days:   <15.0 mg/dL     Alkaline Phosphatase   Date Value Ref Range Status   02/18/2025 65 40 - 150 U/L Final     ALP   Date Value Ref Range Status   04/05/2025 75 40 - 150 unit/L Final     AST   Date Value Ref Range Status   04/05/2025 58 (H) 11 - 45 unit/L Final   02/18/2025 64 (H) 10 - 40 U/L Final     ALT   Date Value Ref Range Status   04/05/2025 36 10 - 44 unit/L Final   02/18/2025 40 10 - 44 U/L Final     Anion Gap   Date Value Ref Range Status   06/11/2025 12 8 - 16 mmol/L Final     eGFR if    Date Value Ref Range Status   04/12/2022 >60.0 >60 mL/min/1.73 m^2 Final     eGFR if non    Date Value Ref Range Status   04/12/2022 >60.0 >60 mL/min/1.73 m^2 Final     Comment:     Calculation used to obtain the estimated glomerular filtration  rate (eGFR) is the CKD-EPI equation.        No results found for: "KCJEQSWH89"    Lab Results   Component Value Date    TSH 3.448 03/06/2025    FREET4 0.97 02/18/2025     No results found in the last 24 hours.    LABORATORY " "EVALUATION    RADIOLOGY EVALUATION     NEUROPHYSIOLOGY EVALUATION     PATHOLOGY EVALUATION      NEUROCOGNITIVE AND NEUROPSYCHOLOGY EVALUATION     Reviewed the neuroimaging independently     Assessment:   64 Years old C. Male with PMHX as above came of the evaluation of " Legs cramps ".   - Legs cramps.   - PLM.   - Hypothyroidism.   Plan:   - Continue current medication regimen, including Crestor and Baclofen at current dose  - Await Ophthalmology specialist's evaluation for possible eye surgery  - Request Ophthalmology specialist to fax evaluation notes for care coordination  - Contact office through patient portal for new issues or if earlier appointment needed  - Follow up in 3-4 months    CARDIOVASCULAR CONDITIONS:  - Reviewed ongoing health issues including heart conditions.  - Explanation that statins like Crestor can influence muscle function.  - Continued current medication regimen, including Crestor, after previous unsuccessful trial off the medication.    RESPIRATORY CONDITIONS:  - Reviewed ongoing health issues including lung conditions.    MUSCULOSKELETAL DISORDERS:  - Reviewed ongoing health issues including shoulder problems.  - Muscle cramps are now infrequent and mostly occurring at night.  - Continue Baclofen at current dose.    EYE DISORDERS:  - Worsening eye condition, particularly eyelid drooping affecting vision.  - Awaiting ophthalmology specialist's evaluation and recommendations for possible eye surgery.  - Recommend requesting ophthalmology specialist to fax their evaluation notes for comprehensive care coordination.    NEUROMUSCULAR DISORDERS:  - Considered potential myasthenia gravis, recalling previous negative test results including EMG,   with discussion of possibility of later presentation despite previous negative results.    FOLLOW-UP CARE:  - Follow up in 3-4 months.  - Contact the office through the patient portal if any new issues arise or if needed to be seen sooner.     MG: " Acetylcholine / Musk antibodies - negative.  Moderate Eyes drop - still present.     NCS/ EMG - 08/ 2024 - CTS / Radiculopathy.   Patient indicated was Dx'ed with Lumbar Radiculopathy since 2008 - 09 - medical treatment / no Sx / no symptoms at this moment.      Home Sleep study - 08/ 2024 - severe PLM / Severe JIA.  CPaP Titration.     Labs: ESR / CRP / Hgb A 1 C / TSH / CBC / CMP / Lipids panel - 2024 - non significant abnormalities.     MEDICAL/SURGICAL COMORBIDITIES     All relevant medical comorbidities noted and managed by primary care physician and medical care team.      HEALTHY LIFESTYLE AND PREVENTATIVE CARE    The patient to adhere to the age-appropriate health maintenance guidelines including screening tests and vaccinations.   The patient to adhere to  healthy lifestyle, optimal weight, exercise, healthy diet,   good sleep hygiene and avoiding drugs including smoking, alcohol and recreational drugs.    RTC: 4 months.      I spent a total of 20 minutes on the day of the visit.This includes face to face time and non-face to face time preparing to see the patient   (eg, review of tests), obtaining and/or reviewing separately obtained history,   documenting clinical information in the electronic or other health record,   independently interpreting results and communicating results to the patient/family/caregiver, or care coordinator.    Please do not hesitate to contact me with any updates, questions or concerns.    Clyde Nichols MD.  General Neurologist.     This note was generated with the assistance of ambient listening technology. Verbal consent was obtained by the patient and accompanying visitor(s) for the recording of patient appointment to facilitate this note. I attest to having reviewed and edited the generated note for accuracy, though some syntax or spelling errors may persist. Please contact the author of this note for any clarification.   (1) informed of the relationship between the  physician and patient and the respective role of any other health care provider with respect to management of the patient; and (2) notified that he or she may decline to receive medical services by telemedicine and may withdraw from such care at any time.    Notes:              [1]   Current Outpatient Medications:     albuterol (PROVENTIL/VENTOLIN HFA) 90 mcg/actuation inhaler, Inhale 2 puffs into the lungs every 4 (four) hours as needed., Disp: , Rfl:     apixaban (ELIQUIS) 5 mg Tab, TAKE 1 TABLET BY MOUTH TWICE A DAY, Disp: 60 tablet, Rfl: 12    atenoloL (TENORMIN) 25 MG tablet, TAKE 1 TABLET BY MOUTH EVERY DAY, Disp: 90 tablet, Rfl: 3    b complex vitamins tablet, Take 1 tablet by mouth once daily., Disp: , Rfl:     baclofen (LIORESAL) 20 MG tablet, Take 1 tablet (20 mg total) by mouth 2 (two) times daily., Disp: 180 tablet, Rfl: 1    co-enzyme Q-10 30 mg capsule, Take 1 capsule by mouth every morning., Disp: , Rfl:     DULoxetine (CYMBALTA) 60 MG capsule, TAKE 1 CAPSULE BY MOUTH EVERY DAY, Disp: 90 capsule, Rfl: 3    fluticasone-umeclidin-vilanter (TRELEGY ELLIPTA) 200-62.5-25 mcg inhaler, Inhale 1 puff into the lungs once daily., Disp: 180 each, Rfl: 3    furosemide (LASIX) 40 MG tablet, TAKE 1 TABLET BY MOUTH EVERY DAY, Disp: 90 tablet, Rfl: 5    inhalation spacing device (BREATHERITE MDI SPACER), Use as directed for inhalation., Disp: 1 each, Rfl: 0    levothyroxine (SYNTHROID) 50 MCG tablet, Take 1 tablet (50 mcg total) by mouth before breakfast. Take on Monday, Tuesday, Wednesday, Thursday, Friday only., Disp: 66 tablet, Rfl: 1    losartan (COZAAR) 50 MG tablet, Take 1 tablet (50 mg total) by mouth once daily., Disp: 90 tablet, Rfl: 3    magnesium 30 mg Tab, Take 1 tablet by mouth once daily., Disp: , Rfl:     multivitamin (THERAGRAN) per tablet, Take 1 tablet by mouth once daily., Disp: , Rfl:     rosuvastatin (CRESTOR) 20 MG tablet, TAKE 1 TABLET BY MOUTH EVERY DAY IN THE EVENING, Disp: 90 tablet, Rfl:  3    tamsulosin (FLOMAX) 0.4 mg Cap, Take 1 capsule (0.4 mg total) by mouth once daily., Disp: 90 capsule, Rfl: 1    traMADoL (ULTRAM) 50 mg tablet, Take by mouth., Disp: , Rfl:     traZODone (DESYREL) 50 MG tablet, Take 1 tablet by mouth every evening., Disp: , Rfl:     vitamin D (VITAMIN D3) 1000 units Tab, Take 1,000 Units by mouth once daily., Disp: , Rfl:   [2]   Social History  Socioeconomic History    Marital status:    Occupational History    Occupation: Retired    Tobacco Use    Smoking status: Never    Smokeless tobacco: Never   Substance and Sexual Activity    Alcohol use: No    Drug use: No    Sexual activity: Not Currently     Partners: Female     Birth control/protection: None     Social Drivers of Health     Financial Resource Strain: Low Risk  (3/15/2025)    Overall Financial Resource Strain (CARDIA)     Difficulty of Paying Living Expenses: Not hard at all   Food Insecurity: No Food Insecurity (3/15/2025)    Hunger Vital Sign     Worried About Running Out of Food in the Last Year: Never true     Ran Out of Food in the Last Year: Never true   Transportation Needs: No Transportation Needs (3/15/2025)    PRAPARE - Transportation     Lack of Transportation (Medical): No     Lack of Transportation (Non-Medical): No   Physical Activity: Unknown (3/15/2025)    Exercise Vital Sign     Days of Exercise per Week: 0 days   Stress: No Stress Concern Present (3/15/2025)    [employer] [place] [employer] - Occupational Stress Questionnaire     Feeling of Stress : Only a little   Housing Stability: Low Risk  (3/15/2025)    Housing Stability Vital Sign     Unable to Pay for Housing in the Last Year: No     Number of Times Moved in the Last Year: 0     Homeless in the Last Year: No

## 2025-08-14 ENCOUNTER — PATIENT OUTREACH (OUTPATIENT)
Dept: ADMINISTRATIVE | Facility: HOSPITAL | Age: 65
End: 2025-08-14
Payer: MEDICARE

## 2025-08-14 DIAGNOSIS — N18.9 CHRONIC KIDNEY DISEASE, UNSPECIFIED CKD STAGE: Primary | ICD-10-CM

## 2025-08-18 ENCOUNTER — CLINICAL SUPPORT (OUTPATIENT)
Dept: PULMONOLOGY | Facility: CLINIC | Age: 65
End: 2025-08-18
Attending: INTERNAL MEDICINE
Payer: MEDICARE

## 2025-08-18 VITALS
HEIGHT: 70 IN | WEIGHT: 205 LBS | DIASTOLIC BLOOD PRESSURE: 70 MMHG | HEART RATE: 58 BPM | SYSTOLIC BLOOD PRESSURE: 100 MMHG | BODY MASS INDEX: 29.35 KG/M2 | RESPIRATION RATE: 21 BRPM | OXYGEN SATURATION: 94 %

## 2025-08-18 DIAGNOSIS — J44.9 MIXED RESTRICTIVE AND OBSTRUCTIVE LUNG DISEASE: ICD-10-CM

## 2025-08-18 DIAGNOSIS — Z01.811 PREOP RESPIRATORY EXAM: ICD-10-CM

## 2025-08-18 DIAGNOSIS — G47.33 SEVERE OBSTRUCTIVE SLEEP APNEA: ICD-10-CM

## 2025-08-18 DIAGNOSIS — Z71.89 ENCOUNTER FOR BIPAP USE COUNSELING: ICD-10-CM

## 2025-08-18 DIAGNOSIS — I27.20 PULMONARY HYPERTENSION: Primary | ICD-10-CM

## 2025-08-18 DIAGNOSIS — I38 VALVULAR HEART DISEASE: ICD-10-CM

## 2025-08-18 DIAGNOSIS — J98.4 MIXED RESTRICTIVE AND OBSTRUCTIVE LUNG DISEASE: ICD-10-CM

## 2025-08-18 DIAGNOSIS — G47.33 OSA TREATED WITH BIPAP: ICD-10-CM

## 2025-08-18 DIAGNOSIS — G47.33 CONTROLLED NONFAMILIAL OBSTRUCTIVE SLEEP APNEA: ICD-10-CM

## 2025-08-18 LAB
BRPFT: ABNORMAL
DLCO SINGLE BREATH LLN: 21.24
DLCO SINGLE BREATH PRE REF: 68.2 %
DLCO SINGLE BREATH REF: 28.17
DLCOC SBVA LLN: 2.79
DLCOC SBVA REF: 3.95
DLCOC SINGLE BREATH LLN: 21.24
DLCOC SINGLE BREATH REF: 28.17
DLCOVA LLN: 2.79
DLCOVA PRE REF: 120.4 %
DLCOVA PRE: 4.76 ML/(MIN*MMHG*L) (ref 2.79–5.12)
DLCOVA REF: 3.95
FEF 25 75 CHG: 20.1 %
FEF 25 75 LLN: 1.23
FEF 25 75 POST REF: 110.9 %
FEF 25 75 PRE REF: 92.3 %
FEF 25 75 REF: 2.67
FET100 CHG: -1.3 %
FEV1 CHG: -2.8 %
FEV1 FVC CHG: 4.8 %
FEV1 FVC LLN: 64
FEV1 FVC POST REF: 111 %
FEV1 FVC PRE REF: 105.9 %
FEV1 FVC REF: 77
FEV1 LLN: 2.49
FEV1 POST REF: 68.7 %
FEV1 PRE REF: 70.6 %
FEV1 REF: 3.38
FVC CHG: -7.3 %
FVC LLN: 3.32
FVC POST REF: 61.7 %
FVC PRE REF: 66.5 %
FVC REF: 4.42
IVC PRE: 2.79 L (ref 3.32–5.54)
IVC SINGLE BREATH LLN: 3.32
IVC SINGLE BREATH PRE REF: 63.2 %
IVC SINGLE BREATH REF: 4.42
PEF CHG: 0.7 %
PEF LLN: 6.51
PEF POST REF: 102.1 %
PEF PRE REF: 101.3 %
PEF REF: 8.82
POST FEF 25 75: 2.97 L/S (ref 1.23–4.67)
POST FET 100: 6.64 SEC
POST FEV1 FVC: 85.05 % (ref 63.88–87.84)
POST FEV1: 2.32 L (ref 2.49–4.21)
POST FVC: 2.73 L (ref 3.32–5.54)
POST PEF: 9.01 L/S (ref 6.51–11.14)
PRE DLCO: 19.2 ML/(MIN*MMHG) (ref 21.24–35.1)
PRE FEF 25 75: 2.47 L/S (ref 1.23–4.67)
PRE FET 100: 6.73 SEC
PRE FEV1 FVC: 81.14 % (ref 63.88–87.84)
PRE FEV1: 2.39 L (ref 2.49–4.21)
PRE FVC: 2.94 L (ref 3.32–5.54)
PRE PEF: 8.94 L/S (ref 6.51–11.14)
VA PRE: 4.03 L (ref 6.98–6.98)
VA SINGLE BREATH LLN: 6.98
VA SINGLE BREATH PRE REF: 57.8 %
VA SINGLE BREATH REF: 6.98

## 2025-08-18 PROCEDURE — 99999 PR PBB SHADOW E&M-EST. PATIENT-LVL V: CPT | Mod: PBBFAC,,, | Performed by: INTERNAL MEDICINE

## 2025-08-18 PROCEDURE — 94729 DIFFUSING CAPACITY: CPT | Mod: S$GLB,,, | Performed by: INTERNAL MEDICINE

## 2025-08-18 PROCEDURE — 99214 OFFICE O/P EST MOD 30 MIN: CPT | Mod: 25,S$GLB,, | Performed by: INTERNAL MEDICINE

## 2025-08-18 PROCEDURE — 3288F FALL RISK ASSESSMENT DOCD: CPT | Mod: CPTII,S$GLB,, | Performed by: INTERNAL MEDICINE

## 2025-08-18 PROCEDURE — 3008F BODY MASS INDEX DOCD: CPT | Mod: CPTII,S$GLB,, | Performed by: INTERNAL MEDICINE

## 2025-08-18 PROCEDURE — 1101F PT FALLS ASSESS-DOCD LE1/YR: CPT | Mod: CPTII,S$GLB,, | Performed by: INTERNAL MEDICINE

## 2025-08-18 PROCEDURE — 94060 EVALUATION OF WHEEZING: CPT | Mod: S$GLB,,, | Performed by: INTERNAL MEDICINE

## 2025-08-18 PROCEDURE — 3078F DIAST BP <80 MM HG: CPT | Mod: CPTII,S$GLB,, | Performed by: INTERNAL MEDICINE

## 2025-08-18 PROCEDURE — 4010F ACE/ARB THERAPY RXD/TAKEN: CPT | Mod: CPTII,S$GLB,, | Performed by: INTERNAL MEDICINE

## 2025-08-18 PROCEDURE — 1159F MED LIST DOCD IN RCRD: CPT | Mod: CPTII,S$GLB,, | Performed by: INTERNAL MEDICINE

## 2025-08-18 PROCEDURE — 1160F RVW MEDS BY RX/DR IN RCRD: CPT | Mod: CPTII,S$GLB,, | Performed by: INTERNAL MEDICINE

## 2025-08-18 PROCEDURE — 3074F SYST BP LT 130 MM HG: CPT | Mod: CPTII,S$GLB,, | Performed by: INTERNAL MEDICINE

## 2025-08-19 ENCOUNTER — PATIENT MESSAGE (OUTPATIENT)
Dept: PULMONOLOGY | Facility: CLINIC | Age: 65
End: 2025-08-19
Payer: MEDICARE

## 2025-08-19 ENCOUNTER — LAB VISIT (OUTPATIENT)
Dept: LAB | Facility: HOSPITAL | Age: 65
End: 2025-08-19
Attending: PHYSICIAN ASSISTANT
Payer: MEDICARE

## 2025-08-19 DIAGNOSIS — E03.1 CONGENITAL HYPOTHYROIDISM WITHOUT GOITER: ICD-10-CM

## 2025-08-19 DIAGNOSIS — I50.32 CHRONIC DIASTOLIC HEART FAILURE: ICD-10-CM

## 2025-08-19 DIAGNOSIS — I10 ESSENTIAL HYPERTENSION: ICD-10-CM

## 2025-08-19 DIAGNOSIS — N18.9 CHRONIC KIDNEY DISEASE, UNSPECIFIED CKD STAGE: ICD-10-CM

## 2025-08-19 LAB
ALBUMIN SERPL BCP-MCNC: 4.3 G/DL (ref 3.5–5.2)
ALBUMIN/CREAT UR: 19 UG/MG
ALP SERPL-CCNC: 65 UNIT/L (ref 40–150)
ALT SERPL W/O P-5'-P-CCNC: 50 UNIT/L (ref 0–55)
ANION GAP (OHS): 11 MMOL/L (ref 8–16)
AST SERPL-CCNC: 63 UNIT/L (ref 0–50)
BILIRUB SERPL-MCNC: 0.7 MG/DL (ref 0.1–1)
BUN SERPL-MCNC: 15 MG/DL (ref 8–23)
CALCIUM SERPL-MCNC: 9.3 MG/DL (ref 8.7–10.5)
CHLORIDE SERPL-SCNC: 101 MMOL/L (ref 95–110)
CHOLEST SERPL-MCNC: 176 MG/DL (ref 120–199)
CHOLEST/HDLC SERPL: 2.5 {RATIO} (ref 2–5)
CO2 SERPL-SCNC: 30 MMOL/L (ref 23–29)
CREAT SERPL-MCNC: 1.4 MG/DL (ref 0.5–1.4)
CREAT UR-MCNC: 79 MG/DL (ref 23–375)
GFR SERPLBLD CREATININE-BSD FMLA CKD-EPI: 56 ML/MIN/1.73/M2
GLUCOSE SERPL-MCNC: 104 MG/DL (ref 70–110)
HDLC SERPL-MCNC: 71 MG/DL (ref 40–75)
HDLC SERPL: 40.3 % (ref 20–50)
LDLC SERPL CALC-MCNC: 93.4 MG/DL (ref 63–159)
MICROALBUMIN UR-MCNC: 15 UG/ML
NONHDLC SERPL-MCNC: 105 MG/DL
POTASSIUM SERPL-SCNC: 3.8 MMOL/L (ref 3.5–5.1)
PROT SERPL-MCNC: 7.2 GM/DL (ref 6–8.4)
SODIUM SERPL-SCNC: 142 MMOL/L (ref 136–145)
T4 FREE SERPL-MCNC: 0.94 NG/DL (ref 0.71–1.51)
TRIGL SERPL-MCNC: 58 MG/DL (ref 30–150)
TSH SERPL-ACNC: 3.53 UIU/ML (ref 0.4–4)

## 2025-08-19 PROCEDURE — 82570 ASSAY OF URINE CREATININE: CPT

## 2025-08-19 PROCEDURE — 80053 COMPREHEN METABOLIC PANEL: CPT

## 2025-08-19 PROCEDURE — 84439 ASSAY OF FREE THYROXINE: CPT

## 2025-08-19 PROCEDURE — 84443 ASSAY THYROID STIM HORMONE: CPT

## 2025-08-19 PROCEDURE — 36415 COLL VENOUS BLD VENIPUNCTURE: CPT | Mod: PO

## 2025-08-19 PROCEDURE — 80061 LIPID PANEL: CPT

## 2025-08-20 ENCOUNTER — TELEPHONE (OUTPATIENT)
Dept: CARDIOLOGY | Facility: HOSPITAL | Age: 65
End: 2025-08-20
Payer: MEDICARE

## 2025-08-20 ENCOUNTER — PATIENT MESSAGE (OUTPATIENT)
Dept: CARDIOLOGY | Facility: CLINIC | Age: 65
End: 2025-08-20
Payer: MEDICARE

## 2025-08-20 DIAGNOSIS — I48.0 PAROXYSMAL ATRIAL FIBRILLATION: ICD-10-CM

## 2025-08-20 RX ORDER — APIXABAN 5 MG/1
5 TABLET, FILM COATED ORAL 2 TIMES DAILY
Qty: 60 TABLET | Refills: 12 | Status: SHIPPED | OUTPATIENT
Start: 2025-08-20

## 2025-08-26 ENCOUNTER — OFFICE VISIT (OUTPATIENT)
Dept: INTERNAL MEDICINE | Facility: CLINIC | Age: 65
End: 2025-08-26
Payer: MEDICARE

## 2025-08-26 ENCOUNTER — LAB VISIT (OUTPATIENT)
Dept: LAB | Facility: HOSPITAL | Age: 65
End: 2025-08-26
Attending: FAMILY MEDICINE
Payer: MEDICARE

## 2025-08-26 VITALS
RESPIRATION RATE: 16 BRPM | HEART RATE: 65 BPM | DIASTOLIC BLOOD PRESSURE: 72 MMHG | TEMPERATURE: 98 F | OXYGEN SATURATION: 95 % | SYSTOLIC BLOOD PRESSURE: 114 MMHG | WEIGHT: 207.25 LBS | BODY MASS INDEX: 29.73 KG/M2

## 2025-08-26 DIAGNOSIS — R79.89 ELEVATED LFTS: ICD-10-CM

## 2025-08-26 DIAGNOSIS — E03.9 ACQUIRED HYPOTHYROIDISM: ICD-10-CM

## 2025-08-26 DIAGNOSIS — I10 ESSENTIAL HYPERTENSION: Primary | ICD-10-CM

## 2025-08-26 PROBLEM — M19.012 OSTEOARTHRITIS OF BOTH GLENOHUMERAL JOINTS: Status: ACTIVE | Noted: 2025-07-29

## 2025-08-26 PROBLEM — M19.011 OSTEOARTHRITIS OF BOTH GLENOHUMERAL JOINTS: Status: ACTIVE | Noted: 2025-07-29

## 2025-08-26 PROCEDURE — 3074F SYST BP LT 130 MM HG: CPT | Mod: CPTII,S$GLB,, | Performed by: FAMILY MEDICINE

## 2025-08-26 PROCEDURE — 3061F NEG MICROALBUMINURIA REV: CPT | Mod: CPTII,S$GLB,, | Performed by: FAMILY MEDICINE

## 2025-08-26 PROCEDURE — 99214 OFFICE O/P EST MOD 30 MIN: CPT | Mod: S$GLB,,, | Performed by: FAMILY MEDICINE

## 2025-08-26 PROCEDURE — 3008F BODY MASS INDEX DOCD: CPT | Mod: CPTII,S$GLB,, | Performed by: FAMILY MEDICINE

## 2025-08-26 PROCEDURE — 3078F DIAST BP <80 MM HG: CPT | Mod: CPTII,S$GLB,, | Performed by: FAMILY MEDICINE

## 2025-08-26 PROCEDURE — 4010F ACE/ARB THERAPY RXD/TAKEN: CPT | Mod: CPTII,S$GLB,, | Performed by: FAMILY MEDICINE

## 2025-08-26 PROCEDURE — 3288F FALL RISK ASSESSMENT DOCD: CPT | Mod: CPTII,S$GLB,, | Performed by: FAMILY MEDICINE

## 2025-08-26 PROCEDURE — 99999 PR PBB SHADOW E&M-EST. PATIENT-LVL V: CPT | Mod: PBBFAC,,, | Performed by: FAMILY MEDICINE

## 2025-08-26 PROCEDURE — 3044F HG A1C LEVEL LT 7.0%: CPT | Mod: CPTII,S$GLB,, | Performed by: FAMILY MEDICINE

## 2025-08-26 PROCEDURE — 3066F NEPHROPATHY DOC TX: CPT | Mod: CPTII,S$GLB,, | Performed by: FAMILY MEDICINE

## 2025-08-26 PROCEDURE — 1101F PT FALLS ASSESS-DOCD LE1/YR: CPT | Mod: CPTII,S$GLB,, | Performed by: FAMILY MEDICINE

## 2025-08-26 PROCEDURE — 1160F RVW MEDS BY RX/DR IN RCRD: CPT | Mod: CPTII,S$GLB,, | Performed by: FAMILY MEDICINE

## 2025-08-26 PROCEDURE — 1159F MED LIST DOCD IN RCRD: CPT | Mod: CPTII,S$GLB,, | Performed by: FAMILY MEDICINE

## 2025-09-02 ENCOUNTER — CLINICAL SUPPORT (OUTPATIENT)
Dept: PULMONOLOGY | Facility: CLINIC | Age: 65
End: 2025-09-02
Payer: MEDICARE

## 2025-09-02 VITALS — BODY MASS INDEX: 29.67 KG/M2 | WEIGHT: 207.25 LBS | HEIGHT: 70 IN

## 2025-09-02 DIAGNOSIS — I38 VALVULAR HEART DISEASE: ICD-10-CM

## 2025-09-02 DIAGNOSIS — I27.20 PULMONARY HYPERTENSION: ICD-10-CM

## (undated) DEVICE — PAD CAST SPECIALIST STRL 4

## (undated) DEVICE — ANGIOTOUCH KIT

## (undated) DEVICE — SUT ETHICON 3-0 BLK MONO PS

## (undated) DEVICE — DRAPE THREE-QTR REINF 53X77IN

## (undated) DEVICE — GAUZE CNFRM STRL 4INX4.1YD

## (undated) DEVICE — SUT 4-0 ETHILON 18 PS-2

## (undated) DEVICE — GLOVE SURG BIOGEL LATEX SZ 7.5

## (undated) DEVICE — DRESSING SPONGE 16PLY 4X4 NS

## (undated) DEVICE — GUIDEWIRE WHOLEY HI TORQ 175CM

## (undated) DEVICE — PACK BASIC SETUP SC BR

## (undated) DEVICE — SHEATH HEMOSTASIS 8.5FR

## (undated) DEVICE — SYR IRRIGATION BULB STER 60ML

## (undated) DEVICE — OMNIPAQUE 300MG 150ML VIAL

## (undated) DEVICE — CATH JR4 5FR

## (undated) DEVICE — CATH INFINITI MP JL3.5 JR4 5FR

## (undated) DEVICE — UNDERGLOVES BIOGEL PI SIZE 8

## (undated) DEVICE — CATH INFINITI MULTIPAK JR4 5FR

## (undated) DEVICE — CATH CV QD LUMN 6FRX110CM

## (undated) DEVICE — KIT GLIDESHEATH SLEND 6FR 10CM

## (undated) DEVICE — SUT VICRYL PLUS 0 CT1 36IN

## (undated) DEVICE — COVER LIGHT HANDLE 80/CA

## (undated) DEVICE — BANDAGE MATRIX HK LOOP 4IN 5YD

## (undated) DEVICE — GUIDEWIRE EMERALD .035IN 260CM

## (undated) DEVICE — KIT ENSITE ELECTRODE SURFACE

## (undated) DEVICE — MANIFOLD 4 PORT

## (undated) DEVICE — PACK HEART CATH BR

## (undated) DEVICE — BAND TR COMP DEVICE REG 24CM

## (undated) DEVICE — NDL ECLIPSE SAF REG 25GX1.5IN

## (undated) DEVICE — APPLICATOR CHLORAPREP ORN 26ML

## (undated) DEVICE — BLADE SCALP OPHTL RND TIP

## (undated) DEVICE — SYR LUER LOCK STERILE 10ML

## (undated) DEVICE — DECANTER 6 VIAL

## (undated) DEVICE — 2.0 DRILL

## (undated) DEVICE — CATH SAFIRE BI-DIR 7FR LRG

## (undated) DEVICE — R CATH BIDIRECTIONL DF CRV 7FR

## (undated) DEVICE — PAD RADIOLUCENT STAT ADULT

## (undated) DEVICE — INTRODUCER HEMOSTASIS 7.5F

## (undated) DEVICE — DRAPE T EXTRM SURG 121X128X90

## (undated) DEVICE — R CATH TRICSPD HALO XP 7FRX110

## (undated) DEVICE — BANDAGE ESMARK ELASTIC ST 4X9

## (undated) DEVICE — BURR CARBIDE OVAL STERILE 4MM

## (undated) DEVICE — DRAPE ANGIO BRACH 38X44IN

## (undated) DEVICE — BNDG COFLEX FOAM LF2 ST 3X5YD

## (undated) DEVICE — CATH JL3.5 5FR

## (undated) DEVICE — CATH PIG145 INFINITI 5X110CM

## (undated) DEVICE — KIT MANIFOLD LOW PRESS TUBING

## (undated) DEVICE — INTRO 8.5FR 63CM SRO

## (undated) DEVICE — SUT VICRYL CTD 2-0 GI 27 SH

## (undated) DEVICE — PAD ABDOMINAL STERILE 8X10IN

## (undated) DEVICE — YANKAUER FLEX NO VENT REG CAP

## (undated) DEVICE — PAD GROUND UNIV STYLE CORD 9IN

## (undated) DEVICE — UNDERPAD DELUXE FLUFF 30X30IN

## (undated) DEVICE — PAD CAST SPECIALIST STRL 6

## (undated) DEVICE — PAD DEFIB CADENCE ADULT R2

## (undated) DEVICE — ELECTRODE REM PLYHSV RETURN 9

## (undated) DEVICE — PACK EP DRAPE OMC

## (undated) DEVICE — KIT PROBE COVER WITH GEL

## (undated) DEVICE — SUT VICRYL 3-0 27 SH

## (undated) DEVICE — PAD HEARTSTART DEFIB ADULT

## (undated) DEVICE — BLADE SURG #15 CARBON STEEL

## (undated) DEVICE — BANDAGE MATRIX HK LOOP 6IN 5YD

## (undated) DEVICE — KIT SYR REUSABLE

## (undated) DEVICE — BANDAGE ROLL COTTN 4.5INX4.1YD